# Patient Record
Sex: FEMALE | Race: WHITE | Employment: OTHER | URBAN - METROPOLITAN AREA
[De-identification: names, ages, dates, MRNs, and addresses within clinical notes are randomized per-mention and may not be internally consistent; named-entity substitution may affect disease eponyms.]

---

## 2017-10-17 ENCOUNTER — HOSPITAL ENCOUNTER (OUTPATIENT)
Dept: WOMENS IMAGING | Age: 52
Discharge: HOME | End: 2017-10-17
Attending: OBSTETRICS & GYNECOLOGY | Admitting: OBSTETRICS & GYNECOLOGY

## 2017-10-17 DIAGNOSIS — Z12.31 ENCOUNTER FOR SCREENING MAMMOGRAM FOR BREAST CANCER: ICD-10-CM

## 2017-11-16 ENCOUNTER — HOSPITAL ENCOUNTER (OUTPATIENT)
Dept: WOMENS IMAGING | Age: 52
Discharge: HOME | End: 2017-11-16
Admitting: INTERNAL MEDICINE

## 2017-11-16 DIAGNOSIS — R92.8 ABNORMAL MAMMOGRAM: ICD-10-CM

## 2017-11-16 DIAGNOSIS — R92.8 OTHER ABNORMAL AND INCONCLUSIVE FINDINGS ON DIAGNOSTIC IMAGING OF BREAST: ICD-10-CM

## 2017-11-28 ENCOUNTER — PAT TELEPHONE (OUTPATIENT)
Dept: PREADMISSION TESTING | Age: 52
End: 2017-11-28

## 2017-11-28 VITALS — HEIGHT: 60 IN | WEIGHT: 186 LBS | BODY MASS INDEX: 36.52 KG/M2

## 2017-11-28 ASSESSMENT — PAIN - FUNCTIONAL ASSESSMENT: PAIN_FUNCTIONAL_ASSESSMENT: 0-10

## 2017-11-28 NOTE — PRE-PROCEDURE INSTRUCTIONS
[facility] ([place] time_____1100_______        Surgery time___1245_________    Take the following medications with a sip of water:  Cynbalta, losartan, famotidine    Do not eat or drink anything after 12:00 midnight prior to your surgery. This includes water chewing gum, mints and ice chips. You may brush your teeth and gargle the morning of your surgery, but do not swallow the water     Please see your family doctor/pediatrician for a history and physical and/or concerning medications. Bring any test results/reports from your physicians office. If you are under the care of a heart doctor or specialist doctor, please be aware that you may be asked to them for clearance    You may be asked to stop blood thinners such as Coumadin, Plavix, Fragmin, Lovenox, etc., or any anti-inflammatories such as:  Aspirin, Ibuprofen, Advil, Naproxen prior to your surgery. We also ask that you stop any OTC medications such as fish oil, vitamin E, glucosamine, garlic, Multivitamins, COQ 10, etc.    We ask that you do not smoke 24 hours prior to surgery  We ask that you do not  drink any alcoholic beverages 24 hours prior to surgery     You must make arrangements for a responsible adult to take you home after your surgery. For your safety you will not be allowed to leave alone or drive yourself home. Your surgery will be cancelled if you do not have a ride home. Also for your safety, it is strongly suggested that someone stay with you the first 24 hours after your surgery. A parent or legal guardian must accompany a child scheduled for surgery and plan to stay at the hospital until the child is discharged. Please do not bring other children with you. For your comfort, please wear simple loose fitting clothing to the hospital.  Please do not bring valuables.     Do not wear any make-up or nail polish on your fingers or toes      For your safety, please do not wear any

## 2017-11-28 NOTE — PROGRESS NOTES
C-Difficile admission screening and protocol:     * Admitted with diarrhea? YES____    NO__X___     *Prior history of C-Diff. In last 3 months? YES____   NO__X___     *Antibiotic use in the past 6-8 weeks? NO___X___YES______                 If yes which  ANTIBIOTIC AND REASON______     *Prior hospitalization or nursing home in the last month?  YES____   NO___X_

## 2017-11-30 ENCOUNTER — HOSPITAL ENCOUNTER (OUTPATIENT)
Dept: ENDOSCOPY | Age: 52
Discharge: HOME | End: 2017-11-30
Attending: INTERNAL MEDICINE | Admitting: INTERNAL MEDICINE

## 2017-11-30 VITALS
TEMPERATURE: 97.8 F | HEART RATE: 75 BPM | SYSTOLIC BLOOD PRESSURE: 153 MMHG | DIASTOLIC BLOOD PRESSURE: 95 MMHG | OXYGEN SATURATION: 95 % | RESPIRATION RATE: 16 BRPM

## 2017-11-30 LAB
GLUCOSE BLD-MCNC: 164 MG/DL (ref 70–99)
GLUCOSE BLD-MCNC: 176 MG/DL (ref 70–99)
PERFORMED ON: ABNORMAL
PERFORMED ON: ABNORMAL

## 2017-11-30 RX ORDER — FENTANYL CITRATE 50 UG/ML
25 INJECTION, SOLUTION INTRAMUSCULAR; INTRAVENOUS EVERY 5 MIN PRN
Status: DISCONTINUED | OUTPATIENT
Start: 2017-11-30 | End: 2017-12-01 | Stop reason: HOSPADM

## 2017-11-30 RX ORDER — SODIUM CHLORIDE 0.9 % (FLUSH) 0.9 %
10 SYRINGE (ML) INJECTION EVERY 12 HOURS SCHEDULED
Status: DISCONTINUED | OUTPATIENT
Start: 2017-11-30 | End: 2017-12-01 | Stop reason: HOSPADM

## 2017-11-30 RX ORDER — SODIUM CHLORIDE 0.9 % (FLUSH) 0.9 %
10 SYRINGE (ML) INJECTION PRN
Status: DISCONTINUED | OUTPATIENT
Start: 2017-11-30 | End: 2017-12-01 | Stop reason: HOSPADM

## 2017-11-30 RX ORDER — ONDANSETRON 2 MG/ML
4 INJECTION INTRAMUSCULAR; INTRAVENOUS
Status: ACTIVE | OUTPATIENT
Start: 2017-11-30 | End: 2017-11-30

## 2017-11-30 RX ORDER — PROMETHAZINE HYDROCHLORIDE 25 MG/ML
6.25 INJECTION, SOLUTION INTRAMUSCULAR; INTRAVENOUS
Status: ACTIVE | OUTPATIENT
Start: 2017-11-30 | End: 2017-11-30

## 2017-11-30 RX ORDER — SODIUM CHLORIDE 9 MG/ML
INJECTION, SOLUTION INTRAVENOUS CONTINUOUS
Status: DISCONTINUED | OUTPATIENT
Start: 2017-11-30 | End: 2017-12-01 | Stop reason: HOSPADM

## 2017-11-30 ASSESSMENT — PAIN SCALES - GENERAL: PAINLEVEL_OUTOF10: 0

## 2017-11-30 ASSESSMENT — ENCOUNTER SYMPTOMS: SHORTNESS OF BREATH: 1

## 2017-11-30 ASSESSMENT — LIFESTYLE VARIABLES: SMOKING_STATUS: 0

## 2017-11-30 NOTE — OP NOTE
The prep was {Desc; excellent/good/fair:78688}. The ileocecal valve was identified and intubated. The scope was then withdrawn back through the cecum, ascending, transverse, descending and sigmoid colons. Careful circumferential examination of the mucosa in these areas was performed. The scope was then withdrawn into the rectum and retroflexed. The scope was straightened, the colon was decompressed and the scope was withdrawn from the patient. Findings:  Ileum:  ***  Colon:  ***  Retroflexed view of the rectum: ***    The patient tolerated the procedure well and was taken to Recovery in good condition. No complications. EBL: ***  Specimens taken: ***    Impression:    1. ***      Recommendations:   1. Clear liquid diet, advance as tolerated.   2.  ***    Daysi Olson MD  [address] [facility]

## 2017-11-30 NOTE — ANESTHESIA PRE-OP
[facility] Department of Anesthesiology  Pre-Anesthesia Evaluation/Consultation       Name:  Dejan Qiu  : 1965  Age:  47 y.o.                                            MRN:  [id number]  Date: 2017           Procedure (Scheduled):  colonoscopy  Surgeon:  Dr. Mary Ann Mane     Allergies   Allergen Reactions    Latex Itching and Rash    Tramadol Hives and Shortness Of Breath    Codeine Itching     Patient Active Problem List   Diagnosis    Labral tear of shoulder    Acromioclavicular joint arthritis    Rotator cuff tendonitis    S/P shoulder surgery     Past Medical History:   Diagnosis Date    Arthritis     Depression     Diabetes mellitus ([address]     REHMAN (dyspnea on exertion)     Fatty liver     GERD (gastroesophageal reflux disease)     Hyperlipidemia     Hypertension     Stress incontinence     Thyroid disease     CYST ON THYROID--FOUND 20 YEARS AGO     Past Surgical History:   Procedure Laterality Date     SECTION      TIMES 3    CHOLECYSTECTOMY      EYE SURGERY      muscle correction and eyelid     HERNIA REPAIR      UMBILICAL AREA x 2    HYSTERECTOMY      SHOULDER ARTHROSCOPY Right 5-19-15    Right shoulder arthroscopy labral debridement open [place] subacromial decompression and chrondroplasty     Social History   Substance Use Topics    Smoking status: Former Smoker     Packs/day: 1.00     Years: 30.00     Types: Cigarettes     Quit date:     Smokeless tobacco: Never Used    Alcohol use No     Medications  Current Outpatient Prescriptions on File Prior to [employer]   Medication Sig Dispense Refill    gabapentin (NEURONTIN) 400 MG capsule TAKE 1 CAPSULE BY MOUTH 3 TIMES DAILY 90 capsule 3    losartan (COZAAR) 100 MG tablet TAKE 1 TABLET BY MOUTH DAILY 30 tablet 3    DULoxetine (CYMBALTA) 60 MG extended release capsule TAKE 1 CAPSULE BY MOUTH DAILY 30 capsule 2    MIRALAX powder TAKE 17 G BY MOUTH DAILY (Patient taking differently: TAKE 17 G BY MOUTH NIGHTLY) 510 g 2    Dulaglutide 0.75 MG/0.5ML SOPN Inject 0.75 mg into the skin once a week 5 Pen 3    ondansetron (ZOFRAN) 4 MG tablet Take 1 tablet by mouth daily as needed for Nausea or Vomiting 30 tablet 0    HYDROcodone-acetaminophen (NORCO) 5-325 MG per tablet Take 1 tablet by mouth 2 times daily . 60 tablet 0    diazepam (VALIUM) 5 MG tablet Take 1 tablet by mouth 2 times daily 60 tablet 2    atorvastatin (LIPITOR) 20 MG tablet TAKE 1 TABLET BY MOUTH DAILY 30 tablet 2    HUMALOG 100 UNIT/ML injection vial INJECT 10 UNITS 3 TIMES A DAY 10 mL 2    famotidine (PEPCID) 20 MG tablet TAKE 1 TAB BY MOUTH 2X DAILY 60 tablet 3    cyclobenzaprine (FLEXERIL) 10 MG tablet TAKE 1 TABLET 3 TIMES A DAY (Patient taking differently: TAKE 1 TABLET 3 TIMES A DAY AS NEEDED) 90 tablet 1    linaclotide (LINZESS) 145 MCG capsule Take 1 capsule by mouth every morning (before breakfast) (Patient taking differently: Take 145 mcg by mouth daily as needed ) 30 capsule 1    cetirizine (ZYRTEC ALLERGY) 10 MG tablet Take 1 tablet by mouth daily (Patient taking differently: Take 10 mg by mouth daily as needed ) 10 tablet 0    insulin glargine (LANTUS) 100 UNIT/ML injection Inject 74 Units into the skin nightly        No current facility-administered medications on file prior to encounter.       Current Outpatient Prescriptions   Medication Sig Dispense Refill    gabapentin (NEURONTIN) 400 MG capsule TAKE 1 CAPSULE BY MOUTH 3 TIMES DAILY 90 capsule 3    losartan (COZAAR) 100 MG tablet TAKE 1 TABLET BY MOUTH DAILY 30 tablet 3    DULoxetine (CYMBALTA) 60 MG extended release capsule TAKE 1 CAPSULE BY MOUTH DAILY 30 capsule 2    MIRALAX powder TAKE 17 G BY MOUTH DAILY (Patient taking differently: TAKE 17 G BY MOUTH NIGHTLY) 510 g 2    Dulaglutide 0.75 MG/0.5ML SOPN Inject 0.75 mg into the skin once a week 5 Pen 3    ondansetron (ZOFRAN) 4 MG tablet Take 1 tablet by mouth daily as needed for Nausea or Vomiting 30 tablet 0    HYDROcodone-acetaminophen (NORCO) 5-325 MG per tablet Take 1 tablet by mouth 2 times daily . 60 tablet 0    diazepam (VALIUM) 5 MG tablet Take 1 tablet by mouth 2 times daily 60 tablet 2    atorvastatin (LIPITOR) 20 MG tablet TAKE 1 TABLET BY MOUTH DAILY 30 tablet 2    HUMALOG 100 UNIT/ML injection vial INJECT 10 UNITS 3 TIMES A DAY 10 mL 2    famotidine (PEPCID) 20 MG tablet TAKE 1 TAB BY MOUTH 2X DAILY 60 tablet 3    cyclobenzaprine (FLEXERIL) 10 MG tablet TAKE 1 TABLET 3 TIMES A DAY (Patient taking differently: TAKE 1 TABLET 3 TIMES A DAY AS NEEDED) 90 tablet 1    linaclotide (LINZESS) 145 MCG capsule Take 1 capsule by mouth every morning (before breakfast) (Patient taking differently: Take 145 mcg by mouth daily as needed ) 30 capsule 1    cetirizine (ZYRTEC ALLERGY) 10 MG tablet Take 1 tablet by mouth daily (Patient taking differently: Take 10 mg by mouth daily as needed ) 10 tablet 0    insulin glargine (LANTUS) 100 UNIT/ML injection Inject 74 Units into the skin nightly        Current Facility-Administered Medications   Medication Dose Route Frequency Provider Last Rate Last Dose    sodium chloride flush 0.9 % injection 10 mL  10 mL Intravenous 2 times per day Yann Elias MD        sodium chloride flush 0.9 % injection 10 mL  10 mL Intravenous PRN Yann Elias MD        0.9 % sodium chloride infusion   Intravenous Continuous Yann Elias MD         Vital Signs (Current) There were no vitals filed for this visit. Vital Signs Statistics (for past 48 hrs)     No Data Recorded    BP Readings from Last 3 Encounters:   11/09/17 (!) 160/90   09/27/17 (!) 165/100   07/07/17 (!) 222/90     BMI  There is no height or weight on file to calculate BMI. Estimated body mass index is 36.33 kg/m² as calculated from the following:    Height as of 11/28/17: 5' (1.524 m). Weight as of 11/28/17: 186 lb (84.4 kg).     CBC   Lab Results   Component Value Date    WBC 6.6 01/18/2017    RBC 4.40

## 2017-11-30 NOTE — OP NOTE
the patient's rectum under digital direction and advanced to the cecum. The cecum was identified by characteristic anatomy and ballottment. The prep was good. The ileocecal valve was identified and intubated. The scope was then withdrawn back through the cecum, ascending, transverse, descending and sigmoid colons. Careful circumferential examination of the mucosa in these areas was performed. The scope was then withdrawn into the rectum and retroflexed. The scope was straightened, the colon was decompressed and the scope was withdrawn from the patient. Findings:  Ileum:  Normal.  Colon:  There was a 5mm and a 7mm lipoma in the ascending colon. A 2mm polyp was identified in the ascending colon and removed completely with cold biopsy polypectomy down to a clean base confirmed by post-polypectomy photograph. All polyp tissue sent off for pathologic evaluation. A 3mm polyp was identified in the ascending colon and removed completely with cold biopsy polypectomy down to a clean base confirmed by post-polypectomy photograph. All polyp tissue sent off for pathologic evaluation. A 3mm polyp was identified in the ascending colon and removed completely with cold biopsy polypectomy down to a clean base confirmed by post-polypectomy photograph. All polyp tissue sent off for pathologic evaluation. A 8mm polyp was identified in the distal transverse colon and removed completely with hot snare polypectomy down to a clean base confirmed by post-polypectomy photograph. All polyp tissue sent off for pathologic evaluation. A 7mm polyp was identified in the distal transverse colon and removed completely with hot snare polypectomy down to a clean base confirmed by post-polypectomy photograph. All polyp tissue sent off for pathologic evaluation. A 6mm polyp was identified in the sigmoid colon and removed completely with hot snare polypectomy down to a clean base confirmed by post-polypectomy photograph.   All polyp

## 2017-11-30 NOTE — H&P
[address] [place] [address]   Pre-operative History and Physical    Patient: Lizzy Tran  : 1965  Acct#:     HISTORY OF PRESENT ILLNESS:    The patient is a 47 y.o. female who presents with 2 cm tubulovillous adenoma removed in  here for surveillance colonoscopy. Also gets epigastric abdominal pain, chronic nausea, heartburn without dysphagia or bleeding. Will plan EGD at same time. Past Medical History:        Diagnosis Date    Arthritis     Depression     Diabetes mellitus (HCC)     REHMAN (dyspnea on exertion)     Fatty liver     GERD (gastroesophageal reflux disease)     Hyperlipidemia     Hypertension     Stress incontinence     Thyroid disease     CYST ON THYROID--FOUND 20 YEARS AGO      Past Surgical History:        Procedure Laterality Date     SECTION      TIMES 3    CHOLECYSTECTOMY      EYE SURGERY      muscle correction and eyelid     HERNIA REPAIR      UMBILICAL AREA x 2    HYSTERECTOMY      SHOULDER ARTHROSCOPY Right 5-19-15    Right shoulder arthroscopy labral debridement open [place] subacromial decompression and chrondroplasty      Medications Prior to Admission:   Current Outpatient Prescriptions on File Prior to Encounter   Medication Sig Dispense Refill    gabapentin (NEURONTIN) 400 MG capsule TAKE 1 CAPSULE BY MOUTH 3 TIMES DAILY 90 capsule 3    losartan (COZAAR) 100 MG tablet TAKE 1 TABLET BY MOUTH DAILY 30 tablet 3    DULoxetine (CYMBALTA) 60 MG extended release capsule TAKE 1 CAPSULE BY MOUTH DAILY 30 capsule 2    MIRALAX powder TAKE 17 G BY MOUTH DAILY (Patient taking differently: TAKE 17 G BY MOUTH NIGHTLY) 510 g 2    Dulaglutide 0.75 MG/0.5ML SOPN Inject 0.75 mg into the skin once a week 5 Pen 3    ondansetron (ZOFRAN) 4 MG tablet Take 1 tablet by mouth daily as needed for Nausea or Vomiting 30 tablet 0    HYDROcodone-acetaminophen (NORCO) 5-325 MG per tablet Take 1 tablet by mouth 2 times daily .  60 tablet 0    diazepam (VALIUM) 5 MG tablet Take 1 tablet by mouth 2 times daily 60 tablet 2    atorvastatin (LIPITOR) 20 MG tablet TAKE 1 TABLET BY MOUTH DAILY 30 tablet 2    HUMALOG 100 UNIT/ML injection vial INJECT 10 UNITS 3 TIMES A DAY 10 mL 2    famotidine (PEPCID) 20 MG tablet TAKE 1 TAB BY MOUTH 2X DAILY 60 tablet 3    cyclobenzaprine (FLEXERIL) 10 MG tablet TAKE 1 TABLET 3 TIMES A DAY (Patient taking differently: TAKE 1 TABLET 3 TIMES A DAY AS NEEDED) 90 tablet 1    linaclotide (LINZESS) 145 MCG capsule Take 1 capsule by mouth every morning (before breakfast) (Patient taking differently: Take 145 mcg by mouth daily as needed ) 30 capsule 1    cetirizine (ZYRTEC ALLERGY) 10 MG tablet Take 1 tablet by mouth daily (Patient taking differently: Take 10 mg by mouth daily as needed ) 10 tablet 0    insulin glargine (LANTUS) 100 UNIT/ML injection Inject 74 Units into the skin nightly        No current facility-administered medications on file prior to encounter. Allergies:  Latex; Tramadol; and Codeine    Social History:   Social History     Social History    Marital status:      Spouse name: N/A    Number of children: N/A    Years of education: N/A     Occupational History    Not on file. Social History Main Topics    Smoking status: Former Smoker     Packs/day: 1.00     Years: 30.00     Types: Cigarettes     Quit date: 2015    Smokeless tobacco: Never Used    Alcohol use No    Drug use: No    Sexual activity: No     Other Topics Concern    Not on file     Social History Narrative    No narrative on file      Family History:       Problem Relation Age of Onset    Cancer Mother     Heart Disease Father     Stroke Brother         PHYSICAL EXAM:      LMP 11/30/2005  I        Heart:  RRR    Lungs:  CTA b    Abdomen:  S/NT/ND/+BS      ASSESSMENT AND PLAN:  ASA: per anesthesia  Mallampati: per anesthesia  1. Patient is a 47 y.o. female here for EGD and colonoscopy   2.   Procedure options, risks and benefits

## 2017-11-30 NOTE — ANESTHESIA POST-OP
[facility] Department of Anesthesiology  Post-Anesthesia Note       Name:  Leta Blackmon                                  Age:  47 y.o. MRN:  [id number]     Last Vitals & Oxygen Saturation: BP (!) 153/95   Pulse 75   Temp 97.8 °F (36.6 °C) (Temporal)   Resp 16   LMP 11/30/2005   SpO2 95%   Patient Vitals for the past 4 hrs:   BP Temp Temp src Pulse Resp SpO2   11/30/17 1314 (!) 153/95 - - 75 16 -   11/30/17 1304 (!) 159/78 97.8 °F (36.6 °C) Temporal 77 16 95 %   11/30/17 1257 - - - 77 14 91 %   11/30/17 1255 - - - 77 18 92 %   11/30/17 1250 - - - 77 16 93 %   11/30/17 1240 127/76 97.6 °F (36.4 °C) Temporal 74 14 100 %   11/30/17 1235 129/75 - - 75 15 99 %   11/30/17 1232 - - - 76 17 100 %   11/30/17 1231 122/73 - - 75 19 99 %   11/30/17 1230 - - - 75 17 98 %   11/30/17 1229 - - - 76 18 98 %   11/30/17 1228 - - - 75 17 99 %   11/30/17 1227 - - - 77 18 99 %   11/30/17 1226 114/63 - - 77 17 98 %   11/30/17 1225 - - - 77 19 96 %   11/30/17 1223 114/63 97.8 °F (36.6 °C) Temporal 75 18 97 %       Level of consciousness:  Awake, alert    Respiratory: Respirations easy, no distress. Stable. Cardiovascular: Hemodynamically stable. Hydration: Adequate. PONV: Adequately managed. Post-op pain: Adequately controlled. Post-op assessment: Tolerated anesthetic well without complication. Complications:  None.     Endy Arango MD  November 30, 2017   2:25 PM

## 2018-09-26 DIAGNOSIS — E11.65 TYPE 2 DIABETES MELLITUS WITH HYPERGLYCEMIA, WITH LONG-TERM CURRENT USE OF INSULIN (HCC): ICD-10-CM

## 2018-09-26 DIAGNOSIS — Z79.4 TYPE 2 DIABETES MELLITUS WITH HYPERGLYCEMIA, WITH LONG-TERM CURRENT USE OF INSULIN (HCC): ICD-10-CM

## 2018-09-26 DIAGNOSIS — E13.9 DIABETES 1.5, MANAGED AS TYPE 2 (HCC): ICD-10-CM

## 2018-09-26 LAB
A/G RATIO: 1.7 (ref 1.1–2.2)
ALBUMIN SERPL-MCNC: 4.1 G/DL (ref 3.4–5)
ALP BLD-CCNC: 128 U/L (ref 40–129)
ALT SERPL-CCNC: 41 U/L (ref 10–40)
ANION GAP SERPL CALCULATED.3IONS-SCNC: 12 MMOL/L (ref 3–16)
AST SERPL-CCNC: 20 U/L (ref 15–37)
BILIRUB SERPL-MCNC: 0.5 MG/DL (ref 0–1)
BUN BLDV-MCNC: 13 MG/DL (ref 7–20)
CALCIUM SERPL-MCNC: 9.3 MG/DL (ref 8.3–10.6)
CHLORIDE BLD-SCNC: 98 MMOL/L (ref 99–110)
CHOLESTEROL, TOTAL: 151 MG/DL (ref 0–199)
CO2: 27 MMOL/L (ref 21–32)
CREAT SERPL-MCNC: 0.6 MG/DL (ref 0.6–1.1)
ESTIMATED AVERAGE GLUCOSE: 277.6 MG/DL
GFR AFRICAN AMERICAN: >60
GFR NON-AFRICAN AMERICAN: >60
GLOBULIN: 2.4 G/DL
GLUCOSE BLD-MCNC: 332 MG/DL (ref 70–99)
HBA1C MFR BLD: 11.3 %
HDLC SERPL-MCNC: 25 MG/DL (ref 40–60)
LDL CHOLESTEROL CALCULATED: 86 MG/DL
POTASSIUM SERPL-SCNC: 4.1 MMOL/L (ref 3.5–5.1)
SODIUM BLD-SCNC: 137 MMOL/L (ref 136–145)
TOTAL PROTEIN: 6.5 G/DL (ref 6.4–8.2)
TRIGL SERPL-MCNC: 202 MG/DL (ref 0–150)
TSH SERPL DL<=0.05 MIU/L-ACNC: 2.29 UIU/ML (ref 0.27–4.2)
URIC ACID, SERUM: 4.7 MG/DL (ref 2.6–6)
VLDLC SERPL CALC-MCNC: 40 MG/DL

## 2019-03-19 ENCOUNTER — HOSPITAL ENCOUNTER (OUTPATIENT)
Dept: WOMENS IMAGING | Age: 54
Discharge: HOME | End: 2019-03-19
Payer: COMMERCIAL

## 2019-03-19 DIAGNOSIS — Z12.39 SCREENING BREAST EXAMINATION: ICD-10-CM

## 2019-03-19 PROCEDURE — 77067 SCR MAMMO BI INCL CAD: CPT

## 2019-04-02 DIAGNOSIS — E13.9 DIABETES 1.5, MANAGED AS TYPE 2 (HCC): ICD-10-CM

## 2019-04-02 LAB
A/G RATIO: 1.3 (ref 1.1–2.2)
ALBUMIN SERPL-MCNC: 4.1 G/DL (ref 3.4–5)
ALP BLD-CCNC: 139 U/L (ref 40–129)
ALT SERPL-CCNC: 38 U/L (ref 10–40)
ANION GAP SERPL CALCULATED.3IONS-SCNC: 13 MMOL/L (ref 3–16)
AST SERPL-CCNC: 20 U/L (ref 15–37)
BILIRUB SERPL-MCNC: 0.5 MG/DL (ref 0–1)
BUN BLDV-MCNC: 16 MG/DL (ref 7–20)
CALCIUM SERPL-MCNC: 9.5 MG/DL (ref 8.3–10.6)
CHLORIDE BLD-SCNC: 94 MMOL/L (ref 99–110)
CHOLESTEROL, TOTAL: 184 MG/DL (ref 0–199)
CO2: 27 MMOL/L (ref 21–32)
CREAT SERPL-MCNC: 0.6 MG/DL (ref 0.6–1.1)
ESTIMATED AVERAGE GLUCOSE: 271.9 MG/DL
GFR AFRICAN AMERICAN: >60
GFR NON-AFRICAN AMERICAN: >60
GLOBULIN: 3.2 G/DL
GLUCOSE BLD-MCNC: 387 MG/DL (ref 70–99)
HBA1C MFR BLD: 11.1 %
HDLC SERPL-MCNC: 27 MG/DL (ref 40–60)
LDL CHOLESTEROL CALCULATED: ABNORMAL MG/DL
LDL CHOLESTEROL DIRECT: 116 MG/DL
POTASSIUM SERPL-SCNC: 4.1 MMOL/L (ref 3.5–5.1)
SODIUM BLD-SCNC: 134 MMOL/L (ref 136–145)
TOTAL PROTEIN: 7.3 G/DL (ref 6.4–8.2)
TRIGL SERPL-MCNC: 306 MG/DL (ref 0–150)
TSH SERPL DL<=0.05 MIU/L-ACNC: 4.25 UIU/ML (ref 0.27–4.2)
VLDLC SERPL CALC-MCNC: ABNORMAL MG/DL

## 2019-08-28 ENCOUNTER — HOSPITAL ENCOUNTER (OUTPATIENT)
Dept: ULTRASOUND IMAGING | Age: 54
Discharge: HOME | End: 2019-08-28
Payer: COMMERCIAL

## 2019-08-28 DIAGNOSIS — R10.84 GENERALIZED ABDOMINAL PAIN: ICD-10-CM

## 2019-08-28 PROCEDURE — 76700 US EXAM ABDOM COMPLETE: CPT

## 2019-08-28 PROCEDURE — 76830 TRANSVAGINAL US NON-OB: CPT

## 2019-08-28 PROCEDURE — 76856 US EXAM PELVIC COMPLETE: CPT

## 2019-10-02 DIAGNOSIS — E13.9 DIABETES 1.5, MANAGED AS TYPE 2 (HCC): ICD-10-CM

## 2019-10-02 LAB
A/G RATIO: 1.4 (ref 1.1–2.2)
ALBUMIN SERPL-MCNC: 3.8 G/DL (ref 3.4–5)
ALP BLD-CCNC: 120 U/L (ref 40–129)
ALT SERPL-CCNC: 28 U/L (ref 10–40)
ANION GAP SERPL CALCULATED.3IONS-SCNC: 11 MMOL/L (ref 3–16)
AST SERPL-CCNC: 15 U/L (ref 15–37)
BILIRUB SERPL-MCNC: 0.4 MG/DL (ref 0–1)
BUN BLDV-MCNC: 15 MG/DL (ref 7–20)
CALCIUM SERPL-MCNC: 9.5 MG/DL (ref 8.3–10.6)
CHLORIDE BLD-SCNC: 98 MMOL/L (ref 99–110)
CHOLESTEROL, TOTAL: 146 MG/DL (ref 0–199)
CO2: 28 MMOL/L (ref 21–32)
CREAT SERPL-MCNC: 0.7 MG/DL (ref 0.6–1.1)
GFR AFRICAN AMERICAN: >60
GFR NON-AFRICAN AMERICAN: >60
GLOBULIN: 2.8 G/DL
GLUCOSE BLD-MCNC: 358 MG/DL (ref 70–99)
HDLC SERPL-MCNC: 24 MG/DL (ref 40–60)
LDL CHOLESTEROL CALCULATED: 72 MG/DL
POTASSIUM SERPL-SCNC: 3.9 MMOL/L (ref 3.5–5.1)
SODIUM BLD-SCNC: 137 MMOL/L (ref 136–145)
TOTAL PROTEIN: 6.6 G/DL (ref 6.4–8.2)
TRIGL SERPL-MCNC: 252 MG/DL (ref 0–150)
TSH SERPL DL<=0.05 MIU/L-ACNC: 3.54 UIU/ML (ref 0.27–4.2)
VLDLC SERPL CALC-MCNC: 50 MG/DL

## 2019-10-03 LAB
ESTIMATED AVERAGE GLUCOSE: 266.1 MG/DL
HBA1C MFR BLD: 10.9 %

## 2020-07-05 ENCOUNTER — HOSPITAL ENCOUNTER (EMERGENCY)
Age: 55
Discharge: HOME | End: 2020-07-05
Attending: EMERGENCY MEDICINE
Payer: COMMERCIAL

## 2020-07-05 VITALS
WEIGHT: 182.76 LBS | SYSTOLIC BLOOD PRESSURE: 158 MMHG | HEART RATE: 94 BPM | RESPIRATION RATE: 18 BRPM | OXYGEN SATURATION: 96 % | DIASTOLIC BLOOD PRESSURE: 77 MMHG | BODY MASS INDEX: 35.88 KG/M2 | TEMPERATURE: 99 F | HEIGHT: 60 IN

## 2020-07-05 PROCEDURE — 99282 EMERGENCY DEPT VISIT SF MDM: CPT

## 2020-07-05 PROCEDURE — 6370000000 HC RX 637 (ALT 250 FOR IP): Performed by: EMERGENCY MEDICINE

## 2020-07-05 RX ORDER — AMOXICILLIN 500 MG/1
500 CAPSULE ORAL 3 TIMES DAILY
Qty: 20 CAPSULE | Refills: 0 | Status: SHIPPED | OUTPATIENT
Start: 2020-07-05 | End: 2020-07-09

## 2020-07-05 RX ORDER — AMOXICILLIN 250 MG/1
500 CAPSULE ORAL ONCE
Status: COMPLETED | OUTPATIENT
Start: 2020-07-05 | End: 2020-07-05

## 2020-07-05 RX ADMIN — AMOXICILLIN 500 MG: 250 CAPSULE ORAL at 16:13

## 2020-07-05 ASSESSMENT — PAIN DESCRIPTION - ORIENTATION: ORIENTATION: RIGHT;OUTER

## 2020-07-05 ASSESSMENT — PAIN SCALES - GENERAL
PAINLEVEL_OUTOF10: 6
PAINLEVEL_OUTOF10: 6

## 2020-07-05 ASSESSMENT — PAIN DESCRIPTION - DESCRIPTORS: DESCRIPTORS: BURNING;TINGLING

## 2020-07-05 ASSESSMENT — PAIN DESCRIPTION - FREQUENCY: FREQUENCY: CONTINUOUS

## 2020-07-05 ASSESSMENT — PAIN DESCRIPTION - LOCATION: LOCATION: LEG

## 2020-07-05 ASSESSMENT — PAIN DESCRIPTION - PAIN TYPE: TYPE: ACUTE PAIN

## 2020-07-05 ASSESSMENT — PAIN - FUNCTIONAL ASSESSMENT: PAIN_FUNCTIONAL_ASSESSMENT: 0-10

## 2020-07-05 NOTE — ED NOTES
Pt presents with rash on right outer thigh, three blisters, and tingling that goes around to her inner thigh. Pt noticed it approx 4 days ago. Pt has had shingles in the past and reports that it feels the same. Pt has appt with her PCP coming up soon but states that she was too uncomfortable to wait. No other symptoms.       [address], [facility]  07/05/20 2389

## 2020-07-05 NOTE — ED PROVIDER NOTES
Stroke Brother        Social History     Tobacco Use    Smoking status: Former Smoker     Packs/day: 1.00     Years: 30.00     Pack years: 30.00     Types: Cigarettes     Last attempt to quit: 2015     Years since quittin.5    Smokeless tobacco: Never Used   Substance Use Topics    Alcohol use: No    Drug use: No       No current facility-administered medications for this encounter. Current Outpatient Medications   Medication Sig Dispense Refill    amoxicillin (AMOXIL) 500 MG capsule Take 1 capsule by mouth 3 times daily for 20 doses 20 capsule 0    HYDROcodone-acetaminophen (NORCO) 5-325 MG per tablet Take 1 tablet by mouth 2 times daily for 30 days. 60 tablet 0    diazePAM (VALIUM) 5 MG tablet Take 1 tablet by mouth every 12 hours as needed for Anxiety or Sleep for up to 30 days. 60 tablet 1    insulin glargine (BASAGLAR KWIKPEN) 100 UNIT/ML injection pen INJECT 70 UNITS INTO THE SKIN NIGHTLY 5 pen 5    ezetimibe (ZETIA) 10 MG tablet TAKE 1 TABLET BY MOUTH DAILY.  30 tablet 3    albuterol sulfate  (90 Base) MCG/ACT inhaler 2 PUFFS INHALED INTO LUNGS EVERY 4-6HRS WHILE AWAKE FOR 7-10 DYAS THEN AS NEEDED WHEEZING/COUGH/EMELI 18 Inhaler 1    famotidine (PEPCID) 20 MG tablet TAKE 1 TAB BY MOUTH 2X DAILY 60 tablet 3    DULoxetine (CYMBALTA) 60 MG extended release capsule TAKE 1 CAPSULE BY MOUTH DAILY 30 capsule 2    losartan (COZAAR) 100 MG tablet TAKE 1 TABLET BY MOUTH DAILY 30 tablet 3    MIRALAX powder TAKE 17 G BY MOUTH DAILY 510 g 2    ADMELOG 100 UNIT/ML injection vial INJECT 10 UNITS 3 TIMES A DAY 10 mL 2    FREESTYLE LITE strip TEST THREE TIMES DAILY 100 strip 5    BD INSULIN SYRINGE U/F 31G X 5/16\" 1 ML MISC USE AT BEDTIME WITH LANTUS 100 each 2    BD INSULIN SYRINGE ULTRAFINE 31G X 5/16\" 0.3 ML MISC USE 4 TIMES A DAY 1 each 3       Allergies   Allergen Reactions    Latex Itching and Rash    Tramadol Hives and Shortness Of Breath    Codeine Itching    Penicillins OCCUPATIONAL HX:  She is retired. REVIEW OF SYSTEMS  10 systems reviewed, pertinent positives per HPI otherwise noted to be negative    PHYSICAL EXAM  BP (!) 158/77   Pulse 94   Temp 99 °F (37.2 °C) (Oral)   Resp 18   Ht 5' (1.524 m)   Wt 182 lb 12.2 oz (82.9 kg)   LMP 11/30/2005   SpO2 96%   BMI 35.69 kg/m²   GENERAL APPEARANCE: Awake and alert. Cooperative. No acute distress. HEAD: Normocephalic. Atraumatic. EYES: EOM's grossly intact. ENT: Mucous membranes are moist.   NECK: Supple. Normal ROM. CHEST: Equal symmetric chest rise. LUNGS: Breathing is unlabored. Speaking comfortably in full sentences. HEART:  Regular rhythm. ABDOMEN: Nondistended. No masses. EXTREMITIES: Moves actively. No acute deformities. SKIN: Warm and dry. She has 3 spots raised red inflamed with some cellulitis on her lateral thigh below her greater trochanter. She complains of pain localized in that area but I do not feel any exudates and there is no signs of trauma. There is no pustules. She then complains of numbness that wraps down from the lateral aspect of her thigh across the lower thigh to the medial thigh with some tingling sensation. This would correspond to a dermatome. I do not see any other rash at the present time. NEUROLOGICAL: Alert and oriented. Strength is 5/5 in all extremities and sensation is intact. LABS  No results found for this visit on 07/05/20. RADIOLOGY  No orders to display         ED COURSE/MDM  Patient seen and evaluated. Patient was evaluated and found to have a rash which could either be insect bites or shingles. At this point the patient wants some antibiotics. She is able to take care of the pain with over-the-counter pain medicines. She has a primary physician to follow-up with. I considered most likely the diagnosis of shingles because of her dermatomal distribution of pain and then numbness and tingling.       Patient was given:   Medications amoxicillin (AMOXIL) capsule 500 mg (500 mg Oral Given 7/5/20 1613)        Patient was given scripts for the following medications. I counseled patient how to take these medications. New Prescriptions    AMOXICILLIN (AMOXIL) 500 MG CAPSULE    Take 1 capsule by mouth 3 times daily for 20 doses       PATIENT REFERRED TO:  Rissa Rizvi MD  [place] [address]  [phone]    In 2 days  If symptoms worsen       CLINICAL IMPRESSION  1. Rash and other nonspecific skin eruption    2. Herpes zoster without complication        Blood pressure (!) 158/77, pulse 94, temperature 99 °F (37.2 °C), temperature source Oral, resp. rate 18, height 5' (1.524 m), weight 182 lb 12.2 oz (82.9 kg), last menstrual period 11/30/2005, SpO2 96 %. DISPOSITION Decision To Discharge 07/05/2020 04:13:53 PM      Comment: Please note this report has been produced using speech recognition software and may contain errors related to that system including errors in grammar, punctuation, and spelling, as well as words and phrases that may be inappropriate. If there are any questions or concerns please feel free to contact the dictating provider for clarification.         Elizabeth Delgado MD  07/05/20 Ollie Harris MD  07/05/20 0418

## 2020-07-06 DIAGNOSIS — E13.9 DIABETES 1.5, MANAGED AS TYPE 2 (HCC): ICD-10-CM

## 2020-07-06 LAB
A/G RATIO: 1.3 (ref 1.1–2.2)
ALBUMIN SERPL-MCNC: 3.9 G/DL (ref 3.4–5)
ALP BLD-CCNC: 105 U/L (ref 40–129)
ALT SERPL-CCNC: 20 U/L (ref 10–40)
ANION GAP SERPL CALCULATED.3IONS-SCNC: 9 MMOL/L (ref 3–16)
AST SERPL-CCNC: 14 U/L (ref 15–37)
BASOPHILS ABSOLUTE: 0.1 K/UL (ref 0–0.2)
BASOPHILS RELATIVE PERCENT: 1 %
BILIRUB SERPL-MCNC: 0.7 MG/DL (ref 0–1)
BUN BLDV-MCNC: 11 MG/DL (ref 7–20)
CALCIUM SERPL-MCNC: 8.8 MG/DL (ref 8.3–10.6)
CHLORIDE BLD-SCNC: 102 MMOL/L (ref 99–110)
CHOLESTEROL, TOTAL: 163 MG/DL (ref 0–199)
CO2: 29 MMOL/L (ref 21–32)
CREAT SERPL-MCNC: 0.7 MG/DL (ref 0.6–1.1)
EOSINOPHILS ABSOLUTE: 0.1 K/UL (ref 0–0.6)
EOSINOPHILS RELATIVE PERCENT: 1.4 %
GFR AFRICAN AMERICAN: >60
GFR NON-AFRICAN AMERICAN: >60
GLOBULIN: 3 G/DL
GLUCOSE BLD-MCNC: 367 MG/DL (ref 70–99)
HCT VFR BLD CALC: 39.6 % (ref 36–48)
HDLC SERPL-MCNC: 26 MG/DL (ref 40–60)
HEMOGLOBIN: 13.2 G/DL (ref 12–16)
LDL CHOLESTEROL CALCULATED: 98 MG/DL
LYMPHOCYTES ABSOLUTE: 1.4 K/UL (ref 1–5.1)
LYMPHOCYTES RELATIVE PERCENT: 25.5 %
MCH RBC QN AUTO: 28.9 PG (ref 26–34)
MCHC RBC AUTO-ENTMCNC: 33.3 G/DL (ref 31–36)
MCV RBC AUTO: 86.6 FL (ref 80–100)
MONOCYTES ABSOLUTE: 0.4 K/UL (ref 0–1.3)
MONOCYTES RELATIVE PERCENT: 7.5 %
NEUTROPHILS ABSOLUTE: 3.5 K/UL (ref 1.7–7.7)
NEUTROPHILS RELATIVE PERCENT: 64.6 %
PDW BLD-RTO: 15.1 % (ref 12.4–15.4)
PLATELET # BLD: 147 K/UL (ref 135–450)
PMV BLD AUTO: 9.1 FL (ref 5–10.5)
POTASSIUM SERPL-SCNC: 4.2 MMOL/L (ref 3.5–5.1)
RBC # BLD: 4.57 M/UL (ref 4–5.2)
SODIUM BLD-SCNC: 140 MMOL/L (ref 136–145)
TOTAL PROTEIN: 6.9 G/DL (ref 6.4–8.2)
TRIGL SERPL-MCNC: 195 MG/DL (ref 0–150)
VLDLC SERPL CALC-MCNC: 39 MG/DL
WBC # BLD: 5.4 K/UL (ref 4–11)

## 2020-07-07 LAB
ESTIMATED AVERAGE GLUCOSE: 205.9 MG/DL
HBA1C MFR BLD: 8.8 %

## 2020-10-05 DIAGNOSIS — E13.9 DIABETES 1.5, MANAGED AS TYPE 2 (HCC): ICD-10-CM

## 2020-10-05 LAB
A/G RATIO: 1.3 (ref 1.1–2.2)
ALBUMIN SERPL-MCNC: 3.9 G/DL (ref 3.4–5)
ALP BLD-CCNC: 124 U/L (ref 40–129)
ALT SERPL-CCNC: 21 U/L (ref 10–40)
ANION GAP SERPL CALCULATED.3IONS-SCNC: 11 MMOL/L (ref 3–16)
AST SERPL-CCNC: 23 U/L (ref 15–37)
BILIRUB SERPL-MCNC: 0.5 MG/DL (ref 0–1)
BUN BLDV-MCNC: 13 MG/DL (ref 7–20)
CALCIUM SERPL-MCNC: 9.2 MG/DL (ref 8.3–10.6)
CHLORIDE BLD-SCNC: 103 MMOL/L (ref 99–110)
CHOLESTEROL, TOTAL: 194 MG/DL (ref 0–199)
CO2: 26 MMOL/L (ref 21–32)
CREAT SERPL-MCNC: 0.7 MG/DL (ref 0.6–1.1)
GFR AFRICAN AMERICAN: >60
GFR NON-AFRICAN AMERICAN: >60
GLOBULIN: 2.9 G/DL
GLUCOSE BLD-MCNC: 203 MG/DL (ref 70–99)
HDLC SERPL-MCNC: 26 MG/DL (ref 40–60)
LDL CHOLESTEROL CALCULATED: 129 MG/DL
POTASSIUM SERPL-SCNC: 4 MMOL/L (ref 3.5–5.1)
SODIUM BLD-SCNC: 140 MMOL/L (ref 136–145)
TOTAL PROTEIN: 6.8 G/DL (ref 6.4–8.2)
TRIGL SERPL-MCNC: 196 MG/DL (ref 0–150)
TSH SERPL DL<=0.05 MIU/L-ACNC: 3.8 UIU/ML (ref 0.27–4.2)
VLDLC SERPL CALC-MCNC: 39 MG/DL

## 2020-10-06 ENCOUNTER — HOSPITAL ENCOUNTER (INPATIENT)
Age: 55
LOS: 2 days | Discharge: HOME | DRG: 174 | End: 2020-10-08
Attending: EMERGENCY MEDICINE | Admitting: INTERNAL MEDICINE
Payer: COMMERCIAL

## 2020-10-06 ENCOUNTER — APPOINTMENT (OUTPATIENT)
Dept: GENERAL RADIOLOGY | Age: 55
DRG: 174 | End: 2020-10-06
Payer: COMMERCIAL

## 2020-10-06 PROBLEM — I21.3 STEMI (ST ELEVATION MYOCARDIAL INFARCTION) (HCC): Status: ACTIVE | Noted: 2020-10-06

## 2020-10-06 LAB
ANION GAP SERPL CALCULATED.3IONS-SCNC: 11 MMOL/L (ref 3–16)
APTT: 38.7 SEC (ref 24.2–36.2)
BASOPHILS ABSOLUTE: 0 K/UL (ref 0–0.2)
BASOPHILS RELATIVE PERCENT: 0.4 %
BUN BLDV-MCNC: 14 MG/DL (ref 7–20)
CALCIUM SERPL-MCNC: 9.4 MG/DL (ref 8.3–10.6)
CHLORIDE BLD-SCNC: 100 MMOL/L (ref 99–110)
CO2: 26 MMOL/L (ref 21–32)
CREAT SERPL-MCNC: 0.7 MG/DL (ref 0.6–1.1)
EKG ATRIAL RATE: 97 BPM
EKG DIAGNOSIS: NORMAL
EKG P AXIS: 60 DEGREES
EKG P-R INTERVAL: 146 MS
EKG Q-T INTERVAL: 402 MS
EKG QRS DURATION: 146 MS
EKG QTC CALCULATION (BAZETT): 510 MS
EKG R AXIS: 69 DEGREES
EKG T AXIS: 47 DEGREES
EKG VENTRICULAR RATE: 97 BPM
EOSINOPHILS ABSOLUTE: 0.1 K/UL (ref 0–0.6)
EOSINOPHILS RELATIVE PERCENT: 0.9 %
ESTIMATED AVERAGE GLUCOSE: 197.3 MG/DL
GFR AFRICAN AMERICAN: >60
GFR NON-AFRICAN AMERICAN: >60
GLUCOSE BLD-MCNC: 329 MG/DL (ref 70–99)
GLUCOSE BLD-MCNC: 340 MG/DL (ref 70–99)
HBA1C MFR BLD: 8.5 %
HCT VFR BLD CALC: 36.3 % (ref 36–48)
HCT VFR BLD CALC: 43.6 % (ref 36–48)
HEMOGLOBIN: 12.3 G/DL (ref 12–16)
HEMOGLOBIN: 14.7 G/DL (ref 12–16)
INR BLD: 1.02 (ref 0.86–1.14)
LYMPHOCYTES ABSOLUTE: 2.3 K/UL (ref 1–5.1)
LYMPHOCYTES RELATIVE PERCENT: 19.3 %
MCH RBC QN AUTO: 28.6 PG (ref 26–34)
MCH RBC QN AUTO: 29.1 PG (ref 26–34)
MCHC RBC AUTO-ENTMCNC: 33.7 G/DL (ref 31–36)
MCHC RBC AUTO-ENTMCNC: 33.9 G/DL (ref 31–36)
MCV RBC AUTO: 84.8 FL (ref 80–100)
MCV RBC AUTO: 86 FL (ref 80–100)
MONOCYTES ABSOLUTE: 0.7 K/UL (ref 0–1.3)
MONOCYTES RELATIVE PERCENT: 5.6 %
NEUTROPHILS ABSOLUTE: 8.6 K/UL (ref 1.7–7.7)
NEUTROPHILS RELATIVE PERCENT: 73.8 %
PDW BLD-RTO: 14.1 % (ref 12.4–15.4)
PDW BLD-RTO: 15.1 % (ref 12.4–15.4)
PERFORMED ON: ABNORMAL
PLATELET # BLD: 199 K/UL (ref 135–450)
PLATELET # BLD: 209 K/UL (ref 135–450)
PMV BLD AUTO: 8.6 FL (ref 5–10.5)
PMV BLD AUTO: 8.6 FL (ref 5–10.5)
POC ACT LR: 352 SEC
POTASSIUM REFLEX MAGNESIUM: 4.4 MMOL/L (ref 3.5–5.1)
PROTHROMBIN TIME: 11.8 SEC (ref 10–13.2)
RBC # BLD: 4.23 M/UL (ref 4–5.2)
RBC # BLD: 5.14 M/UL (ref 4–5.2)
SODIUM BLD-SCNC: 137 MMOL/L (ref 136–145)
TROPONIN: 0.1 NG/ML
WBC # BLD: 10.1 K/UL (ref 4–11)
WBC # BLD: 11.7 K/UL (ref 4–11)

## 2020-10-06 PROCEDURE — 6370000000 HC RX 637 (ALT 250 FOR IP): Performed by: EMERGENCY MEDICINE

## 2020-10-06 PROCEDURE — B2151ZZ FLUOROSCOPY OF LEFT HEART USING LOW OSMOLAR CONTRAST: ICD-10-PCS | Performed by: INTERNAL MEDICINE

## 2020-10-06 PROCEDURE — 99152 MOD SED SAME PHYS/QHP 5/>YRS: CPT | Performed by: INTERNAL MEDICINE

## 2020-10-06 PROCEDURE — 6360000002 HC RX W HCPCS

## 2020-10-06 PROCEDURE — 2580000003 HC RX 258: Performed by: INTERNAL MEDICINE

## 2020-10-06 PROCEDURE — 99153 MOD SED SAME PHYS/QHP EA: CPT

## 2020-10-06 PROCEDURE — 93005 ELECTROCARDIOGRAM TRACING: CPT | Performed by: EMERGENCY MEDICINE

## 2020-10-06 PROCEDURE — 99152 MOD SED SAME PHYS/QHP 5/>YRS: CPT

## 2020-10-06 PROCEDURE — 85730 THROMBOPLASTIN TIME PARTIAL: CPT

## 2020-10-06 PROCEDURE — 6360000004 HC RX CONTRAST MEDICATION: Performed by: INTERNAL MEDICINE

## 2020-10-06 PROCEDURE — 92941 PRQ TRLML REVSC TOT OCCL AMI: CPT

## 2020-10-06 PROCEDURE — 6370000000 HC RX 637 (ALT 250 FOR IP): Performed by: INTERNAL MEDICINE

## 2020-10-06 PROCEDURE — 93005 ELECTROCARDIOGRAM TRACING: CPT | Performed by: INTERNAL MEDICINE

## 2020-10-06 PROCEDURE — 93458 L HRT ARTERY/VENTRICLE ANGIO: CPT | Performed by: INTERNAL MEDICINE

## 2020-10-06 PROCEDURE — 71045 X-RAY EXAM CHEST 1 VIEW: CPT

## 2020-10-06 PROCEDURE — C1874 STENT, COATED/COV W/DEL SYS: HCPCS

## 2020-10-06 PROCEDURE — 6360000002 HC RX W HCPCS: Performed by: INTERNAL MEDICINE

## 2020-10-06 PROCEDURE — 2500000003 HC RX 250 WO HCPCS

## 2020-10-06 PROCEDURE — 83036 HEMOGLOBIN GLYCOSYLATED A1C: CPT

## 2020-10-06 PROCEDURE — 96374 THER/PROPH/DIAG INJ IV PUSH: CPT

## 2020-10-06 PROCEDURE — 92941 PRQ TRLML REVSC TOT OCCL AMI: CPT | Performed by: INTERNAL MEDICINE

## 2020-10-06 PROCEDURE — 6360000002 HC RX W HCPCS: Performed by: EMERGENCY MEDICINE

## 2020-10-06 PROCEDURE — 85027 COMPLETE CBC AUTOMATED: CPT

## 2020-10-06 PROCEDURE — C1769 GUIDE WIRE: HCPCS

## 2020-10-06 PROCEDURE — 84484 ASSAY OF TROPONIN QUANT: CPT

## 2020-10-06 PROCEDURE — B2111ZZ FLUOROSCOPY OF MULTIPLE CORONARY ARTERIES USING LOW OSMOLAR CONTRAST: ICD-10-PCS | Performed by: INTERNAL MEDICINE

## 2020-10-06 PROCEDURE — 2709999900 HC NON-CHARGEABLE SUPPLY

## 2020-10-06 PROCEDURE — 92928 PRQ TCAT PLMT NTRAC ST 1 LES: CPT

## 2020-10-06 PROCEDURE — 36415 COLL VENOUS BLD VENIPUNCTURE: CPT

## 2020-10-06 PROCEDURE — 93458 L HRT ARTERY/VENTRICLE ANGIO: CPT

## 2020-10-06 PROCEDURE — C1894 INTRO/SHEATH, NON-LASER: HCPCS

## 2020-10-06 PROCEDURE — 92928 PRQ TCAT PLMT NTRAC ST 1 LES: CPT | Performed by: INTERNAL MEDICINE

## 2020-10-06 PROCEDURE — 93010 ELECTROCARDIOGRAM REPORT: CPT | Performed by: INTERNAL MEDICINE

## 2020-10-06 PROCEDURE — C1887 CATHETER, GUIDING: HCPCS

## 2020-10-06 PROCEDURE — C1725 CATH, TRANSLUMIN NON-LASER: HCPCS

## 2020-10-06 PROCEDURE — 80048 BASIC METABOLIC PNL TOTAL CA: CPT

## 2020-10-06 PROCEDURE — 99285 EMERGENCY DEPT VISIT HI MDM: CPT

## 2020-10-06 PROCEDURE — 85610 PROTHROMBIN TIME: CPT

## 2020-10-06 PROCEDURE — 85025 COMPLETE CBC W/AUTO DIFF WBC: CPT

## 2020-10-06 PROCEDURE — 2100000000 HC CCU R&B

## 2020-10-06 PROCEDURE — 85347 COAGULATION TIME ACTIVATED: CPT

## 2020-10-06 PROCEDURE — 4A023N7 MEASUREMENT OF CARDIAC SAMPLING AND PRESSURE, LEFT HEART, PERCUTANEOUS APPROACH: ICD-10-PCS | Performed by: INTERNAL MEDICINE

## 2020-10-06 PROCEDURE — 027136Z DILATION OF CORONARY ARTERY, TWO ARTERIES WITH THREE DRUG-ELUTING INTRALUMINAL DEVICES, PERCUTANEOUS APPROACH: ICD-10-PCS | Performed by: INTERNAL MEDICINE

## 2020-10-06 RX ORDER — ASPIRIN 81 MG/1
81 TABLET, CHEWABLE ORAL DAILY
Status: DISCONTINUED | OUTPATIENT
Start: 2020-10-07 | End: 2020-10-08 | Stop reason: HOSPADM

## 2020-10-06 RX ORDER — ONDANSETRON 2 MG/ML
4 INJECTION INTRAMUSCULAR; INTRAVENOUS EVERY 6 HOURS PRN
Status: DISCONTINUED | OUTPATIENT
Start: 2020-10-06 | End: 2020-10-08 | Stop reason: HOSPADM

## 2020-10-06 RX ORDER — HEPARIN SODIUM AND DEXTROSE 10000; 5 [USP'U]/100ML; G/100ML
12 INJECTION INTRAVENOUS CONTINUOUS
Status: DISCONTINUED | OUTPATIENT
Start: 2020-10-06 | End: 2020-10-06

## 2020-10-06 RX ORDER — DIAZEPAM 5 MG/1
5 TABLET ORAL EVERY 6 HOURS PRN
Status: DISCONTINUED | OUTPATIENT
Start: 2020-10-06 | End: 2020-10-08 | Stop reason: HOSPADM

## 2020-10-06 RX ORDER — DEXTROSE MONOHYDRATE 50 MG/ML
100 INJECTION, SOLUTION INTRAVENOUS PRN
Status: DISCONTINUED | OUTPATIENT
Start: 2020-10-06 | End: 2020-10-07

## 2020-10-06 RX ORDER — HYDRALAZINE HYDROCHLORIDE 10 MG/1
10 TABLET, FILM COATED ORAL EVERY 6 HOURS PRN
Status: DISCONTINUED | OUTPATIENT
Start: 2020-10-06 | End: 2020-10-08 | Stop reason: HOSPADM

## 2020-10-06 RX ORDER — HYDROCODONE BITARTRATE AND ACETAMINOPHEN 5; 325 MG/1; MG/1
1 TABLET ORAL 2 TIMES DAILY
Status: DISCONTINUED | OUTPATIENT
Start: 2020-10-06 | End: 2020-10-08 | Stop reason: HOSPADM

## 2020-10-06 RX ORDER — ATROPINE SULFATE 0.4 MG/ML
0.5 AMPUL (ML) INJECTION
Status: ACTIVE | OUTPATIENT
Start: 2020-10-06 | End: 2020-10-06

## 2020-10-06 RX ORDER — DULOXETIN HYDROCHLORIDE 60 MG/1
60 CAPSULE, DELAYED RELEASE ORAL DAILY
Status: DISCONTINUED | OUTPATIENT
Start: 2020-10-06 | End: 2020-10-08 | Stop reason: HOSPADM

## 2020-10-06 RX ORDER — DIAZEPAM 5 MG/1
5 TABLET ORAL EVERY 6 HOURS PRN
COMMUNITY
End: 2020-10-27

## 2020-10-06 RX ORDER — DEXTROSE MONOHYDRATE 25 G/50ML
12.5 INJECTION, SOLUTION INTRAVENOUS PRN
Status: DISCONTINUED | OUTPATIENT
Start: 2020-10-06 | End: 2020-10-07

## 2020-10-06 RX ORDER — HEPARIN SODIUM 1000 [USP'U]/ML
60 INJECTION, SOLUTION INTRAVENOUS; SUBCUTANEOUS ONCE
Status: COMPLETED | OUTPATIENT
Start: 2020-10-06 | End: 2020-10-06

## 2020-10-06 RX ORDER — CARVEDILOL 3.12 MG/1
3.12 TABLET ORAL 2 TIMES DAILY WITH MEALS
Status: DISCONTINUED | OUTPATIENT
Start: 2020-10-06 | End: 2020-10-08 | Stop reason: HOSPADM

## 2020-10-06 RX ORDER — SODIUM CHLORIDE 0.9 % (FLUSH) 0.9 %
10 SYRINGE (ML) INJECTION EVERY 12 HOURS SCHEDULED
Status: DISCONTINUED | OUTPATIENT
Start: 2020-10-06 | End: 2020-10-08 | Stop reason: HOSPADM

## 2020-10-06 RX ORDER — NICOTINE POLACRILEX 4 MG
15 LOZENGE BUCCAL PRN
Status: DISCONTINUED | OUTPATIENT
Start: 2020-10-06 | End: 2020-10-07

## 2020-10-06 RX ORDER — ACETAMINOPHEN 325 MG/1
650 TABLET ORAL EVERY 4 HOURS PRN
Status: DISCONTINUED | OUTPATIENT
Start: 2020-10-06 | End: 2020-10-08 | Stop reason: HOSPADM

## 2020-10-06 RX ORDER — INSULIN GLARGINE 100 [IU]/ML
70 INJECTION, SOLUTION SUBCUTANEOUS NIGHTLY
Status: DISCONTINUED | OUTPATIENT
Start: 2020-10-06 | End: 2020-10-07

## 2020-10-06 RX ORDER — FAMOTIDINE 20 MG/1
20 TABLET, FILM COATED ORAL 2 TIMES DAILY
Status: DISCONTINUED | OUTPATIENT
Start: 2020-10-06 | End: 2020-10-08 | Stop reason: HOSPADM

## 2020-10-06 RX ORDER — SODIUM CHLORIDE 0.9 % (FLUSH) 0.9 %
10 SYRINGE (ML) INJECTION PRN
Status: DISCONTINUED | OUTPATIENT
Start: 2020-10-06 | End: 2020-10-08 | Stop reason: HOSPADM

## 2020-10-06 RX ORDER — MORPHINE SULFATE 2 MG/ML
2 INJECTION, SOLUTION INTRAMUSCULAR; INTRAVENOUS
Status: COMPLETED | OUTPATIENT
Start: 2020-10-06 | End: 2020-10-06

## 2020-10-06 RX ORDER — ATORVASTATIN CALCIUM 40 MG/1
40 TABLET, FILM COATED ORAL NIGHTLY
Status: DISCONTINUED | OUTPATIENT
Start: 2020-10-06 | End: 2020-10-08 | Stop reason: HOSPADM

## 2020-10-06 RX ORDER — HEPARIN SODIUM 1000 [USP'U]/ML
60 INJECTION, SOLUTION INTRAVENOUS; SUBCUTANEOUS PRN
Status: DISCONTINUED | OUTPATIENT
Start: 2020-10-06 | End: 2020-10-06

## 2020-10-06 RX ORDER — HYDRALAZINE HYDROCHLORIDE 20 MG/ML
10 INJECTION INTRAMUSCULAR; INTRAVENOUS
Status: DISCONTINUED | OUTPATIENT
Start: 2020-10-06 | End: 2020-10-06

## 2020-10-06 RX ORDER — ASPIRIN 81 MG/1
324 TABLET, CHEWABLE ORAL ONCE
Status: COMPLETED | OUTPATIENT
Start: 2020-10-06 | End: 2020-10-06

## 2020-10-06 RX ORDER — ALBUTEROL SULFATE 2.5 MG/3ML
2.5 SOLUTION RESPIRATORY (INHALATION) EVERY 4 HOURS PRN
Status: DISCONTINUED | OUTPATIENT
Start: 2020-10-06 | End: 2020-10-08 | Stop reason: HOSPADM

## 2020-10-06 RX ORDER — POLYETHYLENE GLYCOL 3350 17 G/17G
17 POWDER, FOR SOLUTION ORAL DAILY PRN
Status: DISCONTINUED | OUTPATIENT
Start: 2020-10-07 | End: 2020-10-08 | Stop reason: HOSPADM

## 2020-10-06 RX ORDER — LOSARTAN POTASSIUM 100 MG/1
100 TABLET ORAL DAILY
Status: DISCONTINUED | OUTPATIENT
Start: 2020-10-06 | End: 2020-10-08 | Stop reason: HOSPADM

## 2020-10-06 RX ORDER — 0.9 % SODIUM CHLORIDE 0.9 %
250 INTRAVENOUS SOLUTION INTRAVENOUS PRN
Status: DISCONTINUED | OUTPATIENT
Start: 2020-10-06 | End: 2020-10-08 | Stop reason: HOSPADM

## 2020-10-06 RX ADMIN — FAMOTIDINE 20 MG: 20 TABLET, FILM COATED ORAL at 21:02

## 2020-10-06 RX ADMIN — CARVEDILOL 3.12 MG: 3.12 TABLET, FILM COATED ORAL at 21:02

## 2020-10-06 RX ADMIN — DULOXETINE HYDROCHLORIDE 60 MG: 60 CAPSULE, DELAYED RELEASE ORAL at 21:02

## 2020-10-06 RX ADMIN — HYDROCODONE BITARTRATE AND ACETAMINOPHEN 1 TABLET: 5; 325 TABLET ORAL at 21:02

## 2020-10-06 RX ADMIN — ATORVASTATIN CALCIUM 40 MG: 40 TABLET, FILM COATED ORAL at 21:02

## 2020-10-06 RX ADMIN — MORPHINE SULFATE 2 MG: 2 INJECTION, SOLUTION INTRAMUSCULAR; INTRAVENOUS at 19:01

## 2020-10-06 RX ADMIN — TICAGRELOR 90 MG: 90 TABLET ORAL at 21:02

## 2020-10-06 RX ADMIN — ASPIRIN 81 MG 324 MG: 81 TABLET ORAL at 14:14

## 2020-10-06 RX ADMIN — IOPAMIDOL 146 ML: 755 INJECTION, SOLUTION INTRAVENOUS at 16:03

## 2020-10-06 RX ADMIN — HEPARIN SODIUM 4990 UNITS: 1000 INJECTION INTRAVENOUS; SUBCUTANEOUS at 14:23

## 2020-10-06 RX ADMIN — Medication 10 ML: at 21:03

## 2020-10-06 RX ADMIN — INSULIN LISPRO 2 UNITS: 100 INJECTION, SOLUTION INTRAVENOUS; SUBCUTANEOUS at 21:15

## 2020-10-06 RX ADMIN — INSULIN GLARGINE 70 UNITS: 100 INJECTION, SOLUTION SUBCUTANEOUS at 21:15

## 2020-10-06 ASSESSMENT — PAIN DESCRIPTION - ORIENTATION
ORIENTATION: LEFT
ORIENTATION: LEFT
ORIENTATION: MID

## 2020-10-06 ASSESSMENT — PAIN SCALES - GENERAL
PAINLEVEL_OUTOF10: 2
PAINLEVEL_OUTOF10: 7
PAINLEVEL_OUTOF10: 1
PAINLEVEL_OUTOF10: 7
PAINLEVEL_OUTOF10: 4
PAINLEVEL_OUTOF10: 1

## 2020-10-06 ASSESSMENT — PAIN DESCRIPTION - DESCRIPTORS
DESCRIPTORS: SHARP;PRESSURE
DESCRIPTORS: SORE
DESCRIPTORS: SORE

## 2020-10-06 ASSESSMENT — PAIN DESCRIPTION - FREQUENCY: FREQUENCY: CONTINUOUS

## 2020-10-06 ASSESSMENT — PAIN DESCRIPTION - LOCATION
LOCATION: OTHER (COMMENT)
LOCATION: CHEST

## 2020-10-06 ASSESSMENT — PAIN DESCRIPTION - PAIN TYPE
TYPE: ACUTE PAIN

## 2020-10-06 ASSESSMENT — PAIN - FUNCTIONAL ASSESSMENT
PAIN_FUNCTIONAL_ASSESSMENT: ACTIVITIES ARE NOT PREVENTED
PAIN_FUNCTIONAL_ASSESSMENT: ACTIVITIES ARE NOT PREVENTED

## 2020-10-06 NOTE — ED NOTES
Report called to Lorenza hennessy, RN, [facility]. Awaiting arrival of [facility]. Last food intake was 2300 last night.      Alena Gitelman, RN  10/06/20 4531

## 2020-10-06 NOTE — H&P
[address] [facility]  1965        CC: Chest Pain    HPI:  The patient is 63 y.o. female with a past medical history significant for type 2 DM and essential hypertension who presented to the [facility] ED with chest pain. She stated that it began this morning and was dull in nature. It lasted several hours before presenting to the ED     On presentation, she is anxious and complains of 7 out of 10 chest pain. She denies any shortness of breath or nausea. Review of Systems:  Constitutional: No fatigue, weakness, night sweats or fever. HEENT: No new vision difficulties or ringing in the ears. Respiratory: No new SOB, PND, orthopnea or cough. Cardiovascular: See HPI   GI: No n/v, diarrhea, constipation, abdominal pain or changes in bowel habits. No melena, no hematochezia  : No urinary frequency, urgency, incontinence, hematuria or dysuria. Skin: No cyanosis or skin lesions. Musculoskeletal: No new muscle or joint pain. Neurological: No syncope or TIA-like symptoms.   Psychiatric: No anxiety, insomnia or depression     Past Medical History:   Diagnosis Date    Arthritis     Back pain     Depression     Diabetes mellitus ([address]     REHMAN (dyspnea on exertion)     Fatty liver     GERD (gastroesophageal reflux disease)     Hyperlipidemia     Hypertension     MRSA (methicillin resistant staph aureus) culture positive 08/15/2018    arm    Stress incontinence     Thyroid disease     CYST ON THYROID--FOUND 20 YEARS AGO     Past Surgical History:   Procedure Laterality Date     SECTION      TIMES 3    CHOLECYSTECTOMY      COLONOSCOPY  2017      Colonoscopy with snare and biopsy    EYE SURGERY      muscle correction and eyelid     HERNIA REPAIR      UMBILICAL AREA x 2    HYSTERECTOMY      SHOULDER ARTHROSCOPY Right 5-19-15    Right shoulder arthroscopy labral debridement open [place] subacromial decompression and chrondroplasty    UPPER GASTROINTESTINAL ENDOSCOPY  2017    Esophagogastroduodenoscopy with biopsy     Family History   Problem Relation Age of Onset    Cancer Mother     Heart Disease Father     Stroke Brother      Social History     Tobacco Use    Smoking status: Former Smoker     Packs/day: 1.00     Years: 30.00     Pack years: 30.00     Types: Cigarettes     Last attempt to quit:      Years since quittin.7    Smokeless tobacco: Never Used   Substance Use Topics    Alcohol use: No    Drug use: No       Allergies   Allergen Reactions    Latex Itching and Rash    Tramadol Hives and Shortness Of Breath    Codeine Itching    Penicillins      No current facility-administered medications for this encounter. Current Outpatient Medications   Medication Sig Dispense Refill    diazePAM (VALIUM) 5 MG tablet Take 5 mg by mouth every 6 hours as needed for Anxiety.  HYDROcodone-acetaminophen (NORCO) 5-325 MG per tablet Take 1 tablet by mouth 2 times daily for 30 days.  60 tablet 0    BASAGLAR KWIKPEN 100 UNIT/ML injection pen INJECT 70 UNITS INTO THE SKIN NIGHTLY 5 pen 5    famotidine (PEPCID) 20 MG tablet TAKE 1 TABLET BY MOUTH TWICE A DAY 60 tablet 3    DULoxetine (CYMBALTA) 60 MG extended release capsule TAKE 1 CAPSULE BY MOUTH EVERY DAY 30 capsule 2    insulin glargine (LANTUS SOLOSTAR) 100 UNIT/ML injection pen Inject 70 Units into the skin nightly 10 pen 3    losartan (COZAAR) 100 MG tablet TAKE 1 TABLET BY MOUTH DAILY 30 tablet 3    MIRALAX powder TAKE 17 G BY MOUTH DAILY 510 g 2    albuterol sulfate  (90 Base) MCG/ACT inhaler INHALE 2 PUFFS BY MOUTH EVERY 4-6 HOURS FOR 7-10 DAYS THEN AS NEEDED 18 Inhaler 1    FREESTYLE LITE strip TEST THREE TIMES DAILY 100 strip 5    BD INSULIN SYRINGE U/F 31G X 5/16\" 1 ML MISC USE AT BEDTIME WITH LANTUS 100 each 2    BD INSULIN SYRINGE ULTRAFINE 31G X 5/16\" 0.3 ML MISC USE 4 TIMES A DAY 1 each 3       Physical Exam:   BP (!) 173/98 Pulse 99   Temp 98.5 °F (36.9 °C) (Oral)   Resp 16   Ht 5' (1.524 m)   Wt 183 lb 3.2 oz (83.1 kg)   LMP 11/30/2005   SpO2 98%   BMI 35.78 kg/m²   No intake or output data in the 24 hours ending 10/06/20 1502  Wt Readings from Last 2 Encounters:   10/06/20 183 lb 3.2 oz (83.1 kg)   07/09/20 183 lb (83 kg)     Constitutional: She is oriented to person, place, and time. She appears well-developed and well-nourished. In no acute distress. Head: Normocephalic and atraumatic. Neck: Neck supple. No JVD present. Carotid bruit is not present. No mass and no thyromegaly present. No lymphadenopathy present. Cardiovascular: Normal rate, regular rhythm, normal heart sounds and intact distal pulses. Exam reveals no gallop and no friction rub. No murmur heard. Pulmonary/Chest: Effort normal and breath sounds normal. No respiratory distress. She has no wheezes, rhonchi or rales. Abdominal: Soft, non-tender. Bowel sounds and aorta are normal. She exhibits no organomegaly, mass or bruit. Extremities: No edema, cyanosis, or clubbing. Pulses are 2+ radial/carotid/dorsalis pedis and posterior tibial bilaterally. Neurological: She is alert and oriented to person, place, and time. She has normal reflexes. No cranial nerve deficit. Coordination normal.   Skin: Skin is warm and dry. There is no rash or diaphoresis. Psychiatric: She has a normal mood and affect. Her speech is normal and behavior is normal.     EKG Interpretation: Sinus rhythm with acute inferior ischemia    Lab Review:   Lab Results   Component Value Date    TRIG 196 10/05/2020    HDL 26 10/05/2020    LDLCALC 129 10/05/2020    LDLDIRECT 116 04/02/2019    LABVLDL 39 10/05/2020     Lab Results   Component Value Date     10/06/2020    K 4.4 10/06/2020    BUN 14 10/06/2020    CREATININE 0.7 10/06/2020     Recent Labs     10/06/20  1410   WBC 11.7*   HGB 14.7   HCT 43.6          Assessment:  1.  Acute Inferior ST elevation Myocardial Infarction  2. Essential Hypertension  3. Type 2 DM      Plan:  Abraham Oglesby is stable at present. SHe has normal LV systolic function. She underwetn PCI to her mid RCA with overlapping 2.75mm X 15mm SHEFALI dilated 2.80mm. There was 0% residual stneosis and MARILIN 3 flow. The mid LAD underwent PCI with a 2.75mm X 15mm SHEFALI diulated to 2.80mm.  Continue atorvastatin and carvedilo for her MI.

## 2020-10-06 NOTE — PROGRESS NOTES
1740: Patient admitted from cath lab to room 1312. Admission assessment completed. VSS on 2L NC. Patient c/o soreness of chest, 2/10. States not nearly as painful as previously. EKG complete. R groin arterial sheath in place. Aggrastat gtt running at this time.

## 2020-10-06 NOTE — PROGRESS NOTES
1815 ACT drawn and resulted 173. Patient currently on aggrastat gtt at 15  ML/hr. Patient instructed on removal procedure. Arterial sheath site without hematoma or oozing. Arterial sheath removed per policy without difficulty. Integrity of sheath inspected upon removal and no abnormalities noted. Manual pressure held 10 minutes. Dry, sterile tegaderm applied. Pressure dressing applied. Patient tolerated well. Vital signs, groin checks, and pedal pulses will be completed per protocol (every 15 minutes X 4, every 30 minutes X 2, and every hour X 2 per protocol).     Sheath removed by Jone Multani RN   Electronically signed by Jone Multani RN on 10/6/2020 at 6:40 PM

## 2020-10-06 NOTE — ED NOTES
Air care here to transport pt to [facility], [address] [facility]  10/06/20 7887

## 2020-10-06 NOTE — PROGRESS NOTES
Late entry due to patient care. 10/06/2020    @ 1938 - Bedside shift hand-off done w/ off-going RN Adán Guajardo and CHENTE Hauser. She's alert and oriented, SR on the monitor, on O2 @ 2L/min per nasal cannula, not in any distress, & on Aggrastat @ 0.15 mcg/kg/min. R groin doesn't have active oozing, is soft, but looks swollen. This RN held manual pressure over her R groin & the groin is less swollen afterwards, although bruised. She was set-up for her dinner tray. Pt. is aware that she can't bend on her waist and to keep her R leg straight. @ 2055 - Shift assessment completed. Plan of care for this shift was explained to her and she verbalized understanding. According to her, she ate \"4 to 5 bites\" of her dinner tray. @ 2102 - She tolerated taking her PO meds. She asked if she can take Miralax tonight. This RN told her that what the hospital has is Glycolax. She refused to take what's available here in the hospital due to her past experience of getting nauseous w/ it.    @ 2125 - Asked for a snack. After this RN told her what's available, she opted to eat a turkey sandwich.    @ 2230 - Ate about 60% of the turkey sandwich.    @ 2300 - R groin is still soft, without active oozing, nor any hematoma. She was repositioned in bed and was instructed that she can now turn to her sides or raise her head of the bed up. 10/07/2020    @ 0105 - O2 sat = 97% on O2 @ 2L/min. This RN titrated-down her O2 support to 1L/min.    @ 0220 - Called-out to go to the bathroom. @ 0234 - Nauseous and started vomiting. PRN Zofran 4 mg IV given (see MAR). @ 0240 - Vomited 180 ml of undigested food/ yellowish vomitus. She said that even at home, she gets nauseous and vomits, especially when she doesn't have a BM. She verbalized, Michelle Rivera (Jamie) can never figure-out where this (n/v) is coming from. \"    @ 77 196 003 - Had a moderate amount of loose and brown BM. She urinated, but missed the measuring hat in the toilet.  After this, she walked back to the bed. Her O2 sat on room air is 89%. This RN placed her back on O2 @ 1L/min.    @ 0309 - Pt.'s daughter, Angeli West, called [facility] because she was calling her mom, but no one was answering. This RN told the pt.'s daughter that the pt. is currently sleeping. Also, the daughter was updated about the recent n/v episode. @ 0450 - Reassessment unchanged. Vitals had been stable. @ 0630 - Warm blanket applied, per her request. No other needs. @ 0720 - Shift hand-off done w/ oncoming MARY Castro, to assume pt. care. This RN handed-off the pt. in a stable condition. @ 0730 - Had another moderate amount of loose and brown BM. She's nauseous and dry heaving. This RN told her that PRN Zofran IV is not yet due. Pt. acknowledged. She was assisted to sit in the bedside recliner.     Electronically signed by Oz Jones RN on 10/7/2020 at 8:23 AM

## 2020-10-06 NOTE — ED NOTES
Left side chest pain, left arm pain woke pt at 0930 this morning. Pt also c/o slight nausea, denies sweating or shortness of breath. Pt has no heart hx herself but sister and father both had heart issues.       [address] Nancy, RN  10/06/20 1372

## 2020-10-06 NOTE — PROCEDURES
[address]                            CARDIAC CATHETERIZATION    PATIENT NAME: Fernando Bautista                :        1965  MED REC NO:   [id number]                          ROOM:       [facility]  ACCOUNT NO:   [de-identified]                           ADMIT DATE: 10/06/2020  PROVIDER:     Pradeep Allen MD    DATE OF PROCEDURE:  10/06/2020    [address] Cardiology Catheterization    INDICATION FOR PROCEDURE:  Acute inferior myocardial infarction. The risks and benefits of the procedure were explained to the patient. Informed consent was obtained. She was flown emergently from [facility]  Emergency Department to [facility] to the cardiac catheterization lab. She had presented to [facility] with chest pain and was found to have an  acute inferior myocardial infarction. The patient was placed on the cardiac catheterization table and prepped  and draped in the sterile fashion. Anesthesia was provided in the right  groin with 1% lidocaine injected subcutaneously and deep. The right  femoral artery was accessed and cannulated and a 6-[place] sheath  inserted using Seldinger technique. Over the 0.035 J-wire, the JL-4 diagnostic catheter was advanced to the  ostium of the left main coronary artery and coronary angiography was  performed to this system. Catheter was removed over the wire. Next,  the JR-4, 6-[place] guide catheter was advanced to the ostium of the  right coronary artery and coronary angiography was performed to this  system. The RCA was then wired with a Runthrough wire. The 99% lesion  in the mid right coronary artery was ballooned with a 2.5 x 15 mm  balloon. This was removed and repeat angiography was performed. The  lesion was then stented with a 2.75 x 15 mm balloon.   There were some  thrombi more proximal to the stent so this was covered with an  additional 2.75 x 15 performed for the procedure. The patient received  a total of 3 mg of IV Versed and 100 mcg of fentanyl. Vital signs were  monitored throughout the procedure and remained stable. The patient had  an ASA grade of II and a Mallampati score of II. Total duration of  sedation was 70 minutes. Vital signs were monitored throughout the  procedure and remained stable. There were no complications from the  sedation. FINDINGS:  1. Right-dominant coronary arterial system with a 99% mid RCA lesion  successfully intervened upon with two 2.75 x 15 mm overlapping [place]  drug-eluting stents dilated to 2.80 mm in diameter. 2.  In the left system, there was a 90% mid LAD lesion that was  intervened upon with a 2.75 x 15 mm [place] drug-eluting stent dilated to  2.80 mm in diameter. There was a 60% proximal LAD disease and a 60%  focal lesion distal to this. In the circumflex, there were small OM  branches and one small OM branch has a 99% lesion. This is too small to  intervene upon. 3.  Normal left ventricular systolic function with an LV ejection  fraction of 60%. 4.  Left ventricular end-diastolic pressure of 10 mmHg. 5.  No gradient across the aortic valve on pullback to suggest aortic  stenosis.         Edwina Carter MD    D: 10/06/2020 16:14:36       T: 10/06/2020 16:19:32     [email or web address]  Job#: [id number]     Doc#: [id number]    CC:

## 2020-10-06 NOTE — ED PROVIDER NOTES
eMERGENCY dEPARTMENT eNCOUnter      Pt Name: Gregg Cervantes  MRN: [id number]  [place] 1965  Date of evaluation: 10/6/2020  Provider: Juliet Wahl MD     [address] [place] [address]       Chief Complaint   Patient presents with    Chest Pain     left side chest pain woke patient up at 0930 this morning         HISTORY OF PRESENT ILLNESS   (Location/Symptom, Timing/Onset,Context/Setting, Quality, Duration, Modifying Factors, Severity) Note limiting factors. HPI    Gregg Cervantes is a 63 y.o. female who presents to the emergency department with acute onset of chest pain that woke her up this morning around 930. Patient has no prior history of heart issues. Patient is a insulin-dependent diabetic with hypertension and presents with 3 to 4 hours of chest pain. The pain radiates to the left shoulder and arm. Has family history significant for heart attacks in the father and a brother and  at age 46s. She has no fever. There has been mild shortness of breath. Mostly in the left precordial area with pressure sensation. Status post right rotator cuff and hysterectomy. Patient denies any injury. There has been nausea but no vomiting. Patient did drive herself to the ED today. Nursing Notes were reviewed. REVIEW OFSYSTEMS    (2+ for level 4; 10+ for level 5)   Review of Systems    General: No fevers, chills or night sweats, No weight loss    Head:  No Sore throat,  No Ear Pain    Chest:  Nontender. No Cough, there is some SOB, positive chest Pain    GI: No abdominal pain or vomiting    : No dysuria or hematuria    Musculoskeletal: No unrelenting pain or night pain    Neurologic: No bowel or bladder incontinence, No saddle anesthesia, No leg weakness    All other systems reviewed and are negative.         PAST MEDICAL HISTORY     Past Medical History:   Diagnosis Date    Arthritis     Back pain     Depression     Diabetes mellitus ([facility] [address] [facility]     REHMAN (dyspnea on exertion)     Fatty liver     GERD Dynamically stable at this time. HEENT: Normocephalic atraumatic. Neck is supple. Airway intact. No adenopathy  Cardiac: Regular rate and rhythm with no murmurs rubs or gallops  Pulmonary: Lungs are clear in all lung fields. No wheezing. No Rales. Abdomen: Soft and nontender. Negative hepatosplenomegaly. Bowel sounds are active  Extremities: Moving all extremities. No calf tenderness. Peripheral pulses all intact  Skin: No skin lesions. No rashes  Neurologic: Cranial nerves II through XII was grossly intact. Nonfocal neurological exam  Psychiatric: Patient is pleasant. Mood is appropriate. DIAGNOSTIC RESULTS     EKG (Per Emergency Physician):     Normal sinus rhythm with ST elevation in the inferior leads as well as a right bundle branch block. This is consistent for acute STEMI     RADIOLOGY (Per Emergency Physician): Interpretation per the Radiologist below, if available at the time of this note:  Xr Chest Portable    Result Date: 10/6/2020  EXAMINATION: ONE XRAY VIEW OF THE CHEST 10/6/2020 2:15 pm COMPARISON: 02/14/2018 HISTORY: ORDERING SYSTEM PROVIDED HISTORY: CP TECHNOLOGIST PROVIDED HISTORY: Reason for exam:->CP Reason for Exam: left side chest pain woke patient up at 0930 this morning-STEMI Acuity: Acute Type of Exam: Initial FINDINGS: Single portable frontal view of the chest is submitted for review. The cardiac silhouette is normal in size. Lung parenchyma is clear without focal airspace consolidation, sizeable pleural effusion, or pneumothorax. Trachea is midline. Visualized osseous structures and soft tissues are grossly intact. No acute cardiopulmonary pathology.        ED BEDSIDE ULTRASOUND:   Performed by ED Physician - none    LABS:  Labs Reviewed   CBC WITH AUTO DIFFERENTIAL - Abnormal; Notable for the following components:       Result Value    WBC 11.7 (*)     Neutrophils Absolute 8.6 (*)     All other components within normal limits    Narrative:     Performed at:  [facility]  [address] [facility]   Phone [phone]   BASIC METABOLIC PANEL W/ REFLEX TO MG FOR LOW K - Abnormal; Notable for the following components:    Glucose 340 (*)     All other components within normal limits    Narrative:     Performed at:  [facility]) - [facility]  [address] [facility]   Phone [phone]   TROPONIN - Abnormal; Notable for the following components:    Troponin 0.10 (*)     All other components within normal limits    Narrative:     Performed at:  [facility]) - [facility]  [address] [facility]   Phone [phone]   APTT   PROTIME-INR        All other labs were within normal range or not returned as of this dictation. Procedures      EMERGENCY DEPARTMENT COURSE and DIFFERENTIAL DIAGNOSIS/MDM:   Vitals:    Vitals:    10/06/20 1415 10/06/20 1420 10/06/20 1426 10/06/20 1430   BP:  (!) 189/93  (!) 173/98   Pulse: 97 97 106 99   Resp: 19 20 22 16   Temp:    98.5 °F (36.9 °C)   TempSrc:    Oral   SpO2: 93% 96% 95% 98%   Weight:       Height:           Medications   aspirin chewable tablet 324 mg (324 mg Oral Given 10/6/20 1414)   heparin (porcine) injection 4,990 Units (4,990 Units Intravenous Given 10/6/20 1423)       MDM. Patient is a 79-year-old female woke up with acute onset of chest pain with radiation to the left arm. On arrival patient has chest pain. EKG confirms acute STEMI. It is mostly in the inferior wall. Patient was given aspirin and heparin bolus. I did activate the STEMI protocol. I spoke with Dr. Damion Gan cardiology. He accepted patient to go directly to the Cath Lab. I did call the air care. ETA in about 16 to 20 minutes. Remained stable. Spoke and gave report to the air care team.  Do not carry Brilinta at the [facility]. I spoke with pharmacy.   Advise the air care team to let cardiology know that she did not receive Brilinta  prior to arrival to the Cath Lab. She did receive heparin and aspirin though. REVAL:         CRITICAL CARE TIME   Total CriticalCare time was 30 minutes, excluding separately reportable procedures. There was a high probability of clinically significant/life threatening deterioration in the patient's condition which required my urgent intervention. CONSULTS:  None    PROCEDURES:  Unless otherwise noted below, none     [unfilled]    FINAL IMPRESSION      1. Acute ST elevation myocardial infarction (STEMI) involving right coronary artery (HCC)          DISPOSITION/PLAN   DISPOSITION        PATIENT REFERRED TO:  No follow-up provider specified. DISCHARGE MEDICATIONS:  New Prescriptions    No medications on file          (Please note:  Portions of this note were completed with a voice recognition program.Efforts were made to edit the dictations but occasionally words and phrases are mis-transcribed.)  Form v2016. J.5-cn    Edvin BRAGG MD (electronically signed)  Emergency Medicine Provider        Felipe Garcia MD  10/06/20 9473

## 2020-10-07 LAB
ANION GAP SERPL CALCULATED.3IONS-SCNC: 9 MMOL/L (ref 3–16)
BUN BLDV-MCNC: 14 MG/DL (ref 7–20)
CALCIUM SERPL-MCNC: 8.7 MG/DL (ref 8.3–10.6)
CHLORIDE BLD-SCNC: 100 MMOL/L (ref 99–110)
CHOLESTEROL, TOTAL: 157 MG/DL (ref 0–199)
CO2: 25 MMOL/L (ref 21–32)
CREAT SERPL-MCNC: 0.6 MG/DL (ref 0.6–1.1)
EKG ATRIAL RATE: 87 BPM
EKG DIAGNOSIS: NORMAL
EKG P AXIS: 71 DEGREES
EKG P-R INTERVAL: 154 MS
EKG Q-T INTERVAL: 428 MS
EKG QRS DURATION: 148 MS
EKG QTC CALCULATION (BAZETT): 515 MS
EKG R AXIS: 167 DEGREES
EKG T AXIS: 32 DEGREES
EKG VENTRICULAR RATE: 87 BPM
ESTIMATED AVERAGE GLUCOSE: 194.4 MG/DL
ESTIMATED AVERAGE GLUCOSE: 197.3 MG/DL
GFR AFRICAN AMERICAN: >60
GFR NON-AFRICAN AMERICAN: >60
GLUCOSE BLD-MCNC: 203 MG/DL (ref 70–99)
GLUCOSE BLD-MCNC: 210 MG/DL (ref 70–99)
GLUCOSE BLD-MCNC: 321 MG/DL (ref 70–99)
GLUCOSE BLD-MCNC: 341 MG/DL (ref 70–99)
GLUCOSE BLD-MCNC: 346 MG/DL (ref 70–99)
HBA1C MFR BLD: 8.4 %
HBA1C MFR BLD: 8.5 %
HCT VFR BLD CALC: 36.6 % (ref 36–48)
HDLC SERPL-MCNC: 29 MG/DL (ref 40–60)
HEMOGLOBIN: 12.2 G/DL (ref 12–16)
LDL CHOLESTEROL CALCULATED: 104 MG/DL
MCH RBC QN AUTO: 28.8 PG (ref 26–34)
MCHC RBC AUTO-ENTMCNC: 33.3 G/DL (ref 31–36)
MCV RBC AUTO: 86.3 FL (ref 80–100)
PDW BLD-RTO: 15.2 % (ref 12.4–15.4)
PERFORMED ON: ABNORMAL
PLATELET # BLD: 193 K/UL (ref 135–450)
PMV BLD AUTO: 8.4 FL (ref 5–10.5)
POC ACT LR: 211 SEC
POC ACT LR: 247 SEC
POTASSIUM SERPL-SCNC: 4.6 MMOL/L (ref 3.5–5.1)
RBC # BLD: 4.24 M/UL (ref 4–5.2)
SODIUM BLD-SCNC: 134 MMOL/L (ref 136–145)
TRIGL SERPL-MCNC: 120 MG/DL (ref 0–150)
TROPONIN: 0.27 NG/ML
VLDLC SERPL CALC-MCNC: 24 MG/DL
WBC # BLD: 11.7 K/UL (ref 4–11)

## 2020-10-07 PROCEDURE — 2580000003 HC RX 258: Performed by: INTERNAL MEDICINE

## 2020-10-07 PROCEDURE — 6360000002 HC RX W HCPCS: Performed by: INTERNAL MEDICINE

## 2020-10-07 PROCEDURE — 94760 N-INVAS EAR/PLS OXIMETRY 1: CPT

## 2020-10-07 PROCEDURE — 2060000000 HC ICU INTERMEDIATE R&B

## 2020-10-07 PROCEDURE — 85027 COMPLETE CBC AUTOMATED: CPT

## 2020-10-07 PROCEDURE — 6370000000 HC RX 637 (ALT 250 FOR IP): Performed by: INTERNAL MEDICINE

## 2020-10-07 PROCEDURE — 83036 HEMOGLOBIN GLYCOSYLATED A1C: CPT

## 2020-10-07 PROCEDURE — 93010 ELECTROCARDIOGRAM REPORT: CPT | Performed by: INTERNAL MEDICINE

## 2020-10-07 PROCEDURE — 99233 SBSQ HOSP IP/OBS HIGH 50: CPT | Performed by: NURSE PRACTITIONER

## 2020-10-07 PROCEDURE — 84484 ASSAY OF TROPONIN QUANT: CPT

## 2020-10-07 PROCEDURE — 36415 COLL VENOUS BLD VENIPUNCTURE: CPT

## 2020-10-07 PROCEDURE — 80048 BASIC METABOLIC PNL TOTAL CA: CPT

## 2020-10-07 PROCEDURE — 80061 LIPID PANEL: CPT

## 2020-10-07 RX ORDER — INSULIN GLARGINE 100 [IU]/ML
73 INJECTION, SOLUTION SUBCUTANEOUS NIGHTLY
Status: DISCONTINUED | OUTPATIENT
Start: 2020-10-07 | End: 2020-10-07

## 2020-10-07 RX ORDER — PROPOFOL 10 MG/ML
12 INJECTION, EMULSION INTRAVENOUS CONTINUOUS PRN
Status: DISCONTINUED | OUTPATIENT
Start: 2020-10-07 | End: 2020-10-07

## 2020-10-07 RX ORDER — INSULIN GLARGINE 100 [IU]/ML
75 INJECTION, SOLUTION SUBCUTANEOUS NIGHTLY
Status: DISCONTINUED | OUTPATIENT
Start: 2020-10-08 | End: 2020-10-07

## 2020-10-07 RX ORDER — INSULIN GLARGINE 100 [IU]/ML
75 INJECTION, SOLUTION SUBCUTANEOUS NIGHTLY
Status: DISCONTINUED | OUTPATIENT
Start: 2020-10-07 | End: 2020-10-07

## 2020-10-07 RX ORDER — PROPOFOL 10 MG/ML
10 INJECTION, EMULSION INTRAVENOUS
Status: DISCONTINUED | OUTPATIENT
Start: 2020-10-07 | End: 2020-10-07 | Stop reason: CLARIF

## 2020-10-07 RX ORDER — DEXTROSE MONOHYDRATE 25 G/50ML
12.5 INJECTION, SOLUTION INTRAVENOUS PRN
Status: DISCONTINUED | OUTPATIENT
Start: 2020-10-07 | End: 2020-10-08 | Stop reason: HOSPADM

## 2020-10-07 RX ORDER — INSULIN GLARGINE 100 [IU]/ML
3 INJECTION, SOLUTION SUBCUTANEOUS ONCE
Status: DISCONTINUED | OUTPATIENT
Start: 2020-10-07 | End: 2020-10-07

## 2020-10-07 RX ORDER — INSULIN GLARGINE 100 [IU]/ML
5 INJECTION, SOLUTION SUBCUTANEOUS ONCE
Status: COMPLETED | OUTPATIENT
Start: 2020-10-07 | End: 2020-10-07

## 2020-10-07 RX ORDER — INSULIN GLARGINE 100 [IU]/ML
70 INJECTION, SOLUTION SUBCUTANEOUS NIGHTLY
Status: DISCONTINUED | OUTPATIENT
Start: 2020-10-07 | End: 2020-10-07

## 2020-10-07 RX ORDER — INSULIN GLARGINE 100 [IU]/ML
75 INJECTION, SOLUTION SUBCUTANEOUS NIGHTLY
Status: DISCONTINUED | OUTPATIENT
Start: 2020-10-07 | End: 2020-10-08 | Stop reason: HOSPADM

## 2020-10-07 RX ORDER — DEXTROSE MONOHYDRATE 50 MG/ML
100 INJECTION, SOLUTION INTRAVENOUS PRN
Status: DISCONTINUED | OUTPATIENT
Start: 2020-10-07 | End: 2020-10-08 | Stop reason: HOSPADM

## 2020-10-07 RX ORDER — NICOTINE POLACRILEX 4 MG
15 LOZENGE BUCCAL PRN
Status: DISCONTINUED | OUTPATIENT
Start: 2020-10-07 | End: 2020-10-08 | Stop reason: HOSPADM

## 2020-10-07 RX ORDER — INSULIN GLARGINE 100 [IU]/ML
73 INJECTION, SOLUTION SUBCUTANEOUS NIGHTLY
Status: DISCONTINUED | OUTPATIENT
Start: 2020-10-08 | End: 2020-10-07

## 2020-10-07 RX ADMIN — INSULIN GLARGINE 5 UNITS: 100 INJECTION, SOLUTION SUBCUTANEOUS at 13:15

## 2020-10-07 RX ADMIN — INSULIN LISPRO 2 UNITS: 100 INJECTION, SOLUTION INTRAVENOUS; SUBCUTANEOUS at 17:45

## 2020-10-07 RX ADMIN — INSULIN LISPRO 1 UNITS: 100 INJECTION, SOLUTION INTRAVENOUS; SUBCUTANEOUS at 22:02

## 2020-10-07 RX ADMIN — ONDANSETRON 4 MG: 2 INJECTION INTRAMUSCULAR; INTRAVENOUS at 02:34

## 2020-10-07 RX ADMIN — LOSARTAN POTASSIUM 100 MG: 100 TABLET, FILM COATED ORAL at 08:52

## 2020-10-07 RX ADMIN — TICAGRELOR 90 MG: 90 TABLET ORAL at 08:52

## 2020-10-07 RX ADMIN — ONDANSETRON 4 MG: 2 INJECTION INTRAMUSCULAR; INTRAVENOUS at 22:00

## 2020-10-07 RX ADMIN — DIAZEPAM 5 MG: 5 TABLET ORAL at 08:52

## 2020-10-07 RX ADMIN — INSULIN LISPRO 7 UNITS: 100 INJECTION, SOLUTION INTRAVENOUS; SUBCUTANEOUS at 13:16

## 2020-10-07 RX ADMIN — INSULIN LISPRO 4 UNITS: 100 INJECTION, SOLUTION INTRAVENOUS; SUBCUTANEOUS at 13:16

## 2020-10-07 RX ADMIN — INSULIN GLARGINE 75 UNITS: 100 INJECTION, SOLUTION SUBCUTANEOUS at 22:02

## 2020-10-07 RX ADMIN — Medication 10 ML: at 08:53

## 2020-10-07 RX ADMIN — INSULIN LISPRO 7 UNITS: 100 INJECTION, SOLUTION INTRAVENOUS; SUBCUTANEOUS at 17:44

## 2020-10-07 RX ADMIN — INSULIN LISPRO 4 UNITS: 100 INJECTION, SOLUTION INTRAVENOUS; SUBCUTANEOUS at 08:53

## 2020-10-07 RX ADMIN — Medication 10 ML: at 02:34

## 2020-10-07 RX ADMIN — Medication 10 ML: at 22:04

## 2020-10-07 RX ADMIN — CARVEDILOL 3.12 MG: 3.12 TABLET, FILM COATED ORAL at 17:43

## 2020-10-07 RX ADMIN — ASPIRIN 81 MG: 81 TABLET, CHEWABLE ORAL at 08:52

## 2020-10-07 RX ADMIN — FAMOTIDINE 20 MG: 20 TABLET, FILM COATED ORAL at 08:52

## 2020-10-07 RX ADMIN — ONDANSETRON 4 MG: 2 INJECTION INTRAMUSCULAR; INTRAVENOUS at 08:53

## 2020-10-07 RX ADMIN — TICAGRELOR 90 MG: 90 TABLET ORAL at 22:00

## 2020-10-07 RX ADMIN — ATORVASTATIN CALCIUM 40 MG: 40 TABLET, FILM COATED ORAL at 22:00

## 2020-10-07 RX ADMIN — DULOXETINE HYDROCHLORIDE 60 MG: 60 CAPSULE, DELAYED RELEASE ORAL at 08:53

## 2020-10-07 RX ADMIN — FAMOTIDINE 20 MG: 20 TABLET, FILM COATED ORAL at 22:00

## 2020-10-07 RX ADMIN — CARVEDILOL 3.12 MG: 3.12 TABLET, FILM COATED ORAL at 08:53

## 2020-10-07 ASSESSMENT — PAIN SCALES - GENERAL
PAINLEVEL_OUTOF10: 0
PAINLEVEL_OUTOF10: 0

## 2020-10-07 NOTE — PROGRESS NOTES
tablet Take 1 tablet by mouth 2 times daily 10/7/20  Yes Morro Bautista MD   diazePAM (VALIUM) 5 MG tablet Take 5 mg by mouth every 6 hours as needed for Anxiety. Yes Dylan Caicedo MD   HYDROcodone-acetaminophen (NORCO) 5-325 MG per tablet Take 1 tablet by mouth 2 times daily for 30 days. 9/21/20 10/21/20 Yes La Harry MD   BASAGLAR KWIKPEN 100 UNIT/ML injection pen INJECT 70 UNITS INTO THE SKIN NIGHTLY 8/24/20  Yes La Harry MD   famotidine (PEPCID) 20 MG tablet TAKE 1 TABLET BY MOUTH TWICE A DAY 8/10/20  Yes La Harry MD   DULoxetine (CYMBALTA) 60 MG extended release capsule TAKE 1 CAPSULE BY MOUTH EVERY DAY 7/22/20  Yes La Harry MD   insulin glargine (LANTUS SOLOSTAR) 100 UNIT/ML injection pen Inject 70 Units into the skin nightly 7/21/20  Yes La Harry MD   losartan (COZAAR) 100 MG tablet TAKE 1 TABLET BY MOUTH DAILY 7/6/20  Yes La Harry MD   MIRALAX powder TAKE 17 G BY MOUTH DAILY 2/4/19  Yes La Harry MD   albuterol sulfate  (90 Base) MCG/ACT inhaler INHALE 2 PUFFS BY MOUTH EVERY 4-6 HOURS FOR 7-10 DAYS THEN AS NEEDED 8/24/20   La Harry MD   FREESTYLE LITE strip TEST THREE TIMES DAILY 5/21/20   La Harry MD   BD INSULIN SYRINGE U/F 31G X 5/16\" 1 ML MISC USE AT BEDTIME WITH LANTUS 8/19/19   La Harry MD   BD INSULIN SYRINGE ULTRAFINE 31G X 5/16\" 0.3 ML MISC USE 4 TIMES A DAY 11/16/18   La Harry MD       Allergies:  Latex; Tramadol; Codeine; and Penicillins    Social History:      TOBACCO:   reports that she quit smoking about 5 years ago. Her smoking use included cigarettes. She has a 30.00 pack-year smoking history. She has never used smokeless tobacco.  ETOH:   reports no history of alcohol use. Family History:      Reviewed in detail and negative for DM, CAD, Cancer, CVA.  Positive as follows:        Problem Relation Age of Onset    Cancer Mother     Heart Disease Father    Mauricio Galvan Stroke Brother        REVIEW OF SYSTEMS:   Pertinent positives as noted in the HPI. All other systems reviewed and negative. PHYSICAL EXAM:  BP (!) 150/74   Pulse 89   Temp 97.8 °F (36.6 °C) (Oral)   Resp 20   Ht 5' (1.524 m)   Wt 181 lb (82.1 kg)   LMP 11/30/2005   SpO2 91%   BMI 35.35 kg/m²   General appearance: No apparent distress, appears stated age and cooperative. Obese female  HEENT: Normal cephalic, atraumatic without obvious deformity. Pupils equal, round, and reactive to light. Extra ocular muscles intact. Conjunctivae/corneas clear. Neck: Supple, with full range of motion. No jugular venous distention. Trachea midline. Respiratory:  Normal respiratory effort. Clear to auscultation, bilaterally without crackles or rhonchi  Cardiovascular: Regular rate, regular rhythm with normal S1/S2 , with no murmurs  Abdomen: Soft, non-tender, non-distended with normal bowel sounds. Musculoskeletal: No clubbing, cyanosis or edema bilaterally. Skin: Skin color, texture, turgor normal.  No rashes or lesions. Neurologic:  Neurovascularly intact without any focal sensory/motor deficits.  Cranial nerves: II-XII intact, grossly non-focal.  Psychiatric: Alert and oriented, thought content appropriate, normal insight    Labs:     CBC:   Recent Labs     10/06/20  1410 10/06/20  2153 10/07/20  0432   WBC 11.7* 10.1 11.7*   RBC 5.14 4.23 4.24   HGB 14.7 12.3 12.2   HCT 43.6 36.3 36.6   MCV 84.8 86.0 86.3   RDW 14.1 15.1 15.2    199 193     BMP:   Recent Labs     10/05/20  1457 10/06/20  1410 10/07/20  0432    137 134*   K 4.0 4.4 4.6    100 100   CO2 26 26 25   BUN 13 14 14   CREATININE 0.7 0.7 0.6     LFT:  Recent Labs     10/05/20  1457   PROT 6.8   ALKPHOS 124   ALT 21   AST 23   BILITOT 0.5     CE:  Recent Labs     10/06/20  1410 10/07/20  0432   TROPONINI 0.10* 0.27*       PT/INR:   Recent Labs     10/06/20  1410   INR 1.02   APTT 38.7*     BNP: No results for input(s): BNP in the last 72 hours. Hgb A1C:   Lab Results   Component Value Date    LABA1C 8.5 10/05/2020     Lab Results   Component Value Date    .3 10/05/2020     ESR:   Lab Results   Component Value Date    SEDRATE 21 07/31/2014     CRP: No results found for: CRP  D Dimer: No results found for: DDIMER  Folate and B12: No results found for: [id number], No results found for: FOLATE  Lactic Acid: No results found for: LACTA  Thyroid Studies:   Lab Results   Component Value Date    TSH 3.80 10/05/2020         ASSESSMENT:    #Inferior wall STEMI  Status post PCI to RCA x2 and mid LAD  -Continue aspirin, Brilinta and statin  -Management as per Cardiology    #Hypertension  -BP fluctuating, adjustments as per Cardiology    #Diabetes type 2-A1c 8.5,which is improving compared to last year. Probable insulin resistance. She states she has been working on diet.  -Increase Lantus to 75 units nightly,home dose  -Start standing Humalog and continue coverage insulin  -Recommend Endocrinologist referal outpatient  -Diabetic education    #History of herpes zoster    #Morbid obesity BMI 35  -Complicating assessment and treatment. Placing patient at risk for multiple co-morbidities as well as early death and contributing to the patient's presentation.   - Counseled on weight loss. Diet: DIET CARDIAC; No Added Salt (3-4 GM)  Thank you for allowing us to participate in this patient's care.    +++++++++++++++++++++++++++++++++++++++++++++++++  [address] Physician - Hospitalist    +++++++++++++++++++++++++++++++++++++++++++++++++  NOTE: This report was transcribed using voice recognition software. Every effort was made to ensure accuracy; however, inadvertent computerized transcription errors may be present.

## 2020-10-07 NOTE — CARE COORDINATION
[address] [facility]  Diabetes Education   Progress Note       NAME:  Natalia Loaiza  MEDICAL RECORD NUMBER:  [id number]  AGE: 63 y.o. GENDER: female  : 1965  TODAY'S DATE:  10/7/2020    Subjective   Reason for Diabetic Education Evaluation and Assessment: hyperglycemia    [employer] agrees to meet with me for diabetes education. She reports that she omitted her Lantus dose because of fear of hypoglycemia when fasting. She has not been able to take her prandial insulin because formulary preference issues could not get resolved. [employer] reports that she focused on improving her glycemic control with consistent Lantus taking, eating less fast food and carry out, smaller carb portions and reducing her regular soda intake.       Visit Type: evaluation      Natalia Loaiza is a 63 y.o. female referred by:     [x] Physician  [] Nursing  [] Chart Review   [] Other:     PAST MEDICAL HISTORY        Diagnosis Date    Arthritis     Back pain     Depression     Diabetes mellitus ([address]     REHMAN (dyspnea on exertion)     Fatty liver     GERD (gastroesophageal reflux disease)     Hyperlipidemia     Hypertension     MRSA (methicillin resistant staph aureus) culture positive 08/15/2018    arm    Stress incontinence     Thyroid disease     CYST ON THYROID--FOUND 20 YEARS AGO       PAST SURGICAL HISTORY    Past Surgical History:   Procedure Laterality Date     SECTION      TIMES 3    CHOLECYSTECTOMY      COLONOSCOPY  2017      Colonoscopy with snare and biopsy    EYE SURGERY      muscle correction and eyelid     HERNIA REPAIR      UMBILICAL AREA x 2    HYSTERECTOMY      SHOULDER ARTHROSCOPY Right 5-19-15    Right shoulder arthroscopy labral debridement open [place] subacromial decompression and chrondroplasty    UPPER GASTROINTESTINAL ENDOSCOPY  2017    Esophagogastroduodenoscopy with biopsy       FAMILY HISTORY    Family History   Problem Relation Age of Onset  Cancer Mother     Heart Disease Father     Stroke Brother        SOCIAL HISTORY    Social History     Tobacco Use    Smoking status: Former Smoker     Packs/day: 1.00     Years: 30.00     Pack years: 30.00     Types: Cigarettes     Last attempt to quit:      Years since quittin.7    Smokeless tobacco: Never Used   Substance Use Topics    Alcohol use: No    Drug use: No       ALLERGIES    Allergies   Allergen Reactions    Latex Itching and Rash    Tramadol Hives and Shortness Of Breath    Codeine Itching    Penicillins        MEDICATIONS     insulin lispro  0.08 Units/kg Subcutaneous TID WC    insulin glargine  75 Units Subcutaneous Nightly    insulin lispro  0-6 Units Subcutaneous TID WC    insulin lispro  0-3 Units Subcutaneous Nightly    losartan  100 mg Oral Daily    DULoxetine  60 mg Oral Daily    famotidine  20 mg Oral BID    HYDROcodone-acetaminophen  1 tablet Oral BID    sodium chloride flush  10 mL Intravenous 2 times per day    carvedilol  3.125 mg Oral BID WC    atorvastatin  40 mg Oral Nightly    aspirin  81 mg Oral Daily    ticagrelor  90 mg Oral BID       Objective        Patient Active Problem List   Diagnosis Code    Labral tear of shoulder S43.439A    Acromioclavicular joint arthritis M19.019    Rotator cuff tendonitis M75.80    S/P shoulder surgery Z98.890    STEMI (ST elevation myocardial infarction) ([facility]) I21.3    Acute ST elevation myocardial infarction (STEMI) involving right coronary artery ([facility]) I21.11    Hyperlipidemia LDL goal <70 E78.5    Uncontrolled type 2 diabetes mellitus with hyperglycemia ([facility]) E11.65        BP (!) 99/58   Pulse 80   Temp 97.8 °F (36.6 °C) (Oral)   Resp 20   Ht 5' (1.524 m)   Wt 181 lb (82.1 kg)   LMP 2005   SpO2 91%   BMI 35.35 kg/m²     HgBA1c:    Lab Results   Component Value Date    LABA1C 8.5 10/07/2020       Recent Labs     10/06/20  2108 10/07/20  0810 10/07/20  1157   POCGLU 329* 346* 321* BUN/Creatinine:    Lab Results   Component Value Date    BUN 14 10/07/2020    CREATININE 0.6 10/07/2020       Assessment        Diabetes Management and Education    Does the patient have a Primary Care Physician? Yes, Bisi Ruano MD       Does the patient require new medication instruction? Yes  Reviewed basal and prandial insulin concepts. Person responsible for administration of Insulin/Medication:        [x] Self     [] Caregiver       [] Spouse       [] Other Family Member   []  Other    Level of patient/caregiver understanding able to:       [] Verbalized Understanding   [] Demonstrated Understanding       [] Teach Back       [x] Needs Reinforcement     []  Other:        Does the patient/caregiver monitor Blood Glucoses? Yes - Expressed concerns that her glucometer was \"old\" and not accurate. Reviewed glycemic control targets, testing frequency and when to call PCP. Level of patient/caregiver understanding able to:        [x] Verbalized Understanding   [] Demonstrated Understanding       [] Teach Back       [] Needs Reinforcement     []  Other:        Does the patient/caregiver understand S/S of Hypoglycemia? No: Denies any recent low blood sugars. Reports having symptoms of low blood sugar when less than 100. Reviewed symptoms, prevention and treatment. Encouraged to carry rescue carbs at all times. Level of patient/caregiver understanding able to:       [] Verbalized Understanding   [] Demonstrated Understanding       [] Teach Back       [x] Needs Reinforcement     []  Other:                    Does the patient/caregiver understand S/S of Hyperglycemia? Yes    Reviewed symptoms, prevention and treatment. Level of patient/caregiver understanding able to:        [x] Verbalized Understanding   [] Demonstrated Understanding       [] Teach Back       [] Needs Reinforcement     []  Other:           Plan        Ongoing diabetes education and blood glucose monitoring. Recommend meds to beds. Will need new prescriptions for prandial insulin pens. Consider increasing correction scale if next BG is > 250.                                              Discharge Plan:  Discharge needs: insulin pens and 4mm pen needles and glucometer/strips/lancets       Teaching Time Diabetes Education:  20 minutes     Electronically signed by Vernell Mendoza on 10/7/2020 at 3:52 PM

## 2020-10-08 VITALS
HEIGHT: 60 IN | TEMPERATURE: 99.1 F | BODY MASS INDEX: 36.66 KG/M2 | HEART RATE: 101 BPM | WEIGHT: 186.73 LBS | DIASTOLIC BLOOD PRESSURE: 50 MMHG | RESPIRATION RATE: 18 BRPM | OXYGEN SATURATION: 90 % | SYSTOLIC BLOOD PRESSURE: 128 MMHG

## 2020-10-08 LAB
GLUCOSE BLD-MCNC: 136 MG/DL (ref 70–99)
GLUCOSE BLD-MCNC: 142 MG/DL (ref 70–99)
PERFORMED ON: ABNORMAL
PERFORMED ON: ABNORMAL

## 2020-10-08 PROCEDURE — 99222 1ST HOSP IP/OBS MODERATE 55: CPT | Performed by: INTERNAL MEDICINE

## 2020-10-08 PROCEDURE — 6370000000 HC RX 637 (ALT 250 FOR IP): Performed by: INTERNAL MEDICINE

## 2020-10-08 PROCEDURE — 6360000002 HC RX W HCPCS: Performed by: INTERNAL MEDICINE

## 2020-10-08 PROCEDURE — 99239 HOSP IP/OBS DSCHRG MGMT >30: CPT | Performed by: NURSE PRACTITIONER

## 2020-10-08 PROCEDURE — 94760 N-INVAS EAR/PLS OXIMETRY 1: CPT

## 2020-10-08 RX ORDER — ONDANSETRON 4 MG/1
4 TABLET, FILM COATED ORAL 3 TIMES DAILY PRN
Qty: 15 TABLET | Refills: 0 | Status: SHIPPED | OUTPATIENT
Start: 2020-10-08 | End: 2020-10-15 | Stop reason: SDUPTHER

## 2020-10-08 RX ORDER — LANCETS 28 GAUGE
1 EACH MISCELLANEOUS DAILY
Qty: 200 EACH | Refills: 0 | Status: SHIPPED | OUTPATIENT
Start: 2020-10-08 | End: 2020-12-09

## 2020-10-08 RX ORDER — CARVEDILOL 3.12 MG/1
3.12 TABLET ORAL 2 TIMES DAILY WITH MEALS
Qty: 60 TABLET | Refills: 3 | Status: SHIPPED | OUTPATIENT
Start: 2020-10-08 | End: 2020-11-10 | Stop reason: SDUPTHER

## 2020-10-08 RX ORDER — BLOOD-GLUCOSE METER
1 KIT MISCELLANEOUS DAILY
Qty: 100 EACH | Refills: 3 | Status: SHIPPED | OUTPATIENT
Start: 2020-10-08

## 2020-10-08 RX ORDER — BLOOD-GLUCOSE METER
1 KIT MISCELLANEOUS DAILY PRN
Qty: 1 DEVICE | Refills: 0 | Status: SHIPPED | OUTPATIENT
Start: 2020-10-08 | End: 2020-10-09 | Stop reason: SDUPTHER

## 2020-10-08 RX ORDER — ONDANSETRON 4 MG/1
4 TABLET, FILM COATED ORAL 3 TIMES DAILY PRN
Qty: 15 TABLET | Refills: 0 | Status: SHIPPED | OUTPATIENT
Start: 2020-10-08 | End: 2020-10-15 | Stop reason: ALTCHOICE

## 2020-10-08 RX ORDER — INSULIN LISPRO 100 [IU]/ML
7 INJECTION, SOLUTION INTRAVENOUS; SUBCUTANEOUS
Qty: 6.3 ML | Refills: 0 | Status: SHIPPED | OUTPATIENT
Start: 2020-10-08 | End: 2020-10-20 | Stop reason: SDUPTHER

## 2020-10-08 RX ORDER — ASPIRIN 81 MG/1
81 TABLET, CHEWABLE ORAL DAILY
Qty: 30 TABLET | Refills: 3 | Status: SHIPPED | OUTPATIENT
Start: 2020-10-09 | End: 2020-12-30

## 2020-10-08 RX ORDER — LOSARTAN POTASSIUM 100 MG/1
100 TABLET ORAL DAILY
Qty: 30 TABLET | Refills: 3 | Status: SHIPPED | OUTPATIENT
Start: 2020-10-09 | End: 2020-12-30

## 2020-10-08 RX ORDER — INSULIN GLARGINE 100 [IU]/ML
75 INJECTION, SOLUTION SUBCUTANEOUS NIGHTLY
Qty: 1 VIAL | Refills: 3 | Status: SHIPPED | OUTPATIENT
Start: 2020-10-08 | End: 2020-10-15 | Stop reason: SDUPTHER

## 2020-10-08 RX ORDER — ATORVASTATIN CALCIUM 40 MG/1
40 TABLET, FILM COATED ORAL NIGHTLY
Qty: 30 TABLET | Refills: 3 | Status: SHIPPED | OUTPATIENT
Start: 2020-10-08 | End: 2020-10-15 | Stop reason: SINTOL

## 2020-10-08 RX ADMIN — ONDANSETRON 4 MG: 2 INJECTION INTRAMUSCULAR; INTRAVENOUS at 08:00

## 2020-10-08 RX ADMIN — LOSARTAN POTASSIUM 100 MG: 100 TABLET, FILM COATED ORAL at 07:58

## 2020-10-08 RX ADMIN — DULOXETINE HYDROCHLORIDE 60 MG: 60 CAPSULE, DELAYED RELEASE ORAL at 07:58

## 2020-10-08 RX ADMIN — INSULIN LISPRO 1 UNITS: 100 INJECTION, SOLUTION INTRAVENOUS; SUBCUTANEOUS at 08:44

## 2020-10-08 RX ADMIN — INSULIN LISPRO 7 UNITS: 100 INJECTION, SOLUTION INTRAVENOUS; SUBCUTANEOUS at 12:35

## 2020-10-08 RX ADMIN — INSULIN LISPRO 7 UNITS: 100 INJECTION, SOLUTION INTRAVENOUS; SUBCUTANEOUS at 08:47

## 2020-10-08 RX ADMIN — FAMOTIDINE 20 MG: 20 TABLET, FILM COATED ORAL at 07:58

## 2020-10-08 RX ADMIN — CARVEDILOL 3.12 MG: 3.12 TABLET, FILM COATED ORAL at 07:58

## 2020-10-08 RX ADMIN — TICAGRELOR 90 MG: 90 TABLET ORAL at 07:58

## 2020-10-08 RX ADMIN — ASPIRIN 81 MG: 81 TABLET, CHEWABLE ORAL at 07:58

## 2020-10-08 ASSESSMENT — PAIN SCALES - GENERAL: PAINLEVEL_OUTOF10: 0

## 2020-10-08 NOTE — H&P
Hospital Medicine History & Physical    Patient:  Sade Rodney  YOB: 1965  Date of Service: 10/8/20  MRN: [id number]    PCP: Jeovanny Ramos MD    Date of Admission: 10/6/2020      Chief Complaint:    Chief Complaint   Patient presents with    Chest Pain     left side chest pain woke patient up at 0930 this morning         History Of Present Illness: The patient is a 63 y.o. female who presents to [facility] with c/o as above  S/p stent placement  Now feels better  Still is nauseous    Past Medical History:        Diagnosis Date    Arthritis     Back pain     Depression     Diabetes mellitus ([address]     REHMAN (dyspnea on exertion)     Fatty liver     GERD (gastroesophageal reflux disease)     Hyperlipidemia     Hypertension     MRSA (methicillin resistant staph aureus) culture positive 08/15/2018    arm    Stress incontinence     Thyroid disease     CYST ON THYROID--FOUND 20 YEARS AGO       Past Surgical History:        Procedure Laterality Date     SECTION      TIMES 3    CHOLECYSTECTOMY      COLONOSCOPY  2017      Colonoscopy with snare and biopsy    EYE SURGERY      muscle correction and eyelid     HERNIA REPAIR      UMBILICAL AREA x 2    HYSTERECTOMY      SHOULDER ARTHROSCOPY Right 5-19-15    Right shoulder arthroscopy labral debridement open [place] subacromial decompression and chrondroplasty    UPPER GASTROINTESTINAL ENDOSCOPY  2017    Esophagogastroduodenoscopy with biopsy       Family History:  Family History   Problem Relation Age of Onset    Cancer Mother     Heart Disease Father     Stroke Brother        Medications Prior to Admission:    Prior to Admission medications    Medication Sig Start Date End Date Taking?  Authorizing Provider   aspirin 81 MG chewable tablet Take 1 tablet by mouth daily 10/9/20  Yes MOY Wiley CNP   insulin glargine (LANTUS) 100 UNIT/ML injection vial Inject 75 Units into the skin nightly 10/8/20  Yes MOY Wiley CNP   atorvastatin (LIPITOR) 40 MG tablet Take 1 tablet by mouth nightly 10/8/20  Yes MOY Wiley CNP   losartan (COZAAR) 100 MG tablet Take 1 tablet by mouth daily 10/9/20  Yes MOY Wiley CNP   carvedilol (COREG) 3.125 MG tablet Take 1 tablet by mouth 2 times daily (with meals) 10/8/20  Yes MOY Wiley CNP   insulin lispro, 1 Unit Dial, (HUMALOG KWIKPEN) 100 UNIT/ML SOPN Inject 7 Units into the skin 3 times daily (before meals) 10/8/20 11/7/20 Yes Nolan Womack MD   Insulin Pen Needle 32G X 4 MM MISC 1 each by Does not apply route daily 10/8/20  Yes Nolan Womack MD   Blood Glucose Monitoring Suppl (FREESTYLE LITE) ASHWIN 1 Device by Does not apply route daily as needed (glucose monitoring) 10/8/20 11/7/20 Yes Nolan Womack MD   FreeStyle Lancets [address] [facility] 1 each by Does not apply route daily 10/8/20 11/7/20 Yes Nolan Womack MD   blood glucose test strips (FREESTYLE LITE) strip 1 each by In Vitro route daily As needed. 10/8/20  Yes Nolan Womack MD   ticagrelor (BRILINTA) 90 MG TABS tablet Take 1 tablet by mouth 2 times daily 10/7/20  Yes Morro Bautista MD   diazePAM (VALIUM) 5 MG tablet Take 5 mg by mouth every 6 hours as needed for Anxiety.    Yes Historical Provider, MD   famotidine (PEPCID) 20 MG tablet TAKE 1 TABLET BY MOUTH TWICE A DAY 8/10/20  Yes La Harry MD   DULoxetine (CYMBALTA) 60 MG extended release capsule TAKE 1 CAPSULE BY MOUTH EVERY DAY 7/22/20  Yes La Harry MD   MIRALAX powder TAKE 17 G BY MOUTH DAILY 2/4/19  Yes La Harry MD   albuterol sulfate  (90 Base) MCG/ACT inhaler INHALE 2 PUFFS BY MOUTH EVERY 4-6 HOURS FOR 7-10 DAYS THEN AS NEEDED 8/24/20   La Harry MD   FREESTYLE LITE strip TEST THREE TIMES DAILY 5/21/20 Mir Costa MD   BD INSULIN SYRINGE U/F 31G X \" 1 ML MISC USE AT BEDTIME WITH LANTUS 19   Mir Costa MD       Allergies:  Latex; Tramadol; Codeine; and Penicillins    Social History:    Social History     Socioeconomic History    Marital status:      Spouse name: Not on file    Number of children: Not on file    Years of education: Not on file    Highest education level: Not on file   Occupational History    Not on file   Social Needs    Financial resource strain: Not on file    Food insecurity     Worry: Not on file     Inability: Not on file    Transportation needs     Medical: Not on file     Non-medical: Not on file   Tobacco Use    Smoking status: Former Smoker     Packs/day: 1.00     Years: 30.00     Pack years: 30.00     Types: Cigarettes     Last attempt to quit:      Years since quittin.7    Smokeless tobacco: Never Used   Substance and Sexual Activity    Alcohol use: No    Drug use: No    Sexual activity: Never   Lifestyle    Physical activity     Days per week: Not on file     Minutes per session: Not on file    Stress: Not on file   Relationships    Social connections     Talks on phone: Not on file     Gets together: Not on file     Attends Ca[employer] service: Not on file     Active member of club or organization: Not on file     Attends meetings of clubs or organizations: Not on file     Relationship status: Not on file    Intimate partner violence     Fear of current or ex partner: Not on file     Emotionally abused: Not on file     Physically abused: Not on file     Forced sexual activity: Not on file   Other Topics Concern    Not on file   Social History Narrative    Not on file       TOBACCO:   reports that she quit smoking about 5 years ago. Her smoking use included cigarettes. She has a 30.00 pack-year smoking history. She has never used smokeless tobacco.  ETOH:   reports no history of alcohol use.     REVIEW OF SYSTEMS:    Vital: BP 10.1 11.7*   HGB 14.7 12.3 12.2   HCT 43.6 36.3 36.6    199 193      RENAL  Recent Labs     10/05/20  1457 10/06/20  1410 10/07/20  0432    137 134*   K 4.0 4.4 4.6    100 100   CO2 26 26 25   BUN 13 14 14   CREATININE 0.7 0.7 0.6     LFT'S  Recent Labs     10/05/20  1457   AST 23   ALT 21   BILITOT 0.5   ALKPHOS 124     COAG  Recent Labs     10/06/20  1410   INR 1.02     CARDIAC ENZYMES  Recent Labs     10/06/20  1410 10/07/20  0432   TROPONINI 0.10* 0.27*       U/A:    Lab Results   Component Value Date    COLORU Yellow 02/21/2018    WBCUA 0-2 02/21/2018    RBCUA None seen 02/21/2018    BACTERIA Rare 02/21/2018    CLARITYU Clear 02/21/2018    SPECGRAV 1.015 02/21/2018    LEUKOCYTESUR Negative 02/21/2018    BLOODU Negative 02/21/2018    GLUCOSEU Negative 02/21/2018       ABG  No results found for: LKU1YUR, BEART, F2NYQUCM, PHART, THGBART, OVS0FOG, PO2ART, [address] Problems    Diagnosis Date Noted    Hyperlipidemia LDL goal <70 [E78.5]     Uncontrolled type 2 diabetes mellitus with hyperglycemia ([facility] [E11.65]     [facility] [I21.3] 10/06/2020    Acute ST elevation myocardial infarction (STEMI) involving right coronary artery ([facility]) [I21.11]            ASSESSMENT/PLAN:  STEMI  CAD  Uncontrolled dm- she is trying to do better  htn  High chol  Non compliance  gastroparesis    Pt is stable. Discussed with staff and pt about the plan. See the orders for further plan. Will proceed further acc to pt's progress.     Electronically signed by Marcela Godoy MD on 10/8/2020 at 11:04 AM

## 2020-10-08 NOTE — PROGRESS NOTES
Hospitalist Progress Note      PCP: Florian Sanders MD    Date of Admission: 10/6/2020    Hospital Course: Ms. Anabela Ybarra, a 63y.o. year old female  who  has a past medical history of Arthritis, Back pain, Depression, Diabetes mellitus ([address] REHMAN (dyspnea on exertion), Fatty liver, GERD (gastroesophageal reflux disease), Hyperlipidemia, Hypertension, MRSA (methicillin resistant staph aureus) culture positive, Stress incontinence, and Thyroid disease. Who presented with chest pain. She was noted to have acute inferior ST elevation MI. She had emergent cardiac catheterization with placement of PCI to mid RCA x2 and mid LAD. Postprocedure she has had hyperglycemia with glucose levels in 300s. A1c is 8.5. Subjective:   No overnight events. Blood pressure better. Patient was weaned off oxygen.     Medications:  Reviewed    Infusion Medications    dextrose       Scheduled Medications    insulin lispro  0.08 Units/kg Subcutaneous TID WC    insulin glargine  75 Units Subcutaneous Nightly    insulin lispro  0-6 Units Subcutaneous TID WC    insulin lispro  0-3 Units Subcutaneous Nightly    losartan  100 mg Oral Daily    DULoxetine  60 mg Oral Daily    famotidine  20 mg Oral BID    HYDROcodone-acetaminophen  1 tablet Oral BID    sodium chloride flush  10 mL Intravenous 2 times per day    carvedilol  3.125 mg Oral BID WC    atorvastatin  40 mg Oral Nightly    aspirin  81 mg Oral Daily    ticagrelor  90 mg Oral BID     PRN Meds: glucose, dextrose, glucagon (rDNA), dextrose, polyethylene glycol, albuterol, diazePAM, sodium chloride flush, acetaminophen, sodium chloride, ondansetron, hydrALAZINE    No intake or output data in the 24 hours ending 10/08/20 1522    Physical Exam Performed:    /71   Pulse 92   Temp 97.4 °F (36.3 °C) (Oral)   Resp 16   Ht 5' (1.524 m)   Wt 186 lb 11.7 oz (84.7 kg)   LMP 11/30/2005   SpO2 92%   BMI 36.47 kg/m²     General appearance: No apparent distress, appears stated age and cooperative. HEENT: Pupils equal, round, and reactive to light. Conjunctivae/corneas clear. Neck: Supple, with full range of motion. No jugular venous distention. Trachea midline. Respiratory:  Normal respiratory effort. Clear to auscultation, bilaterally without Rales/Wheezes/Rhonchi. Cardiovascular: Regular rate and rhythm with normal S1/S2 without murmurs, rubs or gallops. Abdomen: Soft, non-tender, non-distended with normal bowel sounds. Musculoskeletal: No clubbing, cyanosis or edema bilaterally. Full range of motion without deformity. Skin: Skin color, texture, turgor normal.  No rashes or lesions. Neurologic:  Neurovascularly intact without any focal sensory/motor deficits. Cranial nerves: II-XII intact, grossly non-focal.  Psychiatric: Alert and oriented, thought content appropriate, normal insight      Labs:   Recent Labs     10/06/20  1410 10/06/20  2153 10/07/20  0432   WBC 11.7* 10.1 11.7*   HGB 14.7 12.3 12.2   HCT 43.6 36.3 36.6    199 193     Recent Labs     10/06/20  1410 10/07/20  0432    134*   K 4.4 4.6    100   CO2 26 25   BUN 14 14   CREATININE 0.7 0.6   CALCIUM 9.4 8.7     No results for input(s): AST, ALT, BILIDIR, BILITOT, ALKPHOS in the last 72 hours. Recent Labs     10/06/20  1410   INR 1.02     Recent Labs     10/06/20  1410 10/07/20  0432   TROPONINI 0.10* 0.27*       Urinalysis:      Lab Results   Component Value Date    NITRU Negative 02/21/2018    WBCUA 0-2 02/21/2018    BACTERIA Rare 02/21/2018    RBCUA None seen 02/21/2018    BLOODU Negative 02/21/2018    SPECGRAV 1.015 02/21/2018    GLUCOSEU Negative 02/21/2018       Radiology:  XR CHEST PORTABLE   Final Result   No acute cardiopulmonary pathology.                  Assessment/Plan:    Active Hospital Problems    Diagnosis    Hyperlipidemia LDL goal <70 [E78.5]    Uncontrolled type 2 diabetes mellitus with hyperglycemia ([facility] [E11.65]    STEMI (ST elevation myocardial

## 2020-10-08 NOTE — PROGRESS NOTES
Krystle 80   Daily Progress Note      Admit Date:  10/6/2020    Reason for follow up visit: STEMI; DM    CC: \"I feel so much better today. I still have a little nausea, but I always have that. \"    64 y/o female with PMH significant for type II DM and HTN admitted to [facility] after presenting to [facility] ED with chest pain. It lasted several hours before presenting to the ED. ECG demonstrated SR with inferior ST elevation. Transported to [facility] and underwent cardiac cath with resultant SHEFALI x 2 to RCA and SHEFALI to LAD. Developed nausea/vomiting post procedure which eventually improved with use of Zofran. Seen by hospitalist for her diabetes mellitus and Lantus adjusted. She has progressively ambulated and without complaints. Interval History:  Pt. seen and examined; records reviewed  BP stable. Ambulating without complaints  Nausea improved  No further vomiting  Denies chest pain or SOB  Hospitalist consult appreciated    Subjective:  Pt with no acute overnight cardiac events. Denies chest pain, SOB, cough, PND, palpitations or dizziness  + nausea improving    Review of Systems:   · Constitutional: no unanticipated weight loss. There's been no change in energy level, sleep pattern, or activity level. No fevers, chills. · Eyes: No visual changes or diplopia. No scleral icterus. · ENT: No Headaches, hearing loss or vertigo. No mouth sores or sore throat. · Cardiovascular: as reviewed in HPI  · Respiratory: No cough or wheezing, no sputum production. No hematemesis. · Gastrointestinal: No abdominal pain, appetite loss, blood in stools. No change in bowel or bladder habits. + chronic nausea  · Genitourinary: No dysuria, trouble voiding, or hematuria. · Musculoskeletal:  No gait disturbance, no joint complaints. · Integumentary: No rash or pruritis. · Neurological: No headache, diplopia, change in muscle strength, numbness or tingling. · Psychiatric: No anxiety or depression.   · Endocrine: No temperature intolerance. No excessive thirst, fluid intake, or urination. No tremor. · Hematologic/Lymphatic: No abnormal bruising or bleeding, blood clots or swollen lymph nodes. · Allergic/Immunologic: No nasal congestion or hives. Objective:   /71   Pulse 92   Temp 97.4 °F (36.3 °C) (Oral)   Resp 16   Ht 5' (1.524 m)   Wt 186 lb 11.7 oz (84.7 kg)   LMP 11/30/2005   SpO2 92%   BMI 36.47 kg/m²       Intake/Output Summary (Last 24 hours) at 10/8/2020 0843  Last data filed at 10/7/2020 0852  Gross per 24 hour   Intake 10 ml   Output --   Net 10 ml     Wt Readings from Last 3 Encounters:   10/08/20 186 lb 11.7 oz (84.7 kg)   07/09/20 183 lb (83 kg)   07/05/20 182 lb 12.2 oz (82.9 kg)       Physical Exam:  General: In no acute distress. Awake, alert, and oriented X4. Ambulated without complaints  Skin:  Warm and dry. No new appearing rashes or lesions. Neck:  Supple. No JVD or carotid bruit appreciated. Chest: Lungs clear to auscultation. No wheezes/rhonchi/rales  Cardiovascular:  RRR. Normal S1 and S2. No murmur/gallop or rub  Abdomen:  soft, nontender, nondistended, +bowel sounds. No hepatomegaly  Extremities:  No LE edema. No clubbing or cyanosis. R groin site unremarkable. 2+ bilateral radial/DP/PT pulses.  Cap refill brisk     Medications:    insulin lispro  0.08 Units/kg Subcutaneous TID WC    insulin glargine  75 Units Subcutaneous Nightly    insulin lispro  0-6 Units Subcutaneous TID WC    insulin lispro  0-3 Units Subcutaneous Nightly    losartan  100 mg Oral Daily    DULoxetine  60 mg Oral Daily    famotidine  20 mg Oral BID    HYDROcodone-acetaminophen  1 tablet Oral BID    sodium chloride flush  10 mL Intravenous 2 times per day    carvedilol  3.125 mg Oral BID WC    atorvastatin  40 mg Oral Nightly    aspirin  81 mg Oral Daily    ticagrelor  90 mg Oral BID      dextrose       glucose, dextrose, glucagon (rDNA), dextrose, polyethylene glycol, albuterol, diazePAM, sodium chloride flush, acetaminophen, sodium chloride, ondansetron, hydrALAZINE    Lab Data:  CBC:   Recent Labs     10/06/20  1410 10/06/20  2153 10/07/20  0432   WBC 11.7* 10.1 11.7*   HGB 14.7 12.3 12.2    199 193     BMP:    Recent Labs     10/05/20  1457 10/06/20  1410 10/07/20  0432    137 134*   K 4.0 4.4 4.6   CO2 26 26 25   BUN 13 14 14   CREATININE 0.7 0.7 0.6     LIVR:   Recent Labs     10/05/20  1457   AST 23   ALT 21     INR:    Recent Labs     10/06/20  1410   INR 1.02     APTT:   Recent Labs     10/06/20  1410   APTT 38.7*     Results for Shannon Lovett (MRN [id number]) as of 10/8/2020 08:48   Ref. Range 10/6/2020 14:10 10/7/2020 04:32   Troponin Latest Ref Range: <0.01 ng/mL 0.10 (H) 0.27 (H)     Results for Shannon Lovett (MRN [id number]) as of 10/8/2020 08:48   Ref. Range 10/7/2020 04:32   Cholesterol, Total Latest Ref Range: 0 - 199 mg/dL 157   HDL Cholesterol Latest Ref Range: 40 - 60 mg/dL 29 (L)   LDL Calculated Latest Ref Range: <100 mg/dL 104 (H)   Triglycerides Latest Ref Range: 0 - 150 mg/dL 120   VLDL Cholesterol Calculated Latest Ref Range: Not Established mg/dL 24       Hgb A1c: 8.5    10/6/2020 Cardiac cath/PCI:  1.  Right-dominant coronary arterial system with a 99% mid RCA lesion  successfully intervened upon with two 2.75 x 15 mm overlapping [place]  drug-eluting stents dilated to 2.80 mm in diameter. 2.  In the left system, there was a 90% mid LAD lesion that was  intervened upon with a 2.75 x 15 mm [place] drug-eluting stent dilated to  2.80 mm in diameter.  There was a 60% proximal LAD disease and a 60%  focal lesion distal to this.  In the circumflex, there were small OM  branches and one small OM branch has a 99% lesion.  This is too small to  intervene upon. 3.  Normal left ventricular systolic function with an LV ejection  fraction of 60%. 4.  Left ventricular end-diastolic pressure of 10 mmHg.   5.  No gradient across the aortic valve on pullback to suggest aortic  stenosis.     ECG: SR with RBBB: inferior infarct    Telemetry: Sinus rhythm      Assessment/Plan:    1. Inferior STEMI/multivessel CAD  -S/P SHEFALI x 2 to RCA and SHEFALI to LAD  -residual 60% prox LAD, 60% focal lesion distal to prox lesion, small OM 99% (to small for intervention)  -LVEF 60%  -continue DAPT, BB and statin  -risk factor modification  -referral to cardiac rehab as oupt     2. Hyperlipidemia with goal LDL < 70  -continue high intensity statin    3. Nausea/vomiting  -nausea improving (relates she has some chronic issues with nausea)  -no further vomiting  -advised to F/U with her PCP: ? Gastroparesis from her DM     4. Type II diabetes mellitus  -A1c 8.5  -Lantus increased  -uncontrolled     5. Obesity  -weight loss discussed      Stable from cardiac standpoint and okay to discharge today    Follow up with cardiology: scheduled on 10/15/2020 @@ 1:20 PM with D. Enzweiler, CNP    Post PCI/MI instructions and limitations discussed. Discussed diet, medications, follow up, activity/exercise, disease process.     Advised to follow up with PCP (Dr. Rocky Ricci) within 1-2 weeks    Discharge Meds:  ASA 81 mg daily  Atorvastatin 40 mg nightly  Carvedilol 3.125 mg BID  Cymbalta 60 mg daily  Famotidine 20 mg BID  Lantus 75 units nightly  Losartan 100 mg daily  Brilinta 90 mg BID      Electronically signed by MOY Najera CNP on 10/8/2020 at 8:43 AM

## 2020-10-08 NOTE — PLAN OF CARE
Problem: Pain:  Goal: Pain level will decrease  Description: Pain level will decrease  Outcome: Met This Shift  Goal: Control of acute pain  Description: Control of acute pain  Outcome: Met This Shift  Goal: Control of chronic pain  Description: Control of chronic pain  Outcome: Met This Shift   Alert and oriented x4 Resp. Easy and even Sats. WNL on RA Medicated x1 for c/o nausea with good effect. Cont.  Per bathroom with SBA Free from fall/injury No c/o pain Frequently turns and repositions self Moved from CVU to PCU this evening

## 2020-10-08 NOTE — PROGRESS NOTES
Reviewed med list and dc instructions. Verbalized understanding. Follow up information given. Patient dc via wc to home.

## 2020-10-08 NOTE — PROGRESS NOTES
4 Eyes Skin Assessment     NAME:  Gregg Cervantes  YOB: 1965  MEDICAL RECORD NUMBER:  [id number]    The patient is being assess for  Transfer to New Unit    I agree that 2 RN's have performed a thorough Head to Toe Skin Assessment on the patient. ALL assessment sites listed below have been assessed. Areas assessed by both nurses:    Head, Face, Ears        Does the Patient have a Wound?  No noted wound(s)       Glynn Prevention initiated:  NA   Wound Care Orders initiated:  NA    Pressure Injury (Stage 3,4, Unstageable, DTI, NWPT, and Complex wounds) if present place consult order under [de-identified] NA    New and Established Ostomies if present place consult order under : NA      Nurse 1 eSignature: Electronically signed by Theresa Andino RN on 10/7/20 at 9:36 PM EDT    **SHARE this note so that the co-signing nurse is able to place an eSignature**    Nurse 2 eSignature: Electronically signed by Vidhi Sanchez RN on 10/7/20 at 11:46 PM EDT

## 2020-10-08 NOTE — PROGRESS NOTES
CLINICAL PHARMACY NOTE: MEDS TO [address] Select Patient?: No  Total # of Prescriptions Filled: 3   The following medications were delivered to the patient:  Current Discharge Medication List      START taking these medications    Details   insulin glargine (LANTUS) 100 UNIT/ML injection vial Inject 75 Units into the skin nightly  Qty: 1 vial, Refills: 3      Insulin Pen Needle 31G X 5 MM MISC 1 each by Does not apply route daily  Qty: 100 each, Refills: 3      FreeStyle Lancets MISC 1 each by Does not apply route daily  Qty: 200 each, Refills: 0        Total # of Interventions Completed: 0  Time Spent (min): 30    Additional Documentation: Test strips and meter were refill too soon

## 2020-10-08 NOTE — DISCHARGE SUMMARY
small OM  branches and one small OM branch has a 99% lesion.  This is too small to  intervene upon. 3.  Normal left ventricular systolic function with an LV ejection  fraction of 60%. 4.  Left ventricular end-diastolic pressure of 10 mmHg. 5.  No gradient across the aortic valve on pullback to suggest aortic  stenosis. Labs:   Lab Results   Component Value Date    CREATININE 0.6 10/07/2020    BUN 14 10/07/2020     (L) 10/07/2020    K 4.6 10/07/2020     10/07/2020    CO2 25 10/07/2020      Lab Results   Component Value Date    WBC 11.7 (H) 10/07/2020    HGB 12.2 10/07/2020    HCT 36.6 10/07/2020    MCV 86.3 10/07/2020     10/07/2020      Lab Results   Component Value Date    INR 1.02 10/06/2020    PROTIME 11.8 10/06/2020    No results found for: BNP    Disposition: home    Patient Instructions:    Nereyda Cobian   Home Medication Instructions ANT:[id number]    Printed on:10/08/20 9166   Medication Information                      albuterol sulfate  (90 Base) MCG/ACT inhaler  INHALE 2 PUFFS BY MOUTH EVERY 4-6 HOURS FOR 7-10 DAYS THEN AS NEEDED             aspirin 81 MG chewable tablet  Take 1 tablet by mouth daily             atorvastatin (LIPITOR) 40 MG tablet  Take 1 tablet by mouth nightly             BD INSULIN SYRINGE U/F 31G X 5/16\" 1 ML MISC  USE AT BEDTIME WITH LANTUS             carvedilol (COREG) 3.125 MG tablet  Take 1 tablet by mouth 2 times daily (with meals)             diazePAM (VALIUM) 5 MG tablet  Take 5 mg by mouth every 6 hours as needed for Anxiety.              DULoxetine (CYMBALTA) 60 MG extended release capsule  TAKE 1 CAPSULE BY MOUTH EVERY DAY             famotidine (PEPCID) 20 MG tablet  TAKE 1 TABLET BY MOUTH TWICE A DAY             FREESTYLE LITE strip  TEST THREE TIMES DAILY             insulin glargine (LANTUS) 100 UNIT/ML injection vial  Inject 75 Units into the skin nightly             losartan (COZAAR) 100 MG tablet  Take 1 tablet by mouth daily MIRALAX powder  TAKE 17 G BY MOUTH DAILY             ticagrelor (BRILINTA) 90 MG TABS tablet  Take 1 tablet by mouth 2 times daily                 Follow-up with D. Enzweiler, CNP on 10/15/2020 @ 1:20 PM    Follow up with Dr. Saw Regalado in 1-2 weeks    Please see today's progress note for discharge physical exam, meds, instructions, etc.    35 minutes spent on pt's discharge this AM    Signed:  MOY Perez CNP on 10/8/2020 at 9:04 AM    [facility], [address] [place] [address], [id number] [place] [address]  Phone: [phone]  Fax: [phone]

## 2020-10-08 NOTE — PROGRESS NOTES
Pt with orders to transfer, up in chair,m VSS, c/o Nausea, Zofran IV given, effective, no vomiting, denies any pain. Report called PCU Nurse Mai Gaines.   Electronically signed by Quincy Singletary RN on 10/7/2020 at 8:27 PM

## 2020-10-08 NOTE — CARE COORDINATION
[facility]  Diabetes Education   Progress Note       NAME:  Sade Rodney  MEDICAL RECORD NUMBER:  [id number]  AGE: 63 y.o. GENDER: female  : 1965  TODAY'S DATE:  10/8/2020    Subjective   Reason for Diabetic Education Evaluation and Assessment: hyperglycemia    Mohini Hopkins is please with her blood sugar levels today. Visit Type: follow-up      Sade Rodney is a 63 y.o. female referred by:     [x] Physician  [] Nursing  [] Chart Review   [] Other:     PAST MEDICAL HISTORY        Diagnosis Date    Arthritis     Back pain     Depression     Diabetes mellitus ([address]     REHMAN (dyspnea on exertion)     Fatty liver     GERD (gastroesophageal reflux disease)     Hyperlipidemia     Hypertension     MRSA (methicillin resistant staph aureus) culture positive 08/15/2018    arm    Stress incontinence     Thyroid disease     CYST ON THYROID--FOUND 20 YEARS AGO       PAST SURGICAL HISTORY    Past Surgical History:   Procedure Laterality Date     SECTION      TIMES 3    CHOLECYSTECTOMY      COLONOSCOPY  2017      Colonoscopy with snare and biopsy    EYE SURGERY      muscle correction and eyelid     HERNIA REPAIR      UMBILICAL AREA x 2    HYSTERECTOMY      SHOULDER ARTHROSCOPY Right 5-19-15    Right shoulder arthroscopy labral debridement open nedra subacromial decompression and chrondroplasty    UPPER GASTROINTESTINAL ENDOSCOPY  2017    Esophagogastroduodenoscopy with biopsy       FAMILY HISTORY    Family History   Problem Relation Age of Onset    Cancer Mother     Heart Disease Father     Stroke Brother        SOCIAL HISTORY    Social History     Tobacco Use    Smoking status: Former Smoker     Packs/day: 1.00     Years: 30.00     Pack years: 30.00     Types: Cigarettes     Last attempt to quit:      Years since quittin.7    Smokeless tobacco: Never Used   Substance Use Topics    Alcohol use: No    Drug use:  No ALLERGIES    Allergies   Allergen Reactions    Latex Itching and Rash    Tramadol Hives and Shortness Of Breath    Codeine Itching    Penicillins        MEDICATIONS     insulin lispro  0.08 Units/kg Subcutaneous TID WC    insulin glargine  75 Units Subcutaneous Nightly    insulin lispro  0-6 Units Subcutaneous TID WC    insulin lispro  0-3 Units Subcutaneous Nightly    losartan  100 mg Oral Daily    DULoxetine  60 mg Oral Daily    famotidine  20 mg Oral BID    HYDROcodone-acetaminophen  1 tablet Oral BID    sodium chloride flush  10 mL Intravenous 2 times per day    carvedilol  3.125 mg Oral BID WC    atorvastatin  40 mg Oral Nightly    aspirin  81 mg Oral Daily    ticagrelor  90 mg Oral BID       Objective        Patient Active Problem List   Diagnosis Code    Labral tear of shoulder S43.439A    Acromioclavicular joint arthritis M19.019    Rotator cuff tendonitis M75.80    S/P shoulder surgery Z98.890    STEMI (ST elevation myocardial infarction) ([facility]) I21.3    Acute ST elevation myocardial infarction (STEMI) involving right coronary artery ([facility]) I21.11    Hyperlipidemia LDL goal <70 E78.5    Uncontrolled type 2 diabetes mellitus with hyperglycemia ([facility]) E11.65        /71   Pulse 92   Temp 97.4 °F (36.3 °C) (Oral)   Resp 16   Ht 5' (1.524 m)   Wt 186 lb 11.7 oz (84.7 kg)   LMP 11/30/2005   SpO2 92%   BMI 36.47 kg/m²     HgBA1c:    Lab Results   Component Value Date    LABA1C 8.5 10/07/2020       Recent Labs     10/07/20  1740 10/07/20  2151 10/08/20  0807 10/08/20  1159   POCGLU 203* 210* 142* 136*       BUN/Creatinine:    Lab Results   Component Value Date    BUN 14 10/07/2020    CREATININE 0.6 10/07/2020       Assessment        Diabetes Management and Education    Does the patient have a Primary Care Physician? Yes, Aj Man MD       Does the patient require new medication instruction? Yes  Reviewed basal and bolus insulin concepts.       Person responsible for administration of Insulin/Medication:        [x] Self     [] Caregiver       [] Spouse       [] Other Family Member   []  Other      Level of patient/caregiver understanding able to:      [x] Verbalized Understanding   [] Demonstrated Understanding       [] Teach Back       [] Needs Reinforcement     []  Other:        Does the patient/caregiver monitor Blood Glucoses? Yes  Reviewed glycemic control targets, testing frequency and when to call PCP. Level of patient/caregiver understanding able to:        [x] Verbalized Understanding   [] Demonstrated Understanding       [] Teach Back       [] Needs Reinforcement     []  Other:      Does the patient/caregiver understand S/S of Hypoglycemia? Yes  Reviewed symptoms, prevention and treatment. Level of patient/caregiver understanding able to:       [x] Verbalized Understanding   [] Demonstrated Understanding       [] Teach Back       [] Needs Reinforcement     []  Other:                     Plan        Ongoing diabetes education and blood glucose monitoring.                                              Discharge Plan:  Discharge needs: insulin pens and 4mm pen needles and glucometer/strips/lancets  Placement for patient upon discharge: independent living        Teaching Time Diabetes Education:  20 minutes     Electronically signed by Olga Matson on 10/8/2020 at 1:09 PM

## 2020-10-09 LAB — POC ACT LR: 173 SEC

## 2020-10-09 RX ORDER — INSULIN GLARGINE 100 [IU]/ML
75 INJECTION, SOLUTION SUBCUTANEOUS NIGHTLY
Qty: 5 PEN | Refills: 0 | Status: SHIPPED | OUTPATIENT
Start: 2020-10-09 | End: 2020-10-20 | Stop reason: SDUPTHER

## 2020-10-09 NOTE — TELEPHONE ENCOUNTER
Pt is taking Lantus. Lilian sent over for the vial but pt normally does the pens. Pharmacy wants to know if we can resend prescription for the pen.     [facility]/pharmacy #[id number] Krystle [address] - F [phone]

## 2020-10-09 NOTE — TELEPHONE ENCOUNTER
I called in prescription this AM for refill on pens. Her dose was increased to 75 u nightly while an inpatient. She needs to follow up with Dr. Daniela Guerrier to get this straightened out.

## 2020-10-12 NOTE — PROGRESS NOTES
[address]  Office Visit    Anirudh Courtney  1965    October 15, 2020    CC:   Chief Complaint   Patient presents with    Follow Up After Procedure     s/p LHC/PCI- edema, sob     HPI:  The patient is 63 y.o. female with a past medical history significant for type II DM and HTN  Her for hospital follow up visit. She was hospitalized at [facility] from 10/6/2020-10/8/2020 after presenting with chest pain to [facility] ED. ECG there demonstrated SR with inferior ST elevation. Transported to [facility] and underwent cardiac cath with resultant SHEFALI x 2 to RCA and SHEFALI to LAD. Developed nausea/vomiting post procedure which eventually improved with use of Zofran. Seen by hospitalist for her diabetes mellitus and Lantus adjusted. Statin stopped after 3 days d/t muscle pains (has tried in the past and same issues). Muscle aches improved off statin. Has been having edema in her feet and legs  And still has SOB with exertion. Hard to get and  Walk around d/t swelling in her feet. Has tingling in her feet. Has been occurring everyday since discharge and good in the AM and worsens as the day progresses. SOBOE. Has limited her sodium intake significantly . Denies chest pain/discomfort,  orthopnea/PND, cough, palpitations, dizziness, syncope,  weight change or claudication. No regular exercise. Having difficulty regulating her BS and with insulin (advised to follow with Dr. Nitza Fang). Asking about sliding scale insulin with meals-referred to Dr. Nitza Fang    Review of Systems:  Constitutional: Denies  fatigue, weakness, night sweats or fever. HEENT: Denies new visual changes, ringing in ears, nosebleeds,nasal congestion  Respiratory: Denies new or change in SOB, PND, orthopnea or cough. Cardiovascular: see HPI  GI: Denies N/V, diarrhea, constipation, abdominal pain, change in bowel habits, melena or hematochezia  : Denies urinary frequency, urgency, incontinence, hematuria or dysuria.   Skin: Denies rash, hives, or cyanosis  Musculoskeletal: + muscle aches on statin  Neurological: Denies syncope or TIA-like symptoms.   Psychiatric: Denies anxiety, insomnia or depression     Past Medical History:   Diagnosis Date    Arthritis     Back pain     Depression     Diabetes mellitus ([address]     REHMAN (dyspnea on exertion)     Fatty liver     GERD (gastroesophageal reflux disease)     Hyperlipidemia     Hypertension     MRSA (methicillin resistant staph aureus) culture positive 08/15/2018    arm    Stress incontinence     Thyroid disease     CYST ON THYROID--FOUND 20 YEARS AGO     Past Surgical History:   Procedure Laterality Date     SECTION      TIMES 3    CHOLECYSTECTOMY      COLONOSCOPY  2017      Colonoscopy with snare and biopsy    EYE SURGERY      muscle correction and eyelid     HERNIA REPAIR      UMBILICAL AREA x 2    HYSTERECTOMY      PTCA  10/2020    SHEFALI to RCA x 2; SHEFALI to LAD    SHOULDER ARTHROSCOPY Right 5-19-15    Right shoulder arthroscopy labral debridement open nedra subacromial decompression and chrondroplasty    UPPER GASTROINTESTINAL ENDOSCOPY  2017    Esophagogastroduodenoscopy with biopsy     Family History   Problem Relation Age of Onset    Cancer Mother     Heart Disease Father     Stroke Brother      Social History     Tobacco Use    Smoking status: Former Smoker     Packs/day: 1.00     Years: 30.00     Pack years: 30.00     Types: Cigarettes     Last attempt to quit:      Years since quittin.7    Smokeless tobacco: Never Used   Substance Use Topics    Alcohol use: No    Drug use: No       Allergies   Allergen Reactions    Latex Itching and Rash    Tramadol Hives and Shortness Of Breath    Codeine Itching    Penicillins      Current Outpatient Medications   Medication Sig Dispense Refill    rosuvastatin (CRESTOR) 20 MG tablet Take 1 tablet by mouth daily 30 tablet 5    ticagrelor (BRILINTA) 90 MG TABS tablet Take 1 tablet by mouth 2 times daily 60 tablet 11    insulin glargine (LANTUS SOLOSTAR) 100 UNIT/ML injection pen Inject 75 Units into the skin nightly 5 pen 0    Insulin Pen Needle 32G X 4 MM MISC 1 each by Does not apply route daily 100 each 3    Blood Glucose Monitoring Suppl (FREESTYLE LITE) ASHWIN 1 Device by Does not apply route daily as needed (glucose monitoring) 1 Device 0    aspirin 81 MG chewable tablet Take 1 tablet by mouth daily 30 tablet 3    losartan (COZAAR) 100 MG tablet Take 1 tablet by mouth daily 30 tablet 3    carvedilol (COREG) 3.125 MG tablet Take 1 tablet by mouth 2 times daily (with meals) 60 tablet 3    insulin lispro, 1 Unit Dial, (HUMALOG KWIKPEN) 100 UNIT/ML SOPN Inject 7 Units into the skin 3 times daily (before meals) 6.3 mL 0    FreeStyle Lancets MISC 1 each by Does not apply route daily 200 each 0    blood glucose test strips (FREESTYLE LITE) strip 1 each by In Vitro route daily As needed. 100 each 3    diazePAM (VALIUM) 5 MG tablet Take 5 mg by mouth every 6 hours as needed for Anxiety.  albuterol sulfate  (90 Base) MCG/ACT inhaler INHALE 2 PUFFS BY MOUTH EVERY 4-6 HOURS FOR 7-10 DAYS THEN AS NEEDED 18 Inhaler 1    famotidine (PEPCID) 20 MG tablet TAKE 1 TABLET BY MOUTH TWICE A DAY 60 tablet 3    DULoxetine (CYMBALTA) 60 MG extended release capsule TAKE 1 CAPSULE BY MOUTH EVERY DAY 30 capsule 2    FREESTYLE LITE strip TEST THREE TIMES DAILY 100 strip 5    BD INSULIN SYRINGE U/F 31G X 5/16\" 1 ML MISC USE AT BEDTIME WITH LANTUS 100 each 2    MIRALAX powder TAKE 17 G BY MOUTH DAILY 510 g 2     No current facility-administered medications for this visit. Physical Exam:   /70   Pulse 77   Temp 96 °F (35.6 °C)   Ht 5' (1.524 m)   Wt 180 lb (81.6 kg)   LMP 11/30/2005   SpO2 96%   BMI 35.15 kg/m²   Wt Readings from Last 2 Encounters:   10/15/20 180 lb (81.6 kg)   10/08/20 186 lb 11.7 oz (84.7 kg)     Constitutional: She is oriented to person, place, and time.  She appears well-developed and well-nourished. In no acute distress. HEENT: Normocephalic and atraumatic. Sclerae anicteric. No xanthelasmas. EOM's intact. Neck: Supple. No JVD present. Carotids without bruits. No thyromegaly present. No lymphadenopathy present. Cardiovascular: RRR, normal S1 and S2; no murmur/gallop or rub  Pulmonary/Chest: Effort normal.  Lungs clear to auscultation. Chest wall nontender  Abdominal: soft, nontender, nondistended. + bowel sounds; no hepatomegaly   Extremities: No edema, cyanosis, or clubbing. Pulses are 2+ radial/DP/PT bilaterally. Cap refill brisk. Neurological: No focal deficit. Skin: Skin is warm and dry. Psychiatric: She has a normal mood and affect. Her speech is normal and behavior is normal.     Lab Review:   Lab Results   Component Value Date    TRIG 120 10/07/2020    HDL 29 10/07/2020    LDLCALC 104 10/07/2020    LDLDIRECT 116 04/02/2019    LABVLDL 24 10/07/2020     Lab Results   Component Value Date     10/07/2020    K 4.6 10/07/2020    K 4.4 10/06/2020     10/07/2020    CO2 25 10/07/2020    BUN 14 10/07/2020    CREATININE 0.6 10/07/2020    GLUCOSE 341 10/07/2020    CALCIUM 8.7 10/07/2020      Lab Results   Component Value Date    WBC 11.7 (H) 10/07/2020    HGB 12.2 10/07/2020    HCT 36.6 10/07/2020    MCV 86.3 10/07/2020     10/07/2020     Results for Saurabh Clemens (MRN [id number]) as of 10/8/2020 08:48    Ref. Range 10/6/2020 14:10 10/7/2020 04:32   Troponin Latest Ref Range: <0.01 ng/mL 0.10 (H) 0.27 (H)       Hgb A1c: 8.5    ECG 10/15/2020  SR with RBBB    10/6/2020 Cardiac cath/PCI: Dr. Chirag Zavala  1.  Right-dominant coronary arterial system with a 99% mid RCA lesion successfully intervened upon with two 2.75 x 15 mm overlapping [facility] drug-eluting stents dilated to 2.80 mm in diameter. 2.  In the left system, there was a 90% mid LAD lesion that was intervened upon with a 2.75 x 15 mm [place] drug-eluting stent dilated to 2.80 mm in diameter. regarding her diabetes mellitus and adjustment of her insulin. She has been running much lower since change in her diet. Check echo for valvular function  Refer to cardiac rehab  Follow up with Dr. Rayo Mak in 3 months or sooner if needed    Return in about 3 months (around 1/15/2021) for with Dr. Rayo Mak. Thanks for allowing me to participate in the care of this patient.       PATRICIA Bowden  [facility], [address] [place] [address] [address] [place] [address], [address] [place] [address]  Office: [phone]  Fax: [phone]      Electronically signed by MOY Ross CNP on 10/15/2020 at 2:08 PM

## 2020-10-15 ENCOUNTER — OFFICE VISIT (OUTPATIENT)
Dept: CARDIOLOGY CLINIC | Age: 55
End: 2020-10-15
Payer: COMMERCIAL

## 2020-10-15 VITALS
WEIGHT: 180 LBS | OXYGEN SATURATION: 96 % | HEART RATE: 77 BPM | BODY MASS INDEX: 35.34 KG/M2 | DIASTOLIC BLOOD PRESSURE: 70 MMHG | SYSTOLIC BLOOD PRESSURE: 130 MMHG | HEIGHT: 60 IN | TEMPERATURE: 96 F

## 2020-10-15 PROCEDURE — G8427 DOCREV CUR MEDS BY ELIG CLIN: HCPCS | Performed by: NURSE PRACTITIONER

## 2020-10-15 PROCEDURE — 1036F TOBACCO NON-USER: CPT | Performed by: NURSE PRACTITIONER

## 2020-10-15 PROCEDURE — 3017F COLORECTAL CA SCREEN DOC REV: CPT | Performed by: NURSE PRACTITIONER

## 2020-10-15 PROCEDURE — 99214 OFFICE O/P EST MOD 30 MIN: CPT | Performed by: NURSE PRACTITIONER

## 2020-10-15 PROCEDURE — G8417 CALC BMI ABV UP PARAM F/U: HCPCS | Performed by: NURSE PRACTITIONER

## 2020-10-15 PROCEDURE — 1111F DSCHRG MED/CURRENT MED MERGE: CPT | Performed by: NURSE PRACTITIONER

## 2020-10-15 PROCEDURE — 2022F DILAT RTA XM EVC RTNOPTHY: CPT | Performed by: NURSE PRACTITIONER

## 2020-10-15 PROCEDURE — 93000 ELECTROCARDIOGRAM COMPLETE: CPT | Performed by: NURSE PRACTITIONER

## 2020-10-15 PROCEDURE — 3052F HG A1C>EQUAL 8.0%<EQUAL 9.0%: CPT | Performed by: NURSE PRACTITIONER

## 2020-10-15 PROCEDURE — G8484 FLU IMMUNIZE NO ADMIN: HCPCS | Performed by: NURSE PRACTITIONER

## 2020-10-15 RX ORDER — ROSUVASTATIN CALCIUM 20 MG/1
20 TABLET, COATED ORAL DAILY
Qty: 30 TABLET | Refills: 5 | Status: SHIPPED | OUTPATIENT
Start: 2020-10-15 | End: 2021-02-17

## 2020-10-27 ENCOUNTER — HOSPITAL ENCOUNTER (OUTPATIENT)
Dept: CARDIAC REHAB | Age: 55
Setting detail: THERAPIES SERIES
Discharge: HOME | End: 2020-10-27
Payer: COMMERCIAL

## 2020-10-27 PROCEDURE — 93798 PHYS/QHP OP CAR RHAB W/ECG: CPT

## 2020-10-30 ENCOUNTER — HOSPITAL ENCOUNTER (OUTPATIENT)
Dept: CARDIAC REHAB | Age: 55
Setting detail: THERAPIES SERIES
Discharge: HOME | End: 2020-10-30
Payer: COMMERCIAL

## 2020-10-30 PROCEDURE — 93798 PHYS/QHP OP CAR RHAB W/ECG: CPT

## 2020-11-02 ENCOUNTER — HOSPITAL ENCOUNTER (OUTPATIENT)
Dept: CARDIAC REHAB | Age: 55
Setting detail: THERAPIES SERIES
Discharge: HOME | End: 2020-11-02
Payer: COMMERCIAL

## 2020-11-02 PROCEDURE — 93798 PHYS/QHP OP CAR RHAB W/ECG: CPT

## 2020-11-04 ENCOUNTER — HOSPITAL ENCOUNTER (OUTPATIENT)
Dept: CARDIAC REHAB | Age: 55
Setting detail: THERAPIES SERIES
Discharge: HOME | End: 2020-11-04
Payer: COMMERCIAL

## 2020-11-04 PROCEDURE — 93798 PHYS/QHP OP CAR RHAB W/ECG: CPT

## 2020-11-06 ENCOUNTER — APPOINTMENT (OUTPATIENT)
Dept: CARDIAC REHAB | Age: 55
End: 2020-11-06
Payer: COMMERCIAL

## 2020-11-09 ENCOUNTER — APPOINTMENT (OUTPATIENT)
Dept: CARDIAC REHAB | Age: 55
End: 2020-11-09
Payer: COMMERCIAL

## 2020-11-11 ENCOUNTER — TELEPHONE (OUTPATIENT)
Dept: CARDIOLOGY CLINIC | Age: 55
End: 2020-11-11

## 2020-11-11 ENCOUNTER — HOSPITAL ENCOUNTER (OUTPATIENT)
Dept: CARDIAC REHAB | Age: 55
Setting detail: THERAPIES SERIES
Discharge: HOME | End: 2020-11-11
Payer: COMMERCIAL

## 2020-11-11 PROCEDURE — 93798 PHYS/QHP OP CAR RHAB W/ECG: CPT

## 2020-11-11 NOTE — TELEPHONE ENCOUNTER
----- Message from Freddi Skiff, MD sent at 11/10/2020  4:57 PM EST -----  Hi  This pt is still having problems with shortness of breath with exertion and bp has been high. I am adjusting the meds. Do you want to see her sooner?   Thank you
Message from Dr. Burgess Raymond parra. Carol was to have an echo and it looks like it is scheduled on 11/30/2020. She needs to have this completed and then arrange follow up with me after echo completed. Keep her appointment with Dr. Rayo Mak as scheduled in January.
Spontaneous, unlabored and symmetrical

## 2020-11-13 ENCOUNTER — HOSPITAL ENCOUNTER (OUTPATIENT)
Dept: CARDIAC REHAB | Age: 55
Setting detail: THERAPIES SERIES
Discharge: HOME | End: 2020-11-13
Payer: COMMERCIAL

## 2020-11-13 PROCEDURE — 93798 PHYS/QHP OP CAR RHAB W/ECG: CPT

## 2020-11-16 ENCOUNTER — HOSPITAL ENCOUNTER (OUTPATIENT)
Dept: CARDIAC REHAB | Age: 55
Setting detail: THERAPIES SERIES
Discharge: HOME | End: 2020-11-16
Payer: COMMERCIAL

## 2020-11-16 PROCEDURE — 93798 PHYS/QHP OP CAR RHAB W/ECG: CPT

## 2020-11-18 ENCOUNTER — TELEPHONE (OUTPATIENT)
Dept: CARDIOLOGY CLINIC | Age: 55
End: 2020-11-18

## 2020-11-18 ENCOUNTER — APPOINTMENT (OUTPATIENT)
Dept: CARDIAC REHAB | Age: 55
End: 2020-11-18
Payer: COMMERCIAL

## 2020-11-18 NOTE — TELEPHONE ENCOUNTER
Spoke to Perico Monae at Dr. Haley Flaherty office and gave her info below. She will give message to Dr. Diony Wiseman and they will call pt to ask if she wants to proceed.

## 2020-11-18 NOTE — TELEPHONE ENCOUNTER
[facility] sent Cardiac Clearance for upper endoscopy and colonoscopy scheduled for 2/5/21 with Dr. Isaiah Vieira    Requesting to hold anticoag/antiplatelet therapy 5 days prior and bridge with Lovenox :  Pt currently on Brilinta - which looks like he just began in Oct.  - please verify     They do not require pt to stop Aspirin    Med Hx:  STEMI, HLD, HTN, DM     LHC/PCI:  10/6/2020  ECG:  10/15/2020  SR with RBBB

## 2020-11-18 NOTE — TELEPHONE ENCOUNTER
Brilinta cannot be held for at least 6 months post stent placement. This will need to be rescheduled unless they are willing to complete while she is still taking Brilinta.

## 2020-11-20 ENCOUNTER — APPOINTMENT (OUTPATIENT)
Dept: CARDIAC REHAB | Age: 55
End: 2020-11-20
Payer: COMMERCIAL

## 2020-11-25 ENCOUNTER — APPOINTMENT (OUTPATIENT)
Dept: CARDIAC REHAB | Age: 55
End: 2020-11-25
Payer: COMMERCIAL

## 2020-11-27 ENCOUNTER — OFFICE VISIT (OUTPATIENT)
Dept: PRIMARY CARE CLINIC | Age: 55
End: 2020-11-27
Payer: COMMERCIAL

## 2020-11-27 PROCEDURE — G8428 CUR MEDS NOT DOCUMENT: HCPCS | Performed by: NURSE PRACTITIONER

## 2020-11-27 PROCEDURE — 99211 OFF/OP EST MAY X REQ PHY/QHP: CPT | Performed by: NURSE PRACTITIONER

## 2020-11-27 PROCEDURE — G8417 CALC BMI ABV UP PARAM F/U: HCPCS | Performed by: NURSE PRACTITIONER

## 2020-11-28 LAB — SARS-COV-2: NOT DETECTED

## 2020-11-30 ENCOUNTER — HOSPITAL ENCOUNTER (OUTPATIENT)
Dept: NON INVASIVE DIAGNOSTICS | Age: 55
Discharge: HOME | End: 2020-11-30
Payer: COMMERCIAL

## 2020-11-30 ENCOUNTER — APPOINTMENT (OUTPATIENT)
Dept: CARDIAC REHAB | Age: 55
End: 2020-11-30
Payer: COMMERCIAL

## 2020-11-30 LAB
LV EF: 58 %
LVEF MODALITY: NORMAL

## 2020-11-30 PROCEDURE — 93306 TTE W/DOPPLER COMPLETE: CPT

## 2020-12-01 NOTE — TELEPHONE ENCOUNTER
Received refill request for ASA,Losartan, and Carvedilol from [facility]. Last OV: 10/15/2020    Last Refill: 10/09/2020 #30 w/ 3 ; 11/10/2020 #60 w/ 2     Next Appointment: 12/10/2020    Pharmacy states they don't have any current scripts for this pt at this time .

## 2020-12-02 ENCOUNTER — HOSPITAL ENCOUNTER (OUTPATIENT)
Dept: CARDIAC REHAB | Age: 55
Setting detail: THERAPIES SERIES
Discharge: HOME | End: 2020-12-02
Payer: COMMERCIAL

## 2020-12-02 PROCEDURE — 93798 PHYS/QHP OP CAR RHAB W/ECG: CPT

## 2020-12-02 RX ORDER — CARVEDILOL 3.12 MG/1
TABLET ORAL
Qty: 60 TABLET | Refills: 3 | OUTPATIENT
Start: 2020-12-02

## 2020-12-02 RX ORDER — ASPIRIN 81 MG/1
TABLET, CHEWABLE ORAL
Qty: 30 TABLET | Refills: 3 | OUTPATIENT
Start: 2020-12-02

## 2020-12-02 RX ORDER — LOSARTAN POTASSIUM 100 MG/1
TABLET ORAL
Qty: 30 TABLET | Refills: 3 | OUTPATIENT
Start: 2020-12-02

## 2020-12-02 NOTE — TELEPHONE ENCOUNTER
It appears that these prescriptions do not need refilled at this time. Please call patient and verify. She is scheduled to F/U with Rick Anna on 12/10 - can be filled at that time if needed.

## 2020-12-03 ENCOUNTER — HOSPITAL ENCOUNTER (OUTPATIENT)
Dept: PULMONOLOGY | Age: 55
Discharge: HOME | End: 2020-12-03
Payer: COMMERCIAL

## 2020-12-03 LAB
DLCO %PRED: 61 %
DLCO PRED: NORMAL
DLCO/VA %PRED: NORMAL
DLCO/VA PRED: NORMAL
DLCO/VA: NORMAL
DLCO: NORMAL
EXPIRATORY TIME-POST: NORMAL
EXPIRATORY TIME: NORMAL
FEF 25-75% %CHNG: NORMAL
FEF 25-75% %PRED-POST: NORMAL
FEF 25-75% %PRED-PRE: NORMAL
FEF 25-75% PRED: NORMAL
FEF 25-75%-POST: NORMAL
FEF 25-75%-PRE: NORMAL
FEV1 %PRED-POST: 54 %
FEV1 %PRED-PRE: 45 %
FEV1 PRED: NORMAL
FEV1-POST: NORMAL
FEV1-PRE: NORMAL
FEV1/FVC %PRED-POST: NORMAL
FEV1/FVC %PRED-PRE: NORMAL
FEV1/FVC PRED: NORMAL
FEV1/FVC-POST: 78 %
FEV1/FVC-PRE: 76 %
FVC %PRED-POST: NORMAL
FVC %PRED-PRE: NORMAL
FVC PRED: NORMAL
FVC-POST: NORMAL
FVC-PRE: NORMAL
GAW %PRED: NORMAL
GAW PRED: NORMAL
GAW: NORMAL
IC %PRED: NORMAL
IC PRED: NORMAL
IC: NORMAL
MEP: NORMAL
MIP: NORMAL
MVV %PRED-PRE: NORMAL
MVV PRED: NORMAL
MVV-PRE: NORMAL
PEF %PRED-POST: NORMAL
PEF %PRED-PRE: NORMAL
PEF PRED: NORMAL
PEF%CHNG: NORMAL
PEF-POST: NORMAL
PEF-PRE: NORMAL
RAW %PRED: NORMAL
RAW PRED: NORMAL
RAW: NORMAL
RV %PRED: NORMAL
RV PRED: NORMAL
RV: NORMAL
SVC %PRED: NORMAL
SVC PRED: NORMAL
SVC: NORMAL
TLC %PRED: 98 %
TLC PRED: NORMAL
TLC: NORMAL
VA %PRED: NORMAL
VA PRED: NORMAL
VA: NORMAL
VTG %PRED: NORMAL
VTG PRED: NORMAL
VTG: NORMAL

## 2020-12-03 PROCEDURE — 94729 DIFFUSING CAPACITY: CPT | Performed by: INTERNAL MEDICINE

## 2020-12-03 PROCEDURE — 94726 PLETHYSMOGRAPHY LUNG VOLUMES: CPT

## 2020-12-03 PROCEDURE — 6370000000 HC RX 637 (ALT 250 FOR IP): Performed by: INTERNAL MEDICINE

## 2020-12-03 PROCEDURE — 94729 DIFFUSING CAPACITY: CPT

## 2020-12-03 PROCEDURE — 94640 AIRWAY INHALATION TREATMENT: CPT

## 2020-12-03 PROCEDURE — 94060 EVALUATION OF WHEEZING: CPT

## 2020-12-03 PROCEDURE — 94060 EVALUATION OF WHEEZING: CPT | Performed by: INTERNAL MEDICINE

## 2020-12-03 PROCEDURE — 94726 PLETHYSMOGRAPHY LUNG VOLUMES: CPT | Performed by: INTERNAL MEDICINE

## 2020-12-03 RX ORDER — ALBUTEROL SULFATE 90 UG/1
4 AEROSOL, METERED RESPIRATORY (INHALATION) ONCE
Status: COMPLETED | OUTPATIENT
Start: 2020-12-03 | End: 2020-12-03

## 2020-12-03 RX ADMIN — ALBUTEROL SULFATE 4 PUFF: 108 AEROSOL, METERED RESPIRATORY (INHALATION) at 09:43

## 2020-12-03 ASSESSMENT — PULMONARY FUNCTION TESTS
FEV1/FVC_PRE: 76
FEV1/FVC_POST: 78
FEV1_PERCENT_PREDICTED_PRE: 45
FEV1_PERCENT_PREDICTED_POST: 54

## 2020-12-04 ENCOUNTER — HOSPITAL ENCOUNTER (OUTPATIENT)
Dept: CARDIAC REHAB | Age: 55
Setting detail: THERAPIES SERIES
Discharge: HOME | End: 2020-12-04
Payer: COMMERCIAL

## 2020-12-04 PROCEDURE — 93798 PHYS/QHP OP CAR RHAB W/ECG: CPT

## 2020-12-04 NOTE — PROCEDURES
Reason for PFT:  SOB    Spirometry  1. Spirometry shows severe obstructive defect. 2. The FVC is diminished suggesting a possible restrictive defect; suggest lung volumes if clinically indicated. Lung Volumes  3. Lung volumes are normal.    Bronchodilator  1. There is a response to bronchodilator. Flow-Volume Loop  4. The flow-volume loop is compatible with an obstructive process. Diffusing Capacity  5. Diffusing capacity is decreased. [>60% and <LLN]      Overall Interpretation  Severe obstruction is present    No restriction is present    There is a bronchodilator response present    Diffusion capacity is mildly reduced    FEV1 is 1.07 L at 45% predicted. FVC is 1.74 L at 59% predicted    FEV1/FVC ratio is 61    Severe obstruction with bronchodilator response. Flows do not normalize post-bronchodilation. Normal volumes. Mildly reduced diffusing capacity. Findings would suggest COPD with asthma-overlap         OBSTRUCTION % Predicted FEV1   MILD >70%   MODERATE 60-69%   MODERATELY-SEVERE 50-59%   SEVERE 35-49%   VERY SEVERE <35%         RESTRICTION % Predicted TLC   MILD Less than LLN but > than 70%   MODERATE 60-69%   MODERATELY-SEVERE <60%                 DIFFUSION CAPACITY DL,CO % Pred   MILD >60% AND < LLN   MODERATE 40-60%   SEVERE <40%       PFT data will be scanned into the media tab in epic.     [facility] [facility] Pulmonology

## 2020-12-07 NOTE — TELEPHONE ENCOUNTER
Spoke with the patient and she states that she does not need scripts at this time to her knowledge . Call complete.

## 2020-12-09 ENCOUNTER — OFFICE VISIT (OUTPATIENT)
Dept: PULMONOLOGY | Age: 55
End: 2020-12-09
Payer: COMMERCIAL

## 2020-12-09 ENCOUNTER — APPOINTMENT (OUTPATIENT)
Dept: CARDIAC REHAB | Age: 55
End: 2020-12-09
Payer: COMMERCIAL

## 2020-12-09 VITALS
TEMPERATURE: 97.5 F | SYSTOLIC BLOOD PRESSURE: 159 MMHG | DIASTOLIC BLOOD PRESSURE: 80 MMHG | HEIGHT: 60 IN | RESPIRATION RATE: 16 BRPM | BODY MASS INDEX: 33.38 KG/M2 | WEIGHT: 170 LBS | HEART RATE: 80 BPM | OXYGEN SATURATION: 96 %

## 2020-12-09 PROBLEM — Z87.891 PERSONAL HISTORY OF TOBACCO USE: Status: ACTIVE | Noted: 2020-12-09

## 2020-12-09 PROBLEM — J44.9 STAGE 2 MODERATE COPD BY GOLD CLASSIFICATION (HCC): Status: ACTIVE | Noted: 2020-12-09

## 2020-12-09 PROCEDURE — G8926 SPIRO NO PERF OR DOC: HCPCS | Performed by: INTERNAL MEDICINE

## 2020-12-09 PROCEDURE — G8484 FLU IMMUNIZE NO ADMIN: HCPCS | Performed by: INTERNAL MEDICINE

## 2020-12-09 PROCEDURE — G8417 CALC BMI ABV UP PARAM F/U: HCPCS | Performed by: INTERNAL MEDICINE

## 2020-12-09 PROCEDURE — 3017F COLORECTAL CA SCREEN DOC REV: CPT | Performed by: INTERNAL MEDICINE

## 2020-12-09 PROCEDURE — 3023F SPIROM DOC REV: CPT | Performed by: INTERNAL MEDICINE

## 2020-12-09 PROCEDURE — 99204 OFFICE O/P NEW MOD 45 MIN: CPT | Performed by: INTERNAL MEDICINE

## 2020-12-09 PROCEDURE — 1036F TOBACCO NON-USER: CPT | Performed by: INTERNAL MEDICINE

## 2020-12-09 PROCEDURE — G8427 DOCREV CUR MEDS BY ELIG CLIN: HCPCS | Performed by: INTERNAL MEDICINE

## 2020-12-09 PROCEDURE — G0296 VISIT TO DETERM LDCT ELIG: HCPCS | Performed by: INTERNAL MEDICINE

## 2020-12-09 NOTE — ASSESSMENT & PLAN NOTE
-FEV1 54% with a bronchodilator response  -Start Advair 250 twice daily, albuterol as needed.  -She is somewhat reluctant to use albuterol, hopefully she will take Advair.  -She is already quit smoking  -May benefit from pulmonary rehab but currently is enrolled in cardiac rehab

## 2020-12-09 NOTE — PROGRESS NOTES
REASON FOR CONSULTATION:  Chief Complaint   Patient presents with    New Patient     shortness of breath PFT ordered         Consult at request of Mona Grove MD     PCP: Mona Grove MD    HISTORY OF PRESENT ILLNESS: Ananya Rodas is a 63y.o. year old female with a history of former 80-pack-year smoker quit  who presents for evaluation of chronic, but progressive shortness of breath. Patient describes shortness of breath has been worse with exertion. Relieved by rest.  She is unsure if there is an associated cough. She did recently have a STEMI and she feels like her symptoms are more noticeable since then but admits that symptoms have been going on at least for a year. She has been given albuterol rescue inhaler which she said alleviates symptoms but does not last very long. She is currently in cardiac rehab.       Past Medical History:   Diagnosis Date    Arthritis     Back pain     Depression     Diabetes mellitus ([address]     REHMAN (dyspnea on exertion)     Fatty liver     GERD (gastroesophageal reflux disease)     Hyperlipidemia     Hypertension     MRSA (methicillin resistant staph aureus) culture positive 08/15/2018    arm    Stress incontinence     Thyroid disease     CYST ON THYROID--FOUND 20 YEARS AGO       Past Surgical History:   Procedure Laterality Date     SECTION      TIMES 3    CHOLECYSTECTOMY      COLONOSCOPY  2017      Colonoscopy with snare and biopsy    EYE SURGERY      muscle correction and eyelid     HERNIA REPAIR      UMBILICAL AREA x 2    HYSTERECTOMY      PTCA  10/2020    SHEFALI to RCA x 2; SHEFALI to LAD    SHOULDER ARTHROSCOPY Right 5-19-15    Right shoulder arthroscopy labral debridement open [place] subacromial decompression and chrondroplasty    UPPER GASTROINTESTINAL ENDOSCOPY  2017    Esophagogastroduodenoscopy with biopsy       family history includes Cancer in her mother; Heart Disease in her father; Stroke in her brother. SOCIAL HISTORY:   reports that she quit smoking about 5 years ago. Her smoking use included cigarettes. She has a 30.00 pack-year smoking history. She has never used smokeless tobacco.      ALLERGIES:  Patient is allergic to latex; tramadol; codeine; and penicillins. REVIEW OF SYSTEMS:  Constitutional: Negative for fever   HENT: Negative for sore throat  Eyes: Negative for redness   Respiratory: +dyspnea, cough  Cardiovascular: Negative for chest pain  Gastrointestinal: Negative for vomiting, diarrhea   Genitourinary: Negative for hematuria   Musculoskeletal: Negative for arthralgias   Skin: Negative for rash  Neurological: Negative for syncope  Hematological: Negative for adenopathy  Psychiatric/Behavorial: Negative for anxiety    Objective:   PHYSICAL EXAM:  Blood pressure (!) 159/80, pulse 80, temperature 97.5 °F (36.4 °C), temperature source Temporal, resp. rate 16, height 5' (1.524 m), weight 170 lb (77.1 kg), last menstrual period 11/30/2005, SpO2 96 %.'  Gen: No distress. Eyes: PERRL. No sclera icterus. No conjunctival injection. ENT: No discharge. Pharynx clear. External appearance of ears and nose normal.  Neck: Trachea midline. No obvious mass. Resp: No accessory muscle use. No crackles. No wheezes. No rhonchi. CV: Regular rate. Regular rhythm. No murmur or rub. No edema. GI: Non-tender. Truncal obesity no hernia. Skin: Warm, dry, normal texture and turgor. No nodule on exposed extremities. Lymph: No cervical LAD. No supraclavicular LAD. M/S: No cyanosis. No clubbing. No joint deformity. Neuro: Moves all four extremities. Psych: Oriented x 3. No anxiety. Awake. Alert. Intact judgement and insight.     Current Outpatient Medications   Medication Sig Dispense Refill    fluticasone-salmeterol (ADVAIR DISKUS) 250-50 MCG/DOSE AEPB Inhale 1 puff into the lungs 2 times daily 1 Inhaler 5    DULoxetine (CYMBALTA) 60 MG extended release capsule TAKE 1 CAPSULE BY MOUTH EVERY DAY 30 capsule 2    famotidine (PEPCID) 20 MG tablet TAKE 1 TABLET BY MOUTH TWICE A DAY 60 tablet 3    FREESTYLE TEST STRIPS strip TEST 3 TIMES A  strip 5    HYDROcodone-acetaminophen (NORCO) 5-325 MG per tablet Take 1 tablet by mouth 2 times daily for 30 days. 60 tablet 0    carvedilol (COREG) 6.25 MG tablet Take 1 tablet by mouth 2 times daily (with meals) 60 tablet 2    albuterol sulfate  (90 Base) MCG/ACT inhaler INHALE 2 PUFFS BY MOUTH EVERY 4-6 HOURS FOR 7-10 DAYS THEN AS NEEDED 18 Inhaler 1    insulin glargine (LANTUS SOLOSTAR) 100 UNIT/ML injection pen Inject 75 Units into the skin nightly (Patient taking differently: Inject 60 Units into the skin nightly ) 5 pen 2    insulin lispro, 1 Unit Dial, (HUMALOG KWIKPEN) 100 UNIT/ML SOPN Inject 7 Units into the skin 3 times daily (before meals) 6.3 mL 2    rosuvastatin (CRESTOR) 20 MG tablet Take 1 tablet by mouth daily 30 tablet 5    ticagrelor (BRILINTA) 90 MG TABS tablet Take 1 tablet by mouth 2 times daily 60 tablet 11    Insulin Pen Needle 32G X 4 MM MISC 1 each by Does not apply route daily 100 each 3    Blood Glucose Monitoring Suppl (FREESTYLE LITE) ASHWIN 1 Device by Does not apply route daily as needed (glucose monitoring) 1 Device 0    aspirin 81 MG chewable tablet Take 1 tablet by mouth daily 30 tablet 3    losartan (COZAAR) 100 MG tablet Take 1 tablet by mouth daily 30 tablet 3    FreeStyle Lancets MISC 1 each by Does not apply route daily 200 each 0    blood glucose test strips (FREESTYLE LITE) strip 1 each by In Vitro route daily As needed. 100 each 3    BD INSULIN SYRINGE U/F 31G X 5/16\" 1 ML MISC USE AT BEDTIME WITH LANTUS 100 each 2    MIRALAX powder TAKE 17 G BY MOUTH DAILY 510 g 2     No current facility-administered medications for this visit.         Data Reviewed:   CBC and Renal reviewed  Last CBC  Lab Results   Component Value Date    WBC 11.7 10/07/2020    RBC 4.24 10/07/2020    HGB 12.2 10/07/2020    MCV 86.3 10/07/2020     10/07/2020     Last Renal  Lab Results   Component Value Date     10/07/2020    K 4.6 10/07/2020    K 4.4 10/06/2020     10/07/2020    CO2 25 10/07/2020    CO2 26 10/06/2020    CO2 26 10/05/2020    BUN 14 10/07/2020    CREATININE 0.6 10/07/2020    GLUCOSE 341 10/07/2020    CALCIUM 8.7 10/07/2020       Last ABG  POC Blood Gas: No results found for: POCPH, POCPCO2, POCPO2, POCHCO3, NBEA, ACWC2XZV  No results for input(s): PH, PCO2, PO2, HCO3, BE, O2SAT in the last 72 hours. Radiology Review:  Pertinent images / reports were reviewed as a part of this visit. CT Chest w/ contrast: No results found for this or any previous visit. CT Chest w/o contrast: No results found for this or any previous visit. CTPA: No results found for this or any previous visit. CXR PA/LAT:   Results for orders placed during the hospital encounter of 02/14/18   XR CHEST STANDARD (2 VW)    Narrative EXAMINATION:  TWO VIEWS OF THE CHEST    2/14/2018 7:46 am    COMPARISON:  05/19/2015    HISTORY:  ORDERING PHYSICIAN PROVIDED HISTORY: cough  TECHNOLOGIST PROVIDED HISTORY:  Technologist Provided Reason for Exam: cough, congestion, and sob for the  last 5 days  Acuity: Acute  Type of Encounter: Initial  Relevant Medical/Surgical History: quit smoking 3 yrs ago    FINDINGS:  Cardiac silhouette, mediastinal hilar contours are normal.  There is mild  linear opacity in the right middle lobe. Lungs are otherwise clear. No  pleural effusions are seen. No acute osseous abnormality is seen. Cholecystectomy clips. Impression Minimal atelectasis or scarring in the right middle lobe. Otherwise no acute cardiopulmonary disease.          CXR portable:   Results for orders placed during the hospital encounter of 10/06/20   XR CHEST PORTABLE    Narrative EXAMINATION:  ONE XRAY VIEW OF THE CHEST    10/6/2020 2:15 pm    COMPARISON:  02/14/2018    HISTORY:  ORDERING SYSTEM PROVIDED HISTORY: CP  TECHNOLOGIST PROVIDED HISTORY:  Reason for exam:->CP  Reason for Exam: left side chest pain woke patient up at 0930 this  morning-STEMI  Acuity: Acute  Type of Exam: Initial    FINDINGS:  Single portable frontal view of the chest is submitted for review. The  cardiac silhouette is normal in size. Lung parenchyma is clear without focal  airspace consolidation, sizeable pleural effusion, or pneumothorax. Trachea  is midline. Visualized osseous structures and soft tissues are grossly  intact. Impression No acute cardiopulmonary pathology. Pulmonary function testing  Moderately severe airflow obstruction, postbronchodilator FEV1 of 54%, there is a bronchodilator response. Reduced DLCO. Assessment/Plan:       Diagnosis Orders   1. Stage 2 moderate COPD by GOLD classification ([facility])  fluticasone-salmeterol (ADVAIR DISKUS) 250-50 MCG/DOSE AEPB   2. Shortness of breath     3. Personal history of tobacco use  [place] VISIT TO DISCUSS LUNG CA SCREEN W LDCT         Problem List Items Addressed This Visit        Pulmonary Problems    Shortness of breath     -Unclear cardiac contributions, though she has significant obstructive lung disease likely attributing for majority of symptoms.          Stage 2 moderate COPD by GOLD classification ([address] - Primary     -FEV1 54% with a bronchodilator response  -Start Advair 250 twice daily, albuterol as needed.  -She is somewhat reluctant to use albuterol, hopefully she will take Advair.  -She is already quit smoking  -May benefit from pulmonary rehab but currently is enrolled in cardiac rehab         Relevant Medications    fluticasone-salmeterol (ADVAIR DISKUS) 250-50 MCG/DOSE AEPB       Other    Personal history of tobacco use     -Quit smoking in 2014  -Eligible for LD CT based on age and smoking history  -Given her family history of lung cancer and her high smoking history she is at relatively high risk of lung cancer I recommended LDCT as per discussion below, she says she will think about this and get back to us. Relevant Orders    [place] VISIT TO DISCUSS LUNG CA SCREEN W LDCT (Completed)               This note was transcribed using [id number] [employer]. Please disregard any translational errors. Swethamicah Kinney [address] [facility]      Low Dose CT (LDCT) Lung Screening criteria met   Age 50-69   Pack year smoking >30   Still smoking or less than 15 year since quit   No sign or symptoms of lung cancer   > 11 months since last LDCT     Risks and benefits of lung cancer screening with LDCT scans discussed:    Significance of positive screen - False-positive LDCT results often occur. 95% of all positive results do not lead to a diagnosis of cancer. Usually further imaging can resolve most false-positive results; however, some patients may require invasive procedures. Over diagnosis risk - 10% to 12% of screen-detected lung cancer cases are over diagnosed--that is, the cancer would not have been detected in the patient's lifetime without the screening. Need for follow up screens annually to continue lung cancer screening effectiveness     Risks associated with radiation from annual LDCT- Radiation exposure is about the same as for a mammogram, which is about 1/3 of the annual background radiation exposure from everyday life. Starting screening at age 54 is not likely to increase cancer risk from radiation exposure. Patients with comorbidities resulting in life expectancy of < 10 years, or that would preclude treatment of an abnormality identified on CT, should not be screened due to lack of benefit.     To obtain maximal benefit from this screening, smoking cessation and long-term abstinence from smoking is critical

## 2020-12-09 NOTE — ASSESSMENT & PLAN NOTE
-Quit smoking in 2014  -Eligible for LD CT based on age and smoking history  -Given her family history of lung cancer and her high smoking history she is at relatively high risk of lung cancer I recommended LDCT as per discussion below, she says she will think about this and get back to us.

## 2020-12-09 NOTE — ASSESSMENT & PLAN NOTE
-Unclear cardiac contributions, though she has significant obstructive lung disease likely attributing for majority of symptoms.

## 2020-12-10 ENCOUNTER — TELEPHONE (OUTPATIENT)
Dept: PULMONOLOGY | Age: 55
End: 2020-12-10

## 2020-12-11 ENCOUNTER — APPOINTMENT (OUTPATIENT)
Dept: CARDIAC REHAB | Age: 55
End: 2020-12-11
Payer: COMMERCIAL

## 2020-12-11 RX ORDER — BUDESONIDE AND FORMOTEROL FUMARATE DIHYDRATE 160; 4.5 UG/1; UG/1
2 AEROSOL RESPIRATORY (INHALATION) 2 TIMES DAILY
Qty: 1 INHALER | Refills: 6 | Status: SHIPPED | OUTPATIENT
Start: 2020-12-11 | End: 2021-05-20

## 2020-12-16 ENCOUNTER — APPOINTMENT (OUTPATIENT)
Dept: CARDIAC REHAB | Age: 55
End: 2020-12-16
Payer: COMMERCIAL

## 2020-12-28 ENCOUNTER — APPOINTMENT (OUTPATIENT)
Dept: CARDIAC REHAB | Age: 55
End: 2020-12-28
Payer: COMMERCIAL

## 2020-12-30 RX ORDER — LOSARTAN POTASSIUM 100 MG/1
TABLET ORAL
Qty: 30 TABLET | Refills: 3 | Status: SHIPPED | OUTPATIENT
Start: 2020-12-30 | End: 2021-03-30

## 2020-12-30 RX ORDER — ASPIRIN 81 MG/1
TABLET, CHEWABLE ORAL
Qty: 30 TABLET | Refills: 3 | Status: SHIPPED | OUTPATIENT
Start: 2020-12-30

## 2021-01-05 LAB
CATARACTS: POSITIVE
DIABETIC RETINOPATHY: NORMAL
GLAUCOMA: NEGATIVE
INTRAOCULAR PRESSURE EYE: NORMAL
VISUAL ACUITY DISTANCE LEFT EYE: NORMAL
VISUAL ACUITY DISTANCE RIGHT EYE: NORMAL

## 2021-01-20 RX ORDER — CARVEDILOL 3.12 MG/1
TABLET ORAL
Qty: 180 TABLET | Refills: 3 | Status: SHIPPED | OUTPATIENT
Start: 2021-01-20 | End: 2021-02-18

## 2021-02-08 DIAGNOSIS — E13.9 DIABETES 1.5, MANAGED AS TYPE 2 (HCC): ICD-10-CM

## 2021-02-08 LAB
A/G RATIO: 1.2 (ref 1.1–2.2)
ALBUMIN SERPL-MCNC: 4.1 G/DL (ref 3.4–5)
ALP BLD-CCNC: 119 U/L (ref 40–129)
ALT SERPL-CCNC: 16 U/L (ref 10–40)
ANION GAP SERPL CALCULATED.3IONS-SCNC: 10 MMOL/L (ref 3–16)
AST SERPL-CCNC: 17 U/L (ref 15–37)
BASOPHILS ABSOLUTE: 0 K/UL (ref 0–0.2)
BASOPHILS RELATIVE PERCENT: 0.3 %
BILIRUB SERPL-MCNC: 0.6 MG/DL (ref 0–1)
BUN BLDV-MCNC: 14 MG/DL (ref 7–20)
CALCIUM SERPL-MCNC: 9.4 MG/DL (ref 8.3–10.6)
CHLORIDE BLD-SCNC: 104 MMOL/L (ref 99–110)
CHOLESTEROL, TOTAL: 184 MG/DL (ref 0–199)
CO2: 26 MMOL/L (ref 21–32)
CREAT SERPL-MCNC: 0.8 MG/DL (ref 0.6–1.1)
EOSINOPHILS ABSOLUTE: 0.1 K/UL (ref 0–0.6)
EOSINOPHILS RELATIVE PERCENT: 1.8 %
GFR AFRICAN AMERICAN: >60
GFR NON-AFRICAN AMERICAN: >60
GLOBULIN: 3.3 G/DL
GLUCOSE BLD-MCNC: 173 MG/DL (ref 70–99)
HCT VFR BLD CALC: 38.7 % (ref 36–48)
HDLC SERPL-MCNC: 29 MG/DL (ref 40–60)
HEMOGLOBIN: 12.8 G/DL (ref 12–16)
LDL CHOLESTEROL CALCULATED: 134 MG/DL
LYMPHOCYTES ABSOLUTE: 1.5 K/UL (ref 1–5.1)
LYMPHOCYTES RELATIVE PERCENT: 18.7 %
MCH RBC QN AUTO: 28 PG (ref 26–34)
MCHC RBC AUTO-ENTMCNC: 33.2 G/DL (ref 31–36)
MCV RBC AUTO: 84.6 FL (ref 80–100)
MONOCYTES ABSOLUTE: 0.4 K/UL (ref 0–1.3)
MONOCYTES RELATIVE PERCENT: 4.8 %
NEUTROPHILS ABSOLUTE: 5.8 K/UL (ref 1.7–7.7)
NEUTROPHILS RELATIVE PERCENT: 74.4 %
PDW BLD-RTO: 15.9 % (ref 12.4–15.4)
PLATELET # BLD: 178 K/UL (ref 135–450)
PMV BLD AUTO: 9.3 FL (ref 5–10.5)
POTASSIUM SERPL-SCNC: 4.8 MMOL/L (ref 3.5–5.1)
RBC # BLD: 4.57 M/UL (ref 4–5.2)
SODIUM BLD-SCNC: 140 MMOL/L (ref 136–145)
TOTAL PROTEIN: 7.4 G/DL (ref 6.4–8.2)
TRIGL SERPL-MCNC: 103 MG/DL (ref 0–150)
TSH SERPL DL<=0.05 MIU/L-ACNC: 3.25 UIU/ML (ref 0.27–4.2)
VLDLC SERPL CALC-MCNC: 21 MG/DL
WBC # BLD: 7.8 K/UL (ref 4–11)

## 2021-02-09 LAB
ESTIMATED AVERAGE GLUCOSE: 142.7 MG/DL
HBA1C MFR BLD: 6.6 %

## 2021-02-17 NOTE — PROGRESS NOTES
[address] [facility]  1965 February 18, 2021      CC: \"I get chest pain\"     HPI:  The patient is 61 y.o. female with a past medical history significant for CAD, hyperlipidemia and hypertension. She presented to [facility] ED with complaints of chest pain and was transferred to [facility]. She underwent LHC on 10/6/20 resulting in SHEFALI x 2 to RCA and SHEFALI to LAD. She presents today for follow up. She states she was unable to complete cardiac rehab due to her COPD. She is now being treated with two inhalers but does continue with shortness of breath. She has been following with Dr. Jennifer Elam for pulmonary. She does exercise at home. She does admit to chest pain that will come with mostly rest. She states this will present as a stabbing pain that will radiate to her back. This will resolve after 30-40 seconds. She will also experience a \"pinching\" pain. This has been occurring daily for her over the past month. She does not take her Pepcid on a consistent basis. She was experiencing myalgias while taking Crestor and Dr. Mann Gonzalez discontinued this and prescribed Zetia for her. Her previous angina presented as chest pain with associated left arm and jaw pain. Review of Systems:  Constitutional: No fatigue, weakness, night sweats or fever. HEENT: No new vision difficulties or ringing in the ears. Respiratory: No new SOB, PND, orthopnea or cough. Cardiovascular: See HPI   GI: No n/v, diarrhea, constipation, abdominal pain or changes in bowel habits. No melena, no hematochezia  : No urinary frequency, urgency, incontinence, hematuria or dysuria. Skin: No cyanosis or skin lesions. Musculoskeletal: No new muscle or joint pain. Neurological: No syncope or TIA-like symptoms.   Psychiatric: No anxiety, insomnia or depression     Past Medical History:   Diagnosis Date    Arthritis     Back pain     Depression     Diabetes mellitus ([address]     REMHAN (dyspnea on exertion)     Fatty liver     GERD (gastroesophageal reflux disease)     Hyperlipidemia     Hypertension     MRSA (methicillin resistant staph aureus) culture positive 08/15/2018    arm    Stress incontinence     Thyroid disease     CYST ON THYROID--FOUND 20 YEARS AGO     Past Surgical History:   Procedure Laterality Date     SECTION      TIMES 3    CHOLECYSTECTOMY      COLONOSCOPY  2017      Colonoscopy with snare and biopsy    EYE SURGERY      muscle correction and eyelid     HERNIA REPAIR      UMBILICAL AREA x 2    HYSTERECTOMY      PTCA  10/2020    SHEFALI to RCA x 2; SHEFALI to LAD    SHOULDER ARTHROSCOPY Right 5-19-15    Right shoulder arthroscopy labral debridement open [place] subacromial decompression and chrondroplasty    UPPER GASTROINTESTINAL ENDOSCOPY  2017    Esophagogastroduodenoscopy with biopsy     Family History   Problem Relation Age of Onset    Cancer Mother         lung     Heart Disease Father     Stroke Brother      Social History     Tobacco Use    Smoking status: Former Smoker     Packs/day: 1.00     Years: 30.00     Pack years: 30.00     Types: Cigarettes     Quit date:      Years since quittin.1    Smokeless tobacco: Never Used   Substance Use Topics    Alcohol use: No    Drug use: No       Allergies   Allergen Reactions    Latex Itching and Rash    Tramadol Hives and Shortness Of Breath    Codeine Itching    Penicillins      Current Outpatient Medications   Medication Sig Dispense Refill    promethazine (PHENERGAN) 25 MG tablet Take 1 tablet by mouth 3 times daily as needed for Nausea 12 tablet 0    HYDROcodone-acetaminophen (NORCO) 5-325 MG per tablet Take 1 tablet by mouth 2 times daily for 30 days.  60 tablet 0    furosemide (LASIX) 20 MG tablet Take 1 tablet by mouth daily 30 tablet 3    ezetimibe (ZETIA) 10 MG tablet Take 1 tablet by mouth daily 30 tablet 3    insulin lispro, 1 Unit Dial, 100 UNIT/ML SOPN INJECT 7 UNITS INTO THE SKIN 3 TIMES DAILY (BEFORE MEALS) 5 pen 1    LANTUS SOLOSTAR 100 UNIT/ML injection pen INJECT 75 UNITS INTO THE SKIN NIGHTLY 5 pen 2    albuterol sulfate  (90 Base) MCG/ACT inhaler INHALE 2 PUFFS BY MOUTH EVERY 4-6 HOURS FOR 7-10 DAYS THEN AS NEEDED 18 Inhaler 1    losartan (COZAAR) 100 MG tablet TAKE 1 TABLET BY MOUTH EVERY DAY 30 tablet 3    aspirin 81 MG chewable tablet TAKE 1 TABLET BY MOUTH EVERY DAY 30 tablet 3    carvedilol (COREG) 6.25 MG tablet TAKE 1 TABLET BY MOUTH TWICE A DAY WITH MEALS 60 tablet 2    DULoxetine (CYMBALTA) 60 MG extended release capsule TAKE 1 CAPSULE BY MOUTH EVERY DAY 30 capsule 2    famotidine (PEPCID) 20 MG tablet TAKE 1 TABLET BY MOUTH TWICE A DAY 60 tablet 3    FREESTYLE TEST STRIPS strip TEST 3 TIMES A  strip 5    ticagrelor (BRILINTA) 90 MG TABS tablet Take 1 tablet by mouth 2 times daily 60 tablet 11    Insulin Pen Needle 32G X 4 MM MISC 1 each by Does not apply route daily 100 each 3    blood glucose test strips (FREESTYLE LITE) strip 1 each by In Vitro route daily As needed. 100 each 3    BD INSULIN SYRINGE U/F 31G X 5/16\" 1 ML MISC USE AT BEDTIME WITH LANTUS 100 each 2    MIRALAX powder TAKE 17 G BY MOUTH DAILY 510 g 2    budesonide-formoterol (SYMBICORT) 160-4.5 MCG/ACT AERO Inhale 2 puffs into the lungs 2 times daily 1 Inhaler 6    fluticasone-salmeterol (ADVAIR DISKUS) 250-50 MCG/DOSE AEPB Inhale 1 puff into the lungs 2 times daily 1 Inhaler 5    Blood Glucose Monitoring Suppl (FREESTYLE LITE) ASHWIN 1 Device by Does not apply route daily as needed (glucose monitoring) 1 Device 0    FreeStyle Lancets MISC 1 each by Does not apply route daily 200 each 0     No current facility-administered medications for this visit.         Physical Exam:   /86   Pulse 72   Temp 97.5 °F (36.4 °C)   Ht 5' (1.524 m)   Wt 186 lb 3.2 oz (84.5 kg)   LMP 11/30/2005   SpO2 99%   BMI 36.36 kg/m²   No intake or output data in the 24 hours ending 02/18/21 1559  Wt Readings from Last 2 Encounters:   02/18/21 186 lb 3.2 oz (84.5 kg)   02/17/21 186 lb (84.4 kg)     Constitutional: She is oriented to person, place, and time. She appears well-developed and well-nourished. In no acute distress. Head: Normocephalic and atraumatic. Neck: Neck supple. No JVD present. Carotid bruit is not present. No mass and no thyromegaly present. No lymphadenopathy present. Cardiovascular: Normal rate, regular rhythm, normal heart sounds and intact distal pulses. Exam reveals no gallop and no friction rub. No murmur heard. Pulmonary/Chest: Effort normal and breath sounds normal. No respiratory distress. She has no wheezes, rhonchi or rales. Abdominal: Soft, non-tender. Bowel sounds and aorta are normal. She exhibits no organomegaly, mass or bruit. Extremities: No edema, cyanosis, or clubbing. Pulses are 2+ radial/carotid/dorsalis pedis and posterior tibial bilaterally. Neurological: She is alert and oriented to person, place, and time. She has normal reflexes. No cranial nerve deficit. Coordination normal.   Skin: Skin is warm and dry. There is no rash or diaphoresis. Psychiatric: She has a normal mood and affect. Her speech is normal and behavior is normal.       Procedures:     [facility] 10/6/20  1. Right-dominant coronary arterial system with a 99% mid RCA lesion  successfully intervened upon with two 2.75 x 15 mm overlapping Tamar Carmela  drug-eluting stents dilated to 2.80 mm in diameter. 2.  In the left system, there was a 90% mid LAD lesion that was  intervened upon with a 2.75 x 15 mm Tamar Carmela drug-eluting stent dilated to  2.80 mm in diameter. There was a 60% proximal LAD disease and a 60%  focal lesion distal to this. In the circumflex, there were small OM  branches and one small OM branch has a 99% lesion. This is too small to  intervene upon. 3.  Normal left ventricular systolic function with an LV ejection  fraction of 60%.   4.  Left ventricular end-diastolic pressure of 10 mmHg.  5.  No gradient across the aortic valve on pullback to suggest aortic  stenosis. Imaging:     Echo 11/30/20  There is concentric left ventricular hypertrophy. Ejection fraction is visually estimated to be 55-60%. RV is mildly dilated  Right ventricular systolic function is normal .     Lab Review:   Lab Results   Component Value Date    TRIG 103 02/08/2021    HDL 29 02/08/2021    LDLCALC 134 02/08/2021    LDLDIRECT 116 04/02/2019    LABVLDL 21 02/08/2021     Lab Results   Component Value Date     02/08/2021    K 4.8 02/08/2021    K 4.4 10/06/2020    BUN 14 02/08/2021    CREATININE 0.8 02/08/2021         Assessment:  1. CAD of native coronary arteries without angina   2. Hyperlipidemia with LDL goal <70 mg/dL   3. Essential hypertension     Plan:  Given her episodes of chest pain, I will have her complete a stress perfusion study. I will order a Lexiscan perfusion due to the patient's inability to exercise. Myocardial perfusion imaging is needed given the patient's prior stents and need to localize any are of ischemia. This will aid in performing a left heart catheterization and possible intervention. Her chest pain does have some atypical features and could be related to acid reflux. I have discussed started Protonix instead of famotidine but she prefers to wait until after completing her stress test. Her cholesterol is not well controlled and she was unable to tolerate rosuvastatin. I will have her begin taking atorvastatin 20 mg daily in addition to Zetia 10 mg. She will complete a fasting lipid profile in 3 months. If this is still uncontrolled, she would require PCSK9 therapy to treat her cholesterol. I will see her in the office for follow up in 3 months. This note was scribed in the presence of Cristhian Alexander MD by General Mendoza, RN. Physician Attestation:  The scribes documentation has been prepared under my direction and personally reviewed by me in its entirety.      I, Dr. Yuliet Rizzo personally performed the services described in this documentation as scribed by my RN in my presence, and I confirm that the note above accurately reflects all work, treatment, procedures, and medical decision making performed by me.

## 2021-02-18 ENCOUNTER — OFFICE VISIT (OUTPATIENT)
Dept: CARDIOLOGY CLINIC | Age: 56
End: 2021-02-18
Payer: COMMERCIAL

## 2021-02-18 VITALS
TEMPERATURE: 97.5 F | HEIGHT: 60 IN | HEART RATE: 72 BPM | BODY MASS INDEX: 36.56 KG/M2 | WEIGHT: 186.2 LBS | OXYGEN SATURATION: 99 % | SYSTOLIC BLOOD PRESSURE: 134 MMHG | DIASTOLIC BLOOD PRESSURE: 86 MMHG

## 2021-02-18 DIAGNOSIS — I10 ESSENTIAL HYPERTENSION: ICD-10-CM

## 2021-02-18 DIAGNOSIS — E78.5 HYPERLIPIDEMIA LDL GOAL <70: ICD-10-CM

## 2021-02-18 DIAGNOSIS — I25.10 CORONARY ARTERY DISEASE INVOLVING NATIVE CORONARY ARTERY OF NATIVE HEART WITHOUT ANGINA PECTORIS: Primary | ICD-10-CM

## 2021-02-18 PROCEDURE — G8484 FLU IMMUNIZE NO ADMIN: HCPCS | Performed by: INTERNAL MEDICINE

## 2021-02-18 PROCEDURE — 99214 OFFICE O/P EST MOD 30 MIN: CPT | Performed by: INTERNAL MEDICINE

## 2021-02-18 PROCEDURE — 1036F TOBACCO NON-USER: CPT | Performed by: INTERNAL MEDICINE

## 2021-02-18 PROCEDURE — G8417 CALC BMI ABV UP PARAM F/U: HCPCS | Performed by: INTERNAL MEDICINE

## 2021-02-18 PROCEDURE — G8427 DOCREV CUR MEDS BY ELIG CLIN: HCPCS | Performed by: INTERNAL MEDICINE

## 2021-02-18 PROCEDURE — 3017F COLORECTAL CA SCREEN DOC REV: CPT | Performed by: INTERNAL MEDICINE

## 2021-02-18 RX ORDER — ATORVASTATIN CALCIUM 20 MG/1
20 TABLET, FILM COATED ORAL DAILY
Qty: 30 TABLET | Refills: 5 | Status: SHIPPED | OUTPATIENT
Start: 2021-02-18 | End: 2021-03-18

## 2021-02-18 RX ORDER — NITROGLYCERIN 0.4 MG/1
0.4 TABLET SUBLINGUAL EVERY 5 MIN PRN
Qty: 25 TABLET | Refills: 3 | Status: SHIPPED | OUTPATIENT
Start: 2021-02-18

## 2021-02-25 ENCOUNTER — HOSPITAL ENCOUNTER (OUTPATIENT)
Dept: NON INVASIVE DIAGNOSTICS | Age: 56
Discharge: HOME | End: 2021-02-25
Payer: COMMERCIAL

## 2021-02-25 DIAGNOSIS — I25.10 CORONARY ARTERY DISEASE INVOLVING NATIVE CORONARY ARTERY OF NATIVE HEART WITHOUT ANGINA PECTORIS: ICD-10-CM

## 2021-02-25 LAB
LV EF: 70 %
LVEF MODALITY: NORMAL

## 2021-02-25 PROCEDURE — A9502 TC99M TETROFOSMIN: HCPCS | Performed by: INTERNAL MEDICINE

## 2021-02-25 PROCEDURE — 6360000002 HC RX W HCPCS: Performed by: INTERNAL MEDICINE

## 2021-02-25 PROCEDURE — 93017 CV STRESS TEST TRACING ONLY: CPT

## 2021-02-25 PROCEDURE — 3430000000 HC RX DIAGNOSTIC RADIOPHARMACEUTICAL: Performed by: INTERNAL MEDICINE

## 2021-02-25 PROCEDURE — 78452 HT MUSCLE IMAGE SPECT MULT: CPT

## 2021-02-25 RX ADMIN — TETROFOSMIN 30 MILLICURIE: 1.38 INJECTION, POWDER, LYOPHILIZED, FOR SOLUTION INTRAVENOUS at 10:43

## 2021-02-25 RX ADMIN — TETROFOSMIN 10 MILLICURIE: 1.38 INJECTION, POWDER, LYOPHILIZED, FOR SOLUTION INTRAVENOUS at 09:00

## 2021-02-25 RX ADMIN — REGADENOSON 0.4 MG: 0.08 INJECTION, SOLUTION INTRAVENOUS at 10:49

## 2021-03-01 ENCOUNTER — TELEPHONE (OUTPATIENT)
Dept: CARDIOLOGY CLINIC | Age: 56
End: 2021-03-01

## 2021-03-01 DIAGNOSIS — I25.10 CORONARY ARTERY DISEASE INVOLVING NATIVE CORONARY ARTERY OF NATIVE HEART WITHOUT ANGINA PECTORIS: Primary | ICD-10-CM

## 2021-03-01 NOTE — TELEPHONE ENCOUNTER
Patient returned call and offered [facility] for 3/2 or 3/8. She agreed to come in on 3/8/21 for 10 AM procedure with 830 AM check in. All instructions discussed and questions answered. Procedure placed on Qgenda and procedure form emailed.

## 2021-03-01 NOTE — TELEPHONE ENCOUNTER
Left message for patient to return the call to the office. The morning of your procedure you will park in the hospital parking lot and report directly to the cath lab to check in.     Pre-Procedure Instructions   1. You will need to fast for at least 8 hours prior to procedure. No caffeine the morning of.   2. Hold your diuretic, Lasix the morning of procedure. 3. Hold all diabetic medications including, Metfomin. If you take Lantus/Levemir only take ½ your normal dose the evening before. All other medications can be taken in the morning with sips of water. 4. You will need to take 325 mg aspirin the morning of. If you are currently taking 81 mg please take 4 tablets that morning. 5. Do not use any lotions, creams or perfume the morning of procedure. 6. Pre-procedure lab work will need to be completed 5-7 days prior to procedure. 7. Please have a responsible adult to drive you home after procedure. We advise you have someone stay with you for the first 24 hours for precautionary measures. Depending on your procedure you may require an overnight stay. 8. Cath lab will provide you with all post procedure instructions. If you have any questions regarding the procedure itself or medications, please call [phone] and ask to speak with a nurse.

## 2021-03-02 ENCOUNTER — TELEPHONE (OUTPATIENT)
Dept: ADMINISTRATIVE | Age: 56
End: 2021-03-02

## 2021-03-02 NOTE — TELEPHONE ENCOUNTER
Patient wanting to make sure it is okay to proceed with injections in her eye.  She is scheduled for a [facility] on 3/8/2021 due to abnormal stress test.

## 2021-03-03 DIAGNOSIS — I25.10 CORONARY ARTERY DISEASE INVOLVING NATIVE CORONARY ARTERY OF NATIVE HEART WITHOUT ANGINA PECTORIS: ICD-10-CM

## 2021-03-03 LAB
ANION GAP SERPL CALCULATED.3IONS-SCNC: 9 MMOL/L (ref 3–16)
BUN BLDV-MCNC: 14 MG/DL (ref 7–20)
CALCIUM SERPL-MCNC: 8.9 MG/DL (ref 8.3–10.6)
CHLORIDE BLD-SCNC: 105 MMOL/L (ref 99–110)
CO2: 30 MMOL/L (ref 21–32)
CREAT SERPL-MCNC: 0.7 MG/DL (ref 0.6–1.1)
GFR AFRICAN AMERICAN: >60
GFR NON-AFRICAN AMERICAN: >60
GLUCOSE BLD-MCNC: 70 MG/DL (ref 70–99)
HCT VFR BLD CALC: 35.6 % (ref 36–48)
HEMOGLOBIN: 12 G/DL (ref 12–16)
MCH RBC QN AUTO: 28.6 PG (ref 26–34)
MCHC RBC AUTO-ENTMCNC: 33.5 G/DL (ref 31–36)
MCV RBC AUTO: 85.2 FL (ref 80–100)
PDW BLD-RTO: 16.2 % (ref 12.4–15.4)
PLATELET # BLD: 204 K/UL (ref 135–450)
PMV BLD AUTO: 9.1 FL (ref 5–10.5)
POTASSIUM SERPL-SCNC: 4.1 MMOL/L (ref 3.5–5.1)
RBC # BLD: 4.18 M/UL (ref 4–5.2)
SODIUM BLD-SCNC: 144 MMOL/L (ref 136–145)
WBC # BLD: 9.1 K/UL (ref 4–11)

## 2021-03-05 DIAGNOSIS — E78.5 HYPERLIPIDEMIA LDL GOAL <70: ICD-10-CM

## 2021-03-05 DIAGNOSIS — I21.19 ST-SEGMENT ELEVATION MYOCARDIAL INFARCTION (STEMI) OF INFERIOR WALL (HCC): ICD-10-CM

## 2021-03-05 RX ORDER — ROSUVASTATIN CALCIUM 20 MG/1
TABLET, COATED ORAL
Qty: 90 TABLET | Refills: 1 | Status: SHIPPED | OUTPATIENT
Start: 2021-03-05 | End: 2021-03-08

## 2021-03-08 ENCOUNTER — HOSPITAL ENCOUNTER (OUTPATIENT)
Dept: CARDIAC CATH/INVASIVE PROCEDURES | Age: 56
Discharge: HOME | End: 2021-03-08
Payer: COMMERCIAL

## 2021-03-08 VITALS
DIASTOLIC BLOOD PRESSURE: 91 MMHG | SYSTOLIC BLOOD PRESSURE: 173 MMHG | HEIGHT: 60 IN | TEMPERATURE: 98.8 F | BODY MASS INDEX: 36.52 KG/M2 | OXYGEN SATURATION: 98 % | WEIGHT: 186 LBS | HEART RATE: 70 BPM

## 2021-03-08 DIAGNOSIS — I20.0 UNSTABLE ANGINA (HCC): ICD-10-CM

## 2021-03-08 LAB
EKG ATRIAL RATE: 74 BPM
EKG DIAGNOSIS: NORMAL
EKG P AXIS: 65 DEGREES
EKG P-R INTERVAL: 134 MS
EKG Q-T INTERVAL: 442 MS
EKG QRS DURATION: 130 MS
EKG QTC CALCULATION (BAZETT): 490 MS
EKG R AXIS: 50 DEGREES
EKG T AXIS: 10 DEGREES
EKG VENTRICULAR RATE: 74 BPM

## 2021-03-08 PROCEDURE — C1894 INTRO/SHEATH, NON-LASER: HCPCS

## 2021-03-08 PROCEDURE — 99152 MOD SED SAME PHYS/QHP 5/>YRS: CPT | Performed by: INTERNAL MEDICINE

## 2021-03-08 PROCEDURE — 93010 ELECTROCARDIOGRAM REPORT: CPT | Performed by: INTERNAL MEDICINE

## 2021-03-08 PROCEDURE — 2500000003 HC RX 250 WO HCPCS

## 2021-03-08 PROCEDURE — 2580000003 HC RX 258

## 2021-03-08 PROCEDURE — 6370000000 HC RX 637 (ALT 250 FOR IP)

## 2021-03-08 PROCEDURE — 93458 L HRT ARTERY/VENTRICLE ANGIO: CPT

## 2021-03-08 PROCEDURE — C1769 GUIDE WIRE: HCPCS

## 2021-03-08 PROCEDURE — 2709999900 HC NON-CHARGEABLE SUPPLY

## 2021-03-08 PROCEDURE — 6370000000 HC RX 637 (ALT 250 FOR IP): Performed by: INTERNAL MEDICINE

## 2021-03-08 PROCEDURE — 6360000004 HC RX CONTRAST MEDICATION: Performed by: INTERNAL MEDICINE

## 2021-03-08 PROCEDURE — 99153 MOD SED SAME PHYS/QHP EA: CPT

## 2021-03-08 PROCEDURE — 93458 L HRT ARTERY/VENTRICLE ANGIO: CPT | Performed by: INTERNAL MEDICINE

## 2021-03-08 PROCEDURE — 93005 ELECTROCARDIOGRAM TRACING: CPT | Performed by: INTERNAL MEDICINE

## 2021-03-08 PROCEDURE — 6360000002 HC RX W HCPCS

## 2021-03-08 PROCEDURE — 99152 MOD SED SAME PHYS/QHP 5/>YRS: CPT

## 2021-03-08 RX ORDER — SODIUM CHLORIDE 0.9 % (FLUSH) 0.9 %
10 SYRINGE (ML) INJECTION PRN
Status: DISCONTINUED | OUTPATIENT
Start: 2021-03-08 | End: 2021-03-09 | Stop reason: HOSPADM

## 2021-03-08 RX ORDER — RANOLAZINE 500 MG/1
500 TABLET, EXTENDED RELEASE ORAL 2 TIMES DAILY
Qty: 60 TABLET | Refills: 5 | Status: SHIPPED | OUTPATIENT
Start: 2021-03-08 | End: 2021-06-24

## 2021-03-08 RX ORDER — SODIUM CHLORIDE 9 MG/ML
INJECTION, SOLUTION INTRAVENOUS CONTINUOUS
Status: DISCONTINUED | OUTPATIENT
Start: 2021-03-08 | End: 2021-03-09 | Stop reason: HOSPADM

## 2021-03-08 RX ORDER — SODIUM CHLORIDE 0.9 % (FLUSH) 0.9 %
10 SYRINGE (ML) INJECTION EVERY 12 HOURS SCHEDULED
Status: DISCONTINUED | OUTPATIENT
Start: 2021-03-08 | End: 2021-03-09 | Stop reason: HOSPADM

## 2021-03-08 RX ORDER — ASPIRIN 325 MG
325 TABLET ORAL ONCE
Status: COMPLETED | OUTPATIENT
Start: 2021-03-08 | End: 2021-03-08

## 2021-03-08 RX ORDER — ACETAMINOPHEN 325 MG/1
650 TABLET ORAL EVERY 4 HOURS PRN
Status: DISCONTINUED | OUTPATIENT
Start: 2021-03-08 | End: 2021-03-09 | Stop reason: HOSPADM

## 2021-03-08 RX ADMIN — ASPIRIN 325 MG ORAL TABLET 325 MG: 325 PILL ORAL at 09:25

## 2021-03-08 RX ADMIN — IOPAMIDOL 65 ML: 755 INJECTION, SOLUTION INTRAVENOUS at 11:55

## 2021-03-08 NOTE — ANESTHESIA PRE-OP
Date     SECTION      TIMES 3    CHOLECYSTECTOMY      COLONOSCOPY  2017      Colonoscopy with snare and biopsy    EYE SURGERY      muscle correction and eyelid     HERNIA REPAIR      UMBILICAL AREA x 2    HYSTERECTOMY      PTCA  10/2020    SHEFALI to RCA x 2; SHEFALI to LAD    SHOULDER ARTHROSCOPY Right 5-19-15    Right shoulder arthroscopy labral debridement open [place] subacromial decompression and chrondroplasty    UPPER GASTROINTESTINAL ENDOSCOPY  2017    Esophagogastroduodenoscopy with biopsy         Medications:  Current Outpatient Medications   Medication Sig Dispense Refill    carvedilol (COREG) 6.25 MG tablet TAKE 1 TABLET BY MOUTH TWICE A DAY WITH MEALS 180 tablet 0    famotidine (PEPCID) 20 MG tablet TAKE 1 TABLET BY MOUTH TWICE A  tablet 1    DULoxetine (CYMBALTA) 60 MG extended release capsule TAKE 1 CAPSULE BY MOUTH EVERY DAY 90 capsule 2    atorvastatin (LIPITOR) 20 MG tablet Take 1 tablet by mouth daily 30 tablet 5    HYDROcodone-acetaminophen (NORCO) 5-325 MG per tablet Take 1 tablet by mouth 2 times daily for 30 days.  60 tablet 0    ezetimibe (ZETIA) 10 MG tablet Take 1 tablet by mouth daily 30 tablet 3    insulin lispro, 1 Unit Dial, 100 UNIT/ML SOPN INJECT 7 UNITS INTO THE SKIN 3 TIMES DAILY (BEFORE MEALS) 5 pen 1    LANTUS SOLOSTAR 100 UNIT/ML injection pen INJECT 75 UNITS INTO THE SKIN NIGHTLY 5 pen 2    albuterol sulfate  (90 Base) MCG/ACT inhaler INHALE 2 PUFFS BY MOUTH EVERY 4-6 HOURS FOR 7-10 DAYS THEN AS NEEDED 18 Inhaler 1    losartan (COZAAR) 100 MG tablet TAKE 1 TABLET BY MOUTH EVERY DAY 30 tablet 3    aspirin 81 MG chewable tablet TAKE 1 TABLET BY MOUTH EVERY DAY 30 tablet 3    ticagrelor (BRILINTA) 90 MG TABS tablet Take 1 tablet by mouth 2 times daily 60 tablet 11    MIRALAX powder TAKE 17 G BY MOUTH DAILY 510 g 2    nitroGLYCERIN (NITROSTAT) 0.4 MG SL tablet Place 1 tablet under the tongue every 5 minutes as needed for Chest pain 25 tablet 3    furosemide (LASIX) 20 MG tablet Take 1 tablet by mouth daily 30 tablet 3    budesonide-formoterol (SYMBICORT) 160-4.5 MCG/ACT AERO Inhale 2 puffs into the lungs 2 times daily 1 Inhaler 6    FREESTYLE TEST STRIPS strip TEST 3 TIMES A  strip 5    Insulin Pen Needle 32G X 4 MM MISC 1 each by Does not apply route daily 100 each 3    Blood Glucose Monitoring Suppl (FREESTYLE LITE) ASHWIN 1 Device by Does not apply route daily as needed (glucose monitoring) 1 Device 0    FreeStyle Lancets MISC 1 each by Does not apply route daily 200 each 0    blood glucose test strips (FREESTYLE LITE) strip 1 each by In Vitro route daily As needed. 100 each 3    BD INSULIN SYRINGE U/F 31G X 5/16\" 1 ML MISC USE AT BEDTIME WITH LANTUS 100 each 2     Current Facility-Administered Medications   Medication Dose Route Frequency Provider Last Rate Last Admin    0.9 % sodium chloride infusion   Intravenous Continuous Di Yee MD        sodium chloride flush 0.9 % injection 10 mL  10 mL Intravenous 2 times per day Di Yee MD        sodium chloride flush 0.9 % injection 10 mL  10 mL Intravenous PRN Di Yee MD               Pre-Sedation:    Pre-Sedation Documentation and Exam:  I have personally completed a history, physical exam & review of systems for this patient (see notes). Prior History of Anesthesia Complications:   none    Modified Mallampati:  III (soft palate, base of uvula visible)    ASA Classification:  Class 2 - A normal healthy patient with mild systemic disease      Kiko Scale: Activity:  2 - Able to move 4 extremities voluntarily on command  Respiration:  2 - Able to breathe deeply and cough freely  Circulation:  2 - BP+/- 20mmHg of normal  Consciousness:  2 - Fully awake  Oxygen Saturation (color):  2 - Able to maintain oxygen saturation >92% on room air    Sedation/Anesthesia Plan:  Guard the patient's safety and welfare.   Minimize physical discomfort and pain. Minimize negative psychological responses to treatment by providing sedation and analgesia and maximize the potential amnesia. Patient to meet pre-procedure discharge plan.     Medication Planned:  midazolam intravenously and fentanyl intravenously    Patient is an appropriate candidate for plan of sedation: yes      Electronically signed by April Loaiza MD on 3/8/2021 at 11:06 AM

## 2021-03-08 NOTE — PROCEDURES
a Terumo pressure band applied for nonocclusive  hemostasis. There were no complications from the procedure and  estimated blood loss was less than 20 mL. Moderate sedation was performed for the procedure. The patient received  a total of 2 mg of IV Versed and 100 mcg of fentanyl. Vital signs were  monitored throughout the sedation period and remained stable. There  were no complications from sedation. Total duration of sedation was 40  minutes. FINDINGS:  1. Codominant coronary arterial circulation. The right coronary artery  has patent stents in the midportion. They are overlapping with no  significant in-stent restenosis. The distal PDA has diffuse 70%  lesions. In the left system, there is no left main disease. The left  anterior descending artery has 50% proximal disease and patent stents in  the midportion. There is a 60% lesion in the distal LAD. In the  circumflex artery, there is 25% disease diffusely. The OM1 branch has  diffuse 70% stenosis in the small branching artery. 2.  Elevated left ventricular end-diastolic pressure at 18 mmHg. 3.  Normal left ventricular systolic function. LV ejection fraction  60%. 4.  No gradient across the aortic valve on pullback to suggest aortic  stenosis.         Pardeep Patterson MD    D: 03/08/2021 12:04:40       T: 03/08/2021 12:08:19     [email or web address]  Job#: [id number]     Doc#: [id number]    CC:  Radha Moyer MD

## 2021-03-08 NOTE — H&P
CC: \"I get chest pain\"      HPI:  The patient is 61 y.o. female with a past medical history significant for CAD, hyperlipidemia and hypertension. She presented to [facility] ED with complaints of chest pain and was transferred to [facility]. She underwent LHC on 10/6/20 resulting in SHEFALI x 2 to RCA and SHEFALI to LAD. She presents today for follow up.       She states she was unable to complete cardiac rehab due to her COPD. She is now being treated with two inhalers but does continue with shortness of breath. She has been following with Dr. Marco A Bryant for pulmonary. She does exercise at home. She does admit to chest pain that will come with mostly rest. She states this will present as a stabbing pain that will radiate to her back. This will resolve after 30-40 seconds. She will also experience a \"pinching\" pain. This has been occurring daily for her over the past month. She does not take her Pepcid on a consistent basis. She was experiencing myalgias while taking Crestor and Dr. Nestor Camacho discontinued this and prescribed Zetia for her.      Her previous angina presented as chest pain with associated left arm and jaw pain.      Review of Systems:  Constitutional: No fatigue, weakness, night sweats or fever. HEENT: No new vision difficulties or ringing in the ears. Respiratory: No new SOB, PND, orthopnea or cough. Cardiovascular: See HPI   GI: No n/v, diarrhea, constipation, abdominal pain or changes in bowel habits. No melena, no hematochezia  : No urinary frequency, urgency, incontinence, hematuria or dysuria. Skin: No cyanosis or skin lesions. Musculoskeletal: No new muscle or joint pain. Neurological: No syncope or TIA-like symptoms.   Psychiatric: No anxiety, insomnia or depression     Past Medical History        Past Medical History:   Diagnosis Date    Arthritis      Back pain      Depression      Diabetes mellitus ([address]      REHMAN (dyspnea on exertion)      Fatty liver      GERD (gastroesophageal reflux disease)      Hyperlipidemia      Hypertension      MRSA (methicillin resistant staph aureus) culture positive 08/15/2018     arm    Stress incontinence      Thyroid disease       CYST ON THYROID--FOUND 20 YEARS AGO         Past Surgical History         Past Surgical History:   Procedure Laterality Date     SECTION         TIMES 3    CHOLECYSTECTOMY        COLONOSCOPY   2017       Colonoscopy with snare and biopsy    EYE SURGERY         muscle correction and eyelid     HERNIA REPAIR         UMBILICAL AREA x 2    HYSTERECTOMY        PTCA   10/2020     SHEFALI to RCA x 2; SHEFALI to LAD    SHOULDER ARTHROSCOPY Right 15     Right shoulder arthroscopy labral debridement open [place] subacromial decompression and chrondroplasty    UPPER GASTROINTESTINAL ENDOSCOPY   2017     Esophagogastroduodenoscopy with biopsy         Family History         Family History   Problem Relation Age of Onset    Cancer Mother           lung     Heart Disease Father      Stroke Brother           Social History            Tobacco Use    Smoking status: Former Smoker       Packs/day: 1.00       Years: 30.00       Pack years: 30.00       Types: Cigarettes       Quit date: 1       Years since quittin.1    Smokeless tobacco: Never Used   Substance Use Topics    Alcohol use: No    Drug use: No              Allergies   Allergen Reactions    Latex Itching and Rash    Tramadol Hives and Shortness Of Breath    Codeine Itching    Penicillins        Current Facility-Administered Medications          Current Outpatient Medications   Medication Sig Dispense Refill    promethazine (PHENERGAN) 25 MG tablet Take 1 tablet by mouth 3 times daily as needed for Nausea 12 tablet 0    HYDROcodone-acetaminophen (NORCO) 5-325 MG per tablet Take 1 tablet by mouth 2 times daily for 30 days.  60 tablet 0    furosemide (LASIX) 20 MG tablet Take 1 tablet by mouth daily 30 tablet 3    ezetimibe (ZETIA) 10 MG tablet Take 1 tablet by mouth daily 30 tablet 3    insulin lispro, 1 Unit Dial, 100 UNIT/ML SOPN INJECT 7 UNITS INTO THE SKIN 3 TIMES DAILY (BEFORE MEALS) 5 pen 1    LANTUS SOLOSTAR 100 UNIT/ML injection pen INJECT 75 UNITS INTO THE SKIN NIGHTLY 5 pen 2    albuterol sulfate  (90 Base) MCG/ACT inhaler INHALE 2 PUFFS BY MOUTH EVERY 4-6 HOURS FOR 7-10 DAYS THEN AS NEEDED 18 Inhaler 1    losartan (COZAAR) 100 MG tablet TAKE 1 TABLET BY MOUTH EVERY DAY 30 tablet 3    aspirin 81 MG chewable tablet TAKE 1 TABLET BY MOUTH EVERY DAY 30 tablet 3    carvedilol (COREG) 6.25 MG tablet TAKE 1 TABLET BY MOUTH TWICE A DAY WITH MEALS 60 tablet 2    DULoxetine (CYMBALTA) 60 MG extended release capsule TAKE 1 CAPSULE BY MOUTH EVERY DAY 30 capsule 2    famotidine (PEPCID) 20 MG tablet TAKE 1 TABLET BY MOUTH TWICE A DAY 60 tablet 3    FREESTYLE TEST STRIPS strip TEST 3 TIMES A  strip 5    ticagrelor (BRILINTA) 90 MG TABS tablet Take 1 tablet by mouth 2 times daily 60 tablet 11    Insulin Pen Needle 32G X 4 MM MISC 1 each by Does not apply route daily 100 each 3    blood glucose test strips (FREESTYLE LITE) strip 1 each by In Vitro route daily As needed.  100 each 3    BD INSULIN SYRINGE U/F 31G X 5/16\" 1 ML MISC USE AT BEDTIME WITH LANTUS 100 each 2    MIRALAX powder TAKE 17 G BY MOUTH DAILY 510 g 2    budesonide-formoterol (SYMBICORT) 160-4.5 MCG/ACT AERO Inhale 2 puffs into the lungs 2 times daily 1 Inhaler 6    fluticasone-salmeterol (ADVAIR DISKUS) 250-50 MCG/DOSE AEPB Inhale 1 puff into the lungs 2 times daily 1 Inhaler 5    Blood Glucose Monitoring Suppl (FREESTYLE LITE) ASHWIN 1 Device by Does not apply route daily as needed (glucose monitoring) 1 Device 0    FreeStyle Lancets MISC 1 each by Does not apply route daily 200 each 0      No current facility-administered medications for this visit.             Physical Exam:   /86   Pulse 72   Temp 97.5 °F (36.4 °C)   Ht 5' (1.524 m)   Wt 186 lb 3.2 oz (84.5 kg)   [facility] 11/30/2005   SpO2 99%   BMI 36.36 kg/m²   No intake or output data in the 24 hours ending 02/18/21 1559      Wt Readings from Last 2 Encounters:   02/18/21 186 lb 3.2 oz (84.5 kg)   02/17/21 186 lb (84.4 kg)      Constitutional: She is oriented to person, place, and time. She appears well-developed and well-nourished. In no acute distress. Head: Normocephalic and atraumatic. Neck: Neck supple. No JVD present. Carotid bruit is not present. No mass and no thyromegaly present. No lymphadenopathy present. Cardiovascular: Normal rate, regular rhythm, normal heart sounds and intact distal pulses. Exam reveals no gallop and no friction rub. No murmur heard. Pulmonary/Chest: Effort normal and breath sounds normal. No respiratory distress. She has no wheezes, rhonchi or rales. Abdominal: Soft, non-tender. Bowel sounds and aorta are normal. She exhibits no organomegaly, mass or bruit. Extremities: No edema, cyanosis, or clubbing. Pulses are 2+ radial/carotid/dorsalis pedis and posterior tibial bilaterally. Neurological: She is alert and oriented to person, place, and time. She has normal reflexes. No cranial nerve deficit. Coordination normal.   Skin: Skin is warm and dry. There is no rash or diaphoresis. Psychiatric: She has a normal mood and affect. Her speech is normal and behavior is normal.         Procedures:     [facility] 10/6/20  1.  Right-dominant coronary arterial system with a 99% mid RCA lesion  successfully intervened upon with two 2.75 x 15 mm overlapping [place]  drug-eluting stents dilated to 2.80 mm in diameter. 2.  In the left system, there was a 90% mid LAD lesion that was  intervened upon with a 2.75 x 15 mm [place] drug-eluting stent dilated to  2.80 mm in diameter.  There was a 60% proximal LAD disease and a 60%  focal lesion distal to this.  In the circumflex, there were small OM  branches and one small OM branch has a 99% lesion.  This is too small to  intervene upon.   3.  Normal left ventricular systolic function with an LV ejection  fraction of 60%. 4.  Left ventricular end-diastolic pressure of 10 mmHg. 5.  No gradient across the aortic valve on pullback to suggest aortic  stenosis.       Imaging:     Echo 11/30/20  There is concentric left ventricular hypertrophy. Ejection fraction is visually estimated to be 55-60%. RV is mildly dilated  Right ventricular systolic function is normal .     Lab Review:         Lab Results   Component Value Date     TRIG 103 02/08/2021     HDL 29 02/08/2021     LDLCALC 134 02/08/2021     LDLDIRECT 116 04/02/2019     LABVLDL 21 02/08/2021            Lab Results   Component Value Date      02/08/2021     K 4.8 02/08/2021     K 4.4 10/06/2020     BUN 14 02/08/2021     CREATININE 0.8 02/08/2021      No results for input(s): WBC, HGB, HCT, PLT in the last 72 hours.     Assessment:  1. CAD of native coronary arteries without angina   2. Hyperlipidemia with LDL goal <70 mg/dL   3. Essential hypertension      Plan:  Given her episodes of chest pain, I will have her complete a stress perfusion study. Her chest pain does have some atypical features and could be related to acid reflux. I have discussed started Protonix instead of famotidine but she prefers to wait until after completing her stress test. Her cholesterol is not well controlled and she was unable to tolerate rosuvastatin. I will have her begin taking atorvastatin 20 mg daily in addition to Zetia 10 mg. She will complete a fasting lipid profile in 3 months. If this is still uncontrolled, she would require PCSK9 therapy to treat her cholesterol.      I will see her in the office for follow up in 3 months. Vitals:    03/08/21 0902   BP: (!) 173/91   Pulse: 70   Temp: 98.8 °F (37.1 °C)   SpO2: 98%     I have reviewed the most recent H&P for this patient and independently examined the patient. There have been no changes. We will proceed with the planned procedure.     Vi Akers MD

## 2021-03-17 NOTE — TELEPHONE ENCOUNTER
Spoke with representative from [employer] and a new claim was initiated.      Case number [id number]     Addended office note faxed to [facility] [place] at [phone]

## 2021-03-17 NOTE — TELEPHONE ENCOUNTER
I called for Dr. Concha Hall to complete peer to peer. He explained that the patient was not able to complete a stress echo, which is why Delorise Avery was ordered. Also, patient had known CAD with previous stent placement. MARY Dale finished phone call and was informed that a new claim would be submitted. Reference # for the call is [id number].

## 2021-03-18 RX ORDER — ATORVASTATIN CALCIUM 20 MG/1
TABLET, FILM COATED ORAL
Qty: 90 TABLET | Refills: 1 | Status: SHIPPED | OUTPATIENT
Start: 2021-03-18 | End: 2021-12-16

## 2021-03-24 ENCOUNTER — TELEPHONE (OUTPATIENT)
Dept: CARDIOLOGY CLINIC | Age: 56
End: 2021-03-24

## 2021-03-24 NOTE — TELEPHONE ENCOUNTER
[employer](company name provided) is calling because they received a request for PA for a stress test but they need clinical documentation showing why the pt needed the stress test done on 2/25/21. When I asked for a good call back number, she stated that she cannot provide one but only a fax number to where the information can be sent.     Please fax the clinical documentation to: [phone]

## 2021-03-30 RX ORDER — LOSARTAN POTASSIUM 100 MG/1
TABLET ORAL
Qty: 90 TABLET | Refills: 1 | Status: SHIPPED | OUTPATIENT
Start: 2021-03-30 | End: 2021-09-23 | Stop reason: SDUPTHER

## 2021-04-19 ENCOUNTER — TELEPHONE (OUTPATIENT)
Dept: CARDIOLOGY CLINIC | Age: 56
End: 2021-04-19

## 2021-04-19 NOTE — TELEPHONE ENCOUNTER
Patient planning to have EGD/Colonoscopy on 5/14/2021 and is requesting to hold Asa and Brilinta x 7 days.      Last intervention 10/6/2020  Last office visit 2/18/2021

## 2021-04-19 NOTE — LETTER
Micheal Urena  Phone: [phone]  Fax: [phone]    Ean Cohen MD        April 20, 2021     Patient: Archana Sanches   YOB: 1965   Date of Visit: 4/19/2021       To Whom It May Concern: It is my medical opinion that Raul Guthrie should not stop DAPT. She had an MI in Oct 2020. If this is urgent we could consider holding it in a month or so but less than ideal.      If you have any questions or concerns, please don't hesitate to call.     Sincerely,        Ean Cohen MD/Gunjan Corbett RN Patient notified; patient is scheduled for stress test this Friday, 12/18/20.

## 2021-04-20 NOTE — TELEPHONE ENCOUNTER
She should not stop DAPT. She had an MI in Oct 2020.  If this is urgent we could consider holding it in a month or so but less than ideal.

## 2021-05-13 DIAGNOSIS — M54.9 BACK PAIN, UNSPECIFIED BACK LOCATION, UNSPECIFIED BACK PAIN LATERALITY, UNSPECIFIED CHRONICITY: ICD-10-CM

## 2021-05-13 DIAGNOSIS — E11.59 TYPE 2 DIABETES MELLITUS WITH OTHER CIRCULATORY COMPLICATION, UNSPECIFIED WHETHER LONG TERM INSULIN USE (HCC): ICD-10-CM

## 2021-05-13 DIAGNOSIS — I10 HYPERTENSION, UNSPECIFIED TYPE: ICD-10-CM

## 2021-05-13 DIAGNOSIS — F34.1 DYSTHYMIC DISORDER: ICD-10-CM

## 2021-05-13 DIAGNOSIS — I25.10 ATHEROSCLEROSIS OF NATIVE CORONARY ARTERY WITHOUT ANGINA PECTORIS, UNSPECIFIED WHETHER NATIVE OR TRANSPLANTED HEART: ICD-10-CM

## 2021-05-13 LAB
A/G RATIO: 1.8 (ref 1.1–2.2)
ALBUMIN SERPL-MCNC: 4.3 G/DL (ref 3.4–5)
ALP BLD-CCNC: 122 U/L (ref 40–129)
ALT SERPL-CCNC: 18 U/L (ref 10–40)
ANION GAP SERPL CALCULATED.3IONS-SCNC: 11 MMOL/L (ref 3–16)
AST SERPL-CCNC: 13 U/L (ref 15–37)
BASOPHILS ABSOLUTE: 0 K/UL (ref 0–0.2)
BASOPHILS RELATIVE PERCENT: 0.3 %
BILIRUB SERPL-MCNC: 0.7 MG/DL (ref 0–1)
BUN BLDV-MCNC: 16 MG/DL (ref 7–20)
CALCIUM SERPL-MCNC: 9.1 MG/DL (ref 8.3–10.6)
CHLORIDE BLD-SCNC: 101 MMOL/L (ref 99–110)
CHOLESTEROL, TOTAL: 112 MG/DL (ref 0–199)
CO2: 29 MMOL/L (ref 21–32)
CREAT SERPL-MCNC: 0.9 MG/DL (ref 0.6–1.1)
EOSINOPHILS ABSOLUTE: 0.1 K/UL (ref 0–0.6)
EOSINOPHILS RELATIVE PERCENT: 1.6 %
GFR AFRICAN AMERICAN: >60
GFR NON-AFRICAN AMERICAN: >60
GLOBULIN: 2.4 G/DL
GLUCOSE BLD-MCNC: 116 MG/DL (ref 70–99)
HCT VFR BLD CALC: 36.7 % (ref 36–48)
HDLC SERPL-MCNC: 31 MG/DL (ref 40–60)
HEMOGLOBIN: 12.5 G/DL (ref 12–16)
LDL CHOLESTEROL CALCULATED: 51 MG/DL
LYMPHOCYTES ABSOLUTE: 1.7 K/UL (ref 1–5.1)
LYMPHOCYTES RELATIVE PERCENT: 19.4 %
MCH RBC QN AUTO: 29.4 PG (ref 26–34)
MCHC RBC AUTO-ENTMCNC: 34.1 G/DL (ref 31–36)
MCV RBC AUTO: 86.1 FL (ref 80–100)
MONOCYTES ABSOLUTE: 0.6 K/UL (ref 0–1.3)
MONOCYTES RELATIVE PERCENT: 6.7 %
NEUTROPHILS ABSOLUTE: 6.2 K/UL (ref 1.7–7.7)
NEUTROPHILS RELATIVE PERCENT: 72 %
PDW BLD-RTO: 16.1 % (ref 12.4–15.4)
PLATELET # BLD: 159 K/UL (ref 135–450)
PMV BLD AUTO: 9.2 FL (ref 5–10.5)
POTASSIUM SERPL-SCNC: 3.9 MMOL/L (ref 3.5–5.1)
RBC # BLD: 4.27 M/UL (ref 4–5.2)
SODIUM BLD-SCNC: 141 MMOL/L (ref 136–145)
TOTAL PROTEIN: 6.7 G/DL (ref 6.4–8.2)
TRIGL SERPL-MCNC: 149 MG/DL (ref 0–150)
VLDLC SERPL CALC-MCNC: 30 MG/DL
WBC # BLD: 8.6 K/UL (ref 4–11)

## 2021-05-14 LAB
ESTIMATED AVERAGE GLUCOSE: 154.2 MG/DL
HBA1C MFR BLD: 7 %

## 2021-05-20 ENCOUNTER — OFFICE VISIT (OUTPATIENT)
Dept: CARDIOLOGY CLINIC | Age: 56
End: 2021-05-20
Payer: COMMERCIAL

## 2021-05-20 VITALS
WEIGHT: 189.4 LBS | HEART RATE: 83 BPM | DIASTOLIC BLOOD PRESSURE: 78 MMHG | OXYGEN SATURATION: 96 % | BODY MASS INDEX: 37.18 KG/M2 | HEIGHT: 60 IN | SYSTOLIC BLOOD PRESSURE: 132 MMHG

## 2021-05-20 DIAGNOSIS — R20.0 NUMBNESS OF FINGERS: ICD-10-CM

## 2021-05-20 DIAGNOSIS — I10 ESSENTIAL HYPERTENSION: ICD-10-CM

## 2021-05-20 DIAGNOSIS — I25.10 ATHEROSCLEROSIS OF NATIVE CORONARY ARTERY OF NATIVE HEART WITHOUT ANGINA PECTORIS: Primary | ICD-10-CM

## 2021-05-20 DIAGNOSIS — E78.5 HYPERLIPIDEMIA LDL GOAL <70: ICD-10-CM

## 2021-05-20 PROCEDURE — G8427 DOCREV CUR MEDS BY ELIG CLIN: HCPCS | Performed by: INTERNAL MEDICINE

## 2021-05-20 PROCEDURE — 1036F TOBACCO NON-USER: CPT | Performed by: INTERNAL MEDICINE

## 2021-05-20 PROCEDURE — 99213 OFFICE O/P EST LOW 20 MIN: CPT | Performed by: INTERNAL MEDICINE

## 2021-05-20 PROCEDURE — G8417 CALC BMI ABV UP PARAM F/U: HCPCS | Performed by: INTERNAL MEDICINE

## 2021-05-20 PROCEDURE — 3017F COLORECTAL CA SCREEN DOC REV: CPT | Performed by: INTERNAL MEDICINE

## 2021-05-20 NOTE — PROGRESS NOTES
[address] [facility]  1965    May 20, 2021      CC:  \"my hand gets numb\"     HPI:  The patient is 61 y.o. female with a past medical history significant for CAD, hyperlipidemia and hypertension. She presented to [facility] ED with complaints of chest pain and was transferred to [facility]. She underwent LHC on 10/6/20 resulting in SHEFALI x 2 to RCA and SHEFALI to LAD. She completed a stress test due to complaints of chest pain revealing a reversible defect. She underwent a heart catheterization on 3/8/21 that did not require any intervention. Today, she reports feeling well overall. She has been experiencing right hand numbness that is improving for her. She denies any recurrence of chest pain since her heart catheterization. Her breathing has been comfortable without shortness of breath. Patient denies exertional chest pain/pressure, dyspnea at rest, REHMAN, PND, orthopnea, palpitations, lightheadedness, weight changes, changes in LE edema, and syncope. She reports medication compliance and is tolerating. Her previous angina presented as chest pain with associated left arm and jaw pain. Review of Systems:  Constitutional: No fatigue, weakness, night sweats or fever. HEENT: No new vision difficulties or ringing in the ears. Respiratory: No new SOB, PND, orthopnea or cough. Cardiovascular: See HPI   GI: No n/v, diarrhea, constipation, abdominal pain or changes in bowel habits. No melena, no hematochezia  : No urinary frequency, urgency, incontinence, hematuria or dysuria. Skin: No cyanosis or skin lesions. Musculoskeletal: No new muscle or joint pain. Neurological: No syncope or TIA-like symptoms.   Psychiatric: No anxiety, insomnia or depression     Past Medical History:   Diagnosis Date    Arthritis     Back pain     Depression     Diabetes mellitus (HCC)     REHMAN (dyspnea on exertion)     Fatty liver     GERD (gastroesophageal reflux disease)     Hyperlipidemia     Hypertension     MRSA (methicillin resistant staph aureus) culture positive 08/15/2018    arm    Stress incontinence     Thyroid disease     CYST ON THYROID--FOUND 20 YEARS AGO     Past Surgical History:   Procedure Laterality Date     SECTION      TIMES 3    CHOLECYSTECTOMY      COLONOSCOPY  2017      Colonoscopy with snare and biopsy    EYE SURGERY      muscle correction and eyelid     HERNIA REPAIR      UMBILICAL AREA x 2    HYSTERECTOMY      PTCA  10/2020    SHEFALI to RCA x 2; SHEFALI to LAD    SHOULDER ARTHROSCOPY Right 5-19-15    Right shoulder arthroscopy labral debridement open [place] subacromial decompression and chrondroplasty    UPPER GASTROINTESTINAL ENDOSCOPY  2017    Esophagogastroduodenoscopy with biopsy     Family History   Problem Relation Age of Onset    Cancer Mother         lung     Heart Disease Father     Stroke Brother      Social History     Tobacco Use    Smoking status: Former Smoker     Packs/day: 1.00     Years: 30.00     Pack years: 30.00     Types: Cigarettes     Quit date:      Years since quittin.3    Smokeless tobacco: Never Used   Vaping Use    Vaping Use: Never used   Substance Use Topics    Alcohol use: No    Drug use: No       Allergies   Allergen Reactions    Latex Itching and Rash    Tramadol Hives and Shortness Of Breath    Codeine Itching    Penicillins      Current Outpatient Medications   Medication Sig Dispense Refill    ezetimibe (ZETIA) 10 MG tablet TAKE 1 TABLET BY MOUTH EVERY DAY 90 tablet 1    Blood Glucose Monitoring Suppl (TRUE METRIX METER) ASHWIN As directed 1 Device 0    blood glucose test strips (RELION TRUE METRIX TEST STRIPS) strip 1 each by In Vitro route 3 times daily As needed. 100 each 3    HYDROcodone-acetaminophen (NORCO) 5-325 MG per tablet Take 1 tablet by mouth 2 times daily for 30 days. 60 tablet 0    hydrocortisone (ALA-ANA) 1 % cream Apply topically 2 times daily.  1 Tube 1    Device 0    FreeStyle Lancets MISC 1 each by Does not apply route daily 200 each 0     No current facility-administered medications for this visit. Physical Exam:   /78   Pulse 83   Ht 5' (1.524 m)   Wt 189 lb 6.4 oz (85.9 kg)   LMP 11/30/2005   SpO2 96%   BMI 36.99 kg/m²   No intake or output data in the 24 hours ending 05/20/21 1153  Wt Readings from Last 2 Encounters:   05/20/21 189 lb 6.4 oz (85.9 kg)   05/17/21 192 lb (87.1 kg)     Constitutional: She is oriented to person, place, and time. She appears well-developed and well-nourished. In no acute distress. Head: Normocephalic and atraumatic. Neck: Neck supple. No JVD present. Carotid bruit is not present. No mass and no thyromegaly present. No lymphadenopathy present. Cardiovascular: Normal rate, regular rhythm, normal heart sounds and intact distal pulses. Exam reveals no gallop and no friction rub. No murmur heard. Pulmonary/Chest: Effort normal and breath sounds normal. No respiratory distress. She has no wheezes, rhonchi or rales. Abdominal: Soft, non-tender. Bowel sounds and aorta are normal. She exhibits no organomegaly, mass or bruit. Extremities: No edema, cyanosis, or clubbing. Pulses are 2+ radial/carotid/dorsalis pedis and posterior tibial bilaterally. Neurological: She is alert and oriented to person, place, and time. She has normal reflexes. No cranial nerve deficit. Coordination normal.   Skin: Skin is warm and dry. There is no rash or diaphoresis. Psychiatric: She has a normal mood and affect. Her speech is normal and behavior is normal.       Procedures:     [facility] 10/6/20  1. Right-dominant coronary arterial system with a 99% mid RCA lesion  successfully intervened upon with two 2.75 x 15 mm overlapping [place]  drug-eluting stents dilated to 2.80 mm in diameter.   2.  In the left system, there was a 90% mid LAD lesion that was  intervened upon with a 2.75 x 15 mm [place] drug-eluting stent dilated to  2.80 mm in diameter. There was a 60% proximal LAD disease and a 60%  focal lesion distal to this. In the circumflex, there were small OM  branches and one small OM branch has a 99% lesion. This is too small to  intervene upon. 3.  Normal left ventricular systolic function with an LV ejection  fraction of 60%. 4.  Left ventricular end-diastolic pressure of 10 mmHg. 5.  No gradient across the aortic valve on pullback to suggest aortic  stenosis. [facility] 3/8/21  1. Codominant coronary arterial circulation. The right coronary artery  has patent stents in the midportion. They are overlapping with no  significant in-stent restenosis. The distal PDA has diffuse 70%  lesions. In the left system, there is no left main disease. The left  anterior descending artery has 50% proximal disease and patent stents in  the midportion. There is a 60% lesion in the distal LAD. In the  circumflex artery, there is 25% disease diffusely. The OM1 branch has  diffuse 70% stenosis in the small branching artery. 2.  Elevated left ventricular end-diastolic pressure at 18 mmHg. 3.  Normal left ventricular systolic function. LV ejection fraction  60%. 4.  No gradient across the aortic valve on pullback to suggest aortic  stenosis. Imaging:     Echo 11/30/20  There is concentric left ventricular hypertrophy. Ejection fraction is visually estimated to be 55-60%. RV is mildly dilated  Right ventricular systolic function is normal .    Stress perfusion 2/25/21  Pharmacological Stress/MPI Results:        1. Technically a satisfactory study.    2. Moderate size reversible perfusion defect suggestive of ischemia    involving the inferior wall    3. Gated Study shows normal LV size and Systolic function; EF is 70 %.               Lab Review:   Lab Results   Component Value Date    TRIG 149 05/13/2021    HDL 31 05/13/2021    LDLCALC 51 05/13/2021    LDLDIRECT 116 04/02/2019    LABVLDL 30 05/13/2021     Lab Results   Component Value Date  05/13/2021    K 3.9 05/13/2021    K 4.4 10/06/2020    BUN 16 05/13/2021    CREATININE 0.9 05/13/2021         Assessment:  1. CAD of native coronary arteries without angina   2. Hyperlipidemia with LDL goal <70 mg/dL   3. Essential hypertension   4. Right hand finger numbness    Plan:  I think that Ms. Lucio  is stable from a cardiovascular standpoint. I see no need to make any changes currently in her medical regimen or pursue further testing. Her mild symptoms of right finger numbness could be from irritation with blood during the radial approach for her heart catheterization. This is getting better and will continue to improve over the next few weeks. She is not endorsing any symptoms representing angina and her blood pressure is well controlled. Her most recent lipid profile was much better controlled with her current medical therapy including atorvastatin 20 mg and Zetia 10 mg daily. I have encouraged her to increase her aerobic activity as tolerated and adhere to a heart healthy diet. I will see her in 6 months at her previously scheduled appointment. This note was scribed in the presence of Gonzalez Marshall MD by [employer], RN. Physician Attestation:  The scribes documentation has been prepared under my direction and personally reviewed by me in its entirety. I, Dr. Marta Machuca personally performed the services described in this documentation as scribed by my RN in my presence, and I confirm that the note above accurately reflects all work, treatment, procedures, and medical decision making performed by me.

## 2021-07-08 DIAGNOSIS — J44.9 STAGE 2 MODERATE COPD BY GOLD CLASSIFICATION (HCC): ICD-10-CM

## 2021-07-08 RX ORDER — BUDESONIDE AND FORMOTEROL FUMARATE DIHYDRATE 160; 4.5 UG/1; UG/1
AEROSOL RESPIRATORY (INHALATION)
Qty: 1 INHALER | Refills: 0 | Status: SHIPPED | OUTPATIENT
Start: 2021-07-08

## 2021-09-01 NOTE — PROGRESS NOTES
[address] [facility]  1965 September 2, 2021      CC:  \"I have bruising on my feet\"     HPI:  The patient is 61 y.o. female with a past medical history significant for CAD, hyperlipidemia and hypertension. She presented to [facility] ED with complaints of chest pain and was transferred to [facility]. She underwent LHC on 10/6/20 resulting in SHEFALI x 2 to RCA and SHEFALI to LAD. She completed a stress test due to complaints of chest pain revealing a reversible defect. She underwent a heart catheterization on 3/8/21 that did not require any intervention. She presents today for follow up. She reports bruising on her feet that began on her left foot and is now present on her right foot. She has also been experiencing swelling in both feet. She denies any injury to her foot. She admits to lower extremity pain that will present with walking. Her breathing has been comfortable without shortness of breath. She admits to occasional chest pain that will present with exertion such as housework. Her previous angina presented as chest pain with associated left arm and jaw pain. Review of Systems:  Constitutional: No fatigue, weakness, night sweats or fever. HEENT: No new vision difficulties or ringing in the ears. Respiratory: No new SOB, PND, orthopnea or cough. Cardiovascular: See HPI   GI: No n/v, diarrhea, constipation, abdominal pain or changes in bowel habits. No melena, no hematochezia  : No urinary frequency, urgency, incontinence, hematuria or dysuria. Skin: No cyanosis or skin lesions. Musculoskeletal: No new muscle or joint pain. Neurological: No syncope or TIA-like symptoms.   Psychiatric: No anxiety, insomnia or depression     Past Medical History:   Diagnosis Date    Arthritis     Back pain     Depression     Diabetes mellitus (HCC)     REHMAN (dyspnea on exertion)     Fatty liver     GERD (gastroesophageal reflux disease)     Hyperlipidemia  Hypertension     MRSA (methicillin resistant staph aureus) culture positive 08/15/2018    arm    Stress incontinence     Thyroid disease     CYST ON THYROID--FOUND 20 YEARS AGO     Past Surgical History:   Procedure Laterality Date     SECTION      TIMES 3    CHOLECYSTECTOMY      COLONOSCOPY  2017      Colonoscopy with snare and biopsy    EYE SURGERY      muscle correction and eyelid     HERNIA REPAIR      UMBILICAL AREA x 2    HYSTERECTOMY      PTCA  10/2020    SHEFALI to RCA x 2; SHEFALI to LAD    SHOULDER ARTHROSCOPY Right 519-15    Right shoulder arthroscopy labral debridement open nedra subacromial decompression and chrondroplasty    UPPER GASTROINTESTINAL ENDOSCOPY  2017    Esophagogastroduodenoscopy with biopsy     Family History   Problem Relation Age of Onset    Cancer Mother         lung     Heart Disease Father     Stroke Brother      Social History     Tobacco Use    Smoking status: Former Smoker     Packs/day: 1.00     Years: 30.00     Pack years: 30.00     Types: Cigarettes     Quit date:      Years since quittin.6    Smokeless tobacco: Never Used   Vaping Use    Vaping Use: Never used   Substance Use Topics    Alcohol use: No    Drug use: No       Allergies   Allergen Reactions    Latex Itching and Rash    Tramadol Hives and Shortness Of Breath    Codeine Itching    Penicillins      Current Outpatient Medications   Medication Sig Dispense Refill    amoxicillin (AMOXIL) 500 MG capsule Take 1 capsule by mouth 3 times daily for 7 days 21 capsule 0    LANTUS SOLOSTAR 100 UNIT/ML injection pen INJECT 75 UNITS INTO THE SKIN NIGHTLY 5 pen 2    insulin lispro, 1 Unit Dial, 100 UNIT/ML SOPN INJECT 7 UNITS INTO THE SKIN 3 TIMES DAILY (BEFORE MEALS) 5 pen 1    carvedilol (COREG) 6.25 MG tablet TAKE 1 TABLET BY MOUTH TWICE A DAY WITH MEALS 180 tablet 0    HYDROcodone-acetaminophen (NORCO) 5-325 MG per tablet Take 1 tablet by mouth 2 times daily for 30 days. 60 tablet 0    diazePAM (VALIUM) 5 MG tablet Take 1 tablet by mouth every 12 hours as needed for Anxiety or Sleep for up to 30 days. 60 tablet 1    SYMBICORT 160-4.5 MCG/ACT AERO TAKE 2 PUFFS BY MOUTH TWICE A DAY 1 Inhaler 0    famotidine (PEPCID) 20 MG tablet TAKE 1 TABLET BY MOUTH TWICE A  tablet 1    ranolazine (RANEXA) 500 MG extended release tablet TAKE 1 TABLET BY MOUTH 2 TIMES DAILY 180 tablet 3    albuterol sulfate  (90 Base) MCG/ACT inhaler INHALE 2 PUFFS BY MOUTH EVERY 4-6 HOURS FOR 7-10 DAYS THEN AS NEEDED 18 Inhaler 1    blood glucose test strips (RELION TRUE METRIX TEST STRIPS) strip 1 each by In Vitro route 6 times daily As needed. 180 each 3    furosemide (LASIX) 20 MG tablet TAKE 1 TABLET BY MOUTH EVERY DAY 90 tablet 1    ezetimibe (ZETIA) 10 MG tablet TAKE 1 TABLET BY MOUTH EVERY DAY 90 tablet 1    Blood Glucose Monitoring Suppl (TRUE METRIX METER) ASHWIN As directed 1 Device 0    losartan (COZAAR) 100 MG tablet TAKE 1 TABLET BY MOUTH EVERY DAY 90 tablet 1    atorvastatin (LIPITOR) 20 MG tablet TAKE 1 TABLET BY MOUTH EVERY DAY 90 tablet 1    DULoxetine (CYMBALTA) 60 MG extended release capsule TAKE 1 CAPSULE BY MOUTH EVERY DAY 90 capsule 2    nitroGLYCERIN (NITROSTAT) 0.4 MG SL tablet Place 1 tablet under the tongue every 5 minutes as needed for Chest pain 25 tablet 3    aspirin 81 MG chewable tablet TAKE 1 TABLET BY MOUTH EVERY DAY 30 tablet 3    FREESTYLE TEST STRIPS strip TEST 3 TIMES A  strip 5    ticagrelor (BRILINTA) 90 MG TABS tablet Take 1 tablet by mouth 2 times daily 60 tablet 11    Insulin Pen Needle 32G X 4 MM MISC 1 each by Does not apply route daily 100 each 3    blood glucose test strips (FREESTYLE LITE) strip 1 each by In Vitro route daily As needed.  100 each 3    BD INSULIN SYRINGE U/F 31G X 5/16\" 1 ML MISC USE AT BEDTIME WITH LANTUS 100 each 2    MIRALAX powder TAKE 17 G BY MOUTH DAILY 510 g 2    Blood Glucose Monitoring Suppl (FREESTYLE was  intervened upon with a 2.75 x 15 mm [place] drug-eluting stent dilated to  2.80 mm in diameter. There was a 60% proximal LAD disease and a 60%  focal lesion distal to this. In the circumflex, there were small OM  branches and one small OM branch has a 99% lesion. This is too small to  intervene upon. 3.  Normal left ventricular systolic function with an LV ejection  fraction of 60%. 4.  Left ventricular end-diastolic pressure of 10 mmHg. 5.  No gradient across the aortic valve on pullback to suggest aortic  stenosis. [facility] 3/8/21  1. Codominant coronary arterial circulation. The right coronary artery  has patent stents in the midportion. They are overlapping with no  significant in-stent restenosis. The distal PDA has diffuse 70%  lesions. In the left system, there is no left main disease. The left  anterior descending artery has 50% proximal disease and patent stents in  the midportion. There is a 60% lesion in the distal LAD. In the  circumflex artery, there is 25% disease diffusely. The OM1 branch has  diffuse 70% stenosis in the small branching artery. 2.  Elevated left ventricular end-diastolic pressure at 18 mmHg. 3.  Normal left ventricular systolic function. LV ejection fraction  60%. 4.  No gradient across the aortic valve on pullback to suggest aortic  stenosis. Imaging:     Echo 11/30/20  There is concentric left ventricular hypertrophy. Ejection fraction is visually estimated to be 55-60%. RV is mildly dilated  Right ventricular systolic function is normal .    Stress perfusion 2/25/21  Pharmacological Stress/MPI Results:        1. Technically a satisfactory study.    2. Moderate size reversible perfusion defect suggestive of ischemia    involving the inferior wall    3. Gated Study shows normal LV size and Systolic function; EF is 70 %.               Lab Review:   Lab Results   Component Value Date    TRIG 236 08/16/2021    HDL 26 08/16/2021    LDLCALC 39 08/16/2021 LDLDIRECT 116 04/02/2019    LABVLDL 47 08/16/2021     Lab Results   Component Value Date     08/16/2021    K 4.2 08/16/2021    K 4.4 10/06/2020    BUN 20 08/16/2021    CREATININE 0.7 08/16/2021       Assessment:  1. CAD of native coronary arteries with stable angina   2. Hyperlipidemia with LDL goal <70 mg/dL   3. Essential hypertension   4. Intermittent claudication     Plan:  She does endorse symptoms of claudication and leg pain with exertion. I will have her complete a lower extremity arterial duplex to assess for peripheral disease. I have encouraged her to wear compression stockings to help with the foot swelling she is experiencing. She does have residual small branch disease and has been experiencing occasional exertional chest pain. I will have her increase her dose of Ranexa to 100 mg BID. Her blood pressure is well controlled and her recent lipid profile was favorable. I have personally reviewed all previous testing for this visit today including imaging, lab results and EKG as detailed above. I will see her in the office for follow up in 6 months. This note was scribed in the presence of Chuck Booker MD by [employer], RN. Physician Attestation:  The scribes documentation has been prepared under my direction and personally reviewed by me in its entirety. I, Dr. Rosie Lott personally performed the services described in this documentation as scribed by my RN in my presence, and I confirm that the note above accurately reflects all work, treatment, procedures, and medical decision making performed by me.

## 2021-09-02 ENCOUNTER — OFFICE VISIT (OUTPATIENT)
Dept: CARDIOLOGY CLINIC | Age: 56
End: 2021-09-02
Payer: COMMERCIAL

## 2021-09-02 VITALS
WEIGHT: 191.6 LBS | HEART RATE: 76 BPM | BODY MASS INDEX: 37.62 KG/M2 | DIASTOLIC BLOOD PRESSURE: 78 MMHG | OXYGEN SATURATION: 95 % | SYSTOLIC BLOOD PRESSURE: 130 MMHG | HEIGHT: 60 IN

## 2021-09-02 DIAGNOSIS — I73.9 INTERMITTENT CLAUDICATION (HCC): ICD-10-CM

## 2021-09-02 DIAGNOSIS — E78.5 HYPERLIPIDEMIA LDL GOAL <70: ICD-10-CM

## 2021-09-02 DIAGNOSIS — I25.118 CORONARY ARTERY DISEASE OF NATIVE ARTERY OF NATIVE HEART WITH STABLE ANGINA PECTORIS (HCC): Primary | ICD-10-CM

## 2021-09-02 DIAGNOSIS — I10 ESSENTIAL HYPERTENSION: ICD-10-CM

## 2021-09-02 PROCEDURE — 99214 OFFICE O/P EST MOD 30 MIN: CPT | Performed by: INTERNAL MEDICINE

## 2021-09-02 PROCEDURE — 1036F TOBACCO NON-USER: CPT | Performed by: INTERNAL MEDICINE

## 2021-09-02 PROCEDURE — G8427 DOCREV CUR MEDS BY ELIG CLIN: HCPCS | Performed by: INTERNAL MEDICINE

## 2021-09-02 PROCEDURE — G8417 CALC BMI ABV UP PARAM F/U: HCPCS | Performed by: INTERNAL MEDICINE

## 2021-09-02 PROCEDURE — 3017F COLORECTAL CA SCREEN DOC REV: CPT | Performed by: INTERNAL MEDICINE

## 2021-09-02 RX ORDER — RANOLAZINE 1000 MG/1
1000 TABLET, EXTENDED RELEASE ORAL 2 TIMES DAILY
Qty: 180 TABLET | Refills: 3 | Status: SHIPPED | OUTPATIENT
Start: 2021-09-02 | End: 2021-10-12

## 2021-09-09 ENCOUNTER — HOSPITAL ENCOUNTER (OUTPATIENT)
Dept: VASCULAR LAB | Age: 56
Discharge: HOME | End: 2021-09-09
Payer: COMMERCIAL

## 2021-09-09 ENCOUNTER — HOSPITAL ENCOUNTER (EMERGENCY)
Age: 56
Discharge: HOME | End: 2021-09-09
Attending: EMERGENCY MEDICINE
Payer: COMMERCIAL

## 2021-09-09 VITALS
DIASTOLIC BLOOD PRESSURE: 86 MMHG | RESPIRATION RATE: 18 BRPM | TEMPERATURE: 98.4 F | WEIGHT: 190 LBS | HEIGHT: 60 IN | SYSTOLIC BLOOD PRESSURE: 182 MMHG | BODY MASS INDEX: 37.3 KG/M2 | OXYGEN SATURATION: 96 % | HEART RATE: 73 BPM

## 2021-09-09 DIAGNOSIS — L03.90 CELLULITIS, UNSPECIFIED CELLULITIS SITE: Primary | ICD-10-CM

## 2021-09-09 DIAGNOSIS — I73.9 INTERMITTENT CLAUDICATION (HCC): ICD-10-CM

## 2021-09-09 PROCEDURE — 93925 LOWER EXTREMITY STUDY: CPT

## 2021-09-09 PROCEDURE — 99285 EMERGENCY DEPT VISIT HI MDM: CPT

## 2021-09-09 RX ORDER — SULFAMETHOXAZOLE AND TRIMETHOPRIM 800; 160 MG/1; MG/1
1 TABLET ORAL 2 TIMES DAILY
Qty: 20 TABLET | Refills: 0 | Status: SHIPPED | OUTPATIENT
Start: 2021-09-09 | End: 2021-09-19

## 2021-09-09 ASSESSMENT — PAIN - FUNCTIONAL ASSESSMENT: PAIN_FUNCTIONAL_ASSESSMENT: ACTIVITIES ARE NOT PREVENTED

## 2021-09-09 ASSESSMENT — PAIN SCALES - GENERAL
PAINLEVEL_OUTOF10: 7

## 2021-09-09 ASSESSMENT — PAIN DESCRIPTION - ORIENTATION
ORIENTATION: LEFT
ORIENTATION: LEFT

## 2021-09-09 ASSESSMENT — PAIN DESCRIPTION - DIRECTION: RADIATING_TOWARDS: NON RADIATING

## 2021-09-09 ASSESSMENT — PAIN DESCRIPTION - FREQUENCY: FREQUENCY: CONTINUOUS

## 2021-09-09 ASSESSMENT — PAIN DESCRIPTION - PAIN TYPE
TYPE: ACUTE PAIN

## 2021-09-09 ASSESSMENT — PAIN DESCRIPTION - DESCRIPTORS: DESCRIPTORS: BURNING;ITCHING

## 2021-09-09 ASSESSMENT — PAIN DESCRIPTION - PROGRESSION: CLINICAL_PROGRESSION: GRADUALLY WORSENING

## 2021-09-09 ASSESSMENT — PAIN DESCRIPTION - ONSET: ONSET: PROGRESSIVE

## 2021-09-09 ASSESSMENT — PAIN DESCRIPTION - LOCATION
LOCATION: ARM
LOCATION: ARM

## 2021-09-10 NOTE — ED PROVIDER NOTES
Triage Chief Complaint:   Abscess (Patient states, \"I think i was bitten by a spider 4-5 days ago\". Patient has 1.25 cm round red area on left lower arm with small hard scab in the center. )    DUANE:  Laci Lawrence is a 61 y.o. female that presents with concern for abscess. She states \"I think I was bitten by a spider for 5 days ago\". The patient notes an area of redness and mild swelling with a scab in the center noted to the inner portion of her mid left forearm. Patient is diabetic. No fever no nausea no vomiting reported    ROS:  At least 9 systems reviewed and otherwise acutely negative except as in the [address]     Past Medical History:   Diagnosis Date    Arthritis     Back pain     Depression     Diabetes mellitus ([address]     REHMAN (dyspnea on exertion)     Fatty liver     GERD (gastroesophageal reflux disease)     Hyperlipidemia     Hypertension     MRSA (methicillin resistant staph aureus) culture positive 08/15/2018    arm    Stress incontinence     Thyroid disease     CYST ON THYROID--FOUND 20 YEARS AGO     Past Surgical History:   Procedure Laterality Date     SECTION      TIMES 3    CHOLECYSTECTOMY      COLONOSCOPY  2017      Colonoscopy with snare and biopsy    EYE SURGERY      muscle correction and eyelid     HERNIA REPAIR      UMBILICAL AREA x 2    HYSTERECTOMY      PTCA  10/2020    SHEFALI to RCA x 2; SHEFALI to LAD    SHOULDER ARTHROSCOPY Right 5-19-15    Right shoulder arthroscopy labral debridement open [place] subacromial decompression and chrondroplasty    UPPER GASTROINTESTINAL ENDOSCOPY  2017    Esophagogastroduodenoscopy with biopsy     Family History   Problem Relation Age of Onset    Cancer Mother         lung     Heart Disease Father     Stroke Brother      Social History     Socioeconomic History    Marital status:      Spouse name: Not on file    Number of children: Not on file    Years of education: Not on file    Highest education level: Not on file   Occupational History    Not on file   Tobacco Use    Smoking status: Former Smoker     Packs/day: 1.00     Years: 30.00     Pack years: 30.00     Types: Cigarettes     Quit date:      Years since quittin.6    Smokeless tobacco: Never Used   Vaping Use    Vaping Use: Never used   Substance and Sexual Activity    Alcohol use: No    Drug use: No    Sexual activity: Never   Other Topics Concern    Not on file   Social History Narrative    Not on file     Social Determinants of Health     Financial Resource Strain:     Difficulty of Paying Living Expenses:    Food Insecurity:     Worried About Running Out of Food in the Last Year:     [address] in the Last Year:    Transportation Needs:     Lack of Transportation (Medical):  Lack of Transportation (Non-Medical):    Physical Activity:     Days of Exercise per Week:     Minutes of Exercise per Session:    Stress:     Feeling of Stress :    Social Connections:     Frequency of Communication with Friends and Family:     Frequency of Social Gatherings with Friends and Family:     Attends [employer] Services:     Active Member of Clubs or Organizations:     Attends Club or Organization Meetings:     Marital Status:    Intimate Partner Violence:     Fear of Current or Ex-Partner:     Emotionally Abused:     Physically Abused:     Sexually Abused:      No current facility-administered medications for this encounter.      Current Outpatient Medications   Medication Sig Dispense Refill    TRUE METRIX BLOOD GLUCOSE TEST strip 1 EACH BY IN VITRO ROUTE 3 TIMES DAILY AS NEEDED. 100 strip 3    ranolazine (RANEXA) 1000 MG extended release tablet Take 1 tablet by mouth 2 times daily 180 tablet 3    LANTUS SOLOSTAR 100 UNIT/ML injection pen INJECT 75 UNITS INTO THE SKIN NIGHTLY 5 pen 2    insulin lispro, 1 Unit Dial, 100 UNIT/ML SOPN INJECT 7 UNITS INTO THE SKIN 3 TIMES DAILY (BEFORE MEALS) 5 pen 1    carvedilol (COREG) 6.25 MG tablet TAKE 1 TABLET BY MOUTH TWICE A DAY WITH MEALS 180 tablet 0    HYDROcodone-acetaminophen (NORCO) 5-325 MG per tablet Take 1 tablet by mouth 2 times daily for 30 days. 60 tablet 0    diazePAM (VALIUM) 5 MG tablet Take 1 tablet by mouth every 12 hours as needed for Anxiety or Sleep for up to 30 days. 60 tablet 1    SYMBICORT 160-4.5 MCG/ACT AERO TAKE 2 PUFFS BY MOUTH TWICE A DAY 1 Inhaler 0    famotidine (PEPCID) 20 MG tablet TAKE 1 TABLET BY MOUTH TWICE A  tablet 1    albuterol sulfate  (90 Base) MCG/ACT inhaler INHALE 2 PUFFS BY MOUTH EVERY 4-6 HOURS FOR 7-10 DAYS THEN AS NEEDED 18 Inhaler 1    blood glucose test strips (RELION TRUE METRIX TEST STRIPS) strip 1 each by In Vitro route 6 times daily As needed.  180 each 3    furosemide (LASIX) 20 MG tablet TAKE 1 TABLET BY MOUTH EVERY DAY 90 tablet 1    ezetimibe (ZETIA) 10 MG tablet TAKE 1 TABLET BY MOUTH EVERY DAY 90 tablet 1    Blood Glucose Monitoring Suppl (TRUE METRIX METER) ASHWIN As directed 1 Device 0    losartan (COZAAR) 100 MG tablet TAKE 1 TABLET BY MOUTH EVERY DAY 90 tablet 1    atorvastatin (LIPITOR) 20 MG tablet TAKE 1 TABLET BY MOUTH EVERY DAY 90 tablet 1    DULoxetine (CYMBALTA) 60 MG extended release capsule TAKE 1 CAPSULE BY MOUTH EVERY DAY 90 capsule 2    nitroGLYCERIN (NITROSTAT) 0.4 MG SL tablet Place 1 tablet under the tongue every 5 minutes as needed for Chest pain 25 tablet 3    aspirin 81 MG chewable tablet TAKE 1 TABLET BY MOUTH EVERY DAY 30 tablet 3    FREESTYLE TEST STRIPS strip TEST 3 TIMES A  strip 5    ticagrelor (BRILINTA) 90 MG TABS tablet Take 1 tablet by mouth 2 times daily 60 tablet 11    Insulin Pen Needle 32G X 4 MM MISC 1 each by Does not apply route daily 100 each 3    Blood Glucose Monitoring Suppl (FREESTYLE LITE) ASHWIN 1 Device by Does not apply route daily as needed (glucose monitoring) 1 Device 0    FreeStyle Lancets MISC 1 each by Does not apply route daily 200 each 0    blood glucose test strips (FREESTYLE LITE) strip 1 each by In Vitro route daily As needed. 100 each 3    BD INSULIN SYRINGE U/F 31G X 5/16\" 1 ML MISC USE AT BEDTIME WITH LANTUS 100 each 2    MIRALAX powder TAKE 17 G BY MOUTH DAILY 510 g 2     Allergies   Allergen Reactions    Latex Itching and Rash    Tramadol Hives and Shortness Of Breath    Codeine Itching    Penicillins        [unfilled]    Nursing Notes Reviewed    Physical Exam:  There were no vitals filed for this visit. GENERAL APPEARANCE: Awake and alert. Cooperative. No acute distress. HEAD: Normocephalic. Atraumatic. EYES: EOM's grossly intact. Sclera anicteric. ENT: Mucous membranes are moist. Tolerates saliva. No trismus. NECK: Supple. No meningismus. Trachea midline. HEART: RRR. Radial pulses 2+. LUNGS: Respirations unlabored. CTAB  ABDOMEN: Soft. Non-tender. No guarding or rebound. EXTREMITIES/SKIN: Examination of left upper extremity shows a well-perfused extremity soft compartments, appropriate capillary refill, strong distal pulses, full range of motion. There is a circular area of indurated cellulitis with a scab in the middle located to the inner left forearm. It is approximately 1.75 cm diameter. There is no circumscribed or fluctuant abscess  NEUROLOGICAL: No gross facial drooping. Moves all 4 extremities spontaneously. PSYCHIATRIC: Normal mood. I have reviewed and interpreted all of the currently available lab results from this visit (if applicable):  No results found for this visit on 09/09/21. Radiographs (if obtained):  [] The following radiograph was interpreted by myself in the absence of a radiologist:  [x] Radiologist's Report Reviewed:  n/a    EKG (if obtained): (All EKG's are interpreted by myself in the absence of a cardiologist)  Initial EKG on my interpretation shows *n/a    MDM:  Differential diagnosis: Abscess, cellulitis, spider bite    Unclear whether or not this is actually a spider bite or not. The patient clearly has an area of cellulitis with a scab in the middle however there is currently no abscess they'll be amenable to incision and drainage at this time. I believe this is probably attempting to become an early abscess however at this stage it is just an indurated cellulitis. Will discharge with Bactrim/Bactroban and outpatient follow-up along with recommendations to keep the area clean. Discussed results, diagnosis and plan with patient and/or family. Questions addressed. Disposition and follow-up agreed upon. Specific discharge instructions explained. The patient and/or family and I have discussed the diagnosis and risks, and we agree with discharging home to follow-up with their primary care, specialist or referral doctor. In the event that medications were prescribed the risk profile of these medications were detailed expressly. We also discussed returning to the Emergency Department immediately if new or worsening symptoms occur. We have discussed the symptoms which are most concerning that necessitate immediate return. Old records reviewed. Labs and imaging reviewed and results discussed with patient. .        Patient was given scripts for the following medications. I counseled patient how to take these medications. New Prescriptions    No medications on file         CRITICAL CARE TIME   Total Critical Care time was 0 minutes, excluding separately reportable procedures. There was a high probability of clinically significant/life threatening deterioration in the patient's condition which required my urgent intervention.       Clinical Impression:  Arm cellulitis  (Please note that portions of this note may have been completed with a voice recognition program. Efforts were made to edit the dictations but occasionally words are mis-transcribed.)    MD Teodora Nicholson MD  09/09/21 [id number]

## 2021-09-10 NOTE — ED TRIAGE NOTES
Patient ambulatory to Room 11 with c/o an abscess to her left lower arm. Patient states, \"I think I was bitten by a spider 4-5 days ago\". Patient has 1.25 cm round red area on left lower arm with small hard scab in the center. Patient c/o pain and itching to the abscess site. No draining noted. She denies fever, denies nausea, vomiting and diarrhea. She is awake, alert, oriented, respirations easy & regular, skin w/d, MMM & pink, cap refill brisk. Dr. Miles Simeon in room to examine patient.

## 2021-09-18 ENCOUNTER — HOSPITAL ENCOUNTER (EMERGENCY)
Age: 56
Discharge: HOME | End: 2021-09-18
Attending: EMERGENCY MEDICINE
Payer: COMMERCIAL

## 2021-09-18 ENCOUNTER — APPOINTMENT (OUTPATIENT)
Dept: GENERAL RADIOLOGY | Age: 56
End: 2021-09-18
Payer: COMMERCIAL

## 2021-09-18 VITALS
WEIGHT: 198.41 LBS | SYSTOLIC BLOOD PRESSURE: 144 MMHG | OXYGEN SATURATION: 90 % | BODY MASS INDEX: 38.75 KG/M2 | DIASTOLIC BLOOD PRESSURE: 61 MMHG | HEART RATE: 75 BPM | TEMPERATURE: 99.5 F | RESPIRATION RATE: 17 BRPM

## 2021-09-18 DIAGNOSIS — U07.1 COVID-19: Primary | ICD-10-CM

## 2021-09-18 LAB
ANION GAP SERPL CALCULATED.3IONS-SCNC: 13 MMOL/L (ref 3–16)
BASOPHILS ABSOLUTE: 0 K/UL (ref 0–0.2)
BASOPHILS RELATIVE PERCENT: 0.1 %
BUN BLDV-MCNC: 23 MG/DL (ref 7–20)
CALCIUM SERPL-MCNC: 8.5 MG/DL (ref 8.3–10.6)
CHLORIDE BLD-SCNC: 94 MMOL/L (ref 99–110)
CO2: 27 MMOL/L (ref 21–32)
CREAT SERPL-MCNC: 1.3 MG/DL (ref 0.6–1.1)
EOSINOPHILS ABSOLUTE: 0 K/UL (ref 0–0.6)
EOSINOPHILS RELATIVE PERCENT: 0.1 %
GFR AFRICAN AMERICAN: 51
GFR NON-AFRICAN AMERICAN: 42
GLUCOSE BLD-MCNC: 267 MG/DL (ref 70–99)
HCT VFR BLD CALC: 33.9 % (ref 36–48)
HEMOGLOBIN: 11.6 G/DL (ref 12–16)
LYMPHOCYTES ABSOLUTE: 1 K/UL (ref 1–5.1)
LYMPHOCYTES RELATIVE PERCENT: 24.7 %
MCH RBC QN AUTO: 29.8 PG (ref 26–34)
MCHC RBC AUTO-ENTMCNC: 34.2 G/DL (ref 31–36)
MCV RBC AUTO: 87 FL (ref 80–100)
MONOCYTES ABSOLUTE: 0.4 K/UL (ref 0–1.3)
MONOCYTES RELATIVE PERCENT: 10.9 %
NEUTROPHILS ABSOLUTE: 2.6 K/UL (ref 1.7–7.7)
NEUTROPHILS RELATIVE PERCENT: 64.2 %
PDW BLD-RTO: 15.9 % (ref 12.4–15.4)
PLATELET # BLD: 117 K/UL (ref 135–450)
PMV BLD AUTO: 8.6 FL (ref 5–10.5)
POTASSIUM REFLEX MAGNESIUM: 4.1 MMOL/L (ref 3.5–5.1)
PRO-BNP: 482 PG/ML (ref 0–124)
RBC # BLD: 3.9 M/UL (ref 4–5.2)
SODIUM BLD-SCNC: 134 MMOL/L (ref 136–145)
TROPONIN: <0.01 NG/ML
WBC # BLD: 4.1 K/UL (ref 4–11)

## 2021-09-18 PROCEDURE — 36415 COLL VENOUS BLD VENIPUNCTURE: CPT

## 2021-09-18 PROCEDURE — 99285 EMERGENCY DEPT VISIT HI MDM: CPT

## 2021-09-18 PROCEDURE — 94640 AIRWAY INHALATION TREATMENT: CPT

## 2021-09-18 PROCEDURE — 94760 N-INVAS EAR/PLS OXIMETRY 1: CPT

## 2021-09-18 PROCEDURE — 96375 TX/PRO/DX INJ NEW DRUG ADDON: CPT

## 2021-09-18 PROCEDURE — 93005 ELECTROCARDIOGRAM TRACING: CPT | Performed by: EMERGENCY MEDICINE

## 2021-09-18 PROCEDURE — 80048 BASIC METABOLIC PNL TOTAL CA: CPT

## 2021-09-18 PROCEDURE — 83880 ASSAY OF NATRIURETIC PEPTIDE: CPT

## 2021-09-18 PROCEDURE — 96365 THER/PROPH/DIAG IV INF INIT: CPT

## 2021-09-18 PROCEDURE — 71045 X-RAY EXAM CHEST 1 VIEW: CPT

## 2021-09-18 PROCEDURE — 2580000003 HC RX 258: Performed by: EMERGENCY MEDICINE

## 2021-09-18 PROCEDURE — 6370000000 HC RX 637 (ALT 250 FOR IP): Performed by: EMERGENCY MEDICINE

## 2021-09-18 PROCEDURE — 6360000002 HC RX W HCPCS: Performed by: EMERGENCY MEDICINE

## 2021-09-18 PROCEDURE — 85025 COMPLETE CBC W/AUTO DIFF WBC: CPT

## 2021-09-18 PROCEDURE — 84484 ASSAY OF TROPONIN QUANT: CPT

## 2021-09-18 PROCEDURE — 2500000003 HC RX 250 WO HCPCS: Performed by: EMERGENCY MEDICINE

## 2021-09-18 RX ORDER — IPRATROPIUM BROMIDE AND ALBUTEROL SULFATE 2.5; .5 MG/3ML; MG/3ML
1 SOLUTION RESPIRATORY (INHALATION) ONCE
Status: DISCONTINUED | OUTPATIENT
Start: 2021-09-18 | End: 2021-09-18

## 2021-09-18 RX ORDER — ALBUTEROL SULFATE 90 UG/1
2 AEROSOL, METERED RESPIRATORY (INHALATION) EVERY 6 HOURS PRN
Status: DISCONTINUED | OUTPATIENT
Start: 2021-09-18 | End: 2021-09-18 | Stop reason: HOSPADM

## 2021-09-18 RX ORDER — 0.9 % SODIUM CHLORIDE 0.9 %
1000 INTRAVENOUS SOLUTION INTRAVENOUS ONCE
Status: COMPLETED | OUTPATIENT
Start: 2021-09-18 | End: 2021-09-18

## 2021-09-18 RX ORDER — DEXAMETHASONE SODIUM PHOSPHATE 10 MG/ML
10 INJECTION, SOLUTION INTRAMUSCULAR; INTRAVENOUS ONCE
Status: COMPLETED | OUTPATIENT
Start: 2021-09-18 | End: 2021-09-18

## 2021-09-18 RX ADMIN — CASIRIVIMAB AND IMDEVIMAB: 600; 600 INJECTION, SOLUTION, CONCENTRATE INTRAVENOUS at 03:02

## 2021-09-18 RX ADMIN — ALBUTEROL SULFATE 2 PUFF: 90 AEROSOL, METERED RESPIRATORY (INHALATION) at 02:30

## 2021-09-18 RX ADMIN — DEXAMETHASONE SODIUM PHOSPHATE 10 MG: 10 INJECTION, SOLUTION INTRAMUSCULAR; INTRAVENOUS at 01:56

## 2021-09-18 RX ADMIN — SODIUM CHLORIDE 1000 ML: 9 INJECTION, SOLUTION INTRAVENOUS at 01:58

## 2021-09-18 ASSESSMENT — PAIN SCALES - GENERAL: PAINLEVEL_OUTOF10: 5

## 2021-09-18 NOTE — ED NOTES
Dr. Leon Headings at the bedside. Patient complains of chest pain and shortness of breath. She states both are intermittent and not constant. She is COVID positive.        Alexander Elias RN  09/18/21 [id number]

## 2021-09-18 NOTE — ED NOTES
Patient's oxygen fluctuates between %88-%94 on room air. Dr. Anuja Reyna notified.         Petra Christianson, RN  09/18/21 [facility], RN  09/18/21 [id number]

## 2021-09-18 NOTE — ED NOTES
Patient placed on 2 liters NC and went from %85 to %94. Dr. Chayo Hernandez notified.        Nelly Mahajan, RN  09/18/21 [id number] [facility], RN  09/18/21 [id number]

## 2021-09-18 NOTE — ED PROVIDER NOTES
[address] [facility]      Pt Name: Jesus Dockery  MRN: [id number]  [place] 1965  Date of evaluation: 9/18/2021  Provider: Bartolo Walsh MD    CHIEF COMPLAINT       Chief Complaint   Patient presents with    Chest Pain     x 2 days, tightness throughout chest     Shortness of Breath     Patient [address] [facility]    Jesus Dockery is a 61 y.o. female who presents to the emergency department with Covid x2 days. Patient endorses 2-day history of chest tightness 4 out of 10 pressure-like worse with inspiration, shortness of breath, cough, congestion. Endorses that both are intermittent and not constant. Patient endorses dry cough. Positive for tactile fevers. States this has never happened before. Denies vaccination status. States symptoms are better with rest. Has been taking over-the-counter medication with some relief. Patient has tested positive for Covid. No other associated symptoms. Nursing Notes were reviewed. Including nursing noted for FM, Surgical History, Past Medical History, Social History, vitals, and allergies; agree with all. REVIEW OF SYSTEMS       Review of Systems   Constitutional: Negative for diaphoresis and unexpected weight change. HENT: Positive for congestion. Negative for dental problem. Eyes: Negative for discharge and itching. Respiratory: Positive for cough, chest tightness and shortness of breath. Cardiovascular: Positive for chest pain. Negative for leg swelling. Gastrointestinal: Negative for abdominal pain, constipation and vomiting. Endocrine: Negative for cold intolerance and heat intolerance. Genitourinary: Negative for vaginal bleeding, vaginal discharge and vaginal pain. Musculoskeletal: Negative for neck pain and neck stiffness. Skin: Negative for color change and pallor. Neurological: Negative for tremors and weakness. Psychiatric/Behavioral: Negative for agitation and behavioral problems. Except as noted above the remainder of the review of systems was reviewed and negative. PAST MEDICAL HISTORY     Past Medical History:   Diagnosis Date    Arthritis     Back pain     Depression     Diabetes mellitus ([address]     REHMAN (dyspnea on exertion)     Fatty liver     GERD (gastroesophageal reflux disease)     Hyperlipidemia     Hypertension     MRSA (methicillin resistant staph aureus) culture positive 08/15/2018    arm    Stress incontinence     Thyroid disease     CYST ON THYROID--FOUND 20 YEARS AGO       SURGICAL HISTORY       Past Surgical History:   Procedure Laterality Date     SECTION      TIMES 3    CHOLECYSTECTOMY      COLONOSCOPY  2017      Colonoscopy with snare and biopsy    EYE SURGERY      muscle correction and eyelid     HERNIA REPAIR      UMBILICAL AREA x 2    HYSTERECTOMY      PTCA  10/2020    SHEFALI to RCA x 2; SHEFALI to LAD    SHOULDER ARTHROSCOPY Right 5-19-15    Right shoulder arthroscopy labral debridement open nedra subacromial decompression and chrondroplasty    UPPER GASTROINTESTINAL ENDOSCOPY  2017    Esophagogastroduodenoscopy with biopsy       CURRENT MEDICATIONS       Discharge Medication List as of 2021  5:33 AM      CONTINUE these medications which have NOT CHANGED    Details   HYDROcodone-acetaminophen (NORCO) 5-325 MG per tablet Take 1 tablet by mouth 2 times daily for 30 days. , Disp-60 tablet, R-0Normal      sulfamethoxazole-trimethoprim (BACTRIM DS) 800-160 MG per tablet Take 1 tablet by mouth 2 times daily for 10 days, Disp-20 tablet, R-0Print      !! TRUE METRIX BLOOD GLUCOSE TEST strip 1 EACH BY IN VITRO ROUTE 3 TIMES DAILY AS NEEDED., Disp-100 strip, R-3Normal      ranolazine (RANEXA) 1000 MG extended release tablet Take 1 tablet by mouth 2 times daily, Disp-180 tablet, R-3Normal      LANTUS SOLOSTAR 100 UNIT/ML injection pen INJECT 75 UNITS INTO THE SKIN NIGHTLY, Disp-5 pen, R-2Normal      insulin lispro, 1 Unit Dial, 100 UNIT/ML SOPN INJECT 7 UNITS INTO THE SKIN 3 TIMES DAILY (BEFORE MEALS), Disp-5 pen, R-1Normal      carvedilol (COREG) 6.25 MG tablet TAKE 1 TABLET BY MOUTH TWICE A DAY WITH MEALS, Disp-180 tablet, R-0Normal      SYMBICORT 160-4.5 MCG/ACT AERO TAKE 2 PUFFS BY MOUTH TWICE A DAY, Disp-1 Inhaler, R-0Patient needs an appointmentNormal      famotidine (PEPCID) 20 MG tablet TAKE 1 TABLET BY MOUTH TWICE A DAY, Disp-180 tablet, R-1Normal      albuterol sulfate  (90 Base) MCG/ACT inhaler INHALE 2 PUFFS BY MOUTH EVERY 4-6 HOURS FOR 7-10 DAYS THEN AS NEEDED, Disp-18 Inhaler, R-1Normal      !! blood glucose test strips (RELION TRUE METRIX TEST STRIPS) strip 1 each by In Vitro route 6 times daily As needed. , Disp-180 each, R-3Normal      furosemide (LASIX) 20 MG tablet TAKE 1 TABLET BY MOUTH EVERY DAY, Disp-90 tablet, R-1Normal      ezetimibe (ZETIA) 10 MG tablet TAKE 1 TABLET BY MOUTH EVERY DAY, Disp-90 tablet, R-1Normal      Blood Glucose Monitoring Suppl (TRUE METRIX METER) ASHWIN As directed, Disp-1 Device, R-0Normal      losartan (COZAAR) 100 MG tablet TAKE 1 TABLET BY MOUTH EVERY DAY, Disp-90 tablet, R-1Normal      atorvastatin (LIPITOR) 20 MG tablet TAKE 1 TABLET BY MOUTH EVERY DAY, Disp-90 tablet, R-1Normal      DULoxetine (CYMBALTA) 60 MG extended release capsule TAKE 1 CAPSULE BY MOUTH EVERY DAY, Disp-90 capsule, R-2Normal      nitroGLYCERIN (NITROSTAT) 0.4 MG SL tablet Place 1 tablet under the tongue every 5 minutes as needed for Chest pain, Disp-25 tablet, R-3Normal      aspirin 81 MG chewable tablet TAKE 1 TABLET BY MOUTH EVERY DAY, Disp-30 tablet, R-3Normal      !! FREESTYLE TEST STRIPS strip TEST 3 TIMES A DAY, Disp-100 strip,R-5Normal      ticagrelor (BRILINTA) 90 MG TABS tablet Take 1 tablet by mouth 2 times daily, Disp-60 tablet,R-11Patient probable discharge 10/8Normal      Insulin Pen Needle 32G X 4 MM MISC DAILY Starting Fri 10/9/2020, Disp-100 each,R-3, Normal      FreeStyle Lancets MISC DAILY Starting Thu 10/8/2020, Until Wed 2020, For 30 doses, Disp-200 each,R-0, Normal      !! blood glucose test strips (FREESTYLE LITE) strip 1 each by In Vitro route daily As needed. , Disp-100 each,R-3Normal      BD INSULIN SYRINGE U/F 31G X 5/16\" 1 ML MISC USE AT BEDTIME WITH LANTUS, Disp-100 each, R-2Normal      MIRALAX powder TAKE 17 G BY MOUTH DAILY, Disp-510 g, R-2, DAWNormal       !! - Potential duplicate medications found. Please discuss with provider. ALLERGIES     Latex, Tramadol, Codeine, and Penicillins    FAMILY HISTORY        Family History   Problem Relation Age of Onset    Cancer Mother         lung     Heart Disease Father     Stroke Brother        SOCIAL HISTORY       Social History     Socioeconomic History    Marital status:      Spouse name: None    Number of children: None    Years of education: None    Highest education level: None   Occupational History    None   Tobacco Use    Smoking status: Former Smoker     Packs/day: 1.00     Years: 30.00     Pack years: 30.00     Types: Cigarettes     Quit date:      Years since quittin.7    Smokeless tobacco: Never Used   Vaping Use    Vaping Use: Never used   Substance and Sexual Activity    Alcohol use: No    Drug use: No    Sexual activity: Never   Other Topics Concern    None   Social History Narrative    None     Social Determinants of Health     Financial Resource Strain:     Difficulty of Paying Living Expenses:    Food Insecurity:     Worried About Running Out of Food in the Last Year:     Ran Out of Food in the Last Year:    Transportation Needs:     Lack of Transportation (Medical):      Lack of Transportation (Non-Medical):    Physical Activity:     Days of Exercise per Week:     Minutes of Exercise per Session:    Stress:     Feeling of Stress :    Social Connections:     Frequency of Communication with Friends and Family:  Frequency of Social Gatherings with Friends and Family:     Attends Orthodox Services:     Active Member of Clubs or Organizations:     Attends Club or Organization Meetings:     Marital Status:    Intimate Partner Violence:     Fear of Current or Ex-Partner:     Emotionally Abused:     Physically Abused:     Sexually Abused:        PHYSICAL EXAM       ED Triage Vitals [09/18/21 0052]   BP Temp Temp Source Pulse Resp SpO2 Height Weight   (!) 154/70 97.7 °F (36.5 °C) Oral 73 18 92 % -- 198 lb 6.6 oz (90 kg)       Physical Exam  Vitals and nursing note reviewed. Constitutional:       General: She is not in acute distress. Appearance: She is well-developed. She is ill-appearing. She is not toxic-appearing or diaphoretic. HENT:      Head: Normocephalic and atraumatic. Right Ear: External ear normal.      Left Ear: External ear normal.   Eyes:      General:         Right eye: No discharge. Left eye: No discharge. Conjunctiva/sclera: Conjunctivae normal.      Pupils: Pupils are equal, round, and reactive to light. Cardiovascular:      Rate and Rhythm: Normal rate and regular rhythm. Heart sounds: No murmur heard. Pulmonary:      Effort: Pulmonary effort is normal. No respiratory distress. Breath sounds: Normal breath sounds. No wheezing or rales. Abdominal:      General: Bowel sounds are normal. There is no distension. Palpations: Abdomen is soft. There is no mass. Tenderness: There is no abdominal tenderness. There is no guarding or rebound. Genitourinary:     Comments: Deferred  Musculoskeletal:         General: No deformity. Normal range of motion. Cervical back: Normal range of motion and neck supple. Skin:     General: Skin is warm. Findings: No erythema or rash. Neurological:      Mental Status: She is alert and oriented to person, place, and time. She is not disoriented. Cranial Nerves: No cranial nerve deficit.       Motor: No atrophy or abnormal muscle tone. Coordination: Coordination normal.   Psychiatric:         Behavior: Behavior normal.         Thought Content: Thought content normal.       DIAGNOSTIC RESULTS     RADIOLOGY:   Non-plain film images such as CT, Ultrasoundand MRI are read by the radiologist. Plain radiographic images are visualized and preliminarily interpreted by the emergency physician with the below findings:    Impression   1. Borderline cardiomegaly, without evidence of CHF   2.  No evidence of pneumonia     ED BEDSIDE ULTRASOUND:   Performed by ED Physician - none    LABS:  Labs Reviewed   CBC WITH AUTO DIFFERENTIAL - Abnormal; Notable for the following components:       Result Value    RBC 3.90 (*)     Hemoglobin 11.6 (*)     Hematocrit 33.9 (*)     RDW 15.9 (*)     Platelets 995 (*)     All other components within normal limits    Narrative:     Performed at:  [address] [facility]   Phone [phone]   BASIC METABOLIC PANEL W/ REFLEX TO MG FOR LOW K - Abnormal; Notable for the following components:    Sodium 134 (*)     Chloride 94 (*)     Glucose 267 (*)     BUN 23 (*)     CREATININE 1.3 (*)     GFR Non- 42 (*)     GFR  51 (*)     All other components within normal limits    Narrative:     Performed at:  [address] [employer]   Phone [phone]   BRAIN NATRIURETIC PEPTIDE - Abnormal; Notable for the following components:    Pro- (*)     All other components within normal limits    Narrative:     Performed at:  [address] [employer]   Phone [phone]   TROPONIN    Narrative:     Performed at:  [address] [employer]   Phone [phone]       All other labs were withinnormal range or not returned as of this dictation. EMERGENCY DEPARTMENT COURSE and DIFFERENTIAL DIAGNOSIS/MDM:     PMH, Surgical Hx, FH, Social Hx reviewed by myself (ETOH usage, Tobacco usage, Drug usage reviewed by myself, no pertinent Hx)- No Pertinent Hx     Old records were reviewed by me    61 female with Covid. Reassuring findings. Her oxygen saturation did fluctuate from 88 to 95% during stay. She was offered admission for borderline oxygen. She wants to go home. She ambulates with steady gait. She was given Regeneron. She understands the risk of going home and she understands the need for strict return precautions. Discussed close PCP follow-up. I estimate there is LOW risk for Sepsis, MI, Stroke, Tamponade, PTX, Toxicity or other life threatening etiology thus I consider the discharge disposition reasonable. The patient is at low risk for mortality based on demographic, history and clinical factors. Given the best available information and clinical assessment, I estimate the risk of hospitalization to be greater than risk of treatment at home. I have explained to the patient that the risk could rapidly change, given precautions for return and instructions. Explained to patient that the risk for mortality is low based on demographic, history and clinical factors. I discussed with patient the results of evaluation in the ED, diagnosis, care, and prognosis. The plan is to discharge to home. Patient is in agreement with plan and questions have been answered. I also discussed with patient the reasons which may require a return visit and the importance of follow-up care. The patient is well-appearing, nontoxic, and improved at the time of discharge. Patient agrees to call to arrange follow-up care as directed. Patient understands to return immediately for worsening/change in symptoms. Lukas close PCP follow-up.     CRITICAL CARE TIME   Total Critical Caretime was 0 minutes, excluding separately reportable procedures. There was a high probability of clinically significant/life threatening deterioration in the patient's condition which required my urgent intervention.         PROCEDURES:  Unlessotherwise noted below, none    FINAL IMPRESSION      1. COVID-19          DISPOSITION/PLAN   DISPOSITION Decision To Discharge 09/18/2021 05:27:26 AM    PATIENT REFERRED TO:  Florian Sanders MD  [address]  [address] [facility] [address]  [phone]    Call today        DISCHARGE MEDICATIONS:  Discharge Medication List as of 9/18/2021  5:33 AM             (Please note that portions ofthis note were completed with a voice recognition program.  Efforts were made to edit the dictations but occasionally words are mis-transcribed.)    Gerry Arias MD(electronically signed)  Attending Emergency Physician         Gerry Arias MD  09/22/21 0026

## 2021-09-19 LAB
EKG ATRIAL RATE: 71 BPM
EKG DIAGNOSIS: NORMAL
EKG P AXIS: 61 DEGREES
EKG P-R INTERVAL: 156 MS
EKG Q-T INTERVAL: 470 MS
EKG QRS DURATION: 140 MS
EKG QTC CALCULATION (BAZETT): 510 MS
EKG R AXIS: 46 DEGREES
EKG T AXIS: 4 DEGREES
EKG VENTRICULAR RATE: 71 BPM

## 2021-09-19 PROCEDURE — 93010 ELECTROCARDIOGRAM REPORT: CPT | Performed by: INTERNAL MEDICINE

## 2021-09-22 ENCOUNTER — APPOINTMENT (OUTPATIENT)
Dept: GENERAL RADIOLOGY | Age: 56
End: 2021-09-22
Payer: COMMERCIAL

## 2021-09-22 ENCOUNTER — HOSPITAL ENCOUNTER (EMERGENCY)
Age: 56
Discharge: HOME | End: 2021-09-23
Attending: EMERGENCY MEDICINE
Payer: COMMERCIAL

## 2021-09-22 DIAGNOSIS — U07.1 PNEUMONIA DUE TO COVID-19 VIRUS: Primary | ICD-10-CM

## 2021-09-22 DIAGNOSIS — J12.82 PNEUMONIA DUE TO COVID-19 VIRUS: Primary | ICD-10-CM

## 2021-09-22 PROCEDURE — 71045 X-RAY EXAM CHEST 1 VIEW: CPT

## 2021-09-22 PROCEDURE — 6370000000 HC RX 637 (ALT 250 FOR IP): Performed by: EMERGENCY MEDICINE

## 2021-09-22 PROCEDURE — 99284 EMERGENCY DEPT VISIT MOD MDM: CPT

## 2021-09-22 RX ORDER — ONDANSETRON 4 MG/1
8 TABLET, ORALLY DISINTEGRATING ORAL ONCE
Status: COMPLETED | OUTPATIENT
Start: 2021-09-22 | End: 2021-09-22

## 2021-09-22 RX ADMIN — ONDANSETRON 8 MG: 4 TABLET, ORALLY DISINTEGRATING ORAL at 23:14

## 2021-09-22 ASSESSMENT — ENCOUNTER SYMPTOMS
SHORTNESS OF BREATH: 1
CONSTIPATION: 0
EYE DISCHARGE: 0
VOMITING: 0
CHEST TIGHTNESS: 1
COLOR CHANGE: 0
EYE ITCHING: 0
ABDOMINAL PAIN: 0
COUGH: 1

## 2021-09-22 ASSESSMENT — PAIN SCALES - GENERAL: PAINLEVEL_OUTOF10: 0

## 2021-09-23 VITALS
HEART RATE: 76 BPM | RESPIRATION RATE: 18 BRPM | TEMPERATURE: 98.2 F | WEIGHT: 182.54 LBS | OXYGEN SATURATION: 94 % | BODY MASS INDEX: 35.84 KG/M2 | SYSTOLIC BLOOD PRESSURE: 185 MMHG | HEIGHT: 60 IN | DIASTOLIC BLOOD PRESSURE: 91 MMHG

## 2021-09-23 RX ORDER — LOSARTAN POTASSIUM 100 MG/1
TABLET ORAL
Qty: 90 TABLET | Refills: 1 | Status: SHIPPED | OUTPATIENT
Start: 2021-09-23 | End: 2022-01-17

## 2021-09-23 RX ORDER — ONDANSETRON 4 MG/1
4 TABLET, FILM COATED ORAL EVERY 8 HOURS PRN
Qty: 20 TABLET | Refills: 0 | Status: SHIPPED | OUTPATIENT
Start: 2021-09-23 | End: 2021-11-08

## 2021-09-23 RX ORDER — METHYLPREDNISOLONE 4 MG/1
TABLET ORAL
Qty: 1 KIT | Refills: 0 | Status: SHIPPED | OUTPATIENT
Start: 2021-09-23 | End: 2021-11-08 | Stop reason: ALTCHOICE

## 2021-09-23 ASSESSMENT — PAIN SCALES - GENERAL: PAINLEVEL_OUTOF10: 0

## 2021-09-23 NOTE — ED PROVIDER NOTES
eMERGENCY dEPARTMENT eNCOUnter      Pt Name: Laci Lawrence  MRN: [id number]  [place] 1965  Date of evaluation: 9/22/2021  Provider: Olivia Polo MD     [address] [place] [address]       Chief Complaint   Patient presents with    Positive For Covid-19     positive covid on 14 th. Went to [facility] infusion 2 days ago. Was feeling better now c/o nausea, cough, and not eating well. HISTORY OF PRESENT ILLNESS   (Location/Symptom, Timing/Onset,Context/Setting, Quality, Duration, Modifying Factors, Severity) Note limiting factors. HPI    Laci Lawrence is a 61 y.o. female who presents to the emergency department with history of Covid diagnosed about a week ago. Patient was at [facility] 4 days ago and received a work-up and with general on infusion. Patient felt better but 2 days later started feeling worse again. Patient has no chest pain but has some nausea. There has been a nonproductive cough. Patient states not eating well. Has been no fever. Patient denies any abdominal pain. Nursing Notes were reviewed. REVIEW OFSYSTEMS    (2+ for level 4; 10+ for level 5)   Review of Systems    General: No fevers, chills or night sweats, No weight loss    Head:  No Sore throat,  No Ear Pain    Chest:  Nontender. Positive cough, No SOB,  Chest Pain    GI: No abdominal pain or vomiting. Positive nausea    : No dysuria or hematuria    Musculoskeletal: No unrelenting pain or night pain    Neurologic: No bowel or bladder incontinence, No saddle anesthesia, No leg weakness    All other systems reviewed and are negative.         PAST MEDICAL HISTORY     Past Medical History:   Diagnosis Date    Arthritis     Back pain     CAD (coronary artery disease)     [facility]     Depression     Diabetes mellitus ([facility]     REHMAN (dyspnea on exertion)     Fatty liver     GERD (gastroesophageal reflux disease)     Hyperlipidemia     Hypertension     MRSA (methicillin resistant staph aureus) culture positive 08/15/2018    arm    Stress incontinence     Thyroid disease     CYST ON THYROID--FOUND 20 YEARS AGO       SURGICAL HISTORY       Past Surgical History:   Procedure Laterality Date    CARDIAC SURGERY      3 stent placed OCt 20     SECTION      TIMES 3    CHOLECYSTECTOMY      COLONOSCOPY  2017      Colonoscopy with snare and biopsy    EYE SURGERY      muscle correction and eyelid     HERNIA REPAIR      UMBILICAL AREA x 2    HYSTERECTOMY      PTCA  10/2020    SHEFALI to RCA x 2; SHEFALI to LAD    SHOULDER ARTHROSCOPY Right 5-19-15    Right shoulder arthroscopy labral debridement open [place] subacromial decompression and chrondroplasty    UPPER GASTROINTESTINAL ENDOSCOPY  2017    Esophagogastroduodenoscopy with biopsy       CURRENT MEDICATIONS       Previous Medications    ALBUTEROL SULFATE  (90 BASE) MCG/ACT INHALER    INHALE 2 PUFFS BY MOUTH EVERY 4-6 HOURS FOR 7-10 DAYS THEN AS NEEDED    ASPIRIN 81 MG CHEWABLE TABLET    TAKE 1 TABLET BY MOUTH EVERY DAY    ATORVASTATIN (LIPITOR) 20 MG TABLET    TAKE 1 TABLET BY MOUTH EVERY DAY    BD INSULIN SYRINGE U/F 31G X \" 1 ML MISC    USE AT BEDTIME WITH LANTUS    BLOOD GLUCOSE MONITORING SUPPL (FREESTYLE LITE) ASHWIN    1 Device by Does not apply route daily as needed (glucose monitoring)    BLOOD GLUCOSE MONITORING SUPPL (TRUE METRIX METER) ASHWIN    As directed    BLOOD GLUCOSE TEST STRIPS (FREESTYLE LITE) STRIP    1 each by In Vitro route daily As needed. BLOOD GLUCOSE TEST STRIPS (RELION TRUE METRIX TEST STRIPS) STRIP    1 each by In Vitro route 6 times daily As needed.     CARVEDILOL (COREG) 6.25 MG TABLET    TAKE 1 TABLET BY MOUTH TWICE A DAY WITH MEALS    DULOXETINE (CYMBALTA) 60 MG EXTENDED RELEASE CAPSULE    TAKE 1 CAPSULE BY MOUTH EVERY DAY    EZETIMIBE (ZETIA) 10 MG TABLET    TAKE 1 TABLET BY MOUTH EVERY DAY    FAMOTIDINE (PEPCID) 20 MG TABLET    TAKE 1 TABLET BY MOUTH TWICE A DAY FREESTYLE LANCETS MISC    1 each by Does not apply route daily    FREESTYLE TEST STRIPS STRIP    TEST 3 TIMES A DAY    FUROSEMIDE (LASIX) 20 MG TABLET    TAKE 1 TABLET BY MOUTH EVERY DAY    HYDROCODONE-ACETAMINOPHEN (NORCO) 5-325 MG PER TABLET    Take 1 tablet by mouth 2 times daily for 30 days. INSULIN LISPRO, 1 UNIT DIAL, 100 UNIT/ML SOPN    INJECT 7 UNITS INTO THE SKIN 3 TIMES DAILY (BEFORE MEALS)    INSULIN PEN NEEDLE 32G X 4 MM MISC    1 each by Does not apply route daily    LANTUS SOLOSTAR 100 UNIT/ML INJECTION PEN    INJECT 75 UNITS INTO THE SKIN NIGHTLY    LOSARTAN (COZAAR) 100 MG TABLET    TAKE 1 TABLET BY MOUTH EVERY DAY    MIRALAX POWDER    TAKE 17 G BY MOUTH DAILY    NITROGLYCERIN (NITROSTAT) 0.4 MG SL TABLET    Place 1 tablet under the tongue every 5 minutes as needed for Chest pain    RANOLAZINE (RANEXA) 1000 MG EXTENDED RELEASE TABLET    Take 1 tablet by mouth 2 times daily    SYMBICORT 160-4.5 MCG/ACT AERO    TAKE 2 PUFFS BY MOUTH TWICE A DAY    TICAGRELOR (BRILINTA) 90 MG TABS TABLET    Take 1 tablet by mouth 2 times daily    TRUE METRIX BLOOD GLUCOSE TEST STRIP    1 EACH BY IN VITRO ROUTE 3 TIMES DAILY AS NEEDED.        ALLERGIES     Latex, Tramadol, Codeine, and Penicillins    FAMILY HISTORY       Family History   Problem Relation Age of Onset    Cancer Mother         lung     Heart Disease Father     Stroke Brother         SOCIAL HISTORY       Social History     Socioeconomic History    Marital status:      Spouse name: None    Number of children: None    Years of education: None    Highest education level: None   Occupational History    None   Tobacco Use    Smoking status: Former Smoker     Packs/day: 1.00     Years: 30.00     Pack years: 30.00     Types: Cigarettes     Quit date:      Years since quittin.7    Smokeless tobacco: Never Used   Vaping Use    Vaping Use: Never used   Substance and Sexual Activity    Alcohol use: No    Drug use: No    Sexual activity: Never   Other Topics Concern    None   Social History Narrative    None     Social Determinants of Health     Financial Resource Strain:     Difficulty of Paying Living Expenses:    Food Insecurity:     Worried About Running Out of Food in the Last Year:     [address] in the Last Year:    Transportation Needs:     Lack of Transportation (Medical):  Lack of Transportation (Non-Medical):    Physical Activity:     Days of Exercise per Week:     Minutes of Exercise per Session:    Stress:     Feeling of Stress :    Social Connections:     Frequency of Communication with Friends and Family:     Frequency of Social Gatherings with Friends and Family:     Attends Shinto Services:     Active Member of Clubs or Organizations:     Attends Club or Organization Meetings:     Marital Status:    Intimate Partner Violence:     Fear of Current or Ex-Partner:     Emotionally Abused:     Physically Abused:     Sexually Abused:        SCREENINGS    Yfn Coma Scale  Eye Opening: Spontaneous  Best Verbal Response: Oriented  Best Motor Response: Obeys commands  Yfn Coma Scale Score: 15      PHYSICAL EXAM    (up to 7 for level 4, 8 or more for level 5)     ED Triage Vitals [09/22/21 2248]   BP Temp Temp Source Pulse Resp SpO2 Height Weight   (!) 206/85 97.2 °F (36.2 °C) Oral 77 18 94 % 5' (1.524 m) 182 lb 8.7 oz (82.8 kg)       Physical Exam    General: Alert and awake ×3. Nontoxic appearance. Well-developed well-nourished elderly 58-year-old in no acute distress. HEENT: Normocephalic atraumatic. Neck is supple. Airway intact. No adenopathy  Cardiac: Regular rate and rhythm with no murmurs rubs or gallops. Chest wall is nontender  Pulmonary: Lungs are clear in all lung fields. No wheezing. No Rales. Abdomen: Soft and nontender. Negative hepatosplenomegaly. Bowel sounds are active  Extremities: Moving all extremities. No calf tenderness. Peripheral pulses all intact.   Negative Ross Hockley' sign. Skin: No skin lesions. No rashes  Neurologic: Cranial nerves II through XII was grossly intact. Nonfocal neurological exam  Psychiatric: Patient is pleasant. Mood is appropriate. DIAGNOSTIC RESULTS     EKG (Per Emergency Physician):       RADIOLOGY (Per Emergency Physician): Interpretation per the Radiologist below, if available at the time of this note:  XR CHEST PORTABLE    Result Date: 9/23/2021  EXAMINATION: ONE XRAY VIEW OF THE CHEST 9/22/2021 11:12 pm COMPARISON: 09/18/2021 HISTORY: ORDERING SYSTEM PROVIDED HISTORY: cough, [employer] TECHNOLOGIST PROVIDED HISTORY: Reason for exam:->cough, [place] Reason for Exam: cough, covid+, exposed 9/9/21 Acuity: Acute Type of Exam: Initial Relevant Medical/Surgical History: hx STEMI FINDINGS: The heart is normal.  The pulmonary vessels are normal.  The lungs are mildly hyperinflated there is some hazy ground-glass opacities along the lung bases which is more prominent on the left and has increased no effusion is seen. Mild bibasilar ground-glass opacities which have slightly increased and could represent developing early multisegmental bronchopneumonia secondary to the patient's known COVID-19 disease. ED BEDSIDE ULTRASOUND:   Performed by ED Physician - none    LABS:  Labs Reviewed - No data to display     All other labs were within normal range or not returned as of this dictation. Procedures      EMERGENCY DEPARTMENT COURSE and DIFFERENTIAL DIAGNOSIS/MDM:   Vitals:    Vitals:    09/22/21 2248   BP: (!) 206/85   Pulse: 77   Resp: 18   Temp: 97.2 °F (36.2 °C)   TempSrc: Oral   SpO2: 94%   Weight: 182 lb 8.7 oz (82.8 kg)   Height: 5' (1.524 m)       Medications   ondansetron (ZOFRAN-ODT) disintegrating tablet 8 mg (8 mg Oral Given 9/22/21 3874)       MDM. Patient is a 69-year-old female with history of COPD contracted Covid about a week ago.   Received Regeneron 4 days ago and felt better but now is feeling worse continues have some cough and nausea. There is no pain. Pulse ox is marginal goes to about 90 to 94% on room air. Patient did not want to be admitted 4 days ago. I also offered her admission tonight and she refused. Patient will tough it out. I will give her some Zofran and Medrol Dosepak at this time to follow-up with her family doctor. She is welcome to return to ED for admission which she wants if she continues have symptoms or worsening symptoms. REVAL:         CRITICAL CARE TIME   Total CriticalCare time was 0 minutes, excluding separately reportable procedures. There was a high probability of clinically significant/life threatening deterioration in the patient's condition which required my urgent intervention. CONSULTS:  None    PROCEDURES:  Unless otherwise noted below, none     [unfilled]    FINAL IMPRESSION      1. Pneumonia due to COVID-19 virus          DISPOSITION/PLAN   DISPOSITION        PATIENT REFERRED TO:  Estefania Justin [facility]  [facility] [address]  [phone]    Schedule an appointment as soon as possible for a visit in 1 week  If symptoms worsen      DISCHARGE MEDICATIONS:  New Prescriptions    METHYLPREDNISOLONE (MEDROL, LARRY,) 4 MG TABLET    Take by mouth. ONDANSETRON (ZOFRAN) 4 MG TABLET    Take 1 tablet by mouth every 8 hours as needed for Nausea          (Please note:  Portions of this note were completed with a voice recognition program.Efforts were made to edit the dictations but occasionally words and phrases are mis-transcribed.)  Form v2016. J.5-cn    Irina BRAGG MD (electronically signed)  Emergency Medicine Provider        Tavia Hammond MD  09/23/21 5664

## 2021-10-03 DIAGNOSIS — I21.19 ST-SEGMENT ELEVATION MYOCARDIAL INFARCTION (STEMI) OF INFERIOR WALL (HCC): ICD-10-CM

## 2021-10-04 RX ORDER — TICAGRELOR 90 MG/1
TABLET ORAL
Qty: 90 TABLET | Refills: 1 | Status: SHIPPED | OUTPATIENT
Start: 2021-10-04 | End: 2021-12-16

## 2021-10-12 ENCOUNTER — TELEPHONE (OUTPATIENT)
Dept: CARDIOLOGY CLINIC | Age: 56
End: 2021-10-12

## 2021-10-12 DIAGNOSIS — I73.9 PAD (PERIPHERAL ARTERY DISEASE) (HCC): Primary | ICD-10-CM

## 2021-10-12 DIAGNOSIS — I73.9 CLAUDICATION (HCC): ICD-10-CM

## 2021-10-12 DIAGNOSIS — Z01.818 PREOP TESTING: ICD-10-CM

## 2021-10-12 RX ORDER — RANOLAZINE 500 MG/1
500 TABLET, EXTENDED RELEASE ORAL 2 TIMES DAILY
Qty: 180 TABLET | Refills: 3 | Status: SHIPPED | OUTPATIENT
Start: 2021-10-12 | End: 2022-10-11

## 2021-10-12 NOTE — TELEPHONE ENCOUNTER
Per Dr. Jess Burton, please schedule peripheral angiogram (abnormal arterial doppler studies). Orders placed in Epic. Pt called stating that she has recovered from Nuria and now wishes to proceed with peripheral angiogram. She request that procedure be scheduled in November. Peripheral angiogram scheduled at [facility] with Dr. Jess Burton 11/8/21 at 10am with arrival time of 8:30am  Pt notified of date, time and instructions and V/u    The morning of your procedure you will park in the hospital parking lot and report directly to the cath lab to check in.     Pre-Procedure Instructions   1. You will need to fast for at least 8 hours prior to procedure. No caffeine the morning of.   2. Hold your diuretic, lasix the morning of procedure. 3. Hold all diabetic medications including, Metfomin. If you take Lantus/Levemir only take ½ your normal dose the evening before. All other medications can be taken in the morning with sips of water. 4. You will need to take 325 mg aspirin the morning of. If you are currently taking 81 mg please take 4 tablets that morning. May take [address] prior to procedure. 5. Do not use any lotions, creams or perfume the morning of procedure. 6. Pre-procedure lab work will need to be completed 5-7 days prior to procedure. 7. Please have a responsible adult to drive you home after procedure. We advise you have someone to stay with you for 24 hours following procedure for precautionary measures. Depending on procedure you may require an overnight stay. 8. Cath lab will provide you with all post procedure instructions. If you have any questions regarding the procedure itself or medications, please call [phone] and ask to speak with a nurse. Published on [facility] and e-mail to [place].

## 2021-10-12 NOTE — TELEPHONE ENCOUNTER
Michael Galvez called in this morning stating that at her last office visit with Dr. Devika Ayala on 9/2, Dr. Devika Ayala increased her dose of Ranexa to 100 mg BID. She is stating that she can't handle the 100 mg, it's tearing up her stomach. She is wanting to know if he can decrease it back to 500 mg?       You can reach Michael Galvez at #[phone]

## 2021-10-12 NOTE — TELEPHONE ENCOUNTER
Spoke to patient who states that she has tried taking Ranexa with and without food and she continues to have an upset stomach. Advised to reduce Ranexa to 500mg bid and call if she has worsening angina. She also states that her COVID test was now negative and she is ready to proceed with peripheral angiogram. See result encounter under arterial doppler study and telephone encounter.

## 2021-11-08 ENCOUNTER — HOSPITAL ENCOUNTER (OUTPATIENT)
Dept: CARDIAC CATH/INVASIVE PROCEDURES | Age: 56
Discharge: HOME | End: 2021-11-08
Payer: COMMERCIAL

## 2021-11-08 VITALS
OXYGEN SATURATION: 98 % | HEART RATE: 82 BPM | BODY MASS INDEX: 37.3 KG/M2 | TEMPERATURE: 99.3 F | DIASTOLIC BLOOD PRESSURE: 87 MMHG | HEIGHT: 60 IN | WEIGHT: 190 LBS | SYSTOLIC BLOOD PRESSURE: 175 MMHG | RESPIRATION RATE: 16 BRPM

## 2021-11-08 DIAGNOSIS — I73.9 INTERMITTENT CLAUDICATION (HCC): ICD-10-CM

## 2021-11-08 LAB
HCT VFR BLD CALC: 39.6 % (ref 36–48)
HEMOGLOBIN: 12.8 G/DL (ref 12–16)
MCH RBC QN AUTO: 27.8 PG (ref 26–34)
MCHC RBC AUTO-ENTMCNC: 32.3 G/DL (ref 31–36)
MCV RBC AUTO: 86.1 FL (ref 80–100)
PDW BLD-RTO: 16.1 % (ref 12.4–15.4)
PLATELET # BLD: 150 K/UL (ref 135–450)
PMV BLD AUTO: 8.5 FL (ref 5–10.5)
POC ACT LR: 332 SEC
RBC # BLD: 4.59 M/UL (ref 4–5.2)
WBC # BLD: 7.5 K/UL (ref 4–11)

## 2021-11-08 PROCEDURE — C1760 CLOSURE DEV, VASC: HCPCS

## 2021-11-08 PROCEDURE — 75716 ARTERY X-RAYS ARMS/LEGS: CPT | Performed by: INTERNAL MEDICINE

## 2021-11-08 PROCEDURE — C2623 CATH, TRANSLUMIN, DRUG-COAT: HCPCS

## 2021-11-08 PROCEDURE — 2500000003 HC RX 250 WO HCPCS

## 2021-11-08 PROCEDURE — 2709999900 HC NON-CHARGEABLE SUPPLY

## 2021-11-08 PROCEDURE — C1725 CATH, TRANSLUMIN NON-LASER: HCPCS

## 2021-11-08 PROCEDURE — 85347 COAGULATION TIME ACTIVATED: CPT

## 2021-11-08 PROCEDURE — C1894 INTRO/SHEATH, NON-LASER: HCPCS

## 2021-11-08 PROCEDURE — 99152 MOD SED SAME PHYS/QHP 5/>YRS: CPT | Performed by: INTERNAL MEDICINE

## 2021-11-08 PROCEDURE — C1769 GUIDE WIRE: HCPCS

## 2021-11-08 PROCEDURE — 6360000002 HC RX W HCPCS

## 2021-11-08 PROCEDURE — 85027 COMPLETE CBC AUTOMATED: CPT

## 2021-11-08 PROCEDURE — 2580000003 HC RX 258

## 2021-11-08 PROCEDURE — 6360000004 HC RX CONTRAST MEDICATION: Performed by: INTERNAL MEDICINE

## 2021-11-08 PROCEDURE — 75716 ARTERY X-RAYS ARMS/LEGS: CPT

## 2021-11-08 PROCEDURE — 99153 MOD SED SAME PHYS/QHP EA: CPT

## 2021-11-08 PROCEDURE — 99152 MOD SED SAME PHYS/QHP 5/>YRS: CPT

## 2021-11-08 PROCEDURE — 75625 CONTRAST EXAM ABDOMINL AORTA: CPT | Performed by: INTERNAL MEDICINE

## 2021-11-08 PROCEDURE — 75625 CONTRAST EXAM ABDOMINL AORTA: CPT

## 2021-11-08 PROCEDURE — 37224 HC FEM POP TERRITORY PLASTY: CPT

## 2021-11-08 PROCEDURE — 37224 PR REVSC OPN/PRG FEM/POP W/ANGIOPLASTY UNI: CPT | Performed by: INTERNAL MEDICINE

## 2021-11-08 RX ORDER — SODIUM CHLORIDE 0.9 % (FLUSH) 0.9 %
5-40 SYRINGE (ML) INJECTION EVERY 12 HOURS SCHEDULED
Status: DISCONTINUED | OUTPATIENT
Start: 2021-11-08 | End: 2021-11-09 | Stop reason: HOSPADM

## 2021-11-08 RX ORDER — SODIUM CHLORIDE 9 MG/ML
INJECTION, SOLUTION INTRAVENOUS CONTINUOUS
Status: DISCONTINUED | OUTPATIENT
Start: 2021-11-08 | End: 2021-11-09 | Stop reason: HOSPADM

## 2021-11-08 RX ORDER — SODIUM CHLORIDE 0.9 % (FLUSH) 0.9 %
5-40 SYRINGE (ML) INJECTION PRN
Status: DISCONTINUED | OUTPATIENT
Start: 2021-11-08 | End: 2021-11-08 | Stop reason: SDUPTHER

## 2021-11-08 RX ORDER — SODIUM CHLORIDE 9 MG/ML
25 INJECTION, SOLUTION INTRAVENOUS PRN
Status: DISCONTINUED | OUTPATIENT
Start: 2021-11-08 | End: 2021-11-08 | Stop reason: SDUPTHER

## 2021-11-08 RX ORDER — ASPIRIN 325 MG
325 TABLET ORAL ONCE
Status: DISCONTINUED | OUTPATIENT
Start: 2021-11-08 | End: 2021-11-09 | Stop reason: HOSPADM

## 2021-11-08 RX ORDER — ACETAMINOPHEN 325 MG/1
650 TABLET ORAL EVERY 4 HOURS PRN
Status: DISCONTINUED | OUTPATIENT
Start: 2021-11-08 | End: 2021-11-09 | Stop reason: HOSPADM

## 2021-11-08 RX ORDER — SODIUM CHLORIDE 0.9 % (FLUSH) 0.9 %
5-40 SYRINGE (ML) INJECTION PRN
Status: DISCONTINUED | OUTPATIENT
Start: 2021-11-08 | End: 2021-11-09 | Stop reason: HOSPADM

## 2021-11-08 RX ORDER — SODIUM CHLORIDE 9 MG/ML
25 INJECTION, SOLUTION INTRAVENOUS PRN
Status: DISCONTINUED | OUTPATIENT
Start: 2021-11-08 | End: 2021-11-09 | Stop reason: HOSPADM

## 2021-11-08 RX ORDER — SODIUM CHLORIDE 0.9 % (FLUSH) 0.9 %
5-40 SYRINGE (ML) INJECTION EVERY 12 HOURS SCHEDULED
Status: DISCONTINUED | OUTPATIENT
Start: 2021-11-08 | End: 2021-11-08 | Stop reason: SDUPTHER

## 2021-11-08 RX ADMIN — IOPAMIDOL 180 ML: 755 INJECTION, SOLUTION INTRAVENOUS at 11:27

## 2021-11-08 NOTE — H&P
08/15/2018     arm    Stress incontinence      Thyroid disease       CYST ON THYROID--FOUND 20 YEARS AGO            Past Surgical History:   Procedure Laterality Date     SECTION         TIMES 3    CHOLECYSTECTOMY        COLONOSCOPY   2017       Colonoscopy with snare and biopsy    EYE SURGERY         muscle correction and eyelid     HERNIA REPAIR         UMBILICAL AREA x 2    HYSTERECTOMY        PTCA   10/2020     SHEFALI to RCA x 2; SHEFALI to LAD    SHOULDER ARTHROSCOPY Right 5-19-15     Right shoulder arthroscopy labral debridement open [place] subacromial decompression and chrondroplasty    UPPER GASTROINTESTINAL ENDOSCOPY   2017     Esophagogastroduodenoscopy with biopsy            Family History   Problem Relation Age of Onset    Cancer Mother           lung     Heart Disease Father      Stroke Brother        Social History            Tobacco Use    Smoking status: Former Smoker       Packs/day: 1.00       Years: 30.00       Pack years: 30.00       Types: Cigarettes       Quit date: 1       Years since quittin.6    Smokeless tobacco: Never Used   Vaping Use    Vaping Use: Never used   Substance Use Topics    Alcohol use: No    Drug use: No              Allergies   Allergen Reactions    Latex Itching and Rash    Tramadol Hives and Shortness Of Breath    Codeine Itching    Penicillins               Current Outpatient Medications   Medication Sig Dispense Refill    amoxicillin (AMOXIL) 500 MG capsule Take 1 capsule by mouth 3 times daily for 7 days 21 capsule 0    LANTUS SOLOSTAR 100 UNIT/ML injection pen INJECT 75 UNITS INTO THE SKIN NIGHTLY 5 pen 2    insulin lispro, 1 Unit Dial, 100 UNIT/ML SOPN INJECT 7 UNITS INTO THE SKIN 3 TIMES DAILY (BEFORE MEALS) 5 pen 1    carvedilol (COREG) 6.25 MG tablet TAKE 1 TABLET BY MOUTH TWICE A DAY WITH MEALS 180 tablet 0    HYDROcodone-acetaminophen (NORCO) 5-325 MG per tablet Take 1 tablet by mouth 2 times daily for 30 days.  61 tablet 0    diazePAM (VALIUM) 5 MG tablet Take 1 tablet by mouth every 12 hours as needed for Anxiety or Sleep for up to 30 days. 60 tablet 1    SYMBICORT 160-4.5 MCG/ACT AERO TAKE 2 PUFFS BY MOUTH TWICE A DAY 1 Inhaler 0    famotidine (PEPCID) 20 MG tablet TAKE 1 TABLET BY MOUTH TWICE A  tablet 1    ranolazine (RANEXA) 500 MG extended release tablet TAKE 1 TABLET BY MOUTH 2 TIMES DAILY 180 tablet 3    albuterol sulfate  (90 Base) MCG/ACT inhaler INHALE 2 PUFFS BY MOUTH EVERY 4-6 HOURS FOR 7-10 DAYS THEN AS NEEDED 18 Inhaler 1    blood glucose test strips (RELION TRUE METRIX TEST STRIPS) strip 1 each by In Vitro route 6 times daily As needed. 180 each 3    furosemide (LASIX) 20 MG tablet TAKE 1 TABLET BY MOUTH EVERY DAY 90 tablet 1    ezetimibe (ZETIA) 10 MG tablet TAKE 1 TABLET BY MOUTH EVERY DAY 90 tablet 1    Blood Glucose Monitoring Suppl (TRUE METRIX METER) ASHWIN As directed 1 Device 0    losartan (COZAAR) 100 MG tablet TAKE 1 TABLET BY MOUTH EVERY DAY 90 tablet 1    atorvastatin (LIPITOR) 20 MG tablet TAKE 1 TABLET BY MOUTH EVERY DAY 90 tablet 1    DULoxetine (CYMBALTA) 60 MG extended release capsule TAKE 1 CAPSULE BY MOUTH EVERY DAY 90 capsule 2    nitroGLYCERIN (NITROSTAT) 0.4 MG SL tablet Place 1 tablet under the tongue every 5 minutes as needed for Chest pain 25 tablet 3    aspirin 81 MG chewable tablet TAKE 1 TABLET BY MOUTH EVERY DAY 30 tablet 3    FREESTYLE TEST STRIPS strip TEST 3 TIMES A  strip 5    ticagrelor (BRILINTA) 90 MG TABS tablet Take 1 tablet by mouth 2 times daily 60 tablet 11    Insulin Pen Needle 32G X 4 MM MISC 1 each by Does not apply route daily 100 each 3    blood glucose test strips (FREESTYLE LITE) strip 1 each by In Vitro route daily As needed.  100 each 3    BD INSULIN SYRINGE U/F 31G X 5/16\" 1 ML MISC USE AT BEDTIME WITH LANTUS 100 each 2    MIRALAX powder TAKE 17 G BY MOUTH DAILY 510 g 2    Blood Glucose Monitoring Suppl (FREESTYLE LITE) ASHWIN 1 Device by Does not apply route daily as needed (glucose monitoring) 1 Device 0    FreeStyle Lancets MISC 1 each by Does not apply route daily 200 each 0      No current facility-administered medications for this visit.         Physical Exam:   /78   Pulse 76   Ht 5' (1.524 m)   Wt 191 lb 9.6 oz (86.9 kg)   LMP 11/30/2005   SpO2 95%   BMI 37.42 kg/m²   No intake or output data in the 24 hours ending 09/02/21 1101      Wt Readings from Last 2 Encounters:   09/02/21 191 lb 9.6 oz (86.9 kg)   08/17/21 194 lb (88 kg)      Constitutional: She is oriented to person, place, and time. She appears well-developed and well-nourished. In no acute distress. Head: Normocephalic and atraumatic. Neck: Neck supple. No JVD present. Carotid bruit is not present. No mass and no thyromegaly present. No lymphadenopathy present. Cardiovascular: Normal rate, regular rhythm, normal heart sounds and intact distal pulses. Exam reveals no gallop and no friction rub. No murmur heard. Pulmonary/Chest: Effort normal and breath sounds normal. No respiratory distress. She has no wheezes, rhonchi or rales. Abdominal: Soft, non-tender. Bowel sounds and aorta are normal. She exhibits no organomegaly, mass or bruit. Extremities: No edema, cyanosis, or clubbing. Pulses are 2+ radial/carotid/dorsalis pedis and posterior tibial bilaterally. Neurological: She is alert and oriented to person, place, and time. She has normal reflexes. No cranial nerve deficit. Coordination normal.   Skin: Skin is warm and dry. There is no rash or diaphoresis. Psychiatric: She has a normal mood and affect. Her speech is normal and behavior is normal.         Procedures:     [facility] 10/6/20  1.  Right-dominant coronary arterial system with a 99% mid RCA lesion  successfully intervened upon with two 2.75 x 15 mm overlapping Isaac Bonine  drug-eluting stents dilated to 2.80 mm in diameter.   2.  In the left system, there was a 90% mid LAD lesion that was  intervened upon with a 2.75 x 15 mm [place] drug-eluting stent dilated to  2.80 mm in diameter.  There was a 60% proximal LAD disease and a 60%  focal lesion distal to this.  In the circumflex, there were small OM  branches and one small OM branch has a 99% lesion.  This is too small to  intervene upon. 3.  Normal left ventricular systolic function with an LV ejection  fraction of 60%. 4.  Left ventricular end-diastolic pressure of 10 mmHg. 5.  No gradient across the aortic valve on pullback to suggest aortic  stenosis.     [facility] 3/8/21  1.  Codominant coronary arterial circulation.  The right coronary artery  has patent stents in the midportion.  They are overlapping with no  significant in-stent restenosis.  The distal PDA has diffuse 70%  lesions.  In the left system, there is no left main disease.  The left  anterior descending artery has 50% proximal disease and patent stents in  the midportion.  There is a 60% lesion in the distal LAD.  In the  circumflex artery, there is 25% disease diffusely.  The OM1 branch has  diffuse 70% stenosis in the small branching artery. 2.  Elevated left ventricular end-diastolic pressure at 18 mmHg. 3.  Normal left ventricular systolic function.  LV ejection fraction  60%. 4.  No gradient across the aortic valve on pullback to suggest aortic  stenosis.       Imaging:     Echo 11/30/20  There is concentric left ventricular hypertrophy. Ejection fraction is visually estimated to be 55-60%. RV is mildly dilated  Right ventricular systolic function is normal .     Stress perfusion 2/25/21  Pharmacological Stress/MPI Results:        1. Technically a satisfactory study.    2. Moderate size reversible perfusion defect suggestive of ischemia    involving the inferior wall    3. Gated Study shows normal LV size and Systolic function; EF is 70 %.                 Lab Review:         Lab Results   Component Value Date     TRIG 236 08/16/2021     HDL 26 08/16/2021     LDLCALC 39 08/16/2021     LDLDIRECT 116 04/02/2019     LABVLDL 47 08/16/2021            Lab Results   Component Value Date      08/16/2021     K 4.2 08/16/2021     K 4.4 10/06/2020     BUN 20 08/16/2021     CREATININE 0.7 08/16/2021         Assessment:  1. CAD of native coronary arteries with stable angina   2. Hyperlipidemia with LDL goal <70 mg/dL   3. Essential hypertension   4. Intermittent claudication      Plan:  She does endorse symptoms of claudication and leg pain with exertion. I will have her complete a lower extremity arterial duplex to assess for peripheral disease. I have encouraged her to wear compression stockings to help with the foot swelling she is experiencing. She does have residual small branch disease and has been experiencing occasional exertional chest pain. I will have her increase her dose of Ranexa to 100 mg BID. Her blood pressure is well controlled and her recent lipid profile was favorable. I have personally reviewed all previous testing for this visit today including imaging, lab results and EKG as detailed above.      I will see her in the office for follow up in 6 months.        Vitals:    11/08/21 0909   BP: (!) 175/87   Pulse: 82   Resp: 16   Temp: 99.3 °F (37.4 °C)   SpO2: 98%     I have reviewed the most recent H&P for this patient and independently examined the patient. There have been no changes. We will proceed with the planned procedure.     Rabia De León MD

## 2021-11-08 NOTE — ANESTHESIA PRE-OP
H&P Update    I have reviewed the history and physical and examined the patient and find no relevant changes. I have reviewed with the patient and/or family the risks, benefits, and alternatives to the procedure. Pre-sedation Assessment    Patient:  Micah Field   :   1965  Intended Procedure: Peripheral Angiogram with possible intervention    [facility] nurse's notes reviewed and agreed. Medications reviewed  Allergies: Allergies   Allergen Reactions    Latex Itching and Rash    Tramadol Hives and Shortness Of Breath    Codeine Itching    Penicillins          Pre-Procedure Assessment/Plan:  ASA 3 - Patient with moderate systemic disease with functional limitations  Mallampati 2  Level of Sedation Plan: Moderate sedation    Anginal Classification w/in 2 weeks: 0    Heart Failure w/in 2 weeks:  If yes NYHA class: None    Post Procedure plan: Return to same level of care

## 2021-11-09 NOTE — PROCEDURES
[address]                                 PROCEDURE NOTE    PATIENT NAME: Reid Aragon                :        1965  MED REC NO:   [id number]                          ROOM:  ACCOUNT NO:   [de-identified]                           ADMIT DATE: 2021  PROVIDER:     Maci Turcios MD      DATE OF PROCEDURE:  2021    INDICATION FOR PROCEDURE:  Intermittent claudication, abnormal lower  extremity arterial studies. PROCEDURE:  The risks and benefits of the procedure were explained to  the patient. Informed consent was obtained. She was placed on the  cardiac catheterization table and prepped and draped in sterile fashion. Anesthesia was provided in the left groin with 1% lidocaine injected  subcutaneously and deep. Using ultrasound guidance, the left femoral  artery was accessed and cannulated and a 5-Romanian sheath inserted using  Seldinger technique. Over the 0.035 J-wire, the pigtail catheter was advanced to the upper  abdominal aorta and abdominal aortogram was performed. Runoff was  performed down to the bifurcation. The catheter was then pulled back  down to just above the bifurcation of the common iliac arteries and  bilateral oblique views were performed of the iliofemoral vessels. At  this point, runoff was performed bilaterally to the foot with the  catheter in this position. After review of the films, decision was made to select out the right  leg. Using a Terumo Advantage 0.035 J-wire, the pigtail catheter was used to  advance down into the right common femoral artery. The wire was left in  place and the pigtail catheter removed. We then removed the 5-Romanian  sheath and placed a 6-Romanian Destination sheath down to the common  femoral artery on the right. Angiography was performed down to the  foot.   We elected to perform balloon angioplasty in the angiographic 80%  lesions in the right mid superficial femoral artery. We gave the  patient IV unfractionated heparin. ACTs were checked throughout the  case to maintain ACT greater than 250 seconds. We used a 5 mm x 120 mm  balloon initially. We then used a 5 mm x 100 mm Lutonix drug-coated  balloon to perform balloon angioplasty cross the lesions here. This was  left up for 2 minutes. Balloon was deflated and the balloon removed. Intraarterial nitroglycerin was given. Repeat angiography was performed  down to the level of the foot. There was excellent flow and  three-vessel runoff below the knee to the foot. The Destination sheath  was removed over the 0.035 J-wire for a short 6-Citizen of the [place] sheath. We  performed an angiography from the left femoral arterial sheath and then  closed the arteriotomy site with a Mynx closure device and manual  pressure was applied for hemostasis. There were no complications from  the procedure. Estimated blood loss was less than 20 mL. Moderate sedation  4mg IV Versed  100mcg Fentanyl   ASA grade 3  Mallampati 2  Total duration of sedation: 100 minutes  No complications  Sedation administered by an independent agent at  direction and supervision  Vital signs monitored and stable     FINDINGS:  1. Patent abdominal aorta with patent bilateral renal arteries. 2.  Patent bilateral common, external and internal iliac arteries with  mild atherosclerotic plaque disease of 25%. 3.  Patent bilateral common femoral and profunda femoris arteries. 4.  The right superficial femoral artery is patent but had serial 80%  lesions present that underwent angioplasty eventually with drug-coated  balloon and a 5 mm size, resulting in 0% residual stenosis. On the  left, there are serial 75% lesions present in the mid SFA. Patent  bilateral popliteal arteries with 0% stenosis. 5.  Three-vessel runoff bilaterally to the foot.         Valentina Salazar MD    D: 11/08/2021 11:48:23       T: 11/08/2021 11:51:14     [id number]  Job#: [id number]     Doc#: [id number]    CC:  Harry Kaminski MD

## 2021-11-12 ENCOUNTER — TELEPHONE (OUTPATIENT)
Dept: CARDIOLOGY CLINIC | Age: 56
End: 2021-11-12

## 2021-11-12 NOTE — TELEPHONE ENCOUNTER
Chikis Reyna called in this afternoon stating that she had a peripheral angiogram done on 11/8 and the site area has knot on it, it's a little warm to touch, and painful. She is wanting to know if she can put a cool compress on it, or heating pad. She is wanting to know if this is normal? That it's scaring her a little.     You can reach Chikis Reyna at #[phone]

## 2021-11-12 NOTE — TELEPHONE ENCOUNTER
Spoke with Calli Chung. She states the knot was the most concerning symptoms for her. Discussed with her that this is normal and likely related to scar tissue in the area of access. Instructed to call if this becomes larger for her. She admits to some warmth at the access site but no redness. She denies any other symptoms down her leg. Encouraged use of a warm compress and Tylenol and to call if any symptoms worsen. She v/u.

## 2021-12-16 DIAGNOSIS — I21.19 ST-SEGMENT ELEVATION MYOCARDIAL INFARCTION (STEMI) OF INFERIOR WALL (HCC): ICD-10-CM

## 2021-12-16 RX ORDER — ATORVASTATIN CALCIUM 20 MG/1
TABLET, FILM COATED ORAL
Qty: 90 TABLET | Refills: 1 | Status: SHIPPED | OUTPATIENT
Start: 2021-12-16 | End: 2022-03-21

## 2021-12-16 RX ORDER — TICAGRELOR 90 MG/1
TABLET ORAL
Qty: 90 TABLET | Refills: 1 | Status: SHIPPED | OUTPATIENT
Start: 2021-12-16 | End: 2022-01-20 | Stop reason: ALTCHOICE

## 2022-01-17 RX ORDER — LOSARTAN POTASSIUM 100 MG/1
TABLET ORAL
Qty: 90 TABLET | Refills: 1 | Status: SHIPPED | OUTPATIENT
Start: 2022-01-17 | End: 2022-06-21

## 2022-01-17 NOTE — PROGRESS NOTES
[address] [facility]  1965 January 20, 2022    CC: \"my legs hurt\"     HPI:  The patient is 61 y.o. female with a past medical history significant for CAD, hyperlipidemia and hypertension. She underwent LHC on 10/6/20 after presenting with chest pain resulting in SHEFALI x 2 to RCA and SHEFALI to LAD. She completed a stress test due to complaints of chest pain revealing a reversible defect. She underwent a heart catheterization on 3/8/21 that did not require any intervention. She underwent peripheral angiogram due to abnormal ABIs and claudication symptoms on 11/8/21. This revealed serial 80% lesions in right SFA that underwent angioplasty with drug coated balloon. She presents today for follow up. She continues to have claudication symptoms with leg pain and denies any improvement since her peripheral angiogram. This presents as calf pain when walking. She does admit to chronic back pain and does feel the calf pain will improve if leaning forward or walking with an aid. She does admit to occasional discoloration on her feet. She denies chest pain with rest or exertion. Dr. Beatriz Ferreira did increase her dose of carvedilol due to hypertension. Her previous angina presented as chest pain with associated left arm and jaw pain. Review of Systems:  Constitutional: No fatigue, weakness, night sweats or fever. HEENT: No new vision difficulties or ringing in the ears. Respiratory: No new SOB, PND, orthopnea or cough. Cardiovascular: See HPI   GI: No n/v, diarrhea, constipation, abdominal pain or changes in bowel habits. No melena, no hematochezia  : No urinary frequency, urgency, incontinence, hematuria or dysuria. Skin: No cyanosis or skin lesions. Musculoskeletal: No new muscle or joint pain. Neurological: No syncope or TIA-like symptoms.   Psychiatric: No anxiety, insomnia or depression     Past Medical History:   Diagnosis Date    Arthritis     Back pain     CAD (coronary artery disease)     COPD (chronic obstructive pulmonary disease) (HCC)     Depression     Diabetes mellitus (HCC)     REHMAN (dyspnea on exertion)     Fatty liver     GERD (gastroesophageal reflux disease)     Hyperlipidemia     Hypertension     MRSA (methicillin resistant staph aureus) culture positive 08/15/2018    arm    Stress incontinence     Thyroid disease     CYST ON THYROID--FOUND 20 YEARS AGO     Past Surgical History:   Procedure Laterality Date    CARDIAC SURGERY      3 stent placed OCt 20    [address] [place] [address]    CHOLECYSTECTOMY      COLONOSCOPY  2017      Colonoscopy with snare and biopsy    EYE SURGERY      muscle correction and eyelid     HERNIA REPAIR      UMBILICAL AREA x 2    HYSTERECTOMY      PTCA  10/2020    SHEFALI to RCA x 2; SHEFALI to LAD    SHOULDER ARTHROSCOPY Right 5-19-15    Right shoulder arthroscopy labral debridement open [place] subacromial decompression and chrondroplasty    UPPER GASTROINTESTINAL ENDOSCOPY  2017    Esophagogastroduodenoscopy with biopsy     Family History   Problem Relation Age of Onset    Cancer Mother         lung     Heart Disease Father     Stroke Brother      Social History     Tobacco Use    Smoking status: Former Smoker     Packs/day: 1.00     Years: 30.00     Pack years: 30.00     Types: Cigarettes     Quit date:      Years since quittin.0    Smokeless tobacco: Never Used   Vaping Use    Vaping Use: Never used   Substance Use Topics    Alcohol use: No    Drug use: No       Allergies   Allergen Reactions    Latex Itching and Rash    Tramadol Hives and Shortness Of Breath    Codeine Itching    Penicillins      Current Outpatient Medications   Medication Sig Dispense Refill    carvedilol (COREG) 12.5 MG tablet TAKE 1 TABLET BY MOUTH TWICE A DAY WITH MEALS 180 tablet 1    losartan (COZAAR) 100 MG tablet TAKE 1 TABLET BY MOUTH EVERY DAY 90 tablet 1    carvedilol (COREG) 6.25 MG tablet TAKE 1 TABLET BY MOUTH TWICE A DAY WITH MEALS 180 tablet 0    HYDROcodone-acetaminophen (NORCO) 5-325 MG per tablet Take 1 tablet by mouth 2 times daily for 30 days. 60 tablet 0    famotidine (PEPCID) 20 MG tablet TAKE 1 TABLET BY MOUTH TWICE A  tablet 1    BRILINTA 90 MG TABS tablet TAKE 1 TABLET BY MOUTH TWICE A DAY 90 tablet 1    FREESTYLE TEST STRIPS strip TEST 3 TIMES A  strip 5    atorvastatin (LIPITOR) 20 MG tablet TAKE 1 TABLET BY MOUTH EVERY DAY 90 tablet 1    TRUE METRIX BLOOD GLUCOSE TEST strip 1 EACH BY IN VITRO ROUTE 3 TIMES DAILY AS NEEDED. 100 strip 3    furosemide (LASIX) 20 MG tablet TAKE 1 TABLET BY MOUTH EVERY DAY 90 tablet 1    ezetimibe (ZETIA) 10 MG tablet TAKE 1 TABLET BY MOUTH EVERY DAY 90 tablet 1    ranolazine (RANEXA) 500 MG extended release tablet Take 1 tablet by mouth 2 times daily 180 tablet 3    SYMBICORT 160-4.5 MCG/ACT AERO TAKE 2 PUFFS BY MOUTH TWICE A DAY 1 Inhaler 0    albuterol sulfate  (90 Base) MCG/ACT inhaler INHALE 2 PUFFS BY MOUTH EVERY 4-6 HOURS FOR 7-10 DAYS THEN AS NEEDED 18 Inhaler 1    blood glucose test strips (RELION TRUE METRIX TEST STRIPS) strip 1 each by In Vitro route 6 times daily As needed. 180 each 3    Blood Glucose Monitoring Suppl (TRUE METRIX METER) ASHWIN As directed 1 Device 0    DULoxetine (CYMBALTA) 60 MG extended release capsule TAKE 1 CAPSULE BY MOUTH EVERY DAY 90 capsule 2    nitroGLYCERIN (NITROSTAT) 0.4 MG SL tablet Place 1 tablet under the tongue every 5 minutes as needed for Chest pain 25 tablet 3    aspirin 81 MG chewable tablet TAKE 1 TABLET BY MOUTH EVERY DAY 30 tablet 3    Insulin Pen Needle 32G X 4 MM MISC 1 each by Does not apply route daily 100 each 3    blood glucose test strips (FREESTYLE LITE) strip 1 each by In Vitro route daily As needed.  100 each 3    BD INSULIN SYRINGE U/F 31G X 5/16\" 1 ML MISC USE AT BEDTIME WITH LANTUS 100 each 2    MIRALAX powder TAKE 17 G BY MOUTH DAILY 510 g 2    insulin lispro, 1 Unit Dial, 100 UNIT/ML SOPN INJECT 7 UNITS INTO THE SKIN 3 TIMES DAILY (BEFORE MEALS) 5 pen 1    LANTUS SOLOSTAR 100 UNIT/ML injection pen INJECT 75 UNITS INTO THE SKIN NIGHTLY 5 pen 1    gabapentin (NEURONTIN) 400 MG capsule TAKE 1 CAPSULE BY MOUTH THREE TIMES A DAY 90 capsule 3    Blood Glucose Monitoring Suppl (FREESTYLE LITE) ASHWIN 1 Device by Does not apply route daily as needed (glucose monitoring) 1 Device 0    FreeStyle Lancets MISC 1 each by Does not apply route daily 200 each 0     No current facility-administered medications for this visit. Physical Exam:   /84   Pulse 76   Resp 12   Ht 5' (1.524 m)   Wt 187 lb (84.8 kg)   LMP 11/30/2005   BMI 36.52 kg/m²   No intake or output data in the 24 hours ending 01/20/22 0944  Wt Readings from Last 2 Encounters:   01/20/22 187 lb (84.8 kg)   11/17/21 194 lb (88 kg)     Constitutional: She is oriented to person, place, and time. She appears well-developed and well-nourished. In no acute distress. Head: Normocephalic and atraumatic. Neck: Neck supple. No JVD present. Carotid bruit is not present. No mass and no thyromegaly present. No lymphadenopathy present. Cardiovascular: Normal rate, regular rhythm, normal heart sounds and intact distal pulses. Exam reveals no gallop and no friction rub. No murmur heard. Pulmonary/Chest: Effort normal and breath sounds normal. No respiratory distress. She has no wheezes, rhonchi or rales. Abdominal: Soft, non-tender. Bowel sounds and aorta are normal. She exhibits no organomegaly, mass or bruit. Extremities: No edema, cyanosis, or clubbing. Pulses are 2+ radial/carotid/dorsalis pedis and posterior tibial bilaterally. Neurological: She is alert and oriented to person, place, and time. She has normal reflexes. No cranial nerve deficit. Coordination normal.   Skin: Skin is warm and dry. There is no rash or diaphoresis. Psychiatric: She has a normal mood and affect.  Her speech is normal and behavior is normal.       Procedures:     [facility] 10/6/20  1. Right-dominant coronary arterial system with a 99% mid RCA lesion  successfully intervened upon with two 2.75 x 15 mm overlapping [place]  drug-eluting stents dilated to 2.80 mm in diameter. 2.  In the left system, there was a 90% mid LAD lesion that was  intervened upon with a 2.75 x 15 mm [place] drug-eluting stent dilated to  2.80 mm in diameter. There was a 60% proximal LAD disease and a 60%  focal lesion distal to this. In the circumflex, there were small OM  branches and one small OM branch has a 99% lesion. This is too small to  intervene upon. 3.  Normal left ventricular systolic function with an LV ejection  fraction of 60%. 4.  Left ventricular end-diastolic pressure of 10 mmHg. 5.  No gradient across the aortic valve on pullback to suggest aortic  stenosis. [facility] 3/8/21  1. Codominant coronary arterial circulation. The right coronary artery  has patent stents in the midportion. They are overlapping with no  significant in-stent restenosis. The distal PDA has diffuse 70%  lesions. In the left system, there is no left main disease. The left  anterior descending artery has 50% proximal disease and patent stents in  the midportion. There is a 60% lesion in the distal LAD. In the  circumflex artery, there is 25% disease diffusely. The OM1 branch has  diffuse 70% stenosis in the small branching artery. 2.  Elevated left ventricular end-diastolic pressure at 18 mmHg. 3.  Normal left ventricular systolic function. LV ejection fraction  60%. 4.  No gradient across the aortic valve on pullback to suggest aortic  stenosis. Peripheral angiogram 11/8/21  1. Patent abdominal aorta with patent bilateral renal arteries. 2.  Patent bilateral common, external and internal iliac arteries with  mild atherosclerotic plaque disease of 25%. 3.  Patent bilateral common femoral and profunda femoris arteries.   4.  The right superficial femoral artery is patent but had serial 80%  lesions present that underwent angioplasty eventually with drug-coated  balloon and a 5 mm size, resulting in 0% residual stenosis. On the  left, there are serial 75% lesions present in the mid SFA. Patent  bilateral popliteal arteries with 0% stenosis. 5.  Three-vessel runoff bilaterally to the foot. Imaging:     Echo 11/30/20  There is concentric left ventricular hypertrophy. Ejection fraction is visually estimated to be 55-60%. RV is mildly dilated  Right ventricular systolic function is normal .    Stress perfusion 2/25/21  Pharmacological Stress/MPI Results:        1. Technically a satisfactory study.    2. Moderate size reversible perfusion defect suggestive of ischemia    involving the inferior wall    3. Gated Study shows normal LV size and Systolic function; EF is 70 %.       Lower extremity arterial duplex 9/9/21  Right Impression   Right AUTUMN 0.73. This is consistent with moderate PAD at rest.   Elevated velocity of the proximal superficial femoral artery suggests a 50-70%   stenosis. The majority of the waveforms are multiphasic throughout the right lower   extremity. Left Impression   Left AUTUMN 1.0. This is consistent with no PAD at rest.   Elevated velocity of the common femoral, proximal superficial femoral and mid   superficial femoral artery suggests a less than 50% stenosis. The majority of the waveforms are triphasic throughout the left lower   extremity. Lab Review:   Lab Results   Component Value Date    TRIG 149 11/01/2021    HDL 31 11/01/2021    LDLCALC 43 11/01/2021    LDLDIRECT 116 04/02/2019    LABVLDL 30 11/01/2021     Lab Results   Component Value Date     11/01/2021    K 4.0 11/01/2021    K 4.1 09/18/2021    BUN 14 11/01/2021    CREATININE 0.9 11/01/2021       Assessment:  1. CAD of native coronary arteries without angina   2. Hyperlipidemia with LDL goal <70 mg/dL   3. Essential hypertension   4. PAD   5. Psuedoclaudication    Plan:  She is not endorsing any symptoms representing angina and her blood pressure is well controlled today in the office. She can finish her current prescription of Brilinta and then remain on aspirin 81 mg. The leg pain she describes is more likely related to chronic back pain (pseudoclaudication) and I have encouraged her to follow up with Dr. Jerry Walker regarding any necessary imaging. Her most recent lipid profile was favorable on her current statin therapy. I have encouraged her to increase her aerobic activity as tolerated and adhere to a heart healthy diet. I have personally reviewed all previous testing for this visit today including imaging, lab results and EKG as detailed above. I will see her in the office for follow up in 1 year. This note was scribed in the presence of Saumya Bourgeois MD by [employer], RN. Physician Attestation:  The scribes documentation has been prepared under my direction and personally reviewed by me in its entirety. I, Dr. Navjot Gordon personally performed the services described in this documentation as scribed by my RN in my presence, and I confirm that the note above accurately reflects all work, treatment, procedures, and medical decision making performed by me.

## 2022-01-20 ENCOUNTER — OFFICE VISIT (OUTPATIENT)
Dept: CARDIOLOGY CLINIC | Age: 57
End: 2022-01-20
Payer: COMMERCIAL

## 2022-01-20 VITALS
RESPIRATION RATE: 12 BRPM | HEIGHT: 60 IN | BODY MASS INDEX: 36.71 KG/M2 | HEART RATE: 76 BPM | DIASTOLIC BLOOD PRESSURE: 84 MMHG | WEIGHT: 187 LBS | SYSTOLIC BLOOD PRESSURE: 136 MMHG

## 2022-01-20 DIAGNOSIS — E78.5 HYPERLIPIDEMIA LDL GOAL <70: ICD-10-CM

## 2022-01-20 DIAGNOSIS — I10 ESSENTIAL HYPERTENSION: ICD-10-CM

## 2022-01-20 DIAGNOSIS — M48.062 PSEUDOCLAUDICATION: ICD-10-CM

## 2022-01-20 DIAGNOSIS — I25.10 CORONARY ARTERY DISEASE INVOLVING NATIVE CORONARY ARTERY OF NATIVE HEART WITHOUT ANGINA PECTORIS: Primary | ICD-10-CM

## 2022-01-20 DIAGNOSIS — I73.9 PAD (PERIPHERAL ARTERY DISEASE) (HCC): ICD-10-CM

## 2022-01-20 PROCEDURE — G8417 CALC BMI ABV UP PARAM F/U: HCPCS | Performed by: INTERNAL MEDICINE

## 2022-01-20 PROCEDURE — 1036F TOBACCO NON-USER: CPT | Performed by: INTERNAL MEDICINE

## 2022-01-20 PROCEDURE — 3017F COLORECTAL CA SCREEN DOC REV: CPT | Performed by: INTERNAL MEDICINE

## 2022-01-20 PROCEDURE — G8484 FLU IMMUNIZE NO ADMIN: HCPCS | Performed by: INTERNAL MEDICINE

## 2022-01-20 PROCEDURE — G8427 DOCREV CUR MEDS BY ELIG CLIN: HCPCS | Performed by: INTERNAL MEDICINE

## 2022-01-20 PROCEDURE — 99214 OFFICE O/P EST MOD 30 MIN: CPT | Performed by: INTERNAL MEDICINE

## 2022-02-15 DIAGNOSIS — E11.59 TYPE 2 DIABETES MELLITUS WITH OTHER CIRCULATORY COMPLICATION, UNSPECIFIED WHETHER LONG TERM INSULIN USE (HCC): ICD-10-CM

## 2022-02-15 LAB
A/G RATIO: 1.6 (ref 1.1–2.2)
ALBUMIN SERPL-MCNC: 4.2 G/DL (ref 3.4–5)
ALP BLD-CCNC: 118 U/L (ref 40–129)
ALT SERPL-CCNC: 21 U/L (ref 10–40)
ANION GAP SERPL CALCULATED.3IONS-SCNC: 12 MMOL/L (ref 3–16)
AST SERPL-CCNC: 15 U/L (ref 15–37)
BASOPHILS ABSOLUTE: 0 K/UL (ref 0–0.2)
BASOPHILS RELATIVE PERCENT: 0.7 %
BILIRUB SERPL-MCNC: 0.9 MG/DL (ref 0–1)
BUN BLDV-MCNC: 16 MG/DL (ref 7–20)
CALCIUM SERPL-MCNC: 9.3 MG/DL (ref 8.3–10.6)
CHLORIDE BLD-SCNC: 104 MMOL/L (ref 99–110)
CHOLESTEROL, TOTAL: 103 MG/DL (ref 0–199)
CO2: 27 MMOL/L (ref 21–32)
CREAT SERPL-MCNC: 0.9 MG/DL (ref 0.6–1.1)
EOSINOPHILS ABSOLUTE: 0.2 K/UL (ref 0–0.6)
EOSINOPHILS RELATIVE PERCENT: 2.3 %
FOLATE: 7.52 NG/ML (ref 4.78–24.2)
GFR AFRICAN AMERICAN: >60
GFR NON-AFRICAN AMERICAN: >60
GLUCOSE BLD-MCNC: 154 MG/DL (ref 70–99)
HCT VFR BLD CALC: 37.1 % (ref 36–48)
HDLC SERPL-MCNC: 36 MG/DL (ref 40–60)
HEMOGLOBIN: 12.4 G/DL (ref 12–16)
LDL CHOLESTEROL CALCULATED: 48 MG/DL
LYMPHOCYTES ABSOLUTE: 1.4 K/UL (ref 1–5.1)
LYMPHOCYTES RELATIVE PERCENT: 20.2 %
MCH RBC QN AUTO: 29.2 PG (ref 26–34)
MCHC RBC AUTO-ENTMCNC: 33.3 G/DL (ref 31–36)
MCV RBC AUTO: 87.6 FL (ref 80–100)
MONOCYTES ABSOLUTE: 0.4 K/UL (ref 0–1.3)
MONOCYTES RELATIVE PERCENT: 6.6 %
NEUTROPHILS ABSOLUTE: 4.8 K/UL (ref 1.7–7.7)
NEUTROPHILS RELATIVE PERCENT: 70.2 %
PDW BLD-RTO: 15.5 % (ref 12.4–15.4)
PLATELET # BLD: 154 K/UL (ref 135–450)
PMV BLD AUTO: 8.4 FL (ref 5–10.5)
POTASSIUM SERPL-SCNC: 4.3 MMOL/L (ref 3.5–5.1)
RBC # BLD: 4.24 M/UL (ref 4–5.2)
SODIUM BLD-SCNC: 143 MMOL/L (ref 136–145)
TOTAL PROTEIN: 6.8 G/DL (ref 6.4–8.2)
TRIGL SERPL-MCNC: 96 MG/DL (ref 0–150)
TSH SERPL DL<=0.05 MIU/L-ACNC: 4.35 UIU/ML (ref 0.27–4.2)
VITAMIN B-12: 392 PG/ML (ref 211–911)
VLDLC SERPL CALC-MCNC: 19 MG/DL
WBC # BLD: 6.8 K/UL (ref 4–11)

## 2022-02-16 LAB
ESTIMATED AVERAGE GLUCOSE: 142.7 MG/DL
HBA1C MFR BLD: 6.6 %

## 2022-03-09 DIAGNOSIS — I21.19 ST-SEGMENT ELEVATION MYOCARDIAL INFARCTION (STEMI) OF INFERIOR WALL (HCC): ICD-10-CM

## 2022-03-09 RX ORDER — TICAGRELOR 90 MG/1
TABLET ORAL
Qty: 60 TABLET | Refills: 1 | OUTPATIENT
Start: 2022-03-09

## 2022-03-18 ENCOUNTER — HOSPITAL ENCOUNTER (OUTPATIENT)
Dept: ULTRASOUND IMAGING | Age: 57
Discharge: HOME | End: 2022-03-18
Payer: COMMERCIAL

## 2022-03-18 ENCOUNTER — HOSPITAL ENCOUNTER (OUTPATIENT)
Dept: WOMENS IMAGING | Age: 57
Discharge: HOME | End: 2022-03-18
Payer: COMMERCIAL

## 2022-03-18 DIAGNOSIS — E04.1 THYROID NODULE: ICD-10-CM

## 2022-03-18 DIAGNOSIS — Z12.31 ENCOUNTER FOR SCREENING MAMMOGRAM FOR BREAST CANCER: ICD-10-CM

## 2022-03-18 PROCEDURE — 76536 US EXAM OF HEAD AND NECK: CPT

## 2022-03-18 PROCEDURE — 77067 SCR MAMMO BI INCL CAD: CPT

## 2022-03-20 NOTE — PROGRESS NOTES
[facility]   Daily Progress Note      Admit Date:  10/6/2020    Reason for follow up visit: Inferior STEMI    CC: \"I feel miserable this morning. \"    62 y/o female with PMH significant for type II DM and HTN admitted to [facility] after presenting to [facility] ED with chest pain. It lasted several hours before presenting to the ED. ECG demonstrated SR with inferior ST elevation. Transported to [facility] and underwent cardiac cath with resultant SHEFALI x 2 to RCA and SHEFALI to LAD. Interval History:  Pt. seen and examined; records reviewed  BP reviewed. Wt 181#  Nausea and vomiting during the night and this AM; dry heaves this AM  Denies chest pain or SOB  BS elevated and running in the 300's; A1c 8.5 on 10/5/2020  Lantus nightly    Subjective:  Pt with no acute overnight cardiac events. Denies chest pain, SOB, cough, PND, palpitations or dizziness    Review of Systems:   · Constitutional: no unanticipated weight loss. There's been no change in energy level, sleep pattern, or activity level. No fevers, chills. · Eyes: No visual changes or diplopia. No scleral icterus. · ENT: No Headaches, hearing loss or vertigo. No mouth sores or sore throat. · Cardiovascular: as reviewed in HPI  · Respiratory: No cough or wheezing, no sputum production. No hematemesis. · Gastrointestinal: No abdominal pain, appetite loss, blood in stools. No change in bowel or bladder habits.  + Nausea/vomiting this AM  · Genitourinary: No dysuria, trouble voiding, or hematuria. · Musculoskeletal:  No gait disturbance, no joint complaints. · Integumentary: No rash or pruritis. · Neurological: No headache, diplopia, change in muscle strength, numbness or tingling. · Psychiatric: No anxiety or depression. · Endocrine: No temperature intolerance. No excessive thirst, fluid intake, or urination. No tremor. · Hematologic/Lymphatic: No abnormal bruising or bleeding, blood clots or swollen lymph nodes.   · Allergic/Immunologic: No nasal congestion or hives. Objective:   BP (!) 150/74   Pulse 89   Temp 97.8 °F (36.6 °C) (Oral)   Resp 20   Ht 5' (1.524 m)   Wt 181 lb (82.1 kg)   LMP 11/30/2005   SpO2 97%   BMI 35.35 kg/m²       Intake/Output Summary (Last 24 hours) at 10/7/2020 0852  Last data filed at 10/7/2020 0730  Gross per 24 hour   Intake 1092 ml   Output 460 ml   Net 632 ml     Wt Readings from Last 3 Encounters:   10/06/20 181 lb (82.1 kg)   07/09/20 183 lb (83 kg)   07/05/20 182 lb 12.2 oz (82.9 kg)       Physical Exam:  General: In no acute distress. Awake, alert, and oriented X4. Up in chair this AM.   Skin:  Warm and dry. No new appearing rashes or lesions. Neck:  Supple. No JVD or carotid bruit appreciated. Chest: Lungs clear to auscultation. No wheezes/rhonchi/rales  Cardiovascular:  RRR. Normal S1 and S2. No murmur/gallop or rub   Abdomen:  soft, nontender, nondistended, +bowel sounds. No hepatomegaly  Extremities:  No LE edema. No clubbing or cyanosis. R groin site unremarkable. 2+ bilateral radial/DP/PT pulses.  Cap refill brisk    Medications:    losartan  100 mg Oral Daily    insulin glargine  70 Units Subcutaneous Nightly    DULoxetine  60 mg Oral Daily    famotidine  20 mg Oral BID    HYDROcodone-acetaminophen  1 tablet Oral BID    sodium chloride flush  10 mL Intravenous 2 times per day    carvedilol  3.125 mg Oral BID WC    atorvastatin  40 mg Oral Nightly    aspirin  81 mg Oral Daily    ticagrelor  90 mg Oral BID    insulin lispro  0-6 Units Subcutaneous TID WC    insulin lispro  0-3 Units Subcutaneous Nightly      dextrose       polyethylene glycol, albuterol, diazePAM, sodium chloride flush, acetaminophen, sodium chloride, ondansetron, glucose, dextrose, glucagon (rDNA), dextrose, hydrALAZINE    Lab Data:  CBC:   Recent Labs     10/06/20  1410 10/06/20  2153 10/07/20  0432   WBC 11.7* 10.1 11.7*   HGB 14.7 12.3 12.2    199 193     BMP:    Recent Labs     10/05/20  1457 10/06/20  1414 10/07/20  0432    137 134*   K 4.0 4.4 4.6   CO2 26 26 25   BUN 13 14 14   CREATININE 0.7 0.7 0.6     LIVR:   Recent Labs     10/05/20  1457   AST 23   ALT 21     INR:    Recent Labs     10/06/20  1410   INR 1.02     APTT:   Recent Labs     10/06/20  1410   APTT 38.7*     Results for Kathryn Pugh (MRN [id number]) as of 10/7/2020 08:53   Ref. Range 10/6/2020 14:10 10/7/2020 04:32   Troponin Latest Ref Range: <0.01 ng/mL 0.10 (H) 0.27 (H)   Cholesterol, Total Latest Ref Range: 0 - 199 mg/dL  157   HDL Cholesterol Latest Ref Range: 40 - 60 mg/dL  29 (L)   LDL Calculated Latest Ref Range: <100 mg/dL  104 (H)   Triglycerides Latest Ref Range: 0 - 150 mg/dL  120   VLDL Cholesterol Calculated Latest Ref Range: Not Established mg/dL  24     Results for Kathryn Pugh (MRN [id number]) as of 10/7/2020 08:53   Ref. Range 10/5/2020 14:57   Hemoglobin A1C Latest Ref Range: See comment % 8.5     10/6/2020 Cardiac cath/PCI:  1. Right-dominant coronary arterial system with a 99% mid RCA lesion  successfully intervened upon with two 2.75 x 15 mm overlapping [place]  drug-eluting stents dilated to 2.80 mm in diameter. 2.  In the left system, there was a 90% mid LAD lesion that was  intervened upon with a 2.75 x 15 mm [place] drug-eluting stent dilated to  2.80 mm in diameter. There was a 60% proximal LAD disease and a 60%  focal lesion distal to this. In the circumflex, there were small OM  branches and one small OM branch has a 99% lesion. This is too small to  intervene upon. 3.  Normal left ventricular systolic function with an LV ejection  fraction of 60%. 4.  Left ventricular end-diastolic pressure of 10 mmHg. 5.  No gradient across the aortic valve on pullback to suggest aortic  stenosis.     ECG: SR with RBBB: inferior infarct    Telemetry: SR without ectopy    Assessment/Plan:    1.  Inferior STEMI/multivessel CAD  -S/P SHEFALI x 2 to RCA and SHEFALI to LAD  -residual 60% prox LAD, 60% focal lesion distal to prox lesion, small OM 99% (to small for intervention)  -LVEF 60%  -continue ASA, Brilinta, statin, ARB and carvedilol  -risk factor modification  -discussed cardiac rehab    2. Hyperlipidemia with goal LDL < 70  -on high intensity statin    3. Nausea/vomiting  -on Zofran  -continue to monitor    4. Type II diabetes mellitus  -uncontrolled  -on Lantus  -will ask hospitalist to see regarding management    5. Obesity  -weight loss discussed    Discussed post MI meds, limitations, activity/exercise an diet. Questions answered. Increase activity today.   Hopefully home tomorrow if N/V improved    Electronically signed by MOY Nickerson CNP on 10/7/2020 at 8:52 AM Home

## 2022-03-21 RX ORDER — ATORVASTATIN CALCIUM 20 MG/1
TABLET, FILM COATED ORAL
Qty: 90 TABLET | Refills: 2 | Status: SHIPPED | OUTPATIENT
Start: 2022-03-21 | End: 2022-08-17 | Stop reason: SDUPTHER

## 2022-03-21 NOTE — TELEPHONE ENCOUNTER
Last OV: 1/20/22  Next OV: 1/20/2023  Last refill:12/16/21  Most recent Labs: 2/15/22  Last EKG (if needed): 9/18/21

## 2022-05-13 DIAGNOSIS — E11.59 TYPE 2 DIABETES MELLITUS WITH OTHER CIRCULATORY COMPLICATION, UNSPECIFIED WHETHER LONG TERM INSULIN USE (HCC): ICD-10-CM

## 2022-05-14 LAB
A/G RATIO: 2 (ref 1.1–2.2)
ALBUMIN SERPL-MCNC: 4.3 G/DL (ref 3.4–5)
ALP BLD-CCNC: 116 U/L (ref 40–129)
ALT SERPL-CCNC: 14 U/L (ref 10–40)
ANION GAP SERPL CALCULATED.3IONS-SCNC: 11 MMOL/L (ref 3–16)
AST SERPL-CCNC: 9 U/L (ref 15–37)
BILIRUB SERPL-MCNC: 0.6 MG/DL (ref 0–1)
BUN BLDV-MCNC: 24 MG/DL (ref 7–20)
CALCIUM SERPL-MCNC: 9.2 MG/DL (ref 8.3–10.6)
CHLORIDE BLD-SCNC: 102 MMOL/L (ref 99–110)
CHOLESTEROL, TOTAL: 103 MG/DL (ref 0–199)
CO2: 28 MMOL/L (ref 21–32)
CREAT SERPL-MCNC: 1 MG/DL (ref 0.6–1.1)
GFR AFRICAN AMERICAN: >60
GFR NON-AFRICAN AMERICAN: 57
GLUCOSE BLD-MCNC: 289 MG/DL (ref 70–99)
HDLC SERPL-MCNC: 30 MG/DL (ref 40–60)
LDL CHOLESTEROL CALCULATED: 48 MG/DL
POTASSIUM SERPL-SCNC: 4.1 MMOL/L (ref 3.5–5.1)
SODIUM BLD-SCNC: 141 MMOL/L (ref 136–145)
T4 FREE: 1.1 NG/DL (ref 0.9–1.8)
TOTAL PROTEIN: 6.4 G/DL (ref 6.4–8.2)
TRIGL SERPL-MCNC: 126 MG/DL (ref 0–150)
TSH SERPL DL<=0.05 MIU/L-ACNC: 3.74 UIU/ML (ref 0.27–4.2)
VLDLC SERPL CALC-MCNC: 25 MG/DL

## 2022-05-17 LAB
ESTIMATED AVERAGE GLUCOSE: 159.9 MG/DL
HBA1C MFR BLD: 7.2 %

## 2022-06-21 RX ORDER — LOSARTAN POTASSIUM 100 MG/1
TABLET ORAL
Qty: 90 TABLET | Refills: 3 | Status: SHIPPED | OUTPATIENT
Start: 2022-06-21 | End: 2022-08-17 | Stop reason: SDUPTHER

## 2022-06-21 NOTE — TELEPHONE ENCOUNTER
Last OV: 1/20/22  Next OV: 1 yr f/u 1/2023  Last refill:1/17/22  Most recent Labs: 5/13/22  Last EKG (if needed):9/18/21

## 2022-10-01 NOTE — ED NOTES
Dr. Jaylyn Ortiz gave patient chest X-RAY results. He offered to admit patient. She refused and wanted to go home. He gave her discharge instructions.  She states, understanding     Jayson Tucker, RN  09/23/21 646 [facility], RN  09/23/21 5772
Gave patient discharge instructions. She states, understanding. Patient discharged to home.  Took patient out in w/c      Christine President, MARY  09/23/21 6942
Patient walked to bathroom and back. O2 sat 89% room air then up to 90-91% r/a quickly.  Dr. Jany Pastrana aware      Nvai Santiago RN  09/22/21 91 Jennifer Palacio RN  09/22/21 3840
Yes

## 2022-10-10 NOTE — TELEPHONE ENCOUNTER
Last OV: 1/20/22  Next OV:  not scheduled. Requested to return in one year.    Last refill:10/12/21  Most recent Labs: CMP 8/15/22  Last EKG (if needed):

## 2022-10-11 RX ORDER — RANOLAZINE 500 MG/1
TABLET, EXTENDED RELEASE ORAL
Qty: 180 TABLET | Refills: 3 | Status: SHIPPED | OUTPATIENT
Start: 2022-10-11

## 2022-11-14 DIAGNOSIS — E11.59 TYPE 2 DIABETES MELLITUS WITH OTHER CIRCULATORY COMPLICATION, UNSPECIFIED WHETHER LONG TERM INSULIN USE (HCC): ICD-10-CM

## 2022-11-14 LAB
A/G RATIO: 1.9 (ref 1.1–2.2)
ALBUMIN SERPL-MCNC: 4 G/DL (ref 3.4–5)
ALP BLD-CCNC: 115 U/L (ref 40–129)
ALT SERPL-CCNC: 17 U/L (ref 10–40)
ANION GAP SERPL CALCULATED.3IONS-SCNC: 10 MMOL/L (ref 3–16)
AST SERPL-CCNC: 13 U/L (ref 15–37)
BASOPHILS ABSOLUTE: 0 K/UL (ref 0–0.2)
BASOPHILS RELATIVE PERCENT: 0.3 %
BILIRUB SERPL-MCNC: 0.8 MG/DL (ref 0–1)
BUN BLDV-MCNC: 19 MG/DL (ref 7–20)
CALCIUM SERPL-MCNC: 8.9 MG/DL (ref 8.3–10.6)
CHLORIDE BLD-SCNC: 103 MMOL/L (ref 99–110)
CHOLESTEROL, TOTAL: 105 MG/DL (ref 0–199)
CO2: 32 MMOL/L (ref 21–32)
CREAT SERPL-MCNC: 1 MG/DL (ref 0.6–1.1)
EOSINOPHILS ABSOLUTE: 0.2 K/UL (ref 0–0.6)
EOSINOPHILS RELATIVE PERCENT: 1.8 %
ESTIMATED AVERAGE GLUCOSE: 159.9 MG/DL
GFR SERPL CREATININE-BSD FRML MDRD: >60 ML/MIN/{1.73_M2}
GLUCOSE BLD-MCNC: 73 MG/DL (ref 70–99)
HBA1C MFR BLD: 7.2 %
HCT VFR BLD CALC: 38.9 % (ref 36–48)
HDLC SERPL-MCNC: 28 MG/DL (ref 40–60)
HEMOGLOBIN: 12.5 G/DL (ref 12–16)
LDL CHOLESTEROL CALCULATED: 58 MG/DL
LYMPHOCYTES ABSOLUTE: 1.4 K/UL (ref 1–5.1)
LYMPHOCYTES RELATIVE PERCENT: 16 %
MCH RBC QN AUTO: 27.8 PG (ref 26–34)
MCHC RBC AUTO-ENTMCNC: 32.3 G/DL (ref 31–36)
MCV RBC AUTO: 86.3 FL (ref 80–100)
MONOCYTES ABSOLUTE: 0.5 K/UL (ref 0–1.3)
MONOCYTES RELATIVE PERCENT: 6.2 %
NEUTROPHILS ABSOLUTE: 6.7 K/UL (ref 1.7–7.7)
NEUTROPHILS RELATIVE PERCENT: 75.7 %
PDW BLD-RTO: 17.2 % (ref 12.4–15.4)
PLATELET # BLD: 148 K/UL (ref 135–450)
PMV BLD AUTO: 8.2 FL (ref 5–10.5)
POTASSIUM SERPL-SCNC: 3.8 MMOL/L (ref 3.5–5.1)
RBC # BLD: 4.51 M/UL (ref 4–5.2)
SODIUM BLD-SCNC: 145 MMOL/L (ref 136–145)
T4 FREE: 1.5 NG/DL (ref 0.9–1.8)
TOTAL PROTEIN: 6.1 G/DL (ref 6.4–8.2)
TRIGL SERPL-MCNC: 96 MG/DL (ref 0–150)
TSH REFLEX FT4: 4.53 UIU/ML (ref 0.27–4.2)
VLDLC SERPL CALC-MCNC: 19 MG/DL
WBC # BLD: 8.8 K/UL (ref 4–11)

## 2022-12-23 ENCOUNTER — APPOINTMENT (OUTPATIENT)
Dept: CT IMAGING | Age: 57
DRG: 198 | End: 2022-12-23
Payer: COMMERCIAL

## 2022-12-23 ENCOUNTER — HOSPITAL ENCOUNTER (INPATIENT)
Age: 57
LOS: 2 days | Discharge: HOME HEALTH | DRG: 198 | End: 2022-12-25
Attending: EMERGENCY MEDICINE | Admitting: INTERNAL MEDICINE
Payer: COMMERCIAL

## 2022-12-23 ENCOUNTER — APPOINTMENT (OUTPATIENT)
Dept: GENERAL RADIOLOGY | Age: 57
DRG: 198 | End: 2022-12-23
Payer: COMMERCIAL

## 2022-12-23 DIAGNOSIS — I25.10 CORONARY ARTERY DISEASE INVOLVING NATIVE HEART, UNSPECIFIED VESSEL OR LESION TYPE, UNSPECIFIED WHETHER ANGINA PRESENT: ICD-10-CM

## 2022-12-23 DIAGNOSIS — I16.0 HYPERTENSIVE URGENCY: ICD-10-CM

## 2022-12-23 DIAGNOSIS — R07.9 CHEST PAIN, UNSPECIFIED TYPE: Primary | ICD-10-CM

## 2022-12-23 LAB
A/G RATIO: 1.4 (ref 1.1–2.2)
ALBUMIN SERPL-MCNC: 4.1 G/DL (ref 3.4–5)
ALP BLD-CCNC: 120 U/L (ref 40–129)
ALT SERPL-CCNC: 11 U/L (ref 10–40)
ANION GAP SERPL CALCULATED.3IONS-SCNC: 7 MMOL/L (ref 3–16)
APTT: 30.1 SEC (ref 23–34.3)
AST SERPL-CCNC: 12 U/L (ref 15–37)
BASOPHILS ABSOLUTE: 0 K/UL (ref 0–0.2)
BASOPHILS RELATIVE PERCENT: 0.3 %
BILIRUB SERPL-MCNC: 0.9 MG/DL (ref 0–1)
BUN BLDV-MCNC: 23 MG/DL (ref 7–20)
CALCIUM SERPL-MCNC: 9.8 MG/DL (ref 8.3–10.6)
CHLORIDE BLD-SCNC: 105 MMOL/L (ref 99–110)
CO2: 34 MMOL/L (ref 21–32)
CREAT SERPL-MCNC: 1 MG/DL (ref 0.6–1.1)
EOSINOPHILS ABSOLUTE: 0.1 K/UL (ref 0–0.6)
EOSINOPHILS RELATIVE PERCENT: 1.5 %
GFR SERPL CREATININE-BSD FRML MDRD: >60 ML/MIN/{1.73_M2}
GLUCOSE BLD-MCNC: 160 MG/DL (ref 70–99)
HCT VFR BLD CALC: 40.8 % (ref 36–48)
HEMOGLOBIN: 13.3 G/DL (ref 12–16)
IMMATURE GRANULOCYTES #: 0 K/UL (ref 0–0.2)
IMMATURE GRANULOCYTES %: 0.1 %
LIPASE: 9 U/L (ref 13–60)
LYMPHOCYTES ABSOLUTE: 1.6 K/UL (ref 1–5.1)
LYMPHOCYTES RELATIVE PERCENT: 20.1 %
MCH RBC QN AUTO: 28.4 PG (ref 26–34)
MCHC RBC AUTO-ENTMCNC: 32.6 G/DL (ref 32–36.4)
MCV RBC AUTO: 87 FL (ref 80–100)
MONOCYTES ABSOLUTE: 0.5 K/UL (ref 0–1.3)
MONOCYTES RELATIVE PERCENT: 6.9 %
NEUTROPHILS ABSOLUTE: 5.6 K/UL (ref 1.7–7.7)
NEUTROPHILS RELATIVE PERCENT: 71.1 %
PDW BLD-RTO: 16 % (ref 12.4–15.4)
PLATELET # BLD: 138 K/UL (ref 135–450)
PMV BLD AUTO: 10 FL (ref 5–10.5)
POTASSIUM REFLEX MAGNESIUM: 5 MMOL/L (ref 3.5–5.1)
PRO-BNP: 477 PG/ML (ref 0–124)
RAPID INFLUENZA  B AGN: NEGATIVE
RAPID INFLUENZA A AGN: NEGATIVE
RBC # BLD: 4.69 M/UL (ref 4–5.2)
SARS-COV-2, NAAT: NOT DETECTED
SODIUM BLD-SCNC: 146 MMOL/L (ref 136–145)
TOTAL PROTEIN: 7.1 G/DL (ref 6.4–8.2)
TROPONIN: <0.01 NG/ML
WBC # BLD: 7.8 K/UL (ref 4–11)

## 2022-12-23 PROCEDURE — 36415 COLL VENOUS BLD VENIPUNCTURE: CPT

## 2022-12-23 PROCEDURE — 93005 ELECTROCARDIOGRAM TRACING: CPT | Performed by: EMERGENCY MEDICINE

## 2022-12-23 PROCEDURE — 83880 ASSAY OF NATRIURETIC PEPTIDE: CPT

## 2022-12-23 PROCEDURE — 87635 SARS-COV-2 COVID-19 AMP PRB: CPT

## 2022-12-23 PROCEDURE — 85025 COMPLETE CBC W/AUTO DIFF WBC: CPT

## 2022-12-23 PROCEDURE — 84484 ASSAY OF TROPONIN QUANT: CPT

## 2022-12-23 PROCEDURE — 6370000000 HC RX 637 (ALT 250 FOR IP): Performed by: EMERGENCY MEDICINE

## 2022-12-23 PROCEDURE — 71260 CT THORAX DX C+: CPT | Performed by: EMERGENCY MEDICINE

## 2022-12-23 PROCEDURE — 71045 X-RAY EXAM CHEST 1 VIEW: CPT

## 2022-12-23 PROCEDURE — 96375 TX/PRO/DX INJ NEW DRUG ADDON: CPT

## 2022-12-23 PROCEDURE — 2500000003 HC RX 250 WO HCPCS: Performed by: EMERGENCY MEDICINE

## 2022-12-23 PROCEDURE — 96365 THER/PROPH/DIAG IV INF INIT: CPT

## 2022-12-23 PROCEDURE — 85730 THROMBOPLASTIN TIME PARTIAL: CPT

## 2022-12-23 PROCEDURE — 87804 INFLUENZA ASSAY W/OPTIC: CPT

## 2022-12-23 PROCEDURE — 6360000004 HC RX CONTRAST MEDICATION: Performed by: EMERGENCY MEDICINE

## 2022-12-23 PROCEDURE — 2060000000 HC ICU INTERMEDIATE R&B

## 2022-12-23 PROCEDURE — 80053 COMPREHEN METABOLIC PANEL: CPT

## 2022-12-23 PROCEDURE — 99285 EMERGENCY DEPT VISIT HI MDM: CPT

## 2022-12-23 PROCEDURE — 6360000002 HC RX W HCPCS: Performed by: EMERGENCY MEDICINE

## 2022-12-23 PROCEDURE — 96366 THER/PROPH/DIAG IV INF ADDON: CPT

## 2022-12-23 PROCEDURE — 83690 ASSAY OF LIPASE: CPT

## 2022-12-23 RX ORDER — CARVEDILOL 12.5 MG/1
12.5 TABLET ORAL ONCE
Status: DISCONTINUED | OUTPATIENT
Start: 2022-12-23 | End: 2022-12-23 | Stop reason: RX

## 2022-12-23 RX ORDER — RANOLAZINE 500 MG/1
500 TABLET, EXTENDED RELEASE ORAL ONCE
Status: DISCONTINUED | OUTPATIENT
Start: 2022-12-23 | End: 2022-12-23 | Stop reason: RX

## 2022-12-23 RX ORDER — ASPIRIN 81 MG/1
243 TABLET, CHEWABLE ORAL ONCE
Status: COMPLETED | OUTPATIENT
Start: 2022-12-23 | End: 2022-12-23

## 2022-12-23 RX ORDER — HEPARIN SODIUM 1000 [USP'U]/ML
2000 INJECTION, SOLUTION INTRAVENOUS; SUBCUTANEOUS PRN
Status: DISCONTINUED | OUTPATIENT
Start: 2022-12-23 | End: 2022-12-23

## 2022-12-23 RX ORDER — MORPHINE SULFATE 4 MG/ML
4 INJECTION, SOLUTION INTRAMUSCULAR; INTRAVENOUS
Status: DISCONTINUED | OUTPATIENT
Start: 2022-12-23 | End: 2022-12-24 | Stop reason: HOSPADM

## 2022-12-23 RX ORDER — HEPARIN SODIUM 1000 [USP'U]/ML
4000 INJECTION, SOLUTION INTRAVENOUS; SUBCUTANEOUS ONCE
Status: COMPLETED | OUTPATIENT
Start: 2022-12-23 | End: 2022-12-23

## 2022-12-23 RX ORDER — HEPARIN SODIUM 10000 [USP'U]/100ML
0-4000 INJECTION, SOLUTION INTRAVENOUS CONTINUOUS
Status: DISCONTINUED | OUTPATIENT
Start: 2022-12-23 | End: 2022-12-25 | Stop reason: HOSPADM

## 2022-12-23 RX ORDER — HEPARIN SODIUM 1000 [USP'U]/ML
4000 INJECTION, SOLUTION INTRAVENOUS; SUBCUTANEOUS PRN
Status: DISCONTINUED | OUTPATIENT
Start: 2022-12-23 | End: 2022-12-23

## 2022-12-23 RX ADMIN — ASPIRIN 243 MG: 81 TABLET, CHEWABLE ORAL at 19:41

## 2022-12-23 RX ADMIN — HEPARIN SODIUM 4000 UNITS: 1000 INJECTION INTRAVENOUS; SUBCUTANEOUS at 22:43

## 2022-12-23 RX ADMIN — MORPHINE SULFATE 4 MG: 4 INJECTION, SOLUTION INTRAMUSCULAR; INTRAVENOUS at 19:40

## 2022-12-23 RX ADMIN — NITROGLYCERIN 1 INCH: 20 OINTMENT TOPICAL at 19:42

## 2022-12-23 RX ADMIN — HEPARIN SODIUM 1000 UNITS/HR: 10000 INJECTION, SOLUTION INTRAVENOUS at 22:43

## 2022-12-23 RX ADMIN — IOPAMIDOL 100 ML: 755 INJECTION, SOLUTION INTRAVENOUS at 20:48

## 2022-12-23 ASSESSMENT — PAIN SCALES - GENERAL
PAINLEVEL_OUTOF10: 5
PAINLEVEL_OUTOF10: 3
PAINLEVEL_OUTOF10: 4
PAINLEVEL_OUTOF10: 4
PAINLEVEL_OUTOF10: 5
PAINLEVEL_OUTOF10: 4

## 2022-12-23 ASSESSMENT — PAIN DESCRIPTION - LOCATION
LOCATION: SHOULDER;ARM
LOCATION: SHOULDER
LOCATION: CHEST
LOCATION: SHOULDER;ARM
LOCATION: CHEST

## 2022-12-23 ASSESSMENT — PAIN - FUNCTIONAL ASSESSMENT
PAIN_FUNCTIONAL_ASSESSMENT: 0-10

## 2022-12-23 ASSESSMENT — PAIN DESCRIPTION - FREQUENCY
FREQUENCY: CONTINUOUS

## 2022-12-23 ASSESSMENT — PAIN DESCRIPTION - DESCRIPTORS
DESCRIPTORS: CRAMPING
DESCRIPTORS: CRAMPING

## 2022-12-23 ASSESSMENT — PAIN DESCRIPTION - PAIN TYPE
TYPE: ACUTE PAIN

## 2022-12-23 ASSESSMENT — PAIN DESCRIPTION - ORIENTATION
ORIENTATION: LEFT

## 2022-12-23 ASSESSMENT — LIFESTYLE VARIABLES: HOW OFTEN DO YOU HAVE A DRINK CONTAINING ALCOHOL: NEVER

## 2022-12-24 LAB
APTT: 64.3 SEC (ref 23–34.3)
APTT: 66.8 SEC (ref 23–34.3)
APTT: 69.5 SEC (ref 23–34.3)
EKG ATRIAL RATE: 78 BPM
EKG DIAGNOSIS: NORMAL
EKG P AXIS: 46 DEGREES
EKG P-R INTERVAL: 164 MS
EKG Q-T INTERVAL: 428 MS
EKG QRS DURATION: 132 MS
EKG QTC CALCULATION (BAZETT): 487 MS
EKG R AXIS: 257 DEGREES
EKG T AXIS: 195 DEGREES
EKG VENTRICULAR RATE: 78 BPM
GLUCOSE BLD-MCNC: 275 MG/DL (ref 70–99)
GLUCOSE BLD-MCNC: 293 MG/DL (ref 70–99)
PERFORMED ON: ABNORMAL
PERFORMED ON: ABNORMAL
TROPONIN: 0.01 NG/ML
TROPONIN: <0.01 NG/ML
TROPONIN: <0.01 NG/ML

## 2022-12-24 PROCEDURE — 6360000002 HC RX W HCPCS: Performed by: INTERNAL MEDICINE

## 2022-12-24 PROCEDURE — 36415 COLL VENOUS BLD VENIPUNCTURE: CPT

## 2022-12-24 PROCEDURE — 2500000003 HC RX 250 WO HCPCS: Performed by: EMERGENCY MEDICINE

## 2022-12-24 PROCEDURE — 6370000000 HC RX 637 (ALT 250 FOR IP): Performed by: INTERNAL MEDICINE

## 2022-12-24 PROCEDURE — 6360000002 HC RX W HCPCS: Performed by: EMERGENCY MEDICINE

## 2022-12-24 PROCEDURE — 96375 TX/PRO/DX INJ NEW DRUG ADDON: CPT

## 2022-12-24 PROCEDURE — 85730 THROMBOPLASTIN TIME PARTIAL: CPT

## 2022-12-24 PROCEDURE — 99222 1ST HOSP IP/OBS MODERATE 55: CPT | Performed by: INTERNAL MEDICINE

## 2022-12-24 PROCEDURE — 82947 ASSAY GLUCOSE BLOOD QUANT: CPT

## 2022-12-24 PROCEDURE — 2060000000 HC ICU INTERMEDIATE R&B

## 2022-12-24 PROCEDURE — 94761 N-INVAS EAR/PLS OXIMETRY MLT: CPT

## 2022-12-24 PROCEDURE — 93010 ELECTROCARDIOGRAM REPORT: CPT | Performed by: INTERNAL MEDICINE

## 2022-12-24 PROCEDURE — 2700000000 HC OXYGEN THERAPY PER DAY

## 2022-12-24 PROCEDURE — 84484 ASSAY OF TROPONIN QUANT: CPT

## 2022-12-24 RX ORDER — DEXTROSE MONOHYDRATE 100 MG/ML
INJECTION, SOLUTION INTRAVENOUS CONTINUOUS PRN
Status: DISCONTINUED | OUTPATIENT
Start: 2022-12-24 | End: 2022-12-25 | Stop reason: HOSPADM

## 2022-12-24 RX ORDER — EZETIMIBE 10 MG/1
10 TABLET ORAL DAILY
Status: DISCONTINUED | OUTPATIENT
Start: 2022-12-24 | End: 2022-12-25 | Stop reason: HOSPADM

## 2022-12-24 RX ORDER — CARVEDILOL 12.5 MG/1
12.5 TABLET ORAL 2 TIMES DAILY
Status: DISCONTINUED | OUTPATIENT
Start: 2022-12-24 | End: 2022-12-24

## 2022-12-24 RX ORDER — FAMOTIDINE 20 MG/1
20 TABLET, FILM COATED ORAL 2 TIMES DAILY
Status: DISCONTINUED | OUTPATIENT
Start: 2022-12-24 | End: 2022-12-25 | Stop reason: HOSPADM

## 2022-12-24 RX ORDER — CARVEDILOL 12.5 MG/1
12.5 TABLET ORAL 2 TIMES DAILY WITH MEALS
Status: DISCONTINUED | OUTPATIENT
Start: 2022-12-24 | End: 2022-12-25 | Stop reason: HOSPADM

## 2022-12-24 RX ORDER — LOSARTAN POTASSIUM 50 MG/1
50 TABLET ORAL DAILY
Status: DISCONTINUED | OUTPATIENT
Start: 2022-12-24 | End: 2022-12-25 | Stop reason: HOSPADM

## 2022-12-24 RX ORDER — HEPARIN SODIUM 1000 [USP'U]/ML
2000 INJECTION, SOLUTION INTRAVENOUS; SUBCUTANEOUS ONCE
Status: COMPLETED | OUTPATIENT
Start: 2022-12-24 | End: 2022-12-24

## 2022-12-24 RX ORDER — ONDANSETRON 2 MG/ML
4 INJECTION INTRAMUSCULAR; INTRAVENOUS
Status: DISCONTINUED | OUTPATIENT
Start: 2022-12-24 | End: 2022-12-24 | Stop reason: HOSPADM

## 2022-12-24 RX ORDER — DULOXETIN HYDROCHLORIDE 60 MG/1
60 CAPSULE, DELAYED RELEASE ORAL DAILY
Status: DISCONTINUED | OUTPATIENT
Start: 2022-12-24 | End: 2022-12-25 | Stop reason: HOSPADM

## 2022-12-24 RX ORDER — RANOLAZINE 500 MG/1
500 TABLET, EXTENDED RELEASE ORAL 2 TIMES DAILY
Status: DISCONTINUED | OUTPATIENT
Start: 2022-12-24 | End: 2022-12-25 | Stop reason: HOSPADM

## 2022-12-24 RX ORDER — INSULIN GLARGINE 100 [IU]/ML
75 INJECTION, SOLUTION SUBCUTANEOUS NIGHTLY
Status: DISCONTINUED | OUTPATIENT
Start: 2022-12-24 | End: 2022-12-25 | Stop reason: HOSPADM

## 2022-12-24 RX ORDER — ATORVASTATIN CALCIUM 20 MG/1
20 TABLET, FILM COATED ORAL DAILY
Status: DISCONTINUED | OUTPATIENT
Start: 2022-12-24 | End: 2022-12-25 | Stop reason: HOSPADM

## 2022-12-24 RX ORDER — DIAZEPAM 5 MG/1
5 TABLET ORAL EVERY 12 HOURS PRN
Status: DISCONTINUED | OUTPATIENT
Start: 2022-12-24 | End: 2022-12-25 | Stop reason: HOSPADM

## 2022-12-24 RX ORDER — INSULIN LISPRO 100 [IU]/ML
0-4 INJECTION, SOLUTION INTRAVENOUS; SUBCUTANEOUS
Status: DISCONTINUED | OUTPATIENT
Start: 2022-12-24 | End: 2022-12-25 | Stop reason: HOSPADM

## 2022-12-24 RX ORDER — ASPIRIN 81 MG/1
81 TABLET, CHEWABLE ORAL DAILY
Status: DISCONTINUED | OUTPATIENT
Start: 2022-12-24 | End: 2022-12-25 | Stop reason: HOSPADM

## 2022-12-24 RX ORDER — INSULIN LISPRO 100 [IU]/ML
0-4 INJECTION, SOLUTION INTRAVENOUS; SUBCUTANEOUS NIGHTLY
Status: DISCONTINUED | OUTPATIENT
Start: 2022-12-24 | End: 2022-12-25 | Stop reason: HOSPADM

## 2022-12-24 RX ADMIN — HEPARIN SODIUM 2000 UNITS: 1000 INJECTION INTRAVENOUS; SUBCUTANEOUS at 14:06

## 2022-12-24 RX ADMIN — ONDANSETRON 4 MG: 2 INJECTION INTRAMUSCULAR; INTRAVENOUS at 06:17

## 2022-12-24 RX ADMIN — HEPARIN SODIUM 2000 UNITS: 1000 INJECTION INTRAVENOUS; SUBCUTANEOUS at 07:00

## 2022-12-24 RX ADMIN — LOSARTAN POTASSIUM 50 MG: 50 TABLET, FILM COATED ORAL at 09:04

## 2022-12-24 RX ADMIN — RANOLAZINE 500 MG: 500 TABLET, FILM COATED, EXTENDED RELEASE ORAL at 21:53

## 2022-12-24 RX ADMIN — RANOLAZINE 500 MG: 500 TABLET, FILM COATED, EXTENDED RELEASE ORAL at 09:04

## 2022-12-24 RX ADMIN — HEPARIN SODIUM 2000 UNITS: 1000 INJECTION INTRAVENOUS; SUBCUTANEOUS at 22:32

## 2022-12-24 RX ADMIN — INSULIN GLARGINE 75 UNITS: 100 INJECTION, SOLUTION SUBCUTANEOUS at 21:53

## 2022-12-24 RX ADMIN — CARVEDILOL 12.5 MG: 12.5 TABLET, FILM COATED ORAL at 09:03

## 2022-12-24 RX ADMIN — ATORVASTATIN CALCIUM 20 MG: 20 TABLET, FILM COATED ORAL at 09:02

## 2022-12-24 RX ADMIN — HEPARIN SODIUM 1380 UNITS/HR: 10000 INJECTION, SOLUTION INTRAVENOUS at 17:19

## 2022-12-24 RX ADMIN — INSULIN LISPRO 1 UNITS: 100 INJECTION, SOLUTION INTRAVENOUS; SUBCUTANEOUS at 18:49

## 2022-12-24 RX ADMIN — CARVEDILOL 12.5 MG: 12.5 TABLET, FILM COATED ORAL at 21:54

## 2022-12-24 RX ADMIN — EZETIMIBE 10 MG: 10 TABLET ORAL at 21:54

## 2022-12-24 RX ADMIN — ASPIRIN 81 MG 81 MG: 81 TABLET ORAL at 09:01

## 2022-12-24 ASSESSMENT — PAIN - FUNCTIONAL ASSESSMENT
PAIN_FUNCTIONAL_ASSESSMENT: NONE - DENIES PAIN

## 2022-12-24 ASSESSMENT — PAIN SCALES - GENERAL: PAINLEVEL_OUTOF10: 0

## 2022-12-24 NOTE — ED NOTES
Pt resting quietly, daughter at bedside. Pt states she has no pain at present.       Tess Bhatia RN  12/24/22 3825

## 2022-12-24 NOTE — ED NOTES
[facility] for quicker ETA.  They report they have no ALS crews on for today     Anastasiya Gordon RN  12/24/22 7967 declined

## 2022-12-24 NOTE — PROGRESS NOTES
Clinical Pharmacy Note  Heparin Dosing       Lab Results   Component Value Date/Time    APTT 64.3 12/24/2022 01:00 PM     Lab Results   Component Value Date/Time    HGB 13.3 12/23/2022 07:25 PM    HCT 40.8 12/23/2022 07:25 PM     12/23/2022 07:25 PM    INR 1.02 10/06/2020 02:10 PM       Current Infusion Rate: 1190 units/hr    Plan:  Bolus: 2000 units  Rate: increase to 1380 units/hr  Next aPTT: 2000 on 12/24    Pharmacy will continue to monitor and adjust based on aPTT results.     Cheryl Matt, [facility] [facility]   12/24/2022 1:56 PM

## 2022-12-24 NOTE — CONSULTS
Clinical Pharmacy Note  Heparin Dosing Consult    Bon Lorenzo is a 61 y.o. female ordered heparin per low dose nomogram by Dr. Darlin Gonzalez. Lab Results   Component Value Date/Time    APTT 38.7 10/06/2020 02:10 PM     Lab Results   Component Value Date/Time    HGB 13.3 12/23/2022 07:25 PM    HCT 40.8 12/23/2022 07:25 PM     12/23/2022 07:25 PM    INR 1.02 10/06/2020 02:10 PM       Ht Readings from Last 1 Encounters:   11/16/22 5' (1.524 m)        Wt Readings from Last 1 Encounters:   12/23/22 205 lb 4 oz (93.1 kg)        Assessment/Plan:  Initial bolus: 4000 units  Initial infusion rate: 1000 units/hr  Next aPTT: 0500 on 12/24    Pharmacy will continue to monitor adjust heparin based on aPTT results using nomogram below:     CAD/STEMI/NSTEMI/UA/AFIB Heparin Nomogram     Initial Bolus: 60 units/kg Max Bolus: 4,000 units       Initial Rate: 12 units/kg/hr Max Initial Rate: 1,000 units/hr     aPTT < 59    Heparin 60 units/kg bolus Increase infusion by 4 units/kg/hr        (maximum 4,000 units)   aPTT 59.1-72.9 Heparin 30 units/kg bolus Increase infusion by 2 units/kg/hr        (maximum 2,000 units)   aPTT     No bolus   No change   aPTT 102.1-109 No bolus   Decrease infusion by 1 units/kg/hr   aPTT 109.1-122.9 No bolus     Decrease infusion by 2 units/kg/hr   aPTT > 123    Hold heparin for 1 hour Decrease infusion by 3 units/kg/hr     Obtain aPTT 6 hours after initial bolus and 6 hours after any dose change until two consecutive therapeutic aPTTs are achieved - then daily.     Meri Peralta, PharmD  12/23/2022 10:23 PM

## 2022-12-24 NOTE — ED NOTES
Patient states, her  is going to bring her insulin pen and lunch. Heparin drip infusing at 11 ml/hr or 1100 units/hr IV per order next PTT at 1300. Continue to wait on transfer ETA at 12 Noon    Patient ambulated to bathroom and back with standby assist of nurse. Patient voided x 1.   Patient back in bed and call light in reach     Ousmane Rocha RN  12/24/22 1100

## 2022-12-24 NOTE — ED NOTES
Access center call and states [address] will now be picking up the patient at 1430. Dr. Emily Ellington and Angi Owens nurse manager updated on delay of transportation.      Robson Bennett RN  12/24/22 [address] MARY Lewis  12/24/22 1302

## 2022-12-24 NOTE — ED TRIAGE NOTES
Pt to ED with complaint of chest pain. Pt states pain started about 2 hours ago. States she did not take any Nitroglycerin. States pain feels like the pain she had with her last heart attack. States pain started midsternal chest area and now radiates to her left shoulder, arm, and jaw. Pt awake, alert. Skin warm, dry with good turgor, Color appears normal for ethnicity.

## 2022-12-24 NOTE — ED NOTES
Pt ambulatory to bathroom. O2 sat 84-85% on room air while ambulatory without oxygen. Oxygen cannula reapplied. Pt c/o feeling nauseous. MD made aware.       Sarah Guy RN  12/24/22 4357

## 2022-12-24 NOTE — PROGRESS NOTES
4 Eyes Skin Assessment     NAME:  Negra Pinto  YOB: 1965  MEDICAL RECORD NUMBER:  [id number]    The patient is being assessed for  Admission    I agree that One RN have performed a thorough Head to Toe Skin Assessment on the patient. ALL assessment sites listed below have been assessed. Areas assessed by both nurses:    Head, Face, Ears, Shoulders, Back, Chest, Arms, Elbows, Hands, Sacrum. Buttock, Coccyx, Ischium, and Legs. Feet and Heels        Does the Patient have a Wound?  No noted wound(s)       Glynn Prevention initiated by RN: Yes   Wound Care Orders initiated by RN: NA    Pressure Injury (Stage 3,4, Unstageable, DTI, NWPT, and Complex wounds) if present place referral order by RN under : NA    New and Established Ostomies, if present place, referral order under : NA      Nurse 1 eSignature: Electronically signed by Latrice Dubose on 12/24/22 at 5:10 PM EST    **SHARE this note so that the co-signing nurse is able to place an eSignature**    Nurse 2 eSignature: Electronically signed by Juanpablo Martin RN on 12/24/22 at 5:13 PM EST

## 2022-12-24 NOTE — ED NOTES
Tried to find a quicker ALS crew. [facility] they report not having a [facility] crew on today.  Current ETA is 12 noon     [facility]  12/24/22 2782

## 2022-12-24 NOTE — ED NOTES
[address] here to take patient to [facility] \[facility] [place] [facility]\"" AT [place] room [facility]  12/24/22 3712

## 2022-12-24 NOTE — ED NOTES
Patient checked her . Her  brought her lunch. She will take her home insulin. Dr. Ofelia Mascorro states, okay for patient to take her home insulin. Patient has been in free standing ED for 17 hours d/t delay in transportation. [facility] ED free standing does not have patient's home medications.  Dr. Ofelia Mascorro states, it was okay for patient to take her home medications      Donta Shah RN  12/24/22 6244

## 2022-12-24 NOTE — ED NOTES
MD made aware that Ranexa and Coreg not available at this facility. No new orders.       Marylin Albarran RN  12/23/22 1298

## 2022-12-24 NOTE — ED NOTES
MD made aware no ALS transport available until tomorrow am. No new orders.       Viola Calhoun, MARY  12/24/22 0453

## 2022-12-24 NOTE — ED PROVIDER NOTES
Emergency Physician Note        Note Open Time: 9:31 PM EST    Chief Complaint  Chest Pain (States started with midsternal chest pain 2 hours ago, radiating to left jaw, shoulder and arm)       History of Present Illness  Sheri Villa is a 61 y.o. female who presents to the ED for chest pain. Patient reports that she had the gradual onset of some anterior chest pain that radiates through to her back during the afternoon and evening today. It is now severe and radiates to the left jaw on the left arm and down to the left hand. It seems to radiate through to the back and feels like a stabbing sensation there. She denies any fevers, chills or sweats. She does have some URI symptoms with a hoarse voice. No vomiting or diarrhea. She has history of coronary disease with stenting x2 a few years ago. She reports compliance with all of her medicines but has not taken her evening medicines yet. Typically has well-controlled hypertension. 10 systems reviewed, pertinent positives per HPI otherwise noted to be negative    I have reviewed the following from the nursing documentation:      Prior to Admission medications    Medication Sig Start Date End Date Taking? Authorizing Provider   carvedilol (COREG) 12.5 MG tablet TAKE 1 TABLET BY MOUTH TWICE A DAY WITH MEALS 12/6/22   Rosario Stevens MD   albuterol sulfate HFA (PROVENTIL;VENTOLIN;PROAIR) 108 (90 Base) MCG/ACT inhaler INHALE 2 PUFFS INTO THE LUNGS EVERY 6 HOURS AS NEEDED. 12/6/22   Eitan Chery MD   HYDROcodone-acetaminophen (NORCO) 5-325 MG per tablet Take 1 tablet by mouth 2 times daily for 30 days. 12/5/22 1/4/23  Rosario Stevens MD   diazePAM (VALIUM) 5 MG tablet Take 1 tablet by mouth every 12 hours as needed for Anxiety or Sleep for up to 30 days.  12/5/22 1/4/23  Rosario Stevens MD   ranolazine (RANEXA) 500 MG extended release tablet TAKE 1 TABLET BY MOUTH TWICE A DAY 10/11/22   MD DANIELA Schaefer UNIT/ML injection pen INJECT 75 UNITS INTO THE SKIN NIGHTLY 9/13/22   Lucie Infante MD   Continuous Blood Gluc  (FREESTYLE JACIEL 2 READER) ASHWIN tid 9/6/22   Lucie Infante MD   Continuous Blood Gluc Sensor (FREESTYLE JACIEL 2 SENSOR) MISC tid 9/6/22   Lucie Infante MD   TRUE METRIX BLOOD GLUCOSE TEST strip USE 1 EACH BY IN VITRO ROUTE 3 TIMES DAILY AS NEEDED. 9/6/22   Lucie Infante MD   Continuous Blood Gluc Sensor (FREESTYLE JACIEL 14 DAY SENSOR) MISC tid 8/30/22   Lucie Infante MD   Continuous Blood Gluc  (FREESTYLE JACIEL 14 DAY READER) ASHWIN tid 8/30/22   Lucie Infante MD   atorvastatin (LIPITOR) 20 MG tablet TAKE 1 TABLET BY MOUTH EVERY DAY 8/17/22   Lucie Infante MD   DULoxetine (CYMBALTA) 60 MG extended release capsule 1 tab po daily 8/17/22   Lucie Infante MD   furosemide (LASIX) 20 MG tablet TAKE 1 TABLET BY MOUTH EVERY DAY 8/17/22   Lucie Infante MD   losartan (COZAAR) 100 MG tablet 1 TAB PO DAILY 8/17/22   Lucie Infante MD   Insulin Syringe-Needle U-100 (BD INSULIN SYRINGE U/F) 31G X 5/16\" 1 ML MISC USE AT BEDTIME WITH LANTUS 8/17/22   Lucie Infante MD   ezetimibe (ZETIA) 10 MG tablet TAKE 1 TABLET BY MOUTH EVERY DAY 7/22/22   Lucie Infante MD   famotidine (PEPCID) 20 MG tablet TAKE 1 TABLET BY MOUTH TWICE A DAY 7/22/22   Lucie Infante MD   LANTUS SOLOSTAR 100 UNIT/ML injection pen INJECT 75 UNITS INTO THE SKIN NIGHTLY 7/22/22 8/21/22  Lucie Infante MD   insulin lispro, 1 Unit Dial, 100 UNIT/ML SOPN INJECT 7 UNITS INTO THE SKIN 3 TIMES DAILY (BEFORE MEALS) 2/10/22 3/12/22  Lucie Infante MD   FREESTYLE TEST STRIPS strip TEST 3 TIMES A DAY 12/16/21   Lucie Infante MD   SYMBICORT 160-4.5 MCG/ACT AERO TAKE 2 PUFFS BY MOUTH TWICE A DAY 7/8/21   Gloria Clark MD   blood glucose test strips (RELION TRUE METRIX TEST STRIPS) strip 1 each by In Vitro route 6 times daily As needed.  6/8/21   Lucie Infante MD   Blood Glucose Monitoring Suppl (TRUE METRIX METER) ASHWIN As directed 21   Rigo Barraza MD   nitroGLYCERIN (NITROSTAT) 0.4 MG SL tablet Place 1 tablet under the tongue every 5 minutes as needed for Chest pain 21   Nadira Kinney MD   aspirin 81 MG chewable tablet TAKE 1 TABLET BY MOUTH EVERY DAY 20   Nadira Kinney MD   Insulin Pen Needle 32G X 4 MM MISC 1 each by Does not apply route daily 10/9/20   Rigo Barraza MD   Blood Glucose Monitoring Suppl (FREESTYLE LITE) ASHWIN 1 Device by Does not apply route daily as needed (glucose monitoring) 10/9/20 12/9/20  Rigo Barraza MD   FreeStyle Lancets MISC 1 each by Does not apply route daily 10/8/20 12/9/20  Danyelle Brown MD   blood glucose test strips (FREESTYLE LITE) strip 1 each by In Vitro route daily As needed.  10/8/20   Danyelle Brown MD   MIRALAX powder TAKE 17 G BY MOUTH DAILY 19   Rigo Barraza MD       Allergies as of 2022 - Fully Reviewed 2022   Allergen Reaction Noted    Latex Itching and Rash 2016    Tramadol Hives and Shortness Of Breath 2012    Codeine Itching 2016    Penicillins  2020       Past Medical History:   Diagnosis Date    Arthritis     Back pain     CAD (coronary artery disease)     COPD (chronic obstructive pulmonary disease) (HCC)     Depression     Diabetes mellitus ([address]     REHMAN (dyspnea on exertion)     Fatty liver     GERD (gastroesophageal reflux disease)     Hyperlipidemia     Hypertension     MRSA (methicillin resistant staph aureus) culture positive 08/15/2018    arm    Stress incontinence     Thyroid disease     CYST ON THYROID--FOUND 20 YEARS AGO        Surgical History:   Past Surgical History:   Procedure Laterality Date    CARDIAC SURGERY      3 stent placed OCt 20     SECTION      TIMES 3    CHOLECYSTECTOMY      COLONOSCOPY  2017      Colonoscopy with snare and biopsy    EYE SURGERY      muscle correction and eyelid     HERNIA REPAIR      UMBILICAL AREA x 2    HYSTERECTOMY (CERVIX STATUS UNKNOWN)      PTCA  10/2020    SHEFALI to RCA x 2; SHEFALI to LAD    SHOULDER ARTHROSCOPY Right 5-19-15    Right shoulder arthroscopy labral debridement open Pace subacromial decompression and chrondroplasty    UPPER GASTROINTESTINAL ENDOSCOPY  2017    Esophagogastroduodenoscopy with biopsy        Family History:    Family History   Problem Relation Age of Onset    Cancer Mother         lung     Heart Disease Father     Stroke Brother        Social History     Socioeconomic History    Marital status:      Spouse name: Not on file    Number of children: Not on file    Years of education: Not on file    Highest education level: Not on file   Occupational History    Not on file   Tobacco Use    Smoking status: Former     Packs/day: 1.00     Years: 30.00     Pack years: 30.00     Types: Cigarettes     Quit date:      Years since quittin.9    Smokeless tobacco: Never   Vaping Use    Vaping Use: Never used   Substance and Sexual Activity    Alcohol use: No    Drug use: No    Sexual activity: Never   Other Topics Concern    Not on file   Social History Narrative    Not on file     Social Determinants of Health     Financial Resource Strain: Not on file   Food Insecurity: Not on file   Transportation Needs: Not on file   Physical Activity: Not on file   Stress: Not on file   Social Connections: Not on file   Intimate Partner Violence: Not on file   Housing Stability: Not on file       Nursing notes reviewed. ED Triage Vitals [22 1922]   Enc Vitals Group      BP (!) 192/109      Heart Rate 78      Resp 22      Temp       Temp src       SpO2 96 %      Weight 205 lb 4 oz (93.1 kg)      Height       Head Circumference       Peak Flow       Pain Score       Pain Loc       Pain Edu? Excl. in [address]? GENERAL:  Awake, alert. Well developed, well nourished with no apparent distress.    HENT:  Normocephalic, Atraumatic, moist mucous membranes. EYES:  Pupils equal round and reactive to light, Conjunctiva normal, extraocular movements normal.  NECK:  No meningeal signs, Supple. CHEST:  Regular rate and rhythm, chest wall non-tender. LUNGS:  Clear to auscultation bilaterally. ABDOMEN:  Soft, non-tender, no rebound, rigidity or guarding, non-distended, normal bowel sounds. No costovertebral angle tenderness to palpation. BACK:  No tenderness. EXTREMITIES:  Normal range of motion, no edema, no bony tenderness, no deformity, distal pulses present. SKIN: Warm, dry and intact. NEUROLOGIC: Normal mental status. Moving all extremities to command. LABS and DIAGNOSTIC RESULTS  EKG  The Ekg interpreted by me shows  normal sinus rhythm with a rate of 78  Axis is   Right axis deviation  QTc is  normal  RBBB  ST Segments: no acute change  Delta waves, Brugada Syndrome, and Short NC are not present. No significant change from prior EKG dated 9/18/21    RADIOLOGY  X-RAYS:  I have reviewed radiologic plain film image(s). ALL OTHER NON-PLAIN FILM IMAGES SUCH AS CT, ULTRASOUND AND MRI HAVE BEEN READ BY THE RADIOLOGIST. CT CHEST PULMONARY EMBOLISM W CONTRAST   Final Result   1. No evidence of pulmonary embolism or acute pulmonary abnormality. 2. Coronary artery disease. XR CHEST PORTABLE   Final Result   No radiographic evidence of acute cardiopulmonary disease. Point-of-care ultrasound:  No results found.     LABS  Labs Reviewed   CBC WITH AUTO DIFFERENTIAL - Abnormal; Notable for the following components:       Result Value    RDW 16.0 (*)     All other components within normal limits   COMPREHENSIVE METABOLIC PANEL W/ REFLEX TO MG FOR LOW K - Abnormal; Notable for the following components:    Sodium 146 (*)     CO2 34 (*)     Glucose 160 (*)     BUN 23 (*)     AST 12 (*)     All other components within normal limits   LIPASE - Abnormal; Notable for the following components:    Lipase 9.0 (*)     All other components within normal limits   BRAIN NATRIURETIC PEPTIDE - Abnormal; Notable for the following components:    Pro- (*)     All other components within normal limits   COVID-19, RAPID   RAPID INFLUENZA A/B ANTIGENS   TROPONIN         MEDICAL DECISION MAKING  Screenings:    ED course:    ED medications:   ED Medication Orders (From admission, onward)      Start Ordered     Status Ordering Provider    12/23/22 2047 12/23/22 2047  iopamidol (ISOVUE-370) 76 % injection 100 mL  IMG ONCE PRN         Last MAR action: Given - by Raul Hermleigh on 12/23/22 at [facility]    12/23/22 1945 12/23/22 1935  aspirin chewable tablet 243 mg  ONCE         Last MAR action: Given - by Pownal Meyers on 12/23/22 at [address], [address]    12/23/22 1945 12/23/22 1935  nitroglycerin (NITRO-BID) 2 % ointment 1 inch  ONCE         Last MAR action: Given - by Pownal Meyers on 12/23/22 at [address], [address]    12/23/22 1945 12/23/22 1936  carvedilol (COREG) tablet 12.5 mg  ONCE         Last MAR action: Not Given - by Pownal Meyers on 12/23/22 at [address], [address]    12/23/22 1945 12/23/22 1937  ranolazine (RANEXA) extended release tablet 500 mg  ONCE         Last MAR action: Not Given - by Pownal Meyers on 12/23/22 at [address]    12/23/22 1934 12/23/22 1935  morphine (PF) injection 4 mg  EVERY 1 HOUR PRN         Last MAR action: Given - by Pownal Meyers on 12/23/22 at [address], [address]          Patient was quite hypertensive on arrival and for this reason I treated her with pain as well as gave her nitroglycerin and ordered her home antihypertensive medications. With this treatment the patient's pain has improved but not resolved. Her blood pressure is also improved. Given her cardiac history I believe she would benefit from hospitalization. Have not found any alternate explanation for her pain. Patient to get a milligrams of aspirin earlier today.     The total Critical Care time is 35 minutes which excludes separately billable procedures. The critical care was concerning treatment of hypertension and chest pain with nitrates, aspirin and morphine. .  This time is exclusive of any time documented by any other providers. I spoke with Dr. Mary Jo Younger. We thoroughly discussed the history, physical exam, laboratory and imaging studies, as well as, emergency department course. Based upon that discussion, we've decided to admit Bhumi Garcia for further observation and evaluation of Marvin Lucio's chest pain. As I have deemed necessary from their history, physical, and studies, I have considered and evaluated Bhumi Garcia for the following diagnoses:  ACUTE CORONARY SYNDROME, PERICARDIAL TAMPONADE, PNEUMOTHORAX, PULMONARY EMBOLISM, and THORACIC DISSECTION. FINAL IMPRESSION  1. Chest pain, unspecified type    2. Coronary artery disease involving native heart, unspecified vessel or lesion type, unspecified whether angina present    3. Hypertensive urgency        Vitals:  Blood pressure (!) 169/91, pulse 67, resp. rate 22, weight 205 lb 4 oz (93.1 kg), last menstrual period 11/30/2005, SpO2 91 %. Disposition  Pt is in stable condition upon Admit to telemetry. This chart was generated using the [address] [place] [address] dictation system. I created this record but it may contain dictation errors.           Amber Lopez MD  12/23/22 [id number]

## 2022-12-24 NOTE — ED NOTES
MD made aware of worsening shoulder pain, states patient may have additional Morphine if she would like. Pt made aware, states she does not wish to have Morphine at this time.       Rylan Tobin RN  12/23/22 [id number]

## 2022-12-24 NOTE — ED NOTES
Called access center to check on ETA.  They state, they will start calling around again to find a transfer [address] [facility], [facility]  12/24/22 6945

## 2022-12-24 NOTE — ED NOTES
Free standing ED does not carry patient's home medications. Her ETA is 12 noon. Dr. Mary Marks states, okay for patient to take her home supply of medications. Patient is very knowledgeable of her medications. Patient didn't take her lasix d/t no inpatient order. Patient didn't bring her insulin. She was unable to take her insulin. Home medication patient did take ASA, atorvastatin, carvedilol, losartan, and ranolazine.       Dioni Perez RN  12/24/22 4330

## 2022-12-24 NOTE — PROGRESS NOTES
Clinical Pharmacy Note  Heparin Dosing       Lab Results   Component Value Date/Time    APTT 66.8 12/24/2022 06:40 AM     Lab Results   Component Value Date/Time    HGB 13.3 12/23/2022 07:25 PM    HCT 40.8 12/23/2022 07:25 PM     12/23/2022 07:25 PM    INR 1.02 10/06/2020 02:10 PM       Current Infusion Rate: 1000 units/hr    Plan:  Bolus: 2000 units  Rate: increase to 1190 units/hr  Next aPTT: 1300 on 12/24    Pharmacy will continue to monitor and adjust based on aPTT results.     Nathalia Espino, PharmD  12/24/2022 6:57 AM

## 2022-12-24 NOTE — ED NOTES
Rebecca from pharmacy called to change Heparin drip and bolus.  New order to given 2000 unit bolus and increase drip to 1380 units/hr      Beny De Leon RN  12/24/22 4524

## 2022-12-24 NOTE — PROGRESS NOTES
Pt arrived on unit, no c/o of CP or otherwise. Pt arrived w/ heparin drip infusing, currently at 13.8. Pt ambulated to bathroom w/ SBA. Diet order not in system when pt arrived, re-ordered same diet, pt ordered food.

## 2022-12-24 NOTE — ED NOTES
Pt ambulatory to the bathroom. Upon return pt  states her shoulder pain is worsening.       Camilo Meyers, RN  12/23/22 [id number]

## 2022-12-25 VITALS
DIASTOLIC BLOOD PRESSURE: 76 MMHG | OXYGEN SATURATION: 93 % | HEIGHT: 60 IN | BODY MASS INDEX: 39.6 KG/M2 | SYSTOLIC BLOOD PRESSURE: 160 MMHG | WEIGHT: 201.72 LBS | RESPIRATION RATE: 14 BRPM | HEART RATE: 84 BPM | TEMPERATURE: 98.7 F

## 2022-12-25 PROBLEM — R07.2 PRECORDIAL CHEST PAIN: Status: ACTIVE | Noted: 2022-12-23

## 2022-12-25 LAB
APTT: 108.2 SEC (ref 23–34.3)
GLUCOSE BLD-MCNC: 182 MG/DL (ref 70–99)
PERFORMED ON: ABNORMAL

## 2022-12-25 PROCEDURE — 99232 SBSQ HOSP IP/OBS MODERATE 35: CPT | Performed by: INTERNAL MEDICINE

## 2022-12-25 PROCEDURE — 99222 1ST HOSP IP/OBS MODERATE 55: CPT | Performed by: INTERNAL MEDICINE

## 2022-12-25 PROCEDURE — 6370000000 HC RX 637 (ALT 250 FOR IP): Performed by: INTERNAL MEDICINE

## 2022-12-25 PROCEDURE — 94680 O2 UPTK RST&XERS DIR SIMPLE: CPT

## 2022-12-25 PROCEDURE — 36415 COLL VENOUS BLD VENIPUNCTURE: CPT

## 2022-12-25 PROCEDURE — 85730 THROMBOPLASTIN TIME PARTIAL: CPT

## 2022-12-25 PROCEDURE — 2700000000 HC OXYGEN THERAPY PER DAY

## 2022-12-25 PROCEDURE — 94761 N-INVAS EAR/PLS OXIMETRY MLT: CPT

## 2022-12-25 RX ORDER — ONDANSETRON 2 MG/ML
4 INJECTION INTRAMUSCULAR; INTRAVENOUS EVERY 6 HOURS PRN
Status: DISCONTINUED | OUTPATIENT
Start: 2022-12-25 | End: 2022-12-25 | Stop reason: HOSPADM

## 2022-12-25 RX ADMIN — CARVEDILOL 12.5 MG: 12.5 TABLET, FILM COATED ORAL at 08:07

## 2022-12-25 RX ADMIN — ASPIRIN 81 MG 81 MG: 81 TABLET ORAL at 08:07

## 2022-12-25 RX ADMIN — RANOLAZINE 500 MG: 500 TABLET, FILM COATED, EXTENDED RELEASE ORAL at 08:07

## 2022-12-25 RX ADMIN — LOSARTAN POTASSIUM 50 MG: 50 TABLET, FILM COATED ORAL at 08:07

## 2022-12-25 RX ADMIN — FAMOTIDINE 20 MG: 20 TABLET, FILM COATED ORAL at 08:07

## 2022-12-25 RX ADMIN — ATORVASTATIN CALCIUM 20 MG: 20 TABLET, FILM COATED ORAL at 08:07

## 2022-12-25 NOTE — PROGRESS NOTES
Home Oxygen Evaluation    SpO2 94% on 2 L/m at rest. (Pt found on 2 L/m when I entered room.)  SpO2 88% on RA at rest  SpO2 81% on RA w/ ambulation  SpO2 89% on 2 L/m w/ ambulation  SpO2 92% on 3 L/m w/ ambulation    Pt DOES qualify for Home O2, at 3 L/m w/ ambulation.  Pt left on 3 L/m post O2 eval.

## 2022-12-25 NOTE — PROGRESS NOTES
DC order in. Pt stated \"please unhook me from this IV fluid, I dont like it\". Pt saline locked. Spoke to [facility] RT about Home O2 Eval and he said it was completed but not charted as of this note. Spoke to  and she is waiting for RT documentation.

## 2022-12-25 NOTE — CONSULTS
[address] VANDA Lucio  1965      Reason for Referral: CP    CC: CP      Subjective:     History of Present Illness:    Jas Whiting is a 61 y.o. patient with a PMH significant for HTN, CAD presented with complaints of CP. Chest pain was precordial  Currently no chest pain  Chest pain intensity moderate 5/10  Pressure-like sometimes sharp no radiation no diaphoresis shortness of breath associated with it. No aggravating or relieving factors. Chest pain was at rest.      Past Medical History:   has a past medical history of Arthritis, Back pain, CAD (coronary artery disease), COPD (chronic obstructive pulmonary disease) ([address] Depression, Diabetes mellitus ([address] REHMAN (dyspnea on exertion), Fatty liver, GERD (gastroesophageal reflux disease), Hyperlipidemia, Hypertension, MRSA (methicillin resistant staph aureus) culture positive, Stress incontinence, and Thyroid disease. Surgical History:   has a past surgical history that includes  section; Hysterectomy; Shoulder arthroscopy (Right, 5-19-15); Cholecystectomy; hernia repair; eye surgery; Upper gastrointestinal endoscopy (2017); Colonoscopy (2017); Percutaneous Transluminal Coronary Angio (10/2020); and Cardiac surgery. Social History:   reports that she quit smoking about 7 years ago. Her smoking use included cigarettes. She has a 30.00 pack-year smoking history. She has never used smokeless tobacco. She reports that she does not drink alcohol and does not use drugs. Family History:  family history includes Cancer in her mother; Heart Disease in her father; Stroke in her brother. Home Medications:  Were reviewed and are listed in nursing record and/or below  Prior to Admission medications    Medication Sig Start Date End Date Taking?  Authorizing Provider   carvedilol (COREG) 12.5 MG tablet TAKE 1 TABLET BY MOUTH TWICE A DAY WITH MEALS 22   Rosario MD Hilda   albuterol sulfate HFA (PROVENTIL;VENTOLIN;PROAIR) 108 (90 Base) MCG/ACT inhaler INHALE 2 PUFFS INTO THE LUNGS EVERY 6 HOURS AS NEEDED. 12/6/22   Vincent Padilla MD   HYDROcodone-acetaminophen (NORCO) 5-325 MG per tablet Take 1 tablet by mouth 2 times daily for 30 days. 12/5/22 1/4/23  Rosario Stevens MD   diazePAM (VALIUM) 5 MG tablet Take 1 tablet by mouth every 12 hours as needed for Anxiety or Sleep for up to 30 days.  12/5/22 1/4/23  Vincent Padilla MD   ranolazine (RANEXA) 500 MG extended release tablet TAKE 1 TABLET BY MOUTH TWICE A DAY 10/11/22   Cuate Maher MD   LANTUS SOLOSTAR 100 UNIT/ML injection pen INJECT 75 UNITS INTO THE SKIN NIGHTLY 9/13/22   Vincent Padilla MD   Continuous Blood Gluc  (FREESTYLE JACIEL 2 READER) ASHWIN tid 9/6/22   Vincent Padilla MD   Continuous Blood Gluc Sensor (FREESTYLE JACIEL 2 SENSOR) MISC tid 9/6/22   Vincent Padilla MD   TRUE METRIX BLOOD GLUCOSE TEST strip USE 1 EACH BY IN VITRO ROUTE 3 TIMES DAILY AS NEEDED. 9/6/22   Vincent Padilla MD   Continuous Blood Gluc Sensor (FREESTYLE JACIEL 14 DAY SENSOR) MISC tid 8/30/22   Vincent Padilla MD   Continuous Blood Gluc  (FREESTYLE JACIEL 14 DAY READER) ASHWIN tid 8/30/22   Vincent Padilla MD   atorvastatin (LIPITOR) 20 MG tablet TAKE 1 TABLET BY MOUTH EVERY DAY 8/17/22   Vincent Padilla MD   DULoxetine (CYMBALTA) 60 MG extended release capsule 1 tab po daily 8/17/22   Vincent Padilla MD   furosemide (LASIX) 20 MG tablet TAKE 1 TABLET BY MOUTH EVERY DAY 8/17/22   Vincent Padilla MD   losartan (COZAAR) 100 MG tablet 1 TAB PO DAILY 8/17/22   Vincent Padilla MD   Insulin Syringe-Needle U-100 (BD INSULIN SYRINGE U/F) 31G X 5/16\" 1 ML MISC USE AT BEDTIME WITH LANTUS 8/17/22   Vincent Padilla MD   ezetimibe (ZETIA) 10 MG tablet TAKE 1 TABLET BY MOUTH EVERY DAY 7/22/22   Vincent Padilla MD   famotidine (PEPCID) 20 MG tablet TAKE 1 TABLET BY MOUTH TWICE A DAY 7/22/22   Catrachita Lechuga MD   insulin lispro, 1 Unit Dial, 100 UNIT/ML SOPN INJECT 7 UNITS INTO THE SKIN 3 TIMES DAILY (BEFORE MEALS) 2/10/22 3/12/22  Rosario Stevens MD   FREESTYLE TEST STRIPS strip TEST 3 TIMES A DAY 12/16/21   Catrachita Lechuga MD   SYMBICORT 160-4.5 MCG/ACT AERO TAKE 2 PUFFS BY MOUTH TWICE A DAY 7/8/21   Arturo Salas MD   blood glucose test strips (RELION TRUE METRIX TEST STRIPS) strip 1 each by In Vitro route 6 times daily As needed. 6/8/21   Catrachita Lechuga MD   Blood Glucose Monitoring Suppl (TRUE METRIX METER) ASHWIN As directed 5/17/21   Catrachita Lechuga MD   nitroGLYCERIN (NITROSTAT) 0.4 MG SL tablet Place 1 tablet under the tongue every 5 minutes as needed for Chest pain 2/18/21   Bayron Bruno MD   aspirin 81 MG chewable tablet TAKE 1 TABLET BY MOUTH EVERY DAY 12/30/20   Bayron Bruno MD   Insulin Pen Needle 32G X 4 MM MISC 1 each by Does not apply route daily 10/9/20   Catrachita Lechuga MD   Blood Glucose Monitoring Suppl (FREESTYLE LITE) ASHWIN 1 Device by Does not apply route daily as needed (glucose monitoring) 10/9/20 12/9/20  Catrachita Lechuga MD   FreeStyle Lancets MISC 1 each by Does not apply route daily 10/8/20 12/9/20  Tatum Ruiz MD   blood glucose test strips (FREESTYLE LITE) strip 1 each by In Vitro route daily As needed.  10/8/20   Tatum Ruiz MD   MIRALAX powder TAKE 17 G BY MOUTH DAILY 2/4/19   Catrachita Lechuga MD        CURRENT Medications:  aspirin chewable tablet 81 mg, Daily  atorvastatin (LIPITOR) tablet 20 mg, Daily  diazePAM (VALIUM) tablet 5 mg, Q12H PRN  DULoxetine (CYMBALTA) extended release capsule 60 mg, Daily  famotidine (PEPCID) tablet 20 mg, BID  ezetimibe (ZETIA) tablet 10 mg, Daily  insulin glargine (LANTUS) injection vial 75 Units, Nightly  losartan (COZAAR) tablet 50 mg, Daily  ranolazine (RANEXA) extended release tablet 500 mg, BID  mometasone-formoterol (DULERA) 200-5 MCG/ACT inhaler 2 puff, BID  insulin lispro (HUMALOG) injection vial 0-4 Units, TID WC  insulin lispro (HUMALOG) injection vial 0-4 Units, Nightly  glucose chewable tablet 16 g, PRN  dextrose bolus 10% 125 mL, PRN   Or  dextrose bolus 10% 250 mL, PRN  glucagon (rDNA) injection 1 mg, PRN  dextrose 10 % infusion, Continuous PRN  carvedilol (COREG) tablet 12.5 mg, BID WC  heparin 25,000 unit in sodium chloride 0.45% 250 mL (premix) infusion, Continuous        Allergies:  Latex, Tramadol, Codeine, and Penicillins           Review of Systems: SEE HPI   Constitutional: no unanticipated weight loss. There's been no change in energy level, sleep pattern, or activity level. No fevers, chills. Eyes: No visual changes or diplopia. No scleral icterus. ENT: No Headaches, hearing loss or vertigo. No mouth sores or sore throat. Cardiovascular: No Chest pain, tightness or discomfort. No Shortness of breath. No Dyspnea on exertion, Orthopnea, Paroxysmal nocturnal dyspnea or breathlessness at rest.  No Palpitations. No Syncope ('blackouts', 'faints', 'collapse') or dizziness. Respiratory: No cough or wheezing, no sputum production. No hematemesis. Gastrointestinal: No abdominal pain, appetite loss, blood in stools. No change in bowel or bladder habits. Genitourinary: No dysuria, trouble voiding, or hematuria. Musculoskeletal:  No gait disturbance, no joint complaints. Integumentary: No rash or pruritis. Neurological: No headache, diplopia, change in muscle strength, numbness or tingling. Psychiatric: No anxiety or depression. Endocrine: No temperature intolerance. No excessive thirst, fluid intake, or urination. No tremor. Hematologic/Lymphatic: No abnormal bruising or bleeding, blood clots or swollen lymph nodes. Allergic/Immunologic: No nasal congestion or hives.       Objective:     PHYSICAL EXAM:      Vitals:    12/25/22 0346   BP: (!) 149/75   Pulse: 78   Resp: 20   Temp: 99.3 °F (37.4 °C)   SpO2: 93%    Weight: 201 lb 11.5 oz (91.5 kg) General Appearance:  Alert, cooperative, no distress, appears stated age. Head:  Normocephalic, without obvious abnormality, atraumatic. Eyes:  Pupils equal and round. No scleral icterus. Mouth: Moist mucosa, no pharyngeal erythema. Nose: Nares normal. No drainage or sinus tenderness. Neck: Supple, symmetrical, trachea midline. No adenopathy. No tenderness/mass/nodules. No carotid bruit or elevated JVD. Lungs:   Respiratory Effort: Normal   Auscultation: Clear to auscultation bilaterally, respirations unlabored. No wheeze, rales   Chest Wall:  No tenderness or deformity. Cardiovascular:    Pulses  Palpation: normal   Ascultation: Regular rate, S1/ S2 normal. No murmur, rub, or gallop. 2+ radial and pedal pulses, symmetric  Carotid  Femoral   Abdomen and Gastrointestinal:   Soft, non-tender, bowel sounds active. Liver and Spleen  Masses   Musculoskeletal: No muscle wasting  Back  Gait   Extremities: Extremities normal, atraumatic. No cyanosis or edema. No cyanosis clubbing       Skin: Inspection and palpation performed, no rashes or lesions. Pysch: Normal mood and affect.  Alert and oriented to time place person   Neurologic: Normal gross motor and sensory exam.       Labs     All labs have been reviewed    Lab Results   Component Value Date/Time    WBC 7.8 12/23/2022 07:25 PM    RBC 4.69 12/23/2022 07:25 PM    HGB 13.3 12/23/2022 07:25 PM    HCT 40.8 12/23/2022 07:25 PM    MCV 87.0 12/23/2022 07:25 PM    RDW 16.0 12/23/2022 07:25 PM     12/23/2022 07:25 PM     Lab Results   Component Value Date/Time     12/23/2022 07:25 PM    K 5.0 12/23/2022 07:25 PM     12/23/2022 07:25 PM    CO2 34 12/23/2022 07:25 PM    BUN 23 12/23/2022 07:25 PM    CREATININE 1.0 12/23/2022 07:25 PM    GFRAA >60 08/15/2022 11:05 AM    GFRAA >60 09/16/2012 03:59 AM    AGRATIO 1.4 12/23/2022 07:25 PM    LABGLOM >60 12/23/2022 07:25 PM    GLUCOSE 160 12/23/2022 07:25 PM    PROT 7.1 12/23/2022 07:25 PM    PROT 7.4 09/16/2012 03:59 AM    CALCIUM 9.8 12/23/2022 07:25 PM    BILITOT 0.9 12/23/2022 07:25 PM    ALKPHOS 120 12/23/2022 07:25 PM    AST 12 12/23/2022 07:25 PM    ALT 11 12/23/2022 07:25 PM     No results found for: PTINR  Lab Results   Component Value Date    LABA1C 7.2 11/14/2022     Lab Results   Component Value Date    TROPONINI <0.01 12/24/2022       Cardiac, Vascular and Imaging Data all Personally Reviewed in Detail by Myself      EKG: NSR RBBB    All reviewed    Assessment and Plan     -Precordial chest pain   No ACS  Normal troponin  No ECG Changes  No further cardiovascular work-up is required  Follow-up with cardiology  Can consider outpatient stress test.    -Uncontrolled HTN   BP Control       Thank you for allowing us to participate in the care of Danna Malin. Please do not hesitate to contact me if you have any questions.     Cortes Cornejo MD, MPH    [facility] [address]  Ph: [phone]  Fax: [phone]    12/25/2022 8:18 AM

## 2022-12-25 NOTE — PROGRESS NOTES
Clinical Pharmacy Note  Heparin Dosing       Lab Results   Component Value Date/Time    APTT 108.2 12/25/2022 04:53 AM     Lab Results   Component Value Date/Time    HGB 13.3 12/23/2022 07:25 PM    HCT 40.8 12/23/2022 07:25 PM     12/23/2022 07:25 PM    INR 1.02 10/06/2020 02:10 PM       Current Infusion Rate: 1380 units/hr    Plan:  Rate: decrease to 1480 units/hr  Next aPTT: 1200 on 12/25    Pharmacy will continue to monitor and adjust based on aPTT results.     Cheng Mendiola [facility]   12/25/2022 7:00 AM

## 2022-12-25 NOTE — PROGRESS NOTES
Pt given home O2 by CM. Belongings gathered. No c/o pain or otherwise. SO came to  pt and I took her down with her home O2 and belongings.

## 2022-12-25 NOTE — PROGRESS NOTES
Clinical Pharmacy Note  Heparin Dosing       Lab Results   Component Value Date/Time    APTT 69.5 12/24/2022 09:40 PM     Lab Results   Component Value Date/Time    HGB 13.3 12/23/2022 07:25 PM    HCT 40.8 12/23/2022 07:25 PM     12/23/2022 07:25 PM    INR 1.02 10/06/2020 02:10 PM       Current Infusion Rate: 1380 units/hr    Plan:  Bolus: 2000 units  Rate: increase to 1570 units/hr  Next aPTT: 0500 on 12/25    Pharmacy will continue to monitor and adjust based on aPTT results.     [facility]   12/24/2022 10:22 PM

## 2022-12-25 NOTE — PROGRESS NOTES
Pt took blood glucose at dinner. It was 234. Pt was given hospital insulin, 1 unit per sliding scale.

## 2022-12-25 NOTE — PLAN OF CARE
Problem: Discharge Planning  Goal: Discharge to home or other facility with appropriate resources  Outcome: Progressing  Flowsheets  Taken 12/24/2022 1941 by Lauren Pedroza RN  Discharge to home or other facility with appropriate resources: Identify barriers to discharge with patient and caregiver  Taken 12/24/2022 1651 by Tyrese Carpio RN  Discharge to home or other facility with appropriate resources: Identify barriers to discharge with patient and caregiver     Problem: ABCDS Injury Assessment  Goal: Absence of physical injury  Outcome: Progressing   Skin assessment completed every shift. Pt assessed for incontinence, appropriate barrier cream applied prn. Pt encouraged to turn/rotate every 2 hours. Assistance provided if pt unable to do so themselves.

## 2022-12-25 NOTE — DISCHARGE INSTR - COC
Continuity of Care Form    Patient Name: Kristel Abdi   :  1965  MRN:  [id number]    Admit date:  2022  Discharge date:  ***    Code Status Order: Full Code   Advance Directives:     Admitting Physician:  Renetta Whittaker MD  PCP: Renetta Whittaker MD    Discharging Nurse: [facility] Unit/Room#: E3U-0534/6988-87  Discharging Unit Phone Number: ***    Emergency Contact:   Extended Emergency Contact Information  Primary Emergency Contact: Trev Lucio  [place] [address] [address] Phone: [phone]  Mobile Phone: [phone]  Relation: Other  Secondary Emergency Contact: Mary Ellen Lucio  [place] Phone: [phone]  Mobile Phone: [phone]  Relation: Other   needed? No    Past Surgical History:  Past Surgical History:   Procedure Laterality Date    CARDIAC SURGERY      3 stent placed OCt [address] [address] 3    CHOLECYSTECTOMY      COLONOSCOPY  2017      Colonoscopy with snare and biopsy    EYE SURGERY      muscle correction and eyelid     HERNIA REPAIR      UMBILICAL AREA x 2    HYSTERECTOMY (CERVIX STATUS UNKNOWN)      PTCA  10/2020    SHEFALI to RCA x 2; SHEFALI to LAD    SHOULDER ARTHROSCOPY Right 5-19-15    Right shoulder arthroscopy labral debridement open nedra subacromial decompression and chrondroplasty    UPPER GASTROINTESTINAL ENDOSCOPY  2017    Esophagogastroduodenoscopy with biopsy       Immunization History: There is no immunization history on file for this patient.     Active Problems:  Patient Active Problem List   Diagnosis Code    Labral tear of shoulder S43.439A    Acromioclavicular joint arthritis M19.019    Rotator cuff tendonitis M75.80    S/P shoulder surgery Z98.890    STEMI (ST elevation myocardial infarction) ([facility]) I21.3    Acute ST elevation myocardial infarction (STEMI) involving right coronary artery ([facility] I21.11    Hyperlipidemia LDL goal <70 E78.5    Uncontrolled type 2 diabetes mellitus with hyperglycemia ([facility]) E11.65    Shortness of breath R06.02    Stage 2 moderate COPD by GOLD classification ([facility]) J44.9    Personal history of tobacco use Z87.891    Unstable angina ([facility]) I20.0    Intermittent claudication ([facility]) I73.9    Precordial chest pain R07.2       Isolation/Infection:   Isolation            No Isolation          Patient Infection Status       Infection Onset Added Last Indicated Last Indicated By Review Planned Expiration Resolved Resolved By    None active    Resolved    COVID-19 (Rule Out) 12/23/22 12/23/22 12/23/22 COVID-19, Rapid (Ordered)   12/23/22 Rule-Out Test Resulted    MRSA  08/20/18 08/20/18 Ward Barraza RN   10/07/20 aWrd Barraza RN    Wound culture arm 8/15/18            Nurse Assessment:  Last Vital Signs: BP (!) 160/76   Pulse 84   Temp 98.7 °F (37.1 °C) (Oral)   Resp 14   Ht 5' (1.524 m)   Wt 201 lb 11.5 oz (91.5 kg)   LMP 11/30/2005   SpO2 93%   BMI 39.40 kg/m²     Last documented pain score (0-10 scale): Pain Level: 0  Last Weight:   Wt Readings from Last 1 Encounters:   12/25/22 201 lb 11.5 oz (91.5 kg)     Mental Status:  oriented and alert    IV Access:  - None    Nursing Mobility/ADLs:  Walking   Independent  Transfer  Independent  Bathing  Independent  Dressing  Independent  Toileting  Independent  Feeding  Independent  Med Admin  Independent  Med Delivery   whole    Wound Care Documentation and Therapy:        Elimination:  Continence: Bowel: Yes  Bladder: Yes  Urinary Catheter: None   Colostomy/Ileostomy/Ileal Conduit: No       Date of Last BM: ***    Intake/Output Summary (Last 24 hours) at 12/25/2022 1121  Last data filed at 12/25/2022 0934  Gross per 24 hour   Intake 475 ml   Output --   Net 475 ml     No intake/output data recorded.     Safety Concerns:     [address] Safety Concerns:035907847}    Impairments/Disabilities:      [address] Impairments/Disabilities:428396467}    Nutrition Therapy:  Current Nutrition Therapy:   { JONAS Diet List:663971134}    Routes of Feeding: {[facility] DME Other Feedings:344973973}  Liquids: {Slp liquid thickness:79330}  Daily Fluid Restriction: {CHP DME Yes amt example:299071878}  Last Modified Barium Swallow with Video (Video Swallowing Test): {Done Not Done Date:}    Treatments at the Time of Hospital Discharge:   Respiratory Treatments: ***  Oxygen Therapy:  {Therapy; copd oxygen:16064}  Ventilator:    { CC Vent List:162187698}    Rehab Therapies: {THERAPEUTIC INTERVENTION:2646306568}  Weight Bearing Status/Restrictions: { CC Weight Bearin}  Other Medical Equipment (for information only, NOT a DME order):  {EQUIPMENT:283225615}  Other Treatments: ***    Patient's personal belongings (please select all that are sent with patient):  {[facility] DME Belongings:698749580}    RN SIGNATURE:  {Esignature:358441500}    CASE MANAGEMENT/SOCIAL WORK SECTION    Inpatient Status Date: ***    Readmission Risk Assessment Score:  Readmission Risk              Risk of Unplanned Readmission:  13           Discharging to Facility/ Agency   Name:   Address:  Phone:  Fax:    Dialysis Facility (if applicable)   Name:  Address:  Dialysis Schedule:  Phone:  Fax:    / signature: {Esignature:672933381}    PHYSICIAN SECTION    Prognosis: {Prognosis:9814409179}    Condition at Discharge: { Patient Condition:667990105}    Rehab Potential (if transferring to Rehab): {Prognosis:6462689793}    Recommended Labs or Other Treatments After Discharge: ***    Physician Certification: I certify the above information and transfer of Lindsay Lucio  is necessary for the continuing treatment of the diagnosis listed and that she requires {Admit to Appropriate Level of Care:44870} for {GREATER/LESS:994222306} 30 days.     Update Admission H&P: {CHP DME Changes in HandP:742888936}    PHYSICIAN SIGNATURE:   {[facility]:535459919}

## 2022-12-25 NOTE — H&P
Hospital Medicine History & Physical    Patient:  Alex Cota  YOB: 1965  Date of Service: 22  MRN: [id number]    PCP: Lenin Ramirez MD    Date of Admission: 2022      Chief Complaint:    Chief Complaint   Patient presents with    Chest Pain     States started with midsternal chest pain 2 hours ago, radiating to left jaw, shoulder and arm         History Of Present Illness:     The patient is a 61 y.o. female who presents to [facility] with c/o as above  Feels better  Seen by the cardiology  No w/up planned    Past Medical History:        Diagnosis Date    Arthritis     Back pain     CAD (coronary artery disease)     COPD (chronic obstructive pulmonary disease) (HCC)     Depression     Diabetes mellitus ([address]     REHMAN (dyspnea on exertion)     Fatty liver     GERD (gastroesophageal reflux disease)     Hyperlipidemia     Hypertension     MRSA (methicillin resistant staph aureus) culture positive 08/15/2018    arm    Stress incontinence     Thyroid disease     CYST ON THYROID--FOUND 20 YEARS AGO       Past Surgical History:        Procedure Laterality Date    CARDIAC SURGERY      3 stent placed OCt 20     SECTION      TIMES 3    CHOLECYSTECTOMY      COLONOSCOPY  2017      Colonoscopy with snare and biopsy    EYE SURGERY      muscle correction and eyelid     HERNIA REPAIR      UMBILICAL AREA x 2    HYSTERECTOMY (CERVIX STATUS UNKNOWN)      PTCA  10/2020    SHEFALI to RCA x 2; SHEFALI to LAD    SHOULDER ARTHROSCOPY Right 5-19-15    Right shoulder arthroscopy labral debridement open [place] subacromial decompression and chrondroplasty    UPPER GASTROINTESTINAL ENDOSCOPY  2017    Esophagogastroduodenoscopy with biopsy       Family History:  Family History   Problem Relation Age of Onset    Cancer Mother         lung     Heart Disease Father     Stroke Brother        Medications Prior to Admission:    Prior to Admission medications    Medication Sig Start Date End Date Taking? Authorizing Provider   carvedilol (COREG) 12.5 MG tablet TAKE 1 TABLET BY MOUTH TWICE A DAY WITH MEALS 12/6/22   Rosario Stevens MD   albuterol sulfate HFA (PROVENTIL;VENTOLIN;PROAIR) 108 (90 Base) MCG/ACT inhaler INHALE 2 PUFFS INTO THE LUNGS EVERY 6 HOURS AS NEEDED. 12/6/22   Valente Jeans, MD   HYDROcodone-acetaminophen (NORCO) 5-325 MG per tablet Take 1 tablet by mouth 2 times daily for 30 days. 12/5/22 1/4/23  Rosario Stevens MD   diazePAM (VALIUM) 5 MG tablet Take 1 tablet by mouth every 12 hours as needed for Anxiety or Sleep for up to 30 days.  12/5/22 1/4/23  Valente Jeans, MD   ranolazine (RANEXA) 500 MG extended release tablet TAKE 1 TABLET BY MOUTH TWICE A DAY 10/11/22   Isaias Luna MD   LANTUS SOLOSTAR 100 UNIT/ML injection pen INJECT 75 UNITS INTO THE SKIN NIGHTLY 9/13/22   Valente Jeans, MD   Continuous Blood Gluc  (FREESTYLE JACIEL 2 READER) ASHWIN tid 9/6/22   Valente Jeans, MD   Continuous Blood Gluc Sensor (FREESTYLE JACIEL 2 SENSOR) MISC tid 9/6/22   Valente Jeans, MD   TRUE METRIX BLOOD GLUCOSE TEST strip USE 1 EACH BY IN VITRO ROUTE 3 TIMES DAILY AS NEEDED. 9/6/22   Valente Jeans, MD   Continuous Blood Gluc Sensor (FREESTYLE JACIEL 14 DAY SENSOR) MISC tid 8/30/22   Valente Jeans, MD   Continuous Blood Gluc  (FREESTYLE JACIEL 14 DAY READER) ASHWIN tid 8/30/22   Valente Jeans, MD   atorvastatin (LIPITOR) 20 MG tablet TAKE 1 TABLET BY MOUTH EVERY DAY 8/17/22   Valente Jeans, MD   DULoxetine (CYMBALTA) 60 MG extended release capsule 1 tab po daily 8/17/22   Valente Jeans, MD   furosemide (LASIX) 20 MG tablet TAKE 1 TABLET BY MOUTH EVERY DAY 8/17/22   Valente Jeans, MD   losartan (COZAAR) 100 MG tablet 1 TAB PO DAILY 8/17/22   Valente Jeans, MD   Insulin Syringe-Needle U-100 (BD INSULIN SYRINGE U/F) 31G X 5/16\" 1 ML MISC USE AT BEDTIME WITH LANTUS 8/17/22   Lazarus Rod, MD   ezetimibe (ZETIA) 10 MG tablet TAKE 1 TABLET BY MOUTH EVERY DAY 7/22/22   Lazarus Rod, MD   famotidine (PEPCID) 20 MG tablet TAKE 1 TABLET BY MOUTH TWICE A DAY 7/22/22   Lazarus Rod, MD   insulin lispro, 1 Unit Dial, 100 UNIT/ML SOPN INJECT 7 UNITS INTO THE SKIN 3 TIMES DAILY (BEFORE MEALS) 2/10/22 3/12/22  Lazarus Rod, MD   FREESTYLE TEST STRIPS strip TEST 3 TIMES A DAY 12/16/21   Lazarus Rod, MD   SYMBICORT 160-4.5 MCG/ACT AERO TAKE 2 PUFFS BY MOUTH TWICE A DAY 7/8/21   Maricel Du MD   blood glucose test strips (RELION TRUE METRIX TEST STRIPS) strip 1 each by In Vitro route 6 times daily As needed. 6/8/21   Lazarus Rod, MD   Blood Glucose Monitoring Suppl (TRUE METRIX METER) ASHWIN As directed 5/17/21   Lazarus Rod, MD   nitroGLYCERIN (NITROSTAT) 0.4 MG SL tablet Place 1 tablet under the tongue every 5 minutes as needed for Chest pain 2/18/21   Manny Zambrano MD   aspirin 81 MG chewable tablet TAKE 1 TABLET BY MOUTH EVERY DAY 12/30/20   Manny Zambrano MD   Insulin Pen Needle 32G X 4 MM MISC 1 each by Does not apply route daily 10/9/20   Lazarus Rod, MD   Blood Glucose Monitoring Suppl (FREESTYLE LITE) ASHWIN 1 Device by Does not apply route daily as needed (glucose monitoring) 10/9/20 12/9/20  Lazarus Rod, MD   FreeStyle Lancets MISC 1 each by Does not apply route daily 10/8/20 12/9/20  Deena Stanley MD   blood glucose test strips (FREESTYLE LITE) strip 1 each by In Vitro route daily As needed.  10/8/20   Deena Stanley MD   MIRALAX powder TAKE 17 G BY MOUTH DAILY 2/4/19   Lazarus Rod, MD       Allergies:  Latex, Tramadol, Codeine, and Penicillins    Social History:    Social History     Socioeconomic History    Marital status:      Spouse name: Not on file    Number of children: Not on file    Years of education: Not on file Highest education level: Not on file   Occupational History    Not on file   Tobacco Use    Smoking status: Former     Packs/day: 1.00     Years: 30.00     Pack years: 30.00     Types: Cigarettes     Quit date:      Years since quittin.9    Smokeless tobacco: Never   Vaping Use    Vaping Use: Never used   Substance and Sexual Activity    Alcohol use: No    Drug use: No    Sexual activity: Never   Other Topics Concern    Not on file   Social History Narrative    Not on file     Social Determinants of Health     Financial Resource Strain: Not on file   Food Insecurity: Not on file   Transportation Needs: Not on file   Physical Activity: Not on file   Stress: Not on file   Social Connections: Not on file   Intimate Partner Violence: Not on file   Housing Stability: Not on file       TOBACCO:   reports that she quit smoking about 7 years ago. Her smoking use included cigarettes. She has a 30.00 pack-year smoking history. She has never used smokeless tobacco.  ETOH:   reports no history of alcohol use. REVIEW OF SYSTEMS:    Vital: BP (!) 160/76   Pulse 88   Temp 98.7 °F (37.1 °C) (Oral)   Resp 12   Ht 5' (1.524 m)   Wt 201 lb 11.5 oz (91.5 kg)   LMP 2005   SpO2 94%   BMI 39.40 kg/m²   Constitutional: no fever, no night sweats, no fatigue  Head: no headache, no head injury, no migranes. Eye: no blurring of vision, no double vision.   Ears: no hearing difficulty, no tinnitus  Mouth/throat: no ulceration, dental caries, dysphagia  Lungs: no cough, no shortness of breath, no wheeze  CVS: no palpitation, no chest pain, no shortness of breath  GI: no abdominal pain, no nausea , no vomiting, no constipation  ARTHUR: no dysuria, frequency and urgency, no hematuria, no kidney stones  Musculoskeletal:back pain  Endocrine: no polyuria, polydypsia, no cold or heat intolerence  Hematology: no anemia, no easy brusing or bleeding, no hx of clotting disorder  Dermatology: no skin rash, no eczema, no prurities,  Psychiatry: no depression, no anxiety,no panic attacks, no suicide ideation  Neurology: no syncope, no seizures, no numbness or tingling of hands, no numbness or tingling of feet, no paresis      PHYSICAL EXAM:  General:  Awake, alert, not in distress. Appears to be stated age. HEENT: Atraumatic, normocephalic. PERRLA. EOM intact. Pink conjunctiva, anicteric sclera. Pink and moist oral mucosa. No lymphadenopathy. Trachea midline. Thyroid non palpable. Neck supple. No carotid bruit. No JVD. Chest: Bilateal air entry, Clear to auscultation, no wheezing, rhonchi or rales. Cardiovascular: RRR, U9L9, no murmur, click, rub or gallop. No lower extremity edema. Pedal and posterior tibialis 2+. Abdomen: Soft, non tender to palpation. Active bowel sounds x 4 quadrants. Musculoskeletal: Limitation of the rom of the joints and LSS  SLR is positive on the affected side  No gross weak ness appreciated  CNS: Oriented to person, place and time. CXR:   CT CHEST PULMONARY EMBOLISM W CONTRAST   Final Result   1. No evidence of pulmonary embolism or acute pulmonary abnormality. 2. Coronary artery disease. XR CHEST PORTABLE   Final Result   No radiographic evidence of acute cardiopulmonary disease. EKG:  I have reviewed the EKG. CBC   Recent Labs     12/23/22 1925   WBC 7.8   HGB 13.3   HCT 40.8         RENAL  Recent Labs     12/23/22 1925   *   K 5.0      CO2 34*   BUN 23*   CREATININE 1.0     LFT'S  Recent Labs     12/23/22 1925   AST 12*   ALT 11   BILITOT 0.9   ALKPHOS 120     COAG  No results for input(s): INR in the last 72 hours.   CARDIAC ENZYMES  Recent Labs     12/24/22  0700 12/24/22  1300 12/24/22  1405   TROPONINI <0.01 0.01 <0.01       U/A:    Lab Results   Component Value Date/Time    COLORU Yellow 02/21/2018 02:57 AM    WBCUA 0-2 02/21/2018 02:57 AM    RBCUA None seen 02/21/2018 02:57 AM    BACTERIA Rare 02/21/2018 02:57 AM    CLARITYU Clear 02/21/2018 02:57 AM    SPECGRAV 1.015 02/21/2018 02:57 AM    LEUKOCYTESUR Negative 02/21/2018 02:57 AM    BLOODU Negative 02/21/2018 02:57 AM    GLUCOSEU Negative 02/21/2018 02:57 AM       ABG  No results found for: VVL8QRA, BEART, K8YQQCTJ, PHART, THGBART, CUP8ZLI, PO2ART, PLL8IOM        Active Hospital Problems    Diagnosis Date Noted    Chest pain [R07.9] 12/23/2022     Priority: Medium           ASSESSMENT/PLAN:  Chest pain-mi r/out  Uncontrolled dm-monitor c/s  Htn  Cad  djd    Pt is stable. Discussed with staff and pt about the plan. See the orders for further plan. Will proceed further acc to pt's progress.     Electronically signed by Pia Ward MD on 12/25/2022 at 8:55 AM

## 2022-12-25 NOTE — CARE COORDINATION
INITIAL ASSESSMENT AND DISCHARGE SUMMARY:     Spoke w/pt to address barriers to dc. HOME: pt resides in a mobile home with her 23year old grandson. [employer] which pt reported no difficulty with. DME/O2: Pt reported that she has no DME at home. Patient to return home with home oxygen through [email or web address]. ACTIVE SERVICES: Pt stated that she is independent with all self care but stated that her ex spouse will likely stay with her for a few days after she returns home to assist her as needed, until she is feeling back to herself. TRANSPORTATION: Pt stated that she does not drive, that her dgtr or her ex spouse provide transportation to her as needed. PHARMACY: denies difficulty obtaining/taking meds. Pt has all scripts filled at [facility] on [facility]. PCP: Dr. Ne Benito: verified address/phone number as correct     INSURANCE:  [facility]     HD/PD: No     THERAPY RECS Not ordered    SW/CM provided contact information for patient or family to call with any questions. SW/CM will follow and assist as needed.       Electronically signed by Shiv Jones RN on 12/25/2022 at 3:59 PM

## 2022-12-25 NOTE — PROGRESS NOTES
Pt alert and oriented x4. Pt has denied any pain throughout the night. Pt currently on 2L NC. Pt SOB with exertion to the bathroom, and o2 sats drop to low 80s. Pt able to come back up to >90% with the 2L afterwards. Heparin gtt infusing. Pt denies further needs at this time. Call light within reach.      Electronically signed by Diane Moore RN on 12/25/2022 at 6:06 AM

## 2022-12-27 NOTE — DISCHARGE SUMMARY
Hospitalist Discharge Summary   12/27/2022 2:14 PM  Subjective:   Admit Date: 12/23/2022  PCP: Thania Moncada MD  Interval History: pt is ready for the discharge  Feels better  Rest of the events as in progress notes and chart  Admitted for the chest pain  Mi r/out  Seen by the cardiology  No w/up at this time  Denies any other complaints  Chart reviewed. Diet: No diet orders on file  Medications:   Scheduled Meds:  Continuous Infusions:  CBC: No results for input(s): WBC, HGB, PLT in the last 72 hours. BMP:  No results for input(s): NA, K, CL, CO2, BUN, CREATININE, GLUCOSE in the last 72 hours. Hepatic: No results for input(s): AST, ALT, ALB, BILITOT, ALKPHOS in the last 72 hours. Troponin: No results for input(s): TROPONINI in the last 72 hours. BNP: No results for input(s): BNP in the last 72 hours. Lipids: No results for input(s): CHOL, HDL in the last 72 hours. Invalid input(s): LDLCALCU  INR: No results for input(s): INR in the last 72 hours. Orders Placed This Encounter   Procedures    COVID-19, Rapid     Standing Status:   Standing     Number of Occurrences:   1     Order Specific Question:   Is this test for diagnosis or screening? Answer:   Diagnosis of ill patient     Order Specific Question:   Symptomatic for COVID-19 as defined by CDC? Answer:   Yes     Order Specific Question:   Date of Symptom Onset     Answer:   12/23/2022     Order Specific Question:   Hospitalized for COVID-19? Answer:   No     Order Specific Question:   Admitted to ICU for COVID-19? Answer:   No     Order Specific Question:   Employed in healthcare setting? Answer:   No     Order Specific Question:   Resident in a congregate (group) care setting? Answer:   No     Order Specific Question:   Pregnant? Answer:   No     Order Specific Question:   Previously tested for COVID-19?      Answer:   Yes    Rapid influenza A/B antigens     Standing Status:   Standing     Number of Occurrences:   1 XR CHEST PORTABLE     Standing Status:   Standing     Number of Occurrences:   1     Order Specific Question:   Reason for exam:     Answer:   chest pain    CT CHEST PULMONARY EMBOLISM W CONTRAST     Standing Status:   Standing     Number of Occurrences:   1     Order Specific Question:   Reason for exam:     Answer:   Chest Pain     Order Specific Question:   Decision Support Exception - unselect if not a suspected or confirmed emergency medical condition     Answer:   Emergency Medical Condition (MA) [1]    CBC with Auto Differential     Standing Status:   Standing     Number of Occurrences:   1    CMP w/ Reflex to MG     Standing Status:   Standing     Number of Occurrences:   1    Lipase     Standing Status:   Standing     Number of Occurrences:   1    Troponin     Standing Status:   Standing     Number of Occurrences:   1    Brain Natriuretic Peptide     Standing Status:   Standing     Number of Occurrences:   1    APTT     Standing Status:   Standing     Number of Occurrences:   1    APTT     Standing Status:   Standing     Number of Occurrences:   1    Troponin     Standing Status:   Standing     Number of Occurrences:   1    APTT     Standing Status:   Standing     Number of Occurrences:   1    APTT     Standing Status:   Standing     Number of Occurrences:   1    Cardiac Monitor - ED Only     Standing Status:   Standing     Number of Occurrences:   1     Order Specific Question:   Patient may go off unit without monitor     Answer:   Yes     Order Specific Question:   Off unit: Answer:   w/ Tele only    Inpatient consult to Cardiology     Standing Status:   Standing     Number of Occurrences:   1     Order Specific Question:   Reason for Consult?      Answer:   chest pain    Home O2 eval (desaturation screen)     Standing Status:   Standing     Number of Occurrences:   1    POCT Glucose     Standing Status:   Standing     Number of Occurrences:   1    POCT Glucose     Standing Status:   Standing Number of Occurrences:   1    POCT Glucose     Standing Status:   Standing     Number of Occurrences:   1    POCT Glucose     Standing Status:   Standing     Number of Occurrences:   1    EKG 12 Lead     Standing Status:   Standing     Number of Occurrences:   1     Order Specific Question:   Reason for Exam?     Answer:   Chest pain    EKG Rhythm Strip     Standing Status:   Standing     Number of Occurrences:   1    Saline lock IV     Standing Status:   Standing     Number of Occurrences:   1    ADMIT TO INPATIENT     Standing Status:   Standing     Number of Occurrences:   1     Order Specific Question:   Discharge Plan:     Answer: Other/Uknown (Specify)    ADMIT TO INPATIENT     Standing Status:   Standing     Number of Occurrences:   1     Order Specific Question:   Discharge Plan:     Answer:   Home with Office Follow-up    Discharge patient     Standing Status:   Standing     Number of Occurrences:   1     Order Specific Question:   Discharge Disposition     Answer:   Home       No current facility-administered medications for this encounter. Current Outpatient Medications   Medication Sig Dispense Refill    carvedilol (COREG) 12.5 MG tablet TAKE 1 TABLET BY MOUTH TWICE A DAY WITH MEALS 180 tablet 1    albuterol sulfate HFA (PROVENTIL;VENTOLIN;PROAIR) 108 (90 Base) MCG/ACT inhaler INHALE 2 PUFFS INTO THE LUNGS EVERY 6 HOURS AS NEEDED. 18 each 1    HYDROcodone-acetaminophen (NORCO) 5-325 MG per tablet Take 1 tablet by mouth 2 times daily for 30 days. 60 tablet 0    diazePAM (VALIUM) 5 MG tablet Take 1 tablet by mouth every 12 hours as needed for Anxiety or Sleep for up to 30 days.  60 tablet 2    ranolazine (RANEXA) 500 MG extended release tablet TAKE 1 TABLET BY MOUTH TWICE A  tablet 3    LANTUS SOLOSTAR 100 UNIT/ML injection pen INJECT 75 UNITS INTO THE SKIN NIGHTLY 5 Adjustable Dose Pre-filled Pen Syringe 3    Continuous Blood Gluc  (FREESTYLE JACIEL 2 READER) ASHWIN tid 90 each 2 Continuous Blood Gluc Sensor (FREESTYLE JACIEL 2 SENSOR) MISC tid 90 each 2    Continuous Blood Gluc Sensor (FREESTYLE JACIEL 14 DAY SENSOR) MISC tid 90 each 3    Continuous Blood Gluc  (FREESTYLE JACIEL 14 DAY READER) ASHWIN tid 90 each 2    atorvastatin (LIPITOR) 20 MG tablet TAKE 1 TABLET BY MOUTH EVERY DAY 90 tablet 2    DULoxetine (CYMBALTA) 60 MG extended release capsule 1 tab po daily 90 capsule 2    furosemide (LASIX) 20 MG tablet TAKE 1 TABLET BY MOUTH EVERY DAY 90 tablet 1    losartan (COZAAR) 100 MG tablet 1 TAB PO DAILY 90 tablet 3    Insulin Syringe-Needle U-100 (BD INSULIN SYRINGE U/F) 31G X 5/16\" 1 ML MISC USE AT BEDTIME WITH LANTUS 100 each 2    ezetimibe (ZETIA) 10 MG tablet TAKE 1 TABLET BY MOUTH EVERY DAY 90 tablet 1    famotidine (PEPCID) 20 MG tablet TAKE 1 TABLET BY MOUTH TWICE A  tablet 1    insulin lispro, 1 Unit Dial, 100 UNIT/ML SOPN INJECT 7 UNITS INTO THE SKIN 3 TIMES DAILY (BEFORE MEALS) 5 pen 1    SYMBICORT 160-4.5 MCG/ACT AERO TAKE 2 PUFFS BY MOUTH TWICE A DAY 1 Inhaler 0    Blood Glucose Monitoring Suppl (TRUE METRIX METER) ASHWIN As directed 1 Device 0    nitroGLYCERIN (NITROSTAT) 0.4 MG SL tablet Place 1 tablet under the tongue every 5 minutes as needed for Chest pain 25 tablet 3    aspirin 81 MG chewable tablet TAKE 1 TABLET BY MOUTH EVERY DAY 30 tablet 3    Insulin Pen Needle 32G X 4 MM MISC 1 each by Does not apply route daily 100 each 3    Blood Glucose Monitoring Suppl (FREESTYLE LITE) ASHWIN 1 Device by Does not apply route daily as needed (glucose monitoring) 1 Device 0    FreeStyle Lancets MISC 1 each by Does not apply route daily 200 each 0    MIRALAX powder TAKE 17 G BY MOUTH DAILY 510 g 2       Orders Placed This Encounter   Medications    aspirin chewable tablet 243 mg    nitroglycerin (NITRO-BID) 2 % ointment 1 inch    DISCONTD: morphine (PF) injection 4 mg    DISCONTD: carvedilol (COREG) tablet 12.5 mg    DISCONTD: ranolazine (RANEXA) extended release tablet 500 mg    iopamidol (ISOVUE-370) 76 % injection 100 mL    heparin (porcine) injection 4,000 Units    DISCONTD: heparin (porcine) injection 4,000 Units    DISCONTD: heparin (porcine) injection 2,000 Units    DISCONTD: heparin 25,000 unit in sodium chloride 0.45% 250 mL (premix) infusion    DISCONTD: aspirin chewable tablet 81 mg    DISCONTD: atorvastatin (LIPITOR) tablet 20 mg    DISCONTD: carvedilol (COREG) tablet 12.5 mg    DISCONTD: diazePAM (VALIUM) tablet 5 mg    DISCONTD: DULoxetine (CYMBALTA) extended release capsule 60 mg    DISCONTD: famotidine (PEPCID) tablet 20 mg    DISCONTD: ezetimibe (ZETIA) tablet 10 mg    DISCONTD: insulin glargine (LANTUS) injection vial 75 Units    DISCONTD: losartan (COZAAR) tablet 50 mg    DISCONTD: ranolazine (RANEXA) extended release tablet 500 mg    DISCONTD: mometasone-formoterol (DULERA) 200-5 MCG/ACT inhaler 2 puff    DISCONTD: insulin lispro (HUMALOG) injection vial 0-4 Units    DISCONTD: insulin lispro (HUMALOG) injection vial 0-4 Units    DISCONTD: ondansetron (ZOFRAN) injection 4 mg    heparin (porcine) injection 2,000 Units    heparin (porcine) injection 2,000 Units    DISCONTD: glucose chewable tablet 16 g    DISCONTD: dextrose bolus 10% 125 mL    DISCONTD: dextrose bolus 10% 250 mL    DISCONTD: glucagon (rDNA) injection 1 mg    DISCONTD: dextrose 10 % infusion    DISCONTD: carvedilol (COREG) tablet 12.5 mg    heparin (porcine) injection 2,000 Units    DISCONTD: ondansetron (ZOFRAN) injection 4 mg           Objective:   Vitals: BP (!) 160/76   Pulse 84   Temp 98.7 °F (37.1 °C) (Oral)   Resp 14   Ht 5' (1.524 m)   Wt 201 lb 11.5 oz (91.5 kg)   LMP 11/30/2005   SpO2 93%   BMI 39.40 kg/m²   General appearance: alert,awake,oriented x 3 and cooperative with exam  HEENT: Head: Normal, normocephalic, atraumatic, anicteric, pupils are reactive to light. Dry mucous membrane.   Neck: no adenopathy, no carotid bruit, supple, symmetrical, trachea midline and thyroid not enlarged, symmetric, no tenderness/mass/nodules  Lungs: clear  Heart: regular rate and rhythm, S1, S2 normal, no murmur, click, rub or gallop  Abdomen: soft, non-tender; bowel sounds normal; no masses,  no organomegaly  Extremities: extremities normal, Neurologic: Mental status: Alert, oriented, thought content appropriate    Assessment and Plan:   Chest pain-mi r/out  Uncontrolled dm  Patient Active Problem List:     Labral tear of shoulder     Acromioclavicular joint arthritis     Rotator cuff tendonitis     S/P shoulder surgery     STEMI (ST elevation myocardial infarction) ([address]     Acute ST elevation myocardial infarction (STEMI) involving right coronary artery ([address]     Hyperlipidemia LDL goal <70     Uncontrolled type 2 diabetes mellitus with hyperglycemia (HCC)     Shortness of breath     Stage 2 moderate COPD by GOLD classification ([address]     Personal history of tobacco use     Unstable angina (HCC)     Intermittent claudication (HCC)     Precordial chest pain    Pt is stable. Discussed with staff and pt about the plan. See the orders for further plan. Will proceed further acc to pt's progress. Time spent with management of the pt is- 20 mts  Follow up in office in 1 wk. See the [address] [place] and Med rec forms  Follow up with specialists as instructed by them. Advised the pt to call me in case of questions or worsening of symptoms. Pt voiced understanding of the instructions.   Condition and prognosis is guarded      Electronically signed by: Sultana Samuel MD, on 12/27/2022, at 2:14 PM

## 2023-01-25 NOTE — PROGRESS NOTES
[address]    Endy Peter  1965 January 26, 2023    CC: \"I have some left should pain that goes into the chest at rest.\"     HPI:  The patient is 61 y.o. female with a past medical history significant for CAD, hyperlipidemia and hypertension. She underwent LHC on 10/6/20 after presenting with chest pain resulting in SHEFALI x 2 to RCA and SHEFALI to LAD. She completed a stress test due to complaints of chest pain revealing a reversible defect. She underwent a heart catheterization on 3/8/21 that did not require any intervention. She underwent peripheral angiogram due to abnormal ABIs and claudication symptoms on 11/8/21. This revealed serial 80% lesions in right SFA that underwent angioplasty with drug coated balloon. claudication symptoms with leg pain and denies any improvement since her peripheral angiogram. This presents as calf pain when walking. She does admit to chronic back pain and does feel the calf pain will improve if leaning forward or walking with an aid. She does admit to occasional discoloration on her feet. Her previous angina presented as chest pain with associated left arm and jaw pain. Today, she presents for HFU after presenting to [facility] 12/25/2022 with CP, precordial, 5/10, pressure like, sometimes sharp. No associated SOB, diaphoresis. No aggravating or relieving factors. CP at rest. Uncontrolled BP. Today, she reports fatigue, left shoulder and into chest pains with off/on BLE edema. The pain occurs at rest or when in bed at night. She continues with chronic back issues with bilateral leg pain. She continues with bilateral leg pain. When at the grocery store, she will push cart with improvement in BLE pain. Patient denies exertional chest pain/pressure, dyspnea at rest, REHMAN, PND, orthopnea, palpitations, lightheadedness, weight changes, changes in LE edema, and syncope. She also admits to medical therapy compliance and tolerating.      Review of Systems:  Constitutional: No fatigue, weakness, night sweats or fever. HEENT: No new vision difficulties or ringing in the ears. Respiratory: No new SOB, PND, orthopnea or cough. Cardiovascular: See HPI   GI: No n/v, diarrhea, constipation, abdominal pain or changes in bowel habits. No melena, no hematochezia  : No urinary frequency, urgency, incontinence, hematuria or dysuria. Skin: No cyanosis or skin lesions. Musculoskeletal: No new muscle or joint pain. Neurological: No syncope or TIA-like symptoms.   Psychiatric: No anxiety, insomnia or depression     Past Medical History:   Diagnosis Date    Arthritis     Back pain     CAD (coronary artery disease)     COPD (chronic obstructive pulmonary disease) (HCC)     Depression     Diabetes mellitus (HCC)     REHMAN (dyspnea on exertion)     Fatty liver     GERD (gastroesophageal reflux disease)     Hyperlipidemia     Hypertension     MRSA (methicillin resistant staph aureus) culture positive 08/15/2018    arm    Stress incontinence     Thyroid disease     CYST ON THYROID--FOUND 20 YEARS AGO     Past Surgical History:   Procedure Laterality Date    CARDIAC SURGERY      3 stent placed OCt 20     SECTION      TIMES 3    CHOLECYSTECTOMY      COLONOSCOPY  2017      Colonoscopy with snare and biopsy    EYE SURGERY      muscle correction and eyelid     HERNIA REPAIR      UMBILICAL AREA x 2    HYSTERECTOMY (CERVIX STATUS UNKNOWN)      PTCA  10/2020    SHEFALI to RCA x 2; SHEFALI to LAD    SHOULDER ARTHROSCOPY Right 5-19-15    Right shoulder arthroscopy labral debridement open [place] subacromial decompression and chrondroplasty    UPPER GASTROINTESTINAL ENDOSCOPY  2017    Esophagogastroduodenoscopy with biopsy     Family History   Problem Relation Age of Onset    Cancer Mother         lung     Heart Disease Father     Stroke Brother      Social History     Tobacco Use    Smoking status: Former     Packs/day: 1.00     Years: 30.00     Pack years: 30.00 Types: Cigarettes     Quit date:      Years since quittin.0    Smokeless tobacco: Never   Vaping Use    Vaping Use: Never used   Substance Use Topics    Alcohol use: No    Drug use: No       Allergies   Allergen Reactions    Latex Itching and Rash    Tramadol Hives and Shortness Of Breath    Codeine Itching    Penicillins      Current Outpatient Medications   Medication Sig Dispense Refill    albuterol sulfate HFA (PROVENTIL;VENTOLIN;PROAIR) 108 (90 Base) MCG/ACT inhaler INHALE 2 PUFFS INTO THE LUNGS EVERY 6 HOURS AS NEEDED. 18 each 1    amoxicillin (AMOXIL) 500 MG capsule Take 1 capsule by mouth 3 times daily for 7 days 21 capsule 0    LANTUS SOLOSTAR 100 UNIT/ML injection pen INJECT 75 UNITS INTO THE SKIN NIGHTLY 15 Adjustable Dose Pre-filled Pen Syringe 1    HYDROcodone-acetaminophen (NORCO) 5-325 MG per tablet Take 1 tablet by mouth 2 times daily for 30 days.  60 tablet 0    carvedilol (COREG) 12.5 MG tablet TAKE 1 TABLET BY MOUTH TWICE A DAY WITH MEALS 180 tablet 1    ranolazine (RANEXA) 500 MG extended release tablet TAKE 1 TABLET BY MOUTH TWICE A  tablet 3    Continuous Blood Gluc  (FREESTYLE JACIEL 2 READER) ASHWIN tid 90 each 2    Continuous Blood Gluc Sensor (FREESTYLE JACIEL 2 SENSOR) MISC tid 90 each 2    Continuous Blood Gluc Sensor (FREESTYLE JACIEL 14 DAY SENSOR) MISC tid 90 each 3    Continuous Blood Gluc  (FREESTYLE JACIEL 14 DAY READER) ASHWIN tid 90 each 2    atorvastatin (LIPITOR) 20 MG tablet TAKE 1 TABLET BY MOUTH EVERY DAY 90 tablet 2    DULoxetine (CYMBALTA) 60 MG extended release capsule 1 tab po daily 90 capsule 2    furosemide (LASIX) 20 MG tablet TAKE 1 TABLET BY MOUTH EVERY DAY 90 tablet 1    losartan (COZAAR) 100 MG tablet 1 TAB PO DAILY 90 tablet 3    Insulin Syringe-Needle U-100 (BD INSULIN SYRINGE U/F) 31G X 5/16\" 1 ML MISC USE AT BEDTIME WITH LANTUS 100 each 2    ezetimibe (ZETIA) 10 MG tablet TAKE 1 TABLET BY MOUTH EVERY DAY 90 tablet 1    famotidine (PEPCID) 20 MG tablet TAKE 1 TABLET BY MOUTH TWICE A  tablet 1    SYMBICORT 160-4.5 MCG/ACT AERO TAKE 2 PUFFS BY MOUTH TWICE A DAY 1 Inhaler 0    Blood Glucose Monitoring Suppl (TRUE METRIX METER) ASHWIN As directed 1 Device 0    nitroGLYCERIN (NITROSTAT) 0.4 MG SL tablet Place 1 tablet under the tongue every 5 minutes as needed for Chest pain 25 tablet 3    aspirin 81 MG chewable tablet TAKE 1 TABLET BY MOUTH EVERY DAY 30 tablet 3    Insulin Pen Needle 32G X 4 MM MISC 1 each by Does not apply route daily 100 each 3    MIRALAX powder TAKE 17 G BY MOUTH DAILY 510 g 2    insulin lispro, 1 Unit Dial, 100 UNIT/ML SOPN INJECT 7 UNITS INTO THE SKIN 3 TIMES DAILY (BEFORE MEALS) 5 pen 1    Blood Glucose Monitoring Suppl (FREESTYLE LITE) ASHWIN 1 Device by Does not apply route daily as needed (glucose monitoring) 1 Device 0    FreeStyle Lancets MISC 1 each by Does not apply route daily 200 each 0     No current facility-administered medications for this visit. Physical Exam:   /72 (Site: Right Upper Arm, Position: Sitting)   Pulse 67   Ht 5' (1.524 m)   Wt 202 lb (91.6 kg)   LMP 11/30/2005   SpO2 94%   BMI 39.45 kg/m²     Wt Readings from Last 2 Encounters:   01/26/23 202 lb (91.6 kg)   12/25/22 201 lb 11.5 oz (91.5 kg)     Constitutional: She is oriented to person, place, and time. She appears well-developed and well-nourished. In no acute distress. Head: Normocephalic and atraumatic. Neck: Neck supple. No JVD present. Carotid bruit is not present. No mass and no thyromegaly present. No lymphadenopathy present. Cardiovascular: Normal rate, regular rhythm, normal heart sounds and intact distal pulses. Exam reveals no gallop and no friction rub. No murmur heard. Pulmonary/Chest: Effort normal and breath sounds normal. No respiratory distress. She has no wheezes, rhonchi or rales. Abdominal: Soft, non-tender. Bowel sounds and aorta are normal. She exhibits no organomegaly, mass or bruit.    Extremities: No edema, cyanosis, or clubbing. Pulses are 2+ radial/carotid/dorsalis pedis and posterior tibial bilaterally. Neurological: She is alert and oriented to person, place, and time. She has normal reflexes. No cranial nerve deficit. Coordination normal.   Skin: Skin is warm and dry. There is no rash or diaphoresis. Psychiatric: She has a normal mood and affect. Her speech is normal and behavior is normal.       EKG 1/26/23 SR. Procedures:     [facility] 10/6/20  1. Right-dominant coronary arterial system with a 99% mid RCA lesion  successfully intervened upon with two 2.75 x 15 mm overlapping [place]  drug-eluting stents dilated to 2.80 mm in diameter. 2.  In the left system, there was a 90% mid LAD lesion that was  intervened upon with a 2.75 x 15 mm [place] drug-eluting stent dilated to  2.80 mm in diameter. There was a 60% proximal LAD disease and a 60%  focal lesion distal to this. In the circumflex, there were small OM  branches and one small OM branch has a 99% lesion. This is too small to  intervene upon. 3.  Normal left ventricular systolic function with an LV ejection  fraction of 60%. 4.  Left ventricular end-diastolic pressure of 10 mmHg. 5.  No gradient across the aortic valve on pullback to suggest aortic  stenosis. [facility] 3/8/21  1. Codominant coronary arterial circulation. The right coronary artery  has patent stents in the midportion. They are overlapping with no  significant in-stent restenosis. The distal PDA has diffuse 70%  lesions. In the left system, there is no left main disease. The left  anterior descending artery has 50% proximal disease and patent stents in  the midportion. There is a 60% lesion in the distal LAD. In the  circumflex artery, there is 25% disease diffusely. The OM1 branch has  diffuse 70% stenosis in the small branching artery. 2.  Elevated left ventricular end-diastolic pressure at 18 mmHg. 3.  Normal left ventricular systolic function.   LV ejection fraction  60%. 4.  No gradient across the aortic valve on pullback to suggest aortic  stenosis. Peripheral angiogram 11/8/21  1. Patent abdominal aorta with patent bilateral renal arteries. 2.  Patent bilateral common, external and internal iliac arteries with  mild atherosclerotic plaque disease of 25%. 3.  Patent bilateral common femoral and profunda femoris arteries. 4.  The right superficial femoral artery is patent but had serial 80%  lesions present that underwent angioplasty eventually with drug-coated  balloon and a 5 mm size, resulting in 0% residual stenosis. On the  left, there are serial 75% lesions present in the mid SFA. Patent  bilateral popliteal arteries with 0% stenosis. 5.  Three-vessel runoff bilaterally to the foot. Imaging:     Echo 11/30/20  There is concentric left ventricular hypertrophy. Ejection fraction is visually estimated to be 55-60%. RV is mildly dilated  Right ventricular systolic function is normal .    Stress perfusion 2/25/21  Pharmacological Stress/MPI Results:        1. Technically a satisfactory study. 2. Moderate size reversible perfusion defect suggestive of ischemia    involving the inferior wall    3. Gated Study shows normal LV size and Systolic function; EF is 70 %. Lower extremity arterial duplex 9/9/21  Right Impression   Right AUTUMN 0.73. This is consistent with moderate PAD at rest.   Elevated velocity of the proximal superficial femoral artery suggests a 50-70%   stenosis. The majority of the waveforms are multiphasic throughout the right lower   extremity. Left Impression   Left AUTUMN 1.0. This is consistent with no PAD at rest.   Elevated velocity of the common femoral, proximal superficial femoral and mid   superficial femoral artery suggests a less than 50% stenosis. The majority of the waveforms are triphasic throughout the left lower   extremity.        Lab Review:   Lab Results   Component Value Date/Time    TRIG 96 11/14/2022 08:35 AM    HDL 28 11/14/2022 08:35 AM    LDLCALC 58 11/14/2022 08:35 AM    LDLDIRECT 116 04/02/2019 07:52 AM    LABVLDL 19 11/14/2022 08:35 AM     Lab Results   Component Value Date/Time     12/23/2022 07:25 PM    K 5.0 12/23/2022 07:25 PM    BUN 23 12/23/2022 07:25 PM    CREATININE 1.0 12/23/2022 07:25 PM       Assessment:  1. CAD of native coronary arteries without angina   2. Hyperlipidemia with LDL goal <70 mg/dL   3. Essential hypertension   4. PAD   5. Psuedoclaudication    Plan:    Santosh Shetty presents for her yearly follow up. She endorses daytime fatigue along with left shoulder pain that radiates into the chest with off/on BLE edema. The pain occurs at rest or when in bed at night. She continues with chronic back issues with bilateral leg pain. She continues with bilateral leg pain however, when at the grocery store, she will push cart with improvement in BLE pain. Her last TSH and CBC were within normal limits. We discussed her symptoms at length and will proceed with a Lexiscan myoview to rule out progressive CAD. Her blood pressure is well controlled today in the office. I have again discussed with her that her leg pain she describes is more likely related to chronic back pain (pseudoclaudication) and I have encouraged her to follow up with Dr. Griselda Rosales regarding any further workup at this time. Her most recent lipid profile was favorable on her current statin therapy. I have encouraged her to continue to stay active as tolerated and adhere to a heart healthy diet. I have personally reviewed all previous testing for this visit today including imaging, lab results and EKG as detailed above. I will see her in the office for follow up in 6 months stress test dependent        This note was scribed in the presence of Dr. Vikki Keane MD by Delio Carpenter RN. Physician Attestation:  The scribes documentation has been prepared under my direction and personally reviewed by me in its entirety.      I, Dr. Venus Mcclendon personally performed the services described in this documentation as scribed by my RN in my presence, and I confirm that the note above accurately reflects all work, treatment, procedures, and medical decision making performed by me.

## 2023-01-26 ENCOUNTER — OFFICE VISIT (OUTPATIENT)
Dept: CARDIOLOGY CLINIC | Age: 58
End: 2023-01-26
Payer: COMMERCIAL

## 2023-01-26 VITALS
DIASTOLIC BLOOD PRESSURE: 72 MMHG | HEIGHT: 60 IN | WEIGHT: 202 LBS | HEART RATE: 67 BPM | BODY MASS INDEX: 39.66 KG/M2 | SYSTOLIC BLOOD PRESSURE: 136 MMHG | OXYGEN SATURATION: 94 %

## 2023-01-26 DIAGNOSIS — M25.512 LEFT SHOULDER PAIN, UNSPECIFIED CHRONICITY: ICD-10-CM

## 2023-01-26 DIAGNOSIS — M48.062 PSEUDOCLAUDICATION: ICD-10-CM

## 2023-01-26 DIAGNOSIS — I73.9 PAD (PERIPHERAL ARTERY DISEASE) (HCC): ICD-10-CM

## 2023-01-26 DIAGNOSIS — E78.5 HYPERLIPIDEMIA LDL GOAL <70: ICD-10-CM

## 2023-01-26 DIAGNOSIS — I10 ESSENTIAL HYPERTENSION: ICD-10-CM

## 2023-01-26 DIAGNOSIS — I25.119 CORONARY ARTERY DISEASE INVOLVING NATIVE CORONARY ARTERY OF NATIVE HEART WITH ANGINA PECTORIS (HCC): Primary | ICD-10-CM

## 2023-01-26 PROCEDURE — G8417 CALC BMI ABV UP PARAM F/U: HCPCS | Performed by: INTERNAL MEDICINE

## 2023-01-26 PROCEDURE — 3078F DIAST BP <80 MM HG: CPT | Performed by: INTERNAL MEDICINE

## 2023-01-26 PROCEDURE — G8427 DOCREV CUR MEDS BY ELIG CLIN: HCPCS | Performed by: INTERNAL MEDICINE

## 2023-01-26 PROCEDURE — 1036F TOBACCO NON-USER: CPT | Performed by: INTERNAL MEDICINE

## 2023-01-26 PROCEDURE — 99214 OFFICE O/P EST MOD 30 MIN: CPT | Performed by: INTERNAL MEDICINE

## 2023-01-26 PROCEDURE — G8484 FLU IMMUNIZE NO ADMIN: HCPCS | Performed by: INTERNAL MEDICINE

## 2023-01-26 PROCEDURE — 3074F SYST BP LT 130 MM HG: CPT | Performed by: INTERNAL MEDICINE

## 2023-01-26 PROCEDURE — 93000 ELECTROCARDIOGRAM COMPLETE: CPT | Performed by: INTERNAL MEDICINE

## 2023-01-26 PROCEDURE — 3017F COLORECTAL CA SCREEN DOC REV: CPT | Performed by: INTERNAL MEDICINE

## 2023-03-09 ENCOUNTER — HOSPITAL ENCOUNTER (OUTPATIENT)
Dept: NON INVASIVE DIAGNOSTICS | Age: 58
Discharge: HOME | End: 2023-03-09
Payer: COMMERCIAL

## 2023-03-09 ENCOUNTER — TELEPHONE (OUTPATIENT)
Dept: CARDIOLOGY CLINIC | Age: 58
End: 2023-03-09

## 2023-03-09 DIAGNOSIS — I25.119 CHEST PAIN DUE TO CAD (HCC): ICD-10-CM

## 2023-03-09 DIAGNOSIS — I25.119 CORONARY ARTERY DISEASE INVOLVING NATIVE CORONARY ARTERY OF NATIVE HEART WITH ANGINA PECTORIS (HCC): ICD-10-CM

## 2023-03-09 DIAGNOSIS — M25.512 LEFT SHOULDER PAIN, UNSPECIFIED CHRONICITY: ICD-10-CM

## 2023-03-09 DIAGNOSIS — I25.119 CORONARY ARTERY DISEASE INVOLVING NATIVE CORONARY ARTERY OF NATIVE HEART WITH ANGINA PECTORIS (HCC): Primary | ICD-10-CM

## 2023-03-09 DIAGNOSIS — R94.39 ABNORMAL STRESS TEST: ICD-10-CM

## 2023-03-09 LAB
LV EF: 67 %
LVEF MODALITY: NORMAL

## 2023-03-09 PROCEDURE — 78452 HT MUSCLE IMAGE SPECT MULT: CPT

## 2023-03-09 PROCEDURE — A9502 TC99M TETROFOSMIN: HCPCS | Performed by: INTERNAL MEDICINE

## 2023-03-09 PROCEDURE — 93017 CV STRESS TEST TRACING ONLY: CPT

## 2023-03-09 PROCEDURE — 6360000002 HC RX W HCPCS: Performed by: INTERNAL MEDICINE

## 2023-03-09 PROCEDURE — 3430000000 HC RX DIAGNOSTIC RADIOPHARMACEUTICAL: Performed by: INTERNAL MEDICINE

## 2023-03-09 RX ADMIN — REGADENOSON 0.4 MG: 0.08 INJECTION, SOLUTION INTRAVENOUS at 10:01

## 2023-03-09 RX ADMIN — TETROFOSMIN 10 MILLICURIE: 1.38 INJECTION, POWDER, LYOPHILIZED, FOR SOLUTION INTRAVENOUS at 09:07

## 2023-03-09 RX ADMIN — TETROFOSMIN 30 MILLICURIE: 1.38 INJECTION, POWDER, LYOPHILIZED, FOR SOLUTION INTRAVENOUS at 10:04

## 2023-03-09 NOTE — TELEPHONE ENCOUNTER
Please call patient to facilitate scheduling of a LHC with Dr. Kimo Breen at [facility]. Thank you. The morning of your procedure you will park in the hospital parking lot and report directly to the cath lab to check in.     Pre-Procedure Instructions   You will need to fast for at least 8 hours prior to procedure. No caffeine the morning of. Hold your diuretic, Lasix the morning of procedure. Hold all diabetic medications including, Metfomin. If you take Lantus/Levemir only take ½ your normal dose the evening before. All other medications can be taken in the morning with sips of water. You will need to take 325 mg aspirin the morning of. If you are currently taking 81 mg please take 4 tablets that morning. Do not use any lotions, creams or perfume the morning of procedure. Pre-procedure lab work will need to be completed 5-7 days prior to procedure. Please have a responsible adult to drive you home after procedure. We advise you have someone to stay with you for 24 hours following procedure for precautionary measures. Depending on procedure you may require an overnight stay. Cath lab will provide you with all post procedure instructions. If you have any questions regarding the procedure itself or medications, please call [phone] and ask to speak with a nurse.

## 2023-03-13 NOTE — TELEPHONE ENCOUNTER
Spoke with the patient and got her scheduled for the procedure. We went over instructions below and she verbalized understanding. Procedure - [facility]   Date: 3/27/2023  Arrival time: 6:30 am   Procedure time: 8:00 am     The morning of your procedure you will park in the hospital parking lot and report directly to the cath lab to check in.      Pre-Procedure Instructions   You will need to fast for at least 8 hours prior to procedure. No caffeine the morning of. Hold your diuretic, Lasix the morning of procedure. Hold all diabetic medications including, Metfomin. If you take Lantus/Levemir only take ½ your normal dose the evening before. All other medications can be taken in the morning with sips of water. You will need to take 325 mg aspirin the morning of. If you are currently taking 81 mg please take 4 tablets that morning. Do not use any lotions, creams or perfume the morning of procedure. Pre-procedure lab work will need to be completed 5-7 days prior to procedure. Please have a responsible adult to drive you home after procedure. We advise you have someone to stay with you for 24 hours following procedure for precautionary measures. Depending on procedure you may require an overnight stay. Cath lab will provide you with all post procedure instructions. If you have any questions regarding the procedure itself or medications, please call [phone] and ask to speak with a nurse.      [facility] / emailed cath lab

## 2023-03-27 ENCOUNTER — HOSPITAL ENCOUNTER (OUTPATIENT)
Dept: CARDIAC CATH/INVASIVE PROCEDURES | Age: 58
Discharge: HOME | End: 2023-03-27
Payer: COMMERCIAL

## 2023-03-27 VITALS
HEIGHT: 60 IN | HEART RATE: 72 BPM | WEIGHT: 201 LBS | RESPIRATION RATE: 16 BRPM | TEMPERATURE: 97.2 F | BODY MASS INDEX: 39.46 KG/M2 | DIASTOLIC BLOOD PRESSURE: 81 MMHG | SYSTOLIC BLOOD PRESSURE: 190 MMHG | OXYGEN SATURATION: 94 %

## 2023-03-27 PROBLEM — R94.39 ABNORMAL CARDIOVASCULAR STRESS TEST: Status: ACTIVE | Noted: 2023-03-27

## 2023-03-27 LAB
ANION GAP SERPL CALCULATED.3IONS-SCNC: 10 MMOL/L (ref 3–16)
BUN SERPL-MCNC: 26 MG/DL (ref 7–20)
CALCIUM SERPL-MCNC: 9.3 MG/DL (ref 8.3–10.6)
CHLORIDE SERPL-SCNC: 108 MMOL/L (ref 99–110)
CO2 SERPL-SCNC: 27 MMOL/L (ref 21–32)
CREAT SERPL-MCNC: 1.3 MG/DL (ref 0.6–1.1)
DEPRECATED RDW RBC AUTO: 16.8 % (ref 12.4–15.4)
EKG ATRIAL RATE: 63 BPM
EKG DIAGNOSIS: NORMAL
EKG P AXIS: 56 DEGREES
EKG P-R INTERVAL: 152 MS
EKG Q-T INTERVAL: 458 MS
EKG QRS DURATION: 146 MS
EKG QTC CALCULATION (BAZETT): 468 MS
EKG R AXIS: 46 DEGREES
EKG T AXIS: 10 DEGREES
EKG VENTRICULAR RATE: 63 BPM
GFR SERPLBLD CREATININE-BSD FMLA CKD-EPI: 47 ML/MIN/{1.73_M2}
GLUCOSE SERPL-MCNC: 118 MG/DL (ref 70–99)
HCT VFR BLD AUTO: 40.6 % (ref 36–48)
HGB BLD-MCNC: 13.5 G/DL (ref 12–16)
MCH RBC QN AUTO: 28.8 PG (ref 26–34)
MCHC RBC AUTO-ENTMCNC: 33.2 G/DL (ref 31–36)
MCV RBC AUTO: 86.8 FL (ref 80–100)
PLATELET # BLD AUTO: 123 K/UL (ref 135–450)
PMV BLD AUTO: 9.1 FL (ref 5–10.5)
POC ACT LR: >400 SEC
POTASSIUM SERPL-SCNC: 4.2 MMOL/L (ref 3.5–5.1)
RBC # BLD AUTO: 4.68 M/UL (ref 4–5.2)
SODIUM SERPL-SCNC: 145 MMOL/L (ref 136–145)
WBC # BLD AUTO: 8.7 K/UL (ref 4–11)

## 2023-03-27 PROCEDURE — C1874 STENT, COATED/COV W/DEL SYS: HCPCS

## 2023-03-27 PROCEDURE — 2580000003 HC RX 258

## 2023-03-27 PROCEDURE — 6370000000 HC RX 637 (ALT 250 FOR IP)

## 2023-03-27 PROCEDURE — 92928 PRQ TCAT PLMT NTRAC ST 1 LES: CPT | Performed by: INTERNAL MEDICINE

## 2023-03-27 PROCEDURE — 2709999900 HC NON-CHARGEABLE SUPPLY

## 2023-03-27 PROCEDURE — 99152 MOD SED SAME PHYS/QHP 5/>YRS: CPT | Performed by: INTERNAL MEDICINE

## 2023-03-27 PROCEDURE — 93458 L HRT ARTERY/VENTRICLE ANGIO: CPT | Performed by: INTERNAL MEDICINE

## 2023-03-27 PROCEDURE — 93458 L HRT ARTERY/VENTRICLE ANGIO: CPT

## 2023-03-27 PROCEDURE — 80048 BASIC METABOLIC PNL TOTAL CA: CPT

## 2023-03-27 PROCEDURE — 85027 COMPLETE CBC AUTOMATED: CPT

## 2023-03-27 PROCEDURE — 6360000002 HC RX W HCPCS

## 2023-03-27 PROCEDURE — C1887 CATHETER, GUIDING: HCPCS

## 2023-03-27 PROCEDURE — C1769 GUIDE WIRE: HCPCS

## 2023-03-27 PROCEDURE — 99152 MOD SED SAME PHYS/QHP 5/>YRS: CPT

## 2023-03-27 PROCEDURE — 93010 ELECTROCARDIOGRAM REPORT: CPT | Performed by: INTERNAL MEDICINE

## 2023-03-27 PROCEDURE — C1894 INTRO/SHEATH, NON-LASER: HCPCS

## 2023-03-27 PROCEDURE — 85347 COAGULATION TIME ACTIVATED: CPT

## 2023-03-27 PROCEDURE — 92928 PRQ TCAT PLMT NTRAC ST 1 LES: CPT

## 2023-03-27 PROCEDURE — C1725 CATH, TRANSLUMIN NON-LASER: HCPCS

## 2023-03-27 PROCEDURE — 93571 IV DOP VEL&/PRESS C FLO 1ST: CPT | Performed by: INTERNAL MEDICINE

## 2023-03-27 PROCEDURE — 93571 IV DOP VEL&/PRESS C FLO 1ST: CPT

## 2023-03-27 PROCEDURE — 6360000004 HC RX CONTRAST MEDICATION: Performed by: INTERNAL MEDICINE

## 2023-03-27 PROCEDURE — 93005 ELECTROCARDIOGRAM TRACING: CPT | Performed by: INTERNAL MEDICINE

## 2023-03-27 PROCEDURE — 2500000003 HC RX 250 WO HCPCS

## 2023-03-27 PROCEDURE — 92921 HC PRQ CARDIAC ANGIO ADDL ART: CPT

## 2023-03-27 PROCEDURE — 99153 MOD SED SAME PHYS/QHP EA: CPT

## 2023-03-27 RX ORDER — ASPIRIN 325 MG
325 TABLET ORAL ONCE
Status: DISCONTINUED | OUTPATIENT
Start: 2023-03-27 | End: 2023-03-28 | Stop reason: HOSPADM

## 2023-03-27 RX ORDER — SODIUM CHLORIDE 0.9 % (FLUSH) 0.9 %
5-40 SYRINGE (ML) INJECTION EVERY 12 HOURS SCHEDULED
Status: DISCONTINUED | OUTPATIENT
Start: 2023-03-27 | End: 2023-03-28 | Stop reason: HOSPADM

## 2023-03-27 RX ORDER — SODIUM CHLORIDE 9 MG/ML
INJECTION, SOLUTION INTRAVENOUS PRN
Status: DISCONTINUED | OUTPATIENT
Start: 2023-03-27 | End: 2023-03-27 | Stop reason: SDUPTHER

## 2023-03-27 RX ORDER — ONDANSETRON 2 MG/ML
4 INJECTION INTRAMUSCULAR; INTRAVENOUS EVERY 6 HOURS PRN
Status: DISCONTINUED | OUTPATIENT
Start: 2023-03-27 | End: 2023-03-28 | Stop reason: HOSPADM

## 2023-03-27 RX ORDER — HYDRALAZINE HYDROCHLORIDE 20 MG/ML
10 INJECTION INTRAMUSCULAR; INTRAVENOUS
Status: DISCONTINUED | OUTPATIENT
Start: 2023-03-27 | End: 2023-03-28 | Stop reason: HOSPADM

## 2023-03-27 RX ORDER — SODIUM CHLORIDE 0.9 % (FLUSH) 0.9 %
5-40 SYRINGE (ML) INJECTION PRN
Status: DISCONTINUED | OUTPATIENT
Start: 2023-03-27 | End: 2023-03-27 | Stop reason: SDUPTHER

## 2023-03-27 RX ORDER — MORPHINE SULFATE 2 MG/ML
2 INJECTION, SOLUTION INTRAMUSCULAR; INTRAVENOUS
Status: DISCONTINUED | OUTPATIENT
Start: 2023-03-27 | End: 2023-03-28 | Stop reason: HOSPADM

## 2023-03-27 RX ORDER — SODIUM CHLORIDE 0.9 % (FLUSH) 0.9 %
5-40 SYRINGE (ML) INJECTION PRN
Status: DISCONTINUED | OUTPATIENT
Start: 2023-03-27 | End: 2023-03-28 | Stop reason: HOSPADM

## 2023-03-27 RX ORDER — ATROPINE SULFATE 0.4 MG/ML
0.5 AMPUL (ML) INJECTION
Status: DISCONTINUED | OUTPATIENT
Start: 2023-03-27 | End: 2023-03-28 | Stop reason: HOSPADM

## 2023-03-27 RX ORDER — 0.9 % SODIUM CHLORIDE 0.9 %
250 INTRAVENOUS SOLUTION INTRAVENOUS PRN
Status: DISCONTINUED | OUTPATIENT
Start: 2023-03-27 | End: 2023-03-28 | Stop reason: HOSPADM

## 2023-03-27 RX ORDER — ACETAMINOPHEN 325 MG/1
650 TABLET ORAL EVERY 4 HOURS PRN
Status: DISCONTINUED | OUTPATIENT
Start: 2023-03-27 | End: 2023-03-28 | Stop reason: HOSPADM

## 2023-03-27 RX ORDER — SODIUM CHLORIDE 9 MG/ML
INJECTION, SOLUTION INTRAVENOUS PRN
Status: DISCONTINUED | OUTPATIENT
Start: 2023-03-27 | End: 2023-03-28 | Stop reason: HOSPADM

## 2023-03-27 RX ORDER — SODIUM CHLORIDE 0.9 % (FLUSH) 0.9 %
5-40 SYRINGE (ML) INJECTION EVERY 12 HOURS SCHEDULED
Status: DISCONTINUED | OUTPATIENT
Start: 2023-03-27 | End: 2023-03-27 | Stop reason: SDUPTHER

## 2023-03-27 RX ADMIN — IOPAMIDOL 170 ML: 755 INJECTION, SOLUTION INTRAVENOUS at 08:57

## 2023-03-27 NOTE — PROCEDURES
with a 2-mm balloon more distally to this. We did balloon  through the stent struts into the ostium of the posterolateral branch  restoring MARILIN 3 flow here. There are patent stents in the mid RCA with  no significant in-stent restenosis. On the left system, there is  trivial plaque disease in the left main. The circumflex artery has 25%  plaque disease in the midportion on the left anterior descending artery,  has 50% disease in the proximal to mid LAD. There is a 50% lesion in  the distal LAD past the previously placed stent in the mid LAD. 2.  Elevated left ventricular end-diastolic pressure at 20 mmHg. 3.  Normal left ventricular systolic function. LV ejection fraction of  65%. 4.  No gradient across the aortic valve on pullback to suggest aortic  stenosis. 5.  Systemic hypertension.         Wilfred Cobian MD    D: 03/27/2023 9:13:47       T: 03/27/2023 9:17:34     [email or web address]  Job#: [id number]     Doc#: [id number]    CC:  Bradley Luna MD

## 2023-03-27 NOTE — H&P
Resp: 16   Temp: 97.2 °F (36.2 °C)   SpO2: 94%     I have reviewed the most recent H&P for this patient and independently examined the patient. There have been no changes. We will proceed with the planned procedure.     Issa Gómez MD

## 2023-03-27 NOTE — ANESTHESIA PRE-OP
Brief Pre-Op Note/Sedation Assessment      Vini Nunes  1965  [id number]  8:09 AM    Planned Procedure: Cardiac Catheterization Procedure  Post Procedure Plan: Return to same level of care  Consent: I have discussed with the patient and/or the patient representative the indication, alternatives, and the possible risks and/or complications of the planned procedure and the anesthesia methods. The patient and/or patient representative appear to understand and agree to proceed. Chief Complaint:   Chest Pain/Pressure      Indications for Cath Procedure:  Presentation:  Worsening Angina  2. Anginal Classification within 2 weeks:  CCS III - Symptoms with everyday living activities, i.e., moderate limitation  3. Angina Symptoms Assessment:  Typical Chest Pain  4. Heart Failure Class within last 2 weeks:  No symptoms  5. Cardiovascular Instability:  No    Prior Ischemic Workup/Eval:  Pre-Procedural Medications: Yes: Aspirin, Beta Blockers, and STATIN  2. Stress Test Completed? Yes:  Stress or Imaging Studies Performed (within ANY time period):   Type:  Stress Nuclear  Results:  Positive:  Myocardial Perfusion Defects (Nuclear) Extent of Ischemia:  Intermediate    Does Patient need surgery? Cath Valve Surgery:  No    Pre-Procedure Medical History:  Vital Signs:  BP (!) 190/81   Pulse 72   Temp 97.2 °F (36.2 °C)   Resp 16   Ht 5' (1.524 m)   Wt 201 lb (91.2 kg)   LMP 11/30/2005   SpO2 94%   BMI 39.26 kg/m²     Allergies: Allergies   Allergen Reactions    Latex Itching and Rash    Tramadol Hives and Shortness Of Breath    Codeine Itching    Penicillins      Medications:    Current Outpatient Medications   Medication Sig Dispense Refill    JARDIANCE 10 MG tablet TAKE 1 TABLET BY MOUTH EVERY DAY (Patient not taking: Reported on 3/27/2023) 30 tablet 0    diazePAM (VALIUM) 5 MG tablet Take 1 tablet by mouth every 12 hours as needed for Anxiety or Sleep for up to 30 days.  60 tablet 2

## 2023-04-06 ENCOUNTER — OFFICE VISIT (OUTPATIENT)
Dept: CARDIOLOGY CLINIC | Age: 58
End: 2023-04-06
Payer: COMMERCIAL

## 2023-04-06 VITALS
DIASTOLIC BLOOD PRESSURE: 68 MMHG | HEIGHT: 61 IN | SYSTOLIC BLOOD PRESSURE: 136 MMHG | WEIGHT: 200 LBS | BODY MASS INDEX: 37.76 KG/M2 | HEART RATE: 70 BPM | OXYGEN SATURATION: 97 %

## 2023-04-06 DIAGNOSIS — I10 ESSENTIAL HYPERTENSION: ICD-10-CM

## 2023-04-06 DIAGNOSIS — E78.5 HYPERLIPIDEMIA LDL GOAL <70: ICD-10-CM

## 2023-04-06 DIAGNOSIS — M48.062 PSEUDOCLAUDICATION: ICD-10-CM

## 2023-04-06 DIAGNOSIS — I25.119 CORONARY ARTERY DISEASE INVOLVING NATIVE CORONARY ARTERY OF NATIVE HEART WITH ANGINA PECTORIS (HCC): Primary | ICD-10-CM

## 2023-04-06 DIAGNOSIS — I73.9 PAD (PERIPHERAL ARTERY DISEASE) (HCC): ICD-10-CM

## 2023-04-06 PROCEDURE — 3074F SYST BP LT 130 MM HG: CPT | Performed by: INTERNAL MEDICINE

## 2023-04-06 PROCEDURE — 1036F TOBACCO NON-USER: CPT | Performed by: INTERNAL MEDICINE

## 2023-04-06 PROCEDURE — 99214 OFFICE O/P EST MOD 30 MIN: CPT | Performed by: INTERNAL MEDICINE

## 2023-04-06 PROCEDURE — 3017F COLORECTAL CA SCREEN DOC REV: CPT | Performed by: INTERNAL MEDICINE

## 2023-04-06 PROCEDURE — 3078F DIAST BP <80 MM HG: CPT | Performed by: INTERNAL MEDICINE

## 2023-04-06 PROCEDURE — 93000 ELECTROCARDIOGRAM COMPLETE: CPT | Performed by: INTERNAL MEDICINE

## 2023-04-06 PROCEDURE — G8417 CALC BMI ABV UP PARAM F/U: HCPCS | Performed by: INTERNAL MEDICINE

## 2023-04-06 PROCEDURE — G8427 DOCREV CUR MEDS BY ELIG CLIN: HCPCS | Performed by: INTERNAL MEDICINE

## 2023-05-10 ENCOUNTER — HOSPITAL ENCOUNTER (OUTPATIENT)
Dept: WOMENS IMAGING | Age: 58
Discharge: HOME | End: 2023-05-10
Payer: COMMERCIAL

## 2023-05-10 ENCOUNTER — HOSPITAL ENCOUNTER (OUTPATIENT)
Dept: VASCULAR LAB | Age: 58
Discharge: HOME | End: 2023-05-10
Payer: COMMERCIAL

## 2023-05-10 DIAGNOSIS — Z12.31 VISIT FOR SCREENING MAMMOGRAM: ICD-10-CM

## 2023-05-10 PROCEDURE — 77067 SCR MAMMO BI INCL CAD: CPT

## 2023-05-10 PROCEDURE — 93925 LOWER EXTREMITY STUDY: CPT

## 2023-05-31 RX ORDER — RANOLAZINE 500 MG/1
500 TABLET, EXTENDED RELEASE ORAL 2 TIMES DAILY
Qty: 180 TABLET | Refills: 3 | Status: SHIPPED | OUTPATIENT
Start: 2023-05-31

## 2023-05-31 NOTE — TELEPHONE ENCOUNTER
Medication Refill    Medication needing refilled:  ranolazine (RANEXA)    Dosage of the medication:  500 MG extended release tablet     How are you taking this medication (QD, BID, TID, QID, PRN):  TAKE 1 TABLET BY MOUTH TWICE A DAY    30 or 90 day supply called in:   90 day supply    When will you run out of your medication:  New Rx sent to new Pharmacy    Which Pharmacy are we sending the medication to?:    [address] [id number] [facility], [facility] [address] [phone]

## 2023-05-31 NOTE — TELEPHONE ENCOUNTER
Last OV: 4/6/23  Next OV: 10/19/23  Last refill: 10/11/22  Most recent Labs: BMP 3/27/23  Last EKG (if needed):

## 2023-06-26 ENCOUNTER — OFFICE VISIT (OUTPATIENT)
Dept: ORTHOPEDIC SURGERY | Age: 58
End: 2023-06-26
Payer: COMMERCIAL

## 2023-06-26 VITALS — HEIGHT: 60 IN | RESPIRATION RATE: 18 BRPM | BODY MASS INDEX: 39.27 KG/M2 | WEIGHT: 200 LBS

## 2023-06-26 DIAGNOSIS — M25.512 ACUTE PAIN OF LEFT SHOULDER: Primary | ICD-10-CM

## 2023-06-26 PROCEDURE — L3660 SO 8 AB RSTR CAN/WEB PRE OTS: HCPCS | Performed by: STUDENT IN AN ORGANIZED HEALTH CARE EDUCATION/TRAINING PROGRAM

## 2023-06-26 PROCEDURE — 1036F TOBACCO NON-USER: CPT | Performed by: STUDENT IN AN ORGANIZED HEALTH CARE EDUCATION/TRAINING PROGRAM

## 2023-06-26 PROCEDURE — G8427 DOCREV CUR MEDS BY ELIG CLIN: HCPCS | Performed by: STUDENT IN AN ORGANIZED HEALTH CARE EDUCATION/TRAINING PROGRAM

## 2023-06-26 PROCEDURE — 3017F COLORECTAL CA SCREEN DOC REV: CPT | Performed by: STUDENT IN AN ORGANIZED HEALTH CARE EDUCATION/TRAINING PROGRAM

## 2023-06-26 PROCEDURE — G8417 CALC BMI ABV UP PARAM F/U: HCPCS | Performed by: STUDENT IN AN ORGANIZED HEALTH CARE EDUCATION/TRAINING PROGRAM

## 2023-06-26 PROCEDURE — 99203 OFFICE O/P NEW LOW 30 MIN: CPT | Performed by: STUDENT IN AN ORGANIZED HEALTH CARE EDUCATION/TRAINING PROGRAM

## 2023-06-28 ENCOUNTER — TELEPHONE (OUTPATIENT)
Dept: ORTHOPEDIC SURGERY | Age: 58
End: 2023-06-28

## 2023-06-30 ENCOUNTER — TELEPHONE (OUTPATIENT)
Dept: CARDIOLOGY CLINIC | Age: 58
End: 2023-06-30

## 2023-07-03 NOTE — TELEPHONE ENCOUNTER
I was able to print off stent placement report from 11/8/21 and 3/27/23, but neither I or Kamla Kaminski were able to print off the report from 10/7/20. Faxed 2021 and [phone], patient will need to request 2022 from Medical Records. Left voicemail to return call.

## 2023-07-05 ENCOUNTER — TELEPHONE (OUTPATIENT)
Dept: ORTHOPEDIC SURGERY | Age: 58
End: 2023-07-05

## 2023-07-05 NOTE — TELEPHONE ENCOUNTER
Other PATIENT IS REQUESTING MRI ORDER. SHE WOULD LIKE IT DONE AT [facility]. PATIENT SAY [facility] DIDN'T GET ALL RECORDS FOR STINT SHE HAS AND SHE WOULD FEEL MORE COMFORTABLE RECEIVING  [address]  [address] [place] [phone]

## 2023-07-05 NOTE — TELEPHONE ENCOUNTER
Jerald Bloom called in stating that she was returning [address], phone call.     Jerald Bloom can be reached at [phone]

## 2023-07-05 NOTE — TELEPHONE ENCOUNTER
Please update location on prior authorization to [facility].     Once updated with insurance, patient will be contacted.

## 2023-07-05 NOTE — TELEPHONE ENCOUNTER
S/W [facility]  MRI facility updated, she may contact [facility] at [phone] to schedule MRI at her convenience. She was asked to schedule her follow up appointment for no less than 7-10 days afterwards to review results and treatment options. Patient voiced understanding of the conversation and will contact the office with further questions or concerns.

## 2023-07-25 RX ORDER — TICAGRELOR 90 MG/1
TABLET ORAL
Qty: 180 TABLET | Refills: 3 | Status: SHIPPED | OUTPATIENT
Start: 2023-07-25

## 2023-07-25 NOTE — TELEPHONE ENCOUNTER
Last OV: 4/6/23  Next OV: 10/19/23  Last refill: 3/27/23  #60  3 R/F  Most recent Labs: 3/27/23  Last EKG (if needed): 4/6/23

## 2023-08-04 ENCOUNTER — HOSPITAL ENCOUNTER (OUTPATIENT)
Dept: MRI IMAGING | Age: 58
Discharge: HOME | End: 2023-08-04
Payer: COMMERCIAL

## 2023-08-04 DIAGNOSIS — M25.512 ACUTE PAIN OF LEFT SHOULDER: ICD-10-CM

## 2023-08-04 PROCEDURE — 73221 MRI JOINT UPR EXTREM W/O DYE: CPT

## 2023-08-14 ENCOUNTER — OFFICE VISIT (OUTPATIENT)
Dept: ORTHOPEDIC SURGERY | Age: 58
End: 2023-08-14
Payer: COMMERCIAL

## 2023-08-14 VITALS — BODY MASS INDEX: 38.87 KG/M2 | WEIGHT: 198 LBS | HEIGHT: 60 IN

## 2023-08-14 DIAGNOSIS — E11.59 TYPE 2 DIABETES MELLITUS WITH OTHER CIRCULATORY COMPLICATION, UNSPECIFIED WHETHER LONG TERM INSULIN USE (HCC): ICD-10-CM

## 2023-08-14 DIAGNOSIS — M79.18 MYOFASCIAL PAIN: ICD-10-CM

## 2023-08-14 DIAGNOSIS — M75.82 ROTATOR CUFF TENDINITIS, LEFT: Primary | ICD-10-CM

## 2023-08-14 LAB
ALBUMIN SERPL-MCNC: 4.1 G/DL (ref 3.4–5)
ALBUMIN/GLOB SERPL: 1.8 {RATIO} (ref 1.1–2.2)
ALP SERPL-CCNC: 118 U/L (ref 40–129)
ALT SERPL-CCNC: 24 U/L (ref 10–40)
ANION GAP SERPL CALCULATED.3IONS-SCNC: 8 MMOL/L (ref 3–16)
AST SERPL-CCNC: 13 U/L (ref 15–37)
BASOPHILS # BLD: 0 K/UL (ref 0–0.2)
BASOPHILS NFR BLD: 0.5 %
BILIRUB SERPL-MCNC: 0.7 MG/DL (ref 0–1)
BUN SERPL-MCNC: 20 MG/DL (ref 7–20)
CALCIUM SERPL-MCNC: 9.2 MG/DL (ref 8.3–10.6)
CHLORIDE SERPL-SCNC: 101 MMOL/L (ref 99–110)
CHOLEST SERPL-MCNC: 119 MG/DL (ref 0–199)
CO2 SERPL-SCNC: 32 MMOL/L (ref 21–32)
CREAT SERPL-MCNC: 1.1 MG/DL (ref 0.6–1.1)
DEPRECATED RDW RBC AUTO: 15.7 % (ref 12.4–15.4)
EOSINOPHIL # BLD: 0.1 K/UL (ref 0–0.6)
EOSINOPHIL NFR BLD: 1.5 %
GFR SERPLBLD CREATININE-BSD FMLA CKD-EPI: 58 ML/MIN/{1.73_M2}
GLUCOSE SERPL-MCNC: 143 MG/DL (ref 70–99)
HCT VFR BLD AUTO: 37.7 % (ref 36–48)
HDLC SERPL-MCNC: 35 MG/DL (ref 40–60)
HGB BLD-MCNC: 12.7 G/DL (ref 12–16)
LDLC SERPL CALC-MCNC: 61 MG/DL
LYMPHOCYTES # BLD: 1.6 K/UL (ref 1–5.1)
LYMPHOCYTES NFR BLD: 20.5 %
MCH RBC QN AUTO: 30 PG (ref 26–34)
MCHC RBC AUTO-ENTMCNC: 33.6 G/DL (ref 31–36)
MCV RBC AUTO: 89.2 FL (ref 80–100)
MONOCYTES # BLD: 0.5 K/UL (ref 0–1.3)
MONOCYTES NFR BLD: 7 %
NEUTROPHILS # BLD: 5.4 K/UL (ref 1.7–7.7)
NEUTROPHILS NFR BLD: 70.5 %
PLATELET # BLD AUTO: 150 K/UL (ref 135–450)
PMV BLD AUTO: 9 FL (ref 5–10.5)
POTASSIUM SERPL-SCNC: 4.1 MMOL/L (ref 3.5–5.1)
PROT SERPL-MCNC: 6.4 G/DL (ref 6.4–8.2)
RBC # BLD AUTO: 4.23 M/UL (ref 4–5.2)
SODIUM SERPL-SCNC: 141 MMOL/L (ref 136–145)
T4 FREE SERPL-MCNC: 1.4 NG/DL (ref 0.9–1.8)
TRIGL SERPL-MCNC: 115 MG/DL (ref 0–150)
TSH SERPL DL<=0.005 MIU/L-ACNC: 4.81 UIU/ML (ref 0.27–4.2)
VLDLC SERPL CALC-MCNC: 23 MG/DL
WBC # BLD AUTO: 7.7 K/UL (ref 4–11)

## 2023-08-14 PROCEDURE — 1036F TOBACCO NON-USER: CPT | Performed by: ORTHOPAEDIC SURGERY

## 2023-08-14 PROCEDURE — 99213 OFFICE O/P EST LOW 20 MIN: CPT | Performed by: ORTHOPAEDIC SURGERY

## 2023-08-14 PROCEDURE — G8427 DOCREV CUR MEDS BY ELIG CLIN: HCPCS | Performed by: ORTHOPAEDIC SURGERY

## 2023-08-14 PROCEDURE — G8417 CALC BMI ABV UP PARAM F/U: HCPCS | Performed by: ORTHOPAEDIC SURGERY

## 2023-08-14 PROCEDURE — 3017F COLORECTAL CA SCREEN DOC REV: CPT | Performed by: ORTHOPAEDIC SURGERY

## 2023-08-14 RX ORDER — CYCLOBENZAPRINE HCL 5 MG
5 TABLET ORAL 3 TIMES DAILY PRN
Qty: 30 TABLET | Refills: 0 | Status: SHIPPED | OUTPATIENT
Start: 2023-08-14 | End: 2023-08-24

## 2023-08-15 LAB
EST. AVERAGE GLUCOSE BLD GHB EST-MCNC: 148.5 MG/DL
HBA1C MFR BLD: 6.8 %

## 2023-08-22 ENCOUNTER — HOSPITAL ENCOUNTER (OUTPATIENT)
Dept: PHYSICAL THERAPY | Age: 58
Setting detail: THERAPIES SERIES
Discharge: HOME | End: 2023-08-22
Payer: COMMERCIAL

## 2023-08-22 DIAGNOSIS — Z78.9 DECREASED ACTIVITIES OF DAILY LIVING (ADL): Primary | ICD-10-CM

## 2023-08-22 PROCEDURE — 97110 THERAPEUTIC EXERCISES: CPT

## 2023-08-22 PROCEDURE — 97161 PT EVAL LOW COMPLEX 20 MIN: CPT

## 2023-08-22 NOTE — PROGRESS NOTES
608 [facility], [address] [facility] [place] [address]  Phone: [phone]   Fax:     [phone]        Physical Therapy Treatment Note/ Progress Report:       Date:  2023    Patient Name:  Darrin Garcia    :  1965  MRN: [id number]    Pertinent Medical History:     Referring Provider:   Dr. Jannette Corral                              Evaluation Date: 2023                                                 Medical Diagnosis:  Rotator cuff tendinitis, left [M75.82]  Myofascial pain [M79.18]     Treatment Diagnosis:      ICD-10-CM     1. Decreased activities of daily living (ADL)  Z78.9                                      Insurance information: PT Insurance Information:  [facility] signed (Y/N): N    Date of Patient follow up with Physician:      Progress Report: []  Yes  [x]  No     Date Range for reporting period:  Beginnin23  Ending:      Progress report due (10 Rx/or 30 days whichever is less):      Recertification due (POC duration/ or 90 days whichever is less):      Visit # POC/Insurance Allowable Auth Needed   1 tbd [x]Yes    []No     Pain level:  10/10 L shoulder     History of Injury:Patient stated she started to have L shoulder pain approximately several month ago with insidious onset. Pain has been \"progressively getting worse. \" Pt stated she has a small tear, arthritis and swelling. Pain is aggravated by reaching up, reaching behind the back and lifting. Pt \"cannot sleep at night. The pain is so bad. . it feels like something is pulled away from the shoulder. \"     Pt is L hand dominant.      Relevant Medical History:  Per review from Dr. Killian Mccabe office note on 23:  \"Right Shoulder X-Ray:  [facility] Joint: narrowing mild  Glenohumeral joint: no abnormalities noted  Elevation humeral head: absent     Left Shoulder MRI: I independently reviewed the images, as well as the No

## 2023-08-25 ENCOUNTER — HOSPITAL ENCOUNTER (OUTPATIENT)
Dept: PHYSICAL THERAPY | Age: 58
Setting detail: THERAPIES SERIES
Discharge: HOME | End: 2023-08-25
Payer: COMMERCIAL

## 2023-08-25 NOTE — FLOWSHEET NOTE
608 [facility], [address] [place] [address]  Phone: [phone]   Fax:     [phone]     Physical Therapy  Cancellation/No-show Note  Patient Name:  Keira Haney  :  1965   Date:  2023    PT department cancelled treatment as PT has not yet been approved by insurance.     Electronically signed by:  Eleanor Nelson PT

## 2023-08-29 ENCOUNTER — APPOINTMENT (OUTPATIENT)
Dept: PHYSICAL THERAPY | Age: 58
End: 2023-08-29
Payer: COMMERCIAL

## 2023-08-31 ENCOUNTER — HOSPITAL ENCOUNTER (OUTPATIENT)
Dept: PHYSICAL THERAPY | Age: 58
Setting detail: THERAPIES SERIES
Discharge: HOME | End: 2023-08-31
Payer: COMMERCIAL

## 2023-08-31 PROCEDURE — 97035 APP MDLTY 1+ULTRASOUND EA 15: CPT

## 2023-08-31 PROCEDURE — 97110 THERAPEUTIC EXERCISES: CPT

## 2023-08-31 PROCEDURE — 97140 MANUAL THERAPY 1/> REGIONS: CPT

## 2023-08-31 NOTE — PROGRESS NOTES
images, as well as the radiology report. No full-thickness rotator cuff tear. Small linear interstitial tear of the  supraspinatus tendon. Moderate glenohumeral degenerative change. Mildly thickened inferior glenohumeral ligament, consider adhesive capsulitis. \"       SUBJECTIVE:  See eval  8/31/23 HEP is \"hurting a lot\" in particular table slide and scapular retraction; likes pendulum    OBJECTIVE:   Observation:   Test measurements:      ROM Left   Shoulder Flex AROM 118  PROM 120   Shoulder Abd AROM 90  PROM 110   Shoulder ER AROM 55  PROM 55   Shoulder IR AROM WFL      IR behind back To L buttock   Elbow flex WFL   Elbow ext [facility]   Wrist [facility] [place]         Strength  Left   Shoulder Flex 3-/5   Shoulder Scap 3-/5   Shoulder ER 4/5       RESTRICTIONS/PRECAUTIONS: DM, cardiac surgery, MRSA. Avoid supine due to COPD    Exercises/Interventions:   Therapeutic Ex (22931)  Min: Resistance/Repetitions CUES/Notes   Overhead pulley 30x, 1/2 range    [place] 30x Shortest setting   Roll ball on table  Flexion  [place] ball  20x  20x small range Pt learned to maintain exercises in a comfortable range    Shrugs 10x    [place]  Flexion  Hor abd/add   20x  20x                   Manual:     STM L biceps tendon and supraspinatus Gently, 15'         Modalities:  Min     US L biceps tendon 1.5 w/cm2, 100%, 8'           Other Therapeutic Activities:  Pt was educated on PT POC, Diagnosis, Prognosis, pathomechanics as well as frequency and duration of scheduling future physical therapy appointments. Time was also taken on this day to answer all patient questions and participation in PT. Reviewed appointment policy in detail with patient and patient verbalized understanding. Home Exercise Program:  Access Code: [email or web address]  URL: [email or web address]  Date: 08/22/2023  Prepared by: Renu Cover     Exercises  - Standing Scapular Retraction  - 1 x daily - 7 x weekly - 10 reps - 5 hold  - Standing Shoulder Shrugs  - 1 x daily -

## 2023-09-05 ENCOUNTER — HOSPITAL ENCOUNTER (OUTPATIENT)
Dept: PHYSICAL THERAPY | Age: 58
Setting detail: THERAPIES SERIES
Discharge: HOME | End: 2023-09-05
Payer: COMMERCIAL

## 2023-09-05 PROCEDURE — 97140 MANUAL THERAPY 1/> REGIONS: CPT

## 2023-09-05 PROCEDURE — 97110 THERAPEUTIC EXERCISES: CPT

## 2023-09-05 PROCEDURE — 97035 APP MDLTY 1+ULTRASOUND EA 15: CPT

## 2023-09-07 ENCOUNTER — APPOINTMENT (OUTPATIENT)
Dept: PHYSICAL THERAPY | Age: 58
End: 2023-09-07
Payer: COMMERCIAL

## 2023-09-11 ENCOUNTER — HOSPITAL ENCOUNTER (OUTPATIENT)
Dept: PHYSICAL THERAPY | Age: 58
Setting detail: THERAPIES SERIES
Discharge: HOME | End: 2023-09-11
Payer: COMMERCIAL

## 2023-09-11 NOTE — FLOWSHEET NOTE
[address] [facility], [address] [place] [address]  Phone: [phone]   Fax:     [phone]     Physical Therapy  Cancellation/No-show Note  Patient Name:  Agnel Schmitt  :  1965   Date:  2023  Cancelled visits to date: 1  No-shows to date: 0    Patient status for today's appointment patient:  [x]  Cancelled  []  Rescheduled appointment  []  No-show     Reason given by patient:  []  Patient ill  []  Conflicting appointment  [x]  No transportation    []  Conflict with work  []  No reason given  []  Other:     Comments:      Phone call information:   []  Phone call made today to patient at _ time at number provided:      []  Patient answered, conversation as follows:    []  Patient did not answer, message left as follows:  []  Phone call not made today    Electronically signed by:  Kate Paz PTA

## 2023-09-14 ENCOUNTER — HOSPITAL ENCOUNTER (OUTPATIENT)
Dept: PHYSICAL THERAPY | Age: 58
Setting detail: THERAPIES SERIES
Discharge: HOME | End: 2023-09-14
Payer: COMMERCIAL

## 2023-09-14 PROCEDURE — 97140 MANUAL THERAPY 1/> REGIONS: CPT

## 2023-09-14 PROCEDURE — 97035 APP MDLTY 1+ULTRASOUND EA 15: CPT

## 2023-09-14 NOTE — PROGRESS NOTES
house/yardwork, driving/computer work  [] (711[address]) Reviewed/Progressed HEP activities related to improving balance, coordination, kinesthetic sense, posture, motor skill, proprioception of scapular, scapulothoracic and UE control with self care, reaching, carrying, lifting, house/yardwork, driving/computer work      Manual Treatments:  PROM / STM / Oscillations-Mobs:  G-I, II, III, IV (PA's, Inf., Post.)  [] ([address]) Provided manual therapy to mobilize soft tissue/joints of cervical/CT, scapular GHJ and UE for the purpose of modulating pain, promoting relaxation,  increasing ROM, reducing/eliminating soft tissue swelling/inflammation/restriction, improving soft tissue extensibility and allowing for proper ROM for normal function with self care, reaching, carrying, lifting, house/yardwork, driving/computer work    Charges:  Timed Code Treatment Minutes: 45   Total Treatment Minutes: 45       [] EVAL (LOW) 89491 (typically 20 minutes face-to-face)  [] EVAL (MOD) 44629 (typically 30 minutes face-to-face)  [] EVAL (HIGH) 11744 (typically 45 minutes face-to-face)  [] RE-EVAL     [] YW(45723) x       [] NMR (32802) x       [x] Manual (67752) x 2  [x] Ultrasound (18199) x  [] TA (87407) x                 Approval Dates:  CPT Code Units Approved Units Used  Date Updated:                       GOALS:  Patient stated goal: \"be able to use my arm again to do everyday life things, cooking, cleaning\"  [] Progressing: [] Met: [] Not Met: [] Adjusted     Therapist goals for Patient:   Short Term Goals: To be achieved in: 2 weeks  1. Independent in HEP and progression per patient tolerance, in order to prevent re-injury. [] Progressing: [] Met: [] Not Met: [] Adjusted  2. Patient will have a decrease in pain to facilitate improvement in movement, function, and ADLs as indicated by Functional Deficits. [] Progressing: [] Met: [] Not Met: [] Adjusted     Long Term Goals: To be achieved in: 10 weeks  1.  Disability index score of 50%

## 2023-09-19 ENCOUNTER — APPOINTMENT (OUTPATIENT)
Dept: PHYSICAL THERAPY | Age: 58
End: 2023-09-19
Payer: COMMERCIAL

## 2023-09-21 ENCOUNTER — HOSPITAL ENCOUNTER (OUTPATIENT)
Dept: PHYSICAL THERAPY | Age: 58
Setting detail: THERAPIES SERIES
Discharge: HOME | End: 2023-09-21
Payer: COMMERCIAL

## 2023-09-21 PROCEDURE — 97140 MANUAL THERAPY 1/> REGIONS: CPT

## 2023-09-21 PROCEDURE — 97110 THERAPEUTIC EXERCISES: CPT

## 2023-09-21 PROCEDURE — 97035 APP MDLTY 1+ULTRASOUND EA 15: CPT

## 2023-09-21 NOTE — PROGRESS NOTES
Other    Prognosis for POC: [x] Good [] Fair  [] Poor    Patient requires continued skilled intervention: [x] Yes  [] No      PLAN: Resume exercises, avoid supine  [x] Continue per plan of care [] Alter current plan (see comments)  [] Plan of care initiated [] Hold pending MD visit [] Discharge    Electronically signed by: Yuniel Bullock PT  , COLT, [id number]      Note: If patient does not return for scheduled/recommended follow up visits, this note will serve as a discharge from care along with the most recent update on progress.

## 2023-09-26 ENCOUNTER — APPOINTMENT (OUTPATIENT)
Dept: PHYSICAL THERAPY | Age: 58
End: 2023-09-26
Payer: COMMERCIAL

## 2023-09-28 ENCOUNTER — HOSPITAL ENCOUNTER (OUTPATIENT)
Dept: PHYSICAL THERAPY | Age: 58
Setting detail: THERAPIES SERIES
Discharge: HOME | End: 2023-09-28
Payer: COMMERCIAL

## 2023-09-28 PROCEDURE — 97110 THERAPEUTIC EXERCISES: CPT

## 2023-09-28 PROCEDURE — 97140 MANUAL THERAPY 1/> REGIONS: CPT

## 2023-09-28 PROCEDURE — 97112 NEUROMUSCULAR REEDUCATION: CPT

## 2023-09-28 NOTE — PROGRESS NOTES
posture, motor skill, proprioception and motor activation to allow for proper function of scapular, scapulothoracic and UE control with self care, carrying, lifting, driving/computer work. Home Exercise Program:    [x] (67276) Reviewed/Progressed HEP activities related to strengthening, flexibility, endurance, ROM of scapular, scapulothoracic and UE control with self care, reaching, carrying, lifting, house/yardwork, driving/computer work  [] (11559) Reviewed/Progressed HEP activities related to improving balance, coordination, kinesthetic sense, posture, motor skill, proprioception of scapular, scapulothoracic and UE control with self care, reaching, carrying, lifting, house/yardwork, driving/computer work      Manual Treatments:  PROM / STM / Oscillations-Mobs:  G-I, II, III, IV (PA's, Inf., Post.)  [] (38934) Provided manual therapy to mobilize soft tissue/joints of cervical/CT, scapular GHJ and UE for the purpose of modulating pain, promoting relaxation,  increasing ROM, reducing/eliminating soft tissue swelling/inflammation/restriction, improving soft tissue extensibility and allowing for proper ROM for normal function with self care, reaching, carrying, lifting, house/yardwork, driving/computer work    Charges:  Timed Code Treatment Minutes: 45   Total Treatment Minutes: 45       [] EVAL (LOW) 20396 (typically 20 minutes face-to-face)  [] EVAL (MOD) 02625 (typically 30 minutes face-to-face)  [] EVAL (HIGH) 24928 (typically 45 minutes face-to-face)  [] RE-EVAL     [x] DE(65570) x       [x] NMR (27692) x       [x] Manual (92967) x   [] Ultrasound (08219) x  [] TA (59488) x                 Approval Dates:  CPT Code Units Approved Units Used  Date Updated:                       GOALS:  Patient stated goal: \"be able to use my arm again to do everyday life things, cooking, cleaning\"  [] Progressing: [] Met: [] Not Met: [] Adjusted     Therapist goals for Patient:   Short Term Goals:  To be achieved in: 2

## 2023-10-02 ENCOUNTER — HOSPITAL ENCOUNTER (OUTPATIENT)
Dept: PHYSICAL THERAPY | Age: 58
Setting detail: THERAPIES SERIES
Discharge: HOME | End: 2023-10-02
Payer: COMMERCIAL

## 2023-10-02 PROCEDURE — 97112 NEUROMUSCULAR REEDUCATION: CPT

## 2023-10-02 PROCEDURE — 97110 THERAPEUTIC EXERCISES: CPT

## 2023-10-02 PROCEDURE — 97140 MANUAL THERAPY 1/> REGIONS: CPT

## 2023-10-02 NOTE — FLOWSHEET NOTE
608 [facility], [address] [place] [address]. [place] [address]  Phone: [phone]   Fax:     [phone]        Physical Therapy Treatment Note/ Progress Report:       Date:  10/2/2023    Patient Name:  Angel Schmitt    :  1965  MRN: [id number]    Pertinent Medical History:     Referring Provider:   Dr. Yoseph Gauthier                              Evaluation Date: 2023                                                 Medical Diagnosis:  Rotator cuff tendinitis, left [M75.82]  Myofascial pain [M79.18]     Treatment Diagnosis:      ICD-10-CM     1. Decreased activities of daily living (ADL)  Z78.9                                      Insurance information: PT Insurance Information:  [facility] signed (Y/N): Y    Date of Patient follow up with Physician:      Progress Report: []  Yes  [x]  No     Date Range for reporting period:  Beginnin23  Ending:      Progress report due (10 Rx/or 30 days whichever is less):      Recertification due (POC duration/ or 90 days whichever is less):      Visit # POC/Insurance Allowable Auth Needed   7 Tbd- approved thru 23 [x]Yes    []No     Pain level:  10/10 L shoulder     History of Injury:Patient stated she started to have L shoulder pain approximately several month ago with insidious onset. Pain has been \"progressively getting worse. \" Pt stated she has a small tear, arthritis and swelling. Pain is aggravated by reaching up, reaching behind the back and lifting. Pt \"cannot sleep at night. The pain is so bad. . it feels like something is pulled away from the shoulder. \"     Pt is L hand dominant.      Relevant Medical History:  Per review from Dr. Clinton Henry office note on 23:  \"Right Shoulder X-Ray:  [facility] Joint: narrowing mild  Glenohumeral joint: no abnormalities noted  Elevation humeral head: absent     Left Shoulder MRI: I independently reviewed the

## 2023-10-05 ENCOUNTER — APPOINTMENT (OUTPATIENT)
Dept: PHYSICAL THERAPY | Age: 58
End: 2023-10-05
Payer: COMMERCIAL

## 2023-10-09 ENCOUNTER — APPOINTMENT (OUTPATIENT)
Dept: PHYSICAL THERAPY | Age: 58
End: 2023-10-09
Payer: COMMERCIAL

## 2023-10-11 ENCOUNTER — HOSPITAL ENCOUNTER (OUTPATIENT)
Dept: PHYSICAL THERAPY | Age: 58
Setting detail: THERAPIES SERIES
Discharge: HOME | End: 2023-10-11
Payer: COMMERCIAL

## 2023-10-11 NOTE — FLOWSHEET NOTE
608 [facility], [address] [facility].  [place] Car[place] [address]  Phone: [phone]   Fax:     [phone]     Physical Therapy  Cancellation/No-show Note  Patient Name:  Geno Ojeda  :  1965   Date:  10/11/2023  Cancelled visits to date: 1  No-shows to date: 1    Patient status for today's appointment patient:  []  Cancelled  []  Rescheduled appointment  []  No-show     Reason given by patient:  []  Patient ill  []  Conflicting appointment  []  No transportation    []  Conflict with work  []  No reason given  []  Other:     Comments:      Phone call information:   [x]  Phone call made today to patient at _ time at number provided:      []  Patient answered, conversation as follows:    [x]  Patient did not answer, message left as follows: PT was unable to leave message as mailbox was full  []  Phone call not made today    Electronically signed by:  Amna Barnes PT

## 2023-10-19 ENCOUNTER — OFFICE VISIT (OUTPATIENT)
Dept: CARDIOLOGY CLINIC | Age: 58
End: 2023-10-19
Payer: COMMERCIAL

## 2023-10-19 VITALS
SYSTOLIC BLOOD PRESSURE: 142 MMHG | WEIGHT: 199.8 LBS | DIASTOLIC BLOOD PRESSURE: 72 MMHG | HEART RATE: 71 BPM | HEIGHT: 60 IN | OXYGEN SATURATION: 92 % | BODY MASS INDEX: 39.23 KG/M2

## 2023-10-19 DIAGNOSIS — E78.5 HYPERLIPIDEMIA LDL GOAL <70: ICD-10-CM

## 2023-10-19 DIAGNOSIS — I73.9 PAD (PERIPHERAL ARTERY DISEASE) (HCC): ICD-10-CM

## 2023-10-19 DIAGNOSIS — M48.062 PSEUDOCLAUDICATION: ICD-10-CM

## 2023-10-19 DIAGNOSIS — I25.119 CORONARY ARTERY DISEASE INVOLVING NATIVE CORONARY ARTERY OF NATIVE HEART WITH ANGINA PECTORIS (HCC): Primary | ICD-10-CM

## 2023-10-19 DIAGNOSIS — I10 ESSENTIAL HYPERTENSION: ICD-10-CM

## 2023-10-19 PROCEDURE — 1036F TOBACCO NON-USER: CPT | Performed by: INTERNAL MEDICINE

## 2023-10-19 PROCEDURE — G8484 FLU IMMUNIZE NO ADMIN: HCPCS | Performed by: INTERNAL MEDICINE

## 2023-10-19 PROCEDURE — 3017F COLORECTAL CA SCREEN DOC REV: CPT | Performed by: INTERNAL MEDICINE

## 2023-10-19 PROCEDURE — 93000 ELECTROCARDIOGRAM COMPLETE: CPT | Performed by: INTERNAL MEDICINE

## 2023-10-19 PROCEDURE — G8417 CALC BMI ABV UP PARAM F/U: HCPCS | Performed by: INTERNAL MEDICINE

## 2023-10-19 PROCEDURE — 3074F SYST BP LT 130 MM HG: CPT | Performed by: INTERNAL MEDICINE

## 2023-10-19 PROCEDURE — G8427 DOCREV CUR MEDS BY ELIG CLIN: HCPCS | Performed by: INTERNAL MEDICINE

## 2023-10-19 PROCEDURE — 99214 OFFICE O/P EST MOD 30 MIN: CPT | Performed by: INTERNAL MEDICINE

## 2023-10-19 PROCEDURE — 3078F DIAST BP <80 MM HG: CPT | Performed by: INTERNAL MEDICINE

## 2023-10-19 NOTE — PROGRESS NOTES
[address] [facility] [address]  1965 October 19, 2023    CC: \"I was having chest pain\"     HPI:  The patient is 60 y.o. female with a past medical history significant for CAD, COPD, PAD, hyperlipidemia and hypertension. She underwent LHC on 10/6/20 after presenting with chest pain resulting in SHEFALI x 2 to RCA and SHEFALI to LAD. She completed a stress test due to complaints of chest pain revealing a reversible defect. She underwent a heart catheterization on 3/8/21 that did not require any intervention. She underwent peripheral angiogram due to abnormal ABIs and claudication symptoms on 11/8/21. This revealed serial 80% lesions in right SFA that underwent angioplasty with drug coated balloon. She presented to the ED 12/23/22 for chest pain and her cardiac workup was unremarkable. She completed an abnormal stress test 3/9/23 and underwent a left heart catheretization 3/27/23 that resulted in x1DES to the PDA. She reports an episode of chest pain the other week, but denies any recurrence. The episode was constant and resolved without any particular intervention. She states she did injure her left arm recently and wonders if the pain is related to this. The episode was not associated with exertion. She denies any worsening shortness of breath. She reports medication compliance and is tolerating. She denies any abnormal bleeding or bruising. She denies exertional chest pain/pressure, dyspnea at rest, worsening REHMAN, PND, orthopnea, palpitations, lightheadedness, weight changes, changes in LE edema, and syncope. Review of Systems:  Constitutional: No fatigue, weakness, night sweats or fever. HEENT: No new vision difficulties or ringing in the ears. Respiratory: No new SOB, PND, orthopnea or cough. Cardiovascular: See HPI   GI: No n/v, diarrhea, constipation, abdominal pain or changes in bowel habits.   No melena, no hematochezia  : No urinary frequency, urgency, incontinence,

## 2023-10-30 ENCOUNTER — OFFICE VISIT (OUTPATIENT)
Dept: ORTHOPEDIC SURGERY | Age: 58
End: 2023-10-30
Payer: COMMERCIAL

## 2023-10-30 VITALS — WEIGHT: 202 LBS | HEIGHT: 60 IN | BODY MASS INDEX: 39.66 KG/M2

## 2023-10-30 DIAGNOSIS — M79.18 MYOFASCIAL PAIN: Primary | ICD-10-CM

## 2023-10-30 PROCEDURE — 1036F TOBACCO NON-USER: CPT | Performed by: ORTHOPAEDIC SURGERY

## 2023-10-30 PROCEDURE — G8427 DOCREV CUR MEDS BY ELIG CLIN: HCPCS | Performed by: ORTHOPAEDIC SURGERY

## 2023-10-30 PROCEDURE — 99213 OFFICE O/P EST LOW 20 MIN: CPT | Performed by: ORTHOPAEDIC SURGERY

## 2023-10-30 PROCEDURE — 3017F COLORECTAL CA SCREEN DOC REV: CPT | Performed by: ORTHOPAEDIC SURGERY

## 2023-10-30 PROCEDURE — G8417 CALC BMI ABV UP PARAM F/U: HCPCS | Performed by: ORTHOPAEDIC SURGERY

## 2023-10-30 PROCEDURE — G8484 FLU IMMUNIZE NO ADMIN: HCPCS | Performed by: ORTHOPAEDIC SURGERY

## 2023-10-30 RX ORDER — GABAPENTIN 300 MG/1
300 CAPSULE ORAL 3 TIMES DAILY
Qty: 27 CAPSULE | Refills: 0 | Status: ON HOLD | OUTPATIENT
Start: 2023-10-30 | End: 2023-11-08

## 2023-11-02 ENCOUNTER — APPOINTMENT (OUTPATIENT)
Dept: GENERAL RADIOLOGY | Age: 58
DRG: 140 | End: 2023-11-02
Payer: COMMERCIAL

## 2023-11-02 ENCOUNTER — APPOINTMENT (OUTPATIENT)
Dept: CT IMAGING | Age: 58
DRG: 140 | End: 2023-11-02
Payer: COMMERCIAL

## 2023-11-02 ENCOUNTER — HOSPITAL ENCOUNTER (INPATIENT)
Age: 58
LOS: 3 days | Discharge: HOME | DRG: 140 | End: 2023-11-06
Attending: EMERGENCY MEDICINE | Admitting: INTERNAL MEDICINE
Payer: COMMERCIAL

## 2023-11-02 DIAGNOSIS — I26.99 ACUTE PULMONARY EMBOLISM, UNSPECIFIED PULMONARY EMBOLISM TYPE, UNSPECIFIED WHETHER ACUTE COR PULMONALE PRESENT (HCC): Primary | ICD-10-CM

## 2023-11-02 DIAGNOSIS — J18.0 BRONCHOPNEUMONIA: ICD-10-CM

## 2023-11-02 DIAGNOSIS — J90 PLEURAL EFFUSION, BILATERAL: ICD-10-CM

## 2023-11-02 DIAGNOSIS — R09.02 HYPOXIA: ICD-10-CM

## 2023-11-02 LAB
ALBUMIN SERPL-MCNC: 3.6 G/DL (ref 3.4–5)
ALBUMIN/GLOB SERPL: 1.2 {RATIO} (ref 1.1–2.2)
ALP SERPL-CCNC: 111 U/L (ref 40–129)
ALT SERPL-CCNC: 18 U/L (ref 10–40)
ANION GAP SERPL CALCULATED.3IONS-SCNC: 10 MMOL/L (ref 3–16)
AST SERPL-CCNC: 13 U/L (ref 15–37)
BACTERIA URNS QL MICRO: ABNORMAL /HPF
BASOPHILS # BLD: 0 K/UL (ref 0–0.2)
BASOPHILS NFR BLD: 0.3 %
BILIRUB SERPL-MCNC: 1.5 MG/DL (ref 0–1)
BILIRUB UR QL STRIP.AUTO: NEGATIVE
BUN SERPL-MCNC: 19 MG/DL (ref 7–20)
CALCIUM SERPL-MCNC: 9.1 MG/DL (ref 8.3–10.6)
CHARACTER UR: ABNORMAL
CHLORIDE SERPL-SCNC: 97 MMOL/L (ref 99–110)
CLARITY UR: CLEAR
CO2 SERPL-SCNC: 29 MMOL/L (ref 21–32)
COLOR UR: YELLOW
CREAT SERPL-MCNC: 0.7 MG/DL (ref 0.6–1.1)
D DIMER: 0.37 UG/ML FEU (ref 0–0.6)
DEPRECATED RDW RBC AUTO: 15.3 % (ref 12.4–15.4)
EOSINOPHIL # BLD: 0.1 K/UL (ref 0–0.6)
EOSINOPHIL NFR BLD: 0.7 %
GFR SERPLBLD CREATININE-BSD FMLA CKD-EPI: >60 ML/MIN/{1.73_M2}
GLUCOSE SERPL-MCNC: 220 MG/DL (ref 70–99)
GLUCOSE UR STRIP.AUTO-MCNC: NEGATIVE MG/DL
HCT VFR BLD AUTO: 32 % (ref 36–48)
HGB BLD-MCNC: 10.5 G/DL (ref 12–16)
HGB UR QL STRIP.AUTO: ABNORMAL
IMM GRANULOCYTES # BLD: 0 K/UL (ref 0–0.2)
IMM GRANULOCYTES NFR BLD: 0.3 %
KETONES UR STRIP.AUTO-MCNC: NEGATIVE MG/DL
LACTATE BLDV-SCNC: 1.2 MMOL/L (ref 0.4–2)
LEUKOCYTE ESTERASE UR QL STRIP.AUTO: ABNORMAL
LYMPHOCYTES # BLD: 1 K/UL (ref 1–5.1)
LYMPHOCYTES NFR BLD: 13.6 %
MCH RBC QN AUTO: 30.9 PG (ref 26–34)
MCHC RBC AUTO-ENTMCNC: 32.8 G/DL (ref 32–36.4)
MCV RBC AUTO: 94.1 FL (ref 80–100)
MONOCYTES # BLD: 0.5 K/UL (ref 0–1.3)
MONOCYTES NFR BLD: 7.5 %
NEUTROPHILS # BLD: 5.6 K/UL (ref 1.7–7.7)
NEUTROPHILS NFR BLD: 77.6 %
NITRITE UR QL STRIP.AUTO: NEGATIVE
NT-PROBNP SERPL-MCNC: 1128 PG/ML (ref 0–124)
PH UR STRIP.AUTO: 5.5 [PH] (ref 5–8)
PLATELET # BLD AUTO: 126 K/UL (ref 135–450)
PMV BLD AUTO: 10.5 FL (ref 5–10.5)
POTASSIUM SERPL-SCNC: 4.4 MMOL/L (ref 3.5–5.1)
PROT SERPL-MCNC: 6.6 G/DL (ref 6.4–8.2)
PROT UR STRIP.AUTO-MCNC: NEGATIVE MG/DL
RBC # BLD AUTO: 3.4 M/UL (ref 4–5.2)
RBC #/AREA URNS HPF: ABNORMAL /HPF (ref 0–4)
SARS-COV-2 RDRP RESP QL NAA+PROBE: NOT DETECTED
SODIUM SERPL-SCNC: 136 MMOL/L (ref 136–145)
SP GR UR STRIP.AUTO: 1.01 (ref 1–1.03)
TROPONIN, HIGH SENSITIVITY: 13 NG/L (ref 0–14)
TROPONIN, HIGH SENSITIVITY: 13 NG/L (ref 0–14)
UA COMPLETE W REFLEX CULTURE PNL UR: ABNORMAL
UA DIPSTICK W REFLEX MICRO PNL UR: YES
URN SPEC COLLECT METH UR: ABNORMAL
UROBILINOGEN UR STRIP-ACNC: 1 E.U./DL
WBC # BLD AUTO: 7.2 K/UL (ref 4–11)
WBC #/AREA URNS HPF: ABNORMAL /HPF (ref 0–5)

## 2023-11-02 PROCEDURE — 83605 ASSAY OF LACTIC ACID: CPT

## 2023-11-02 PROCEDURE — 6360000002 HC RX W HCPCS: Performed by: EMERGENCY MEDICINE

## 2023-11-02 PROCEDURE — 83880 ASSAY OF NATRIURETIC PEPTIDE: CPT

## 2023-11-02 PROCEDURE — 81001 URINALYSIS AUTO W/SCOPE: CPT

## 2023-11-02 PROCEDURE — 99285 EMERGENCY DEPT VISIT HI MDM: CPT

## 2023-11-02 PROCEDURE — 85025 COMPLETE CBC W/AUTO DIFF WBC: CPT

## 2023-11-02 PROCEDURE — 87040 BLOOD CULTURE FOR BACTERIA: CPT

## 2023-11-02 PROCEDURE — 6360000004 HC RX CONTRAST MEDICATION: Performed by: EMERGENCY MEDICINE

## 2023-11-02 PROCEDURE — 6370000000 HC RX 637 (ALT 250 FOR IP): Performed by: EMERGENCY MEDICINE

## 2023-11-02 PROCEDURE — 96375 TX/PRO/DX INJ NEW DRUG ADDON: CPT

## 2023-11-02 PROCEDURE — 80053 COMPREHEN METABOLIC PANEL: CPT

## 2023-11-02 PROCEDURE — 85379 FIBRIN DEGRADATION QUANT: CPT

## 2023-11-02 PROCEDURE — 71045 X-RAY EXAM CHEST 1 VIEW: CPT

## 2023-11-02 PROCEDURE — 87635 SARS-COV-2 COVID-19 AMP PRB: CPT

## 2023-11-02 PROCEDURE — 36415 COLL VENOUS BLD VENIPUNCTURE: CPT

## 2023-11-02 PROCEDURE — 96365 THER/PROPH/DIAG IV INF INIT: CPT

## 2023-11-02 PROCEDURE — 93005 ELECTROCARDIOGRAM TRACING: CPT | Performed by: EMERGENCY MEDICINE

## 2023-11-02 PROCEDURE — 2580000003 HC RX 258: Performed by: EMERGENCY MEDICINE

## 2023-11-02 PROCEDURE — 71260 CT THORAX DX C+: CPT

## 2023-11-02 PROCEDURE — 84484 ASSAY OF TROPONIN QUANT: CPT

## 2023-11-02 RX ORDER — DEXAMETHASONE SODIUM PHOSPHATE 4 MG/ML
6 INJECTION, SOLUTION INTRA-ARTICULAR; INTRALESIONAL; INTRAMUSCULAR; INTRAVENOUS; SOFT TISSUE ONCE
Status: COMPLETED | OUTPATIENT
Start: 2023-11-02 | End: 2023-11-02

## 2023-11-02 RX ORDER — AZITHROMYCIN 500 MG/1
500 TABLET, FILM COATED ORAL ONCE
Status: COMPLETED | OUTPATIENT
Start: 2023-11-02 | End: 2023-11-02

## 2023-11-02 RX ADMIN — DEXAMETHASONE SODIUM PHOSPHATE 6 MG: 4 INJECTION INTRA-ARTICULAR; INTRALESIONAL; INTRAMUSCULAR; INTRAVENOUS; SOFT TISSUE at 23:03

## 2023-11-02 RX ADMIN — APIXABAN 10 MG: 5 TABLET, FILM COATED ORAL at 23:36

## 2023-11-02 RX ADMIN — CEFTRIAXONE 1000 MG: 1 INJECTION, POWDER, FOR SOLUTION INTRAMUSCULAR; INTRAVENOUS at 23:35

## 2023-11-02 RX ADMIN — AZITHROMYCIN 500 MG: 500 TABLET, FILM COATED ORAL at 23:35

## 2023-11-02 RX ADMIN — IOMEPROL INJECTION 100 ML: 714 INJECTION, SOLUTION INTRAVASCULAR at 21:23

## 2023-11-02 ASSESSMENT — PATIENT HEALTH QUESTIONNAIRE - PHQ9
SUM OF ALL RESPONSES TO PHQ QUESTIONS 1-9: 0
2. FEELING DOWN, DEPRESSED OR HOPELESS: 0
1. LITTLE INTEREST OR PLEASURE IN DOING THINGS: 0
SUM OF ALL RESPONSES TO PHQ QUESTIONS 1-9: 0
SUM OF ALL RESPONSES TO PHQ9 QUESTIONS 1 & 2: 0
SUM OF ALL RESPONSES TO PHQ QUESTIONS 1-9: 0
SUM OF ALL RESPONSES TO PHQ QUESTIONS 1-9: 0

## 2023-11-02 ASSESSMENT — PAIN SCALES - GENERAL: PAINLEVEL_OUTOF10: 3

## 2023-11-02 ASSESSMENT — LIFESTYLE VARIABLES
HOW MANY STANDARD DRINKS CONTAINING ALCOHOL DO YOU HAVE ON A TYPICAL DAY: PATIENT DOES NOT DRINK
HOW OFTEN DO YOU HAVE A DRINK CONTAINING ALCOHOL: NEVER

## 2023-11-02 ASSESSMENT — PAIN - FUNCTIONAL ASSESSMENT: PAIN_FUNCTIONAL_ASSESSMENT: 0-10

## 2023-11-02 ASSESSMENT — PAIN DESCRIPTION - LOCATION: LOCATION: CHEST

## 2023-11-02 NOTE — ED PROVIDER NOTES
[facility] Emergency Department      Pt Name: Javier Phipps  MRN: [id number]  [address] [facility] [place] 1965  Date of evaluation: 11/2/2023  Provider: Amanda Rincon MD  CHIEF COMPLAINT  Chief Complaint   Patient presents with    Shortness of Breath     Pt states hx of COPD, feeling SOB since 10/30/23, tired, UTI symptoms. 86-88% on room air. Pt states she wears home 02 2L NC PRN, presents without o2, 86-88% on room air at triage 98% on 2L. Chest pain pressure states history of MI. 3/10 pain. States not medicated for pain. HPI  Javier Phipps is a 60 y.o. female who presents because of difficulty breathing, fatigue, dysuria, chest discomfort. Symptoms have been coming on for several days. She says that the tightness in her chest started yesterday. Does have a history of coronary artery disease, COPD, peripheral vascular disease, chronic kidney disease, supplemental oxygen use at home as needed. She denies cough or fever. Does have some dysuria and has a painful tooth. She has needed to cancel some dental appointments because of the blood thinner she was taking. Her cardiologist took her off the blood thinner several days ago at the office visit. She had cardiac stents placed in 2020. She has nausea without vomiting. REVIEW OF SYSTEMS:  No fever, no abdominal pain today, leg swelling a few days ago pertinent positives and negatives as per the HPI. All other pertinent review of systems reviewed and negative. Nursing notes reviewed.     PAST MEDICAL HISTORY  Past Medical History:   Diagnosis Date    Arthritis     Back pain     CAD (coronary artery disease)     Chronic kidney disease     COPD (chronic obstructive pulmonary disease) (HCC)     Depression     Diabetes mellitus (HCC)     REHMAN (dyspnea on exertion)     Fatty liver     GERD (gastroesophageal reflux disease)     Hyperlipidemia     Hypertension     MRSA (methicillin resistant staph aureus) culture positive 08/15/2018    arm    Stress

## 2023-11-02 NOTE — ED NOTES
Pt bundled with a warm blanket. Bed locked. Lowered. Rails x2. light in reach. Bedside table next to bed. No further questions or needs made known at this time.          Jonny Price RN  11/02/23 1944

## 2023-11-02 NOTE — ED TRIAGE NOTES
Pt from home. Per pt her Ex  drove her to the ED to be seen for concern for Shortness of breath, Chest pain, UTI symptoms, feeling \"tired\" and nausea. Pt reports a prior MI, she sees a cardiologist, History of COPD, Home o2 2L PRN. Pt states she is independent at baseline. Pt presents A&0x4, brought back via wheelchair due to shortness of breath when walking today. She states her symptoms started Monday 10/30/23. Pt rates her pain as 3/10 in her chest. She states \" I feel pressure in the center front of my chest and a like stabbing pain in my back. \" The Pt was 86-88% on room air during triage assessment, 2L NC placed and Pt sat's were %. Pts skin is dry, warm and color is appropriate for ethnicity. At this time she is breathing easy and equal. Pt into a hospital gown and non skid socks. The nights plan of care, goals, fall prevention and safety have been reviewed with the pt who acknowledges understanding. Bed locked. Lowered. Rails x2. Call light in reach. Bedside table next to bed. Opportunity for questions, concerns, medication review offered. Pt states that her Ex- Alcides Jimenez ([phone]) is allowed to have \" any information about me and this visit and also my Daughter Rei Vang [facility]). \" Pt unsure of Mary Ellen's Number. No further questions or needs made known at this time.

## 2023-11-03 PROBLEM — J96.00 ACUTE RESPIRATORY FAILURE (HCC): Status: ACTIVE | Noted: 2023-11-03

## 2023-11-03 PROBLEM — I26.99 ACUTE PULMONARY EMBOLISM (HCC): Status: ACTIVE | Noted: 2023-11-03

## 2023-11-03 LAB
EKG ATRIAL RATE: 65 BPM
EKG DIAGNOSIS: NORMAL
EKG P AXIS: 65 DEGREES
EKG P-R INTERVAL: 152 MS
EKG Q-T INTERVAL: 480 MS
EKG QRS DURATION: 136 MS
EKG QTC CALCULATION (BAZETT): 499 MS
EKG R AXIS: 50 DEGREES
EKG T AXIS: -25 DEGREES
EKG VENTRICULAR RATE: 65 BPM
GLUCOSE BLD-MCNC: 338 MG/DL (ref 70–99)
GLUCOSE BLD-MCNC: 352 MG/DL (ref 70–99)
GLUCOSE BLD-MCNC: 378 MG/DL (ref 70–99)
GLUCOSE BLD-MCNC: 393 MG/DL (ref 70–99)
GLUCOSE BLD-MCNC: 400 MG/DL (ref 70–99)
PERFORMED ON: ABNORMAL

## 2023-11-03 PROCEDURE — 93970 EXTREMITY STUDY: CPT

## 2023-11-03 PROCEDURE — 94640 AIRWAY INHALATION TREATMENT: CPT

## 2023-11-03 PROCEDURE — 1200000000 HC SEMI PRIVATE

## 2023-11-03 PROCEDURE — 6370000000 HC RX 637 (ALT 250 FOR IP): Performed by: INTERNAL MEDICINE

## 2023-11-03 PROCEDURE — 94760 N-INVAS EAR/PLS OXIMETRY 1: CPT

## 2023-11-03 PROCEDURE — 93010 ELECTROCARDIOGRAM REPORT: CPT | Performed by: INTERNAL MEDICINE

## 2023-11-03 PROCEDURE — 99223 1ST HOSP IP/OBS HIGH 75: CPT | Performed by: INTERNAL MEDICINE

## 2023-11-03 PROCEDURE — 6360000002 HC RX W HCPCS: Performed by: INTERNAL MEDICINE

## 2023-11-03 PROCEDURE — 2580000003 HC RX 258: Performed by: INTERNAL MEDICINE

## 2023-11-03 PROCEDURE — 2700000000 HC OXYGEN THERAPY PER DAY

## 2023-11-03 RX ORDER — INSULIN LISPRO 100 [IU]/ML
0-8 INJECTION, SOLUTION INTRAVENOUS; SUBCUTANEOUS
Status: DISCONTINUED | OUTPATIENT
Start: 2023-11-03 | End: 2023-11-06 | Stop reason: HOSPADM

## 2023-11-03 RX ORDER — DULOXETIN HYDROCHLORIDE 60 MG/1
60 CAPSULE, DELAYED RELEASE ORAL DAILY
Status: DISCONTINUED | OUTPATIENT
Start: 2023-11-03 | End: 2023-11-03

## 2023-11-03 RX ORDER — INSULIN LISPRO 100 [IU]/ML
0-4 INJECTION, SOLUTION INTRAVENOUS; SUBCUTANEOUS NIGHTLY
Status: DISCONTINUED | OUTPATIENT
Start: 2023-11-03 | End: 2023-11-03

## 2023-11-03 RX ORDER — AZITHROMYCIN 500 MG/1
500 TABLET, FILM COATED ORAL DAILY
Status: COMPLETED | OUTPATIENT
Start: 2023-11-03 | End: 2023-11-06

## 2023-11-03 RX ORDER — CARVEDILOL 12.5 MG/1
12.5 TABLET ORAL 2 TIMES DAILY WITH MEALS
Status: DISCONTINUED | OUTPATIENT
Start: 2023-11-03 | End: 2023-11-06 | Stop reason: HOSPADM

## 2023-11-03 RX ORDER — HYDROCODONE BITARTRATE AND ACETAMINOPHEN 5; 325 MG/1; MG/1
1 TABLET ORAL 2 TIMES DAILY
Status: DISCONTINUED | OUTPATIENT
Start: 2023-11-03 | End: 2023-11-06 | Stop reason: HOSPADM

## 2023-11-03 RX ORDER — INSULIN LISPRO 100 [IU]/ML
0-4 INJECTION, SOLUTION INTRAVENOUS; SUBCUTANEOUS
Status: DISCONTINUED | OUTPATIENT
Start: 2023-11-03 | End: 2023-11-03

## 2023-11-03 RX ORDER — INSULIN LISPRO 100 [IU]/ML
0-8 INJECTION, SOLUTION INTRAVENOUS; SUBCUTANEOUS
Status: DISCONTINUED | OUTPATIENT
Start: 2023-11-03 | End: 2023-11-03

## 2023-11-03 RX ORDER — PREDNISONE 20 MG/1
20 TABLET ORAL DAILY
Status: DISCONTINUED | OUTPATIENT
Start: 2023-11-03 | End: 2023-11-05

## 2023-11-03 RX ORDER — INSULIN LISPRO 100 [IU]/ML
8 INJECTION, SOLUTION INTRAVENOUS; SUBCUTANEOUS
Status: DISCONTINUED | OUTPATIENT
Start: 2023-11-03 | End: 2023-11-03

## 2023-11-03 RX ORDER — LOSARTAN POTASSIUM 25 MG/1
25 TABLET ORAL DAILY
Status: DISCONTINUED | OUTPATIENT
Start: 2023-11-03 | End: 2023-11-04

## 2023-11-03 RX ORDER — DEXTROSE MONOHYDRATE 100 MG/ML
INJECTION, SOLUTION INTRAVENOUS CONTINUOUS PRN
Status: DISCONTINUED | OUTPATIENT
Start: 2023-11-03 | End: 2023-11-06 | Stop reason: HOSPADM

## 2023-11-03 RX ORDER — ASPIRIN 81 MG/1
81 TABLET, CHEWABLE ORAL DAILY
Status: DISCONTINUED | OUTPATIENT
Start: 2023-11-03 | End: 2023-11-06 | Stop reason: HOSPADM

## 2023-11-03 RX ORDER — EZETIMIBE 10 MG/1
10 TABLET ORAL DAILY
Status: DISCONTINUED | OUTPATIENT
Start: 2023-11-03 | End: 2023-11-06 | Stop reason: HOSPADM

## 2023-11-03 RX ORDER — FUROSEMIDE 20 MG/1
20 TABLET ORAL DAILY
Status: DISCONTINUED | OUTPATIENT
Start: 2023-11-03 | End: 2023-11-06 | Stop reason: HOSPADM

## 2023-11-03 RX ORDER — INSULIN LISPRO 100 [IU]/ML
12 INJECTION, SOLUTION INTRAVENOUS; SUBCUTANEOUS
Status: DISCONTINUED | OUTPATIENT
Start: 2023-11-04 | End: 2023-11-06 | Stop reason: HOSPADM

## 2023-11-03 RX ORDER — DIAZEPAM 5 MG/1
5 TABLET ORAL EVERY 12 HOURS PRN
Status: DISCONTINUED | OUTPATIENT
Start: 2023-11-03 | End: 2023-11-06 | Stop reason: HOSPADM

## 2023-11-03 RX ORDER — GABAPENTIN 300 MG/1
300 CAPSULE ORAL 3 TIMES DAILY
Status: DISCONTINUED | OUTPATIENT
Start: 2023-11-03 | End: 2023-11-05

## 2023-11-03 RX ORDER — ATORVASTATIN CALCIUM 20 MG/1
20 TABLET, FILM COATED ORAL DAILY
Status: DISCONTINUED | OUTPATIENT
Start: 2023-11-03 | End: 2023-11-06 | Stop reason: HOSPADM

## 2023-11-03 RX ORDER — RANOLAZINE 500 MG/1
500 TABLET, EXTENDED RELEASE ORAL 2 TIMES DAILY
Status: DISCONTINUED | OUTPATIENT
Start: 2023-11-03 | End: 2023-11-06 | Stop reason: HOSPADM

## 2023-11-03 RX ORDER — INSULIN LISPRO 100 [IU]/ML
0-8 INJECTION, SOLUTION INTRAVENOUS; SUBCUTANEOUS NIGHTLY
Status: DISCONTINUED | OUTPATIENT
Start: 2023-11-03 | End: 2023-11-06 | Stop reason: HOSPADM

## 2023-11-03 RX ORDER — ACETAMINOPHEN 500 MG
500 TABLET ORAL EVERY 6 HOURS PRN
Status: DISCONTINUED | OUTPATIENT
Start: 2023-11-03 | End: 2023-11-06 | Stop reason: HOSPADM

## 2023-11-03 RX ORDER — FAMOTIDINE 20 MG/1
20 TABLET, FILM COATED ORAL 2 TIMES DAILY
Status: DISCONTINUED | OUTPATIENT
Start: 2023-11-03 | End: 2023-11-05

## 2023-11-03 RX ORDER — IPRATROPIUM BROMIDE AND ALBUTEROL SULFATE 2.5; .5 MG/3ML; MG/3ML
1 SOLUTION RESPIRATORY (INHALATION)
Status: DISCONTINUED | OUTPATIENT
Start: 2023-11-03 | End: 2023-11-05

## 2023-11-03 RX ORDER — INSULIN GLARGINE 100 [IU]/ML
75 INJECTION, SOLUTION SUBCUTANEOUS NIGHTLY
Status: DISCONTINUED | OUTPATIENT
Start: 2023-11-03 | End: 2023-11-06 | Stop reason: HOSPADM

## 2023-11-03 RX ORDER — ONDANSETRON 2 MG/ML
4 INJECTION INTRAMUSCULAR; INTRAVENOUS EVERY 4 HOURS PRN
Status: DISCONTINUED | OUTPATIENT
Start: 2023-11-03 | End: 2023-11-06 | Stop reason: HOSPADM

## 2023-11-03 RX ADMIN — INSULIN GLARGINE 75 UNITS: 100 INJECTION, SOLUTION SUBCUTANEOUS at 21:51

## 2023-11-03 RX ADMIN — AZITHROMYCIN 500 MG: 500 TABLET, FILM COATED ORAL at 20:35

## 2023-11-03 RX ADMIN — ACETAMINOPHEN 500 MG: 500 TABLET ORAL at 13:25

## 2023-11-03 RX ADMIN — EZETIMIBE 10 MG: 10 TABLET ORAL at 08:49

## 2023-11-03 RX ADMIN — CARVEDILOL 12.5 MG: 12.5 TABLET, FILM COATED ORAL at 08:50

## 2023-11-03 RX ADMIN — CARVEDILOL 12.5 MG: 12.5 TABLET, FILM COATED ORAL at 17:46

## 2023-11-03 RX ADMIN — INSULIN LISPRO 8 UNITS: 100 INJECTION, SOLUTION INTRAVENOUS; SUBCUTANEOUS at 12:56

## 2023-11-03 RX ADMIN — ATORVASTATIN CALCIUM 20 MG: 20 TABLET, FILM COATED ORAL at 08:48

## 2023-11-03 RX ADMIN — IPRATROPIUM BROMIDE AND ALBUTEROL SULFATE 1 DOSE: .5; 3 SOLUTION RESPIRATORY (INHALATION) at 19:55

## 2023-11-03 RX ADMIN — INSULIN LISPRO 4 UNITS: 100 INJECTION, SOLUTION INTRAVENOUS; SUBCUTANEOUS at 21:50

## 2023-11-03 RX ADMIN — HYDROCODONE BITARTRATE AND ACETAMINOPHEN 1 TABLET: 5; 325 TABLET ORAL at 21:33

## 2023-11-03 RX ADMIN — FUROSEMIDE 20 MG: 20 TABLET ORAL at 08:50

## 2023-11-03 RX ADMIN — APIXABAN 10 MG: 5 TABLET, FILM COATED ORAL at 20:35

## 2023-11-03 RX ADMIN — IPRATROPIUM BROMIDE AND ALBUTEROL SULFATE 1 DOSE: .5; 3 SOLUTION RESPIRATORY (INHALATION) at 16:13

## 2023-11-03 RX ADMIN — RANOLAZINE 500 MG: 500 TABLET, FILM COATED, EXTENDED RELEASE ORAL at 20:35

## 2023-11-03 RX ADMIN — RANOLAZINE 500 MG: 500 TABLET, FILM COATED, EXTENDED RELEASE ORAL at 08:49

## 2023-11-03 RX ADMIN — CEFTRIAXONE 1000 MG: 1 INJECTION, POWDER, FOR SOLUTION INTRAMUSCULAR; INTRAVENOUS at 20:33

## 2023-11-03 RX ADMIN — INSULIN LISPRO 8 UNITS: 100 INJECTION, SOLUTION INTRAVENOUS; SUBCUTANEOUS at 17:46

## 2023-11-03 RX ADMIN — ASPIRIN 81 MG: 81 TABLET, CHEWABLE ORAL at 08:48

## 2023-11-03 RX ADMIN — HYDROCODONE BITARTRATE AND ACETAMINOPHEN 1 TABLET: 5; 325 TABLET ORAL at 08:50

## 2023-11-03 RX ADMIN — INSULIN LISPRO 8 UNITS: 100 INJECTION, SOLUTION INTRAVENOUS; SUBCUTANEOUS at 08:47

## 2023-11-03 RX ADMIN — INSULIN LISPRO 8 UNITS: 100 INJECTION, SOLUTION INTRAVENOUS; SUBCUTANEOUS at 12:55

## 2023-11-03 RX ADMIN — IPRATROPIUM BROMIDE AND ALBUTEROL SULFATE 1 DOSE: .5; 3 SOLUTION RESPIRATORY (INHALATION) at 08:08

## 2023-11-03 RX ADMIN — PREDNISONE 20 MG: 20 TABLET ORAL at 18:49

## 2023-11-03 RX ADMIN — IPRATROPIUM BROMIDE AND ALBUTEROL SULFATE 1 DOSE: .5; 3 SOLUTION RESPIRATORY (INHALATION) at 12:03

## 2023-11-03 RX ADMIN — APIXABAN 10 MG: 5 TABLET, FILM COATED ORAL at 08:48

## 2023-11-03 RX ADMIN — LOSARTAN POTASSIUM 25 MG: 25 TABLET, FILM COATED ORAL at 08:50

## 2023-11-03 RX ADMIN — INSULIN LISPRO 8 UNITS: 100 INJECTION, SOLUTION INTRAVENOUS; SUBCUTANEOUS at 17:45

## 2023-11-03 ASSESSMENT — PAIN SCALES - GENERAL
PAINLEVEL_OUTOF10: 0
PAINLEVEL_OUTOF10: 3
PAINLEVEL_OUTOF10: 0
PAINLEVEL_OUTOF10: 0

## 2023-11-03 ASSESSMENT — PAIN DESCRIPTION - LOCATION: LOCATION: SHOULDER

## 2023-11-03 ASSESSMENT — PAIN - FUNCTIONAL ASSESSMENT
PAIN_FUNCTIONAL_ASSESSMENT: NONE - DENIES PAIN
PAIN_FUNCTIONAL_ASSESSMENT: PREVENTS OR INTERFERES SOME ACTIVE ACTIVITIES AND ADLS

## 2023-11-03 ASSESSMENT — PAIN DESCRIPTION - ORIENTATION: ORIENTATION: LEFT

## 2023-11-03 ASSESSMENT — PAIN DESCRIPTION - DESCRIPTORS: DESCRIPTORS: SHARP

## 2023-11-03 NOTE — ED NOTES
Pt's Ex  returned to bedside at this time. All known needs met. Call light remains in reach.      Anaid Asencio RN  11/02/23 9992

## 2023-11-03 NOTE — ED NOTES
This RN called [employer] to check on Squad d/t squad being a half hr past given ETA at this time. Spoke with Wayne Steward who states squad was running a bit behind d/t their last run and should be 10 minutes away. New updated ETA [id number], Pt updated. All known needs met. Call light remains in reach.       Ana Palomares RN  11/03/23 0111

## 2023-11-03 NOTE — ED NOTES
Transfer of Pt care Report called to [address] [place] [address] at [facility] on [address] for room 3361. RN states understanding and had no further questions at this time.       Tyrell Jiménez RN  11/02/23 1463

## 2023-11-03 NOTE — ED NOTES
Pt's Ex- back at bedside with food for Pt. All known Pt needs met. Call light remains in reach.       Jonny Price RN  11/02/23 4155

## 2023-11-03 NOTE — ED NOTES
MD at bedside updating Pt and her Ex  on findings and going over plan of care. Bed remains locked. Lowered. Rails x2. Call light in reach. Bedside table next to bed. No further questions or needs made known at this time.          Echo Nicole RN  11/02/23 6320

## 2023-11-03 NOTE — ED NOTES
This RN called [employer] and Booked a BLS unit for this Pt. Pt needs reviewed, No ISO, 2L NC, NSL PIV. Spoke with Saad Hendricks who states ETA of [phone]. Pt and family updated. All known needs met. Call light remains in reach.       John Walton RN  11/02/23 0722

## 2023-11-03 NOTE — CARE COORDINATION
Discharge Planning:      (CM) reviewed the patient's chart to assess needs. Patient's Readmission Risk Score is 14%   . Patient's medical insurance is  Payor: Pato Langston / Plan: Genna Given / Product Type: *No Product type* / .  Patient's PCP is Ej Tay MD .  No needs anticipated, at this time. CM team to follow. Staff to inform CM if additional discharge needs arise.     Pts preferred pharmacy is   [facility] [phone] - [address] [facility] [address], [address] [facility] [phone] [facility] [phone]  [address] [facility]  [address]  Phone: [phone] Fax: [phone]    EMMA Carballo  [phone]  Electronically signed by EMMA Gordon on 11/3/2023 at 8:42 AM

## 2023-11-03 NOTE — ED NOTES
Pt asked if she was able to Eat. RN asked MD if Pt can have food. MD states Pt is allowed to eat/Drink at this time. Pt's Ex- states he plans to go get her food. All known needs met. Call light remains in reach.       Sylvester Armstrong RN  11/02/23 0871

## 2023-11-03 NOTE — ED NOTES
Mario Alberto Registration called [facility] to update them RN has booked Pt Transport.       Sylvester Armstrong RN  11/02/23 3206

## 2023-11-03 NOTE — ED NOTES
Transfer of Care report given to [facility] EMT with [employer]. EMT states understanding and had no further questions. Face sheet, ambulance certification, ED synopsis and copy of ED EKG to EMT. Pt sent on 2L NC 02,  in a hospital gown, Non skid socks, 20g PIV NSL ETOH Capped. Pt has allergy and ID bracelet on. Pt's personal items in hospital bag with ID label. Pt's personal items with stretcher. Crew assumes Pt care at this time.      Sylvester Armstrong, RN  11/03/23 0122 No

## 2023-11-03 NOTE — ED NOTES
Patient ambulated to bathroom with nurse stand by to obtain urine specimen. Patient dyspneic upon returning to bed. All monitoring devices placed on patient. She is sinus rhythm on cardiac monitor. O2 intact at 2 LPM. Patient given ice water, lights in room dimmed, side rails up on bed and call light near. Patient denies further needs at this time.       Chandana Mcneill., RN  11/02/23 2013

## 2023-11-04 PROBLEM — E66.09 CLASS 2 OBESITY DUE TO EXCESS CALORIES WITH BODY MASS INDEX (BMI) OF 39.0 TO 39.9 IN ADULT: Status: ACTIVE | Noted: 2023-11-04

## 2023-11-04 PROBLEM — Z71.89 DIABETES EDUCATION, ENCOUNTER FOR: Status: ACTIVE | Noted: 2023-11-04

## 2023-11-04 PROBLEM — R78.81 POSITIVE BLOOD CULTURE: Status: ACTIVE | Noted: 2023-11-04

## 2023-11-04 PROBLEM — J18.9 COMMUNITY ACQUIRED PNEUMONIA: Status: ACTIVE | Noted: 2023-11-04

## 2023-11-04 PROBLEM — J18.0 BRONCHOPNEUMONIA: Status: ACTIVE | Noted: 2023-11-04

## 2023-11-04 PROBLEM — R09.02 HYPOXIA: Status: ACTIVE | Noted: 2023-11-04

## 2023-11-04 PROBLEM — R73.9 HYPERGLYCEMIA: Status: ACTIVE | Noted: 2023-11-04

## 2023-11-04 PROBLEM — E66.812 CLASS 2 OBESITY DUE TO EXCESS CALORIES WITH BODY MASS INDEX (BMI) OF 39.0 TO 39.9 IN ADULT: Status: ACTIVE | Noted: 2023-11-04

## 2023-11-04 PROBLEM — J44.1 ACUTE EXACERBATION OF CHRONIC OBSTRUCTIVE PULMONARY DISEASE (COPD) (HCC): Status: ACTIVE | Noted: 2023-11-04

## 2023-11-04 PROBLEM — I10 ESSENTIAL HYPERTENSION: Status: ACTIVE | Noted: 2023-11-04

## 2023-11-04 PROBLEM — J90 PLEURAL EFFUSION, BILATERAL: Status: ACTIVE | Noted: 2023-11-04

## 2023-11-04 PROBLEM — I25.10 CORONARY ARTERY DISEASE DUE TO LIPID RICH PLAQUE: Status: ACTIVE | Noted: 2023-11-04

## 2023-11-04 PROBLEM — I25.83 CORONARY ARTERY DISEASE DUE TO LIPID RICH PLAQUE: Status: ACTIVE | Noted: 2023-11-04

## 2023-11-04 PROBLEM — Z71.3 WEIGHT LOSS COUNSELING, ENCOUNTER FOR: Status: ACTIVE | Noted: 2023-11-04

## 2023-11-04 LAB
GLUCOSE BLD-MCNC: 357 MG/DL (ref 70–99)
GLUCOSE BLD-MCNC: 359 MG/DL (ref 70–99)
GLUCOSE BLD-MCNC: 361 MG/DL (ref 70–99)
GLUCOSE BLD-MCNC: 408 MG/DL (ref 70–99)
GLUCOSE BLD-MCNC: 439 MG/DL (ref 70–99)
PERFORMED ON: ABNORMAL
RESP PATH DNA+RNA PNL NPH NAA+NON-PROBE: NORMAL

## 2023-11-04 PROCEDURE — 82103 ALPHA-1-ANTITRYPSIN TOTAL: CPT

## 2023-11-04 PROCEDURE — 1200000000 HC SEMI PRIVATE

## 2023-11-04 PROCEDURE — 6370000000 HC RX 637 (ALT 250 FOR IP): Performed by: INTERNAL MEDICINE

## 2023-11-04 PROCEDURE — 2700000000 HC OXYGEN THERAPY PER DAY

## 2023-11-04 PROCEDURE — 94760 N-INVAS EAR/PLS OXIMETRY 1: CPT

## 2023-11-04 PROCEDURE — 2580000003 HC RX 258: Performed by: INTERNAL MEDICINE

## 2023-11-04 PROCEDURE — 94640 AIRWAY INHALATION TREATMENT: CPT

## 2023-11-04 PROCEDURE — 6360000002 HC RX W HCPCS: Performed by: INTERNAL MEDICINE

## 2023-11-04 PROCEDURE — 82104 ALPHA-1-ANTITRYPSIN PHENO: CPT

## 2023-11-04 PROCEDURE — 6370000000 HC RX 637 (ALT 250 FOR IP): Performed by: SPECIALIST

## 2023-11-04 PROCEDURE — 0202U NFCT DS 22 TRGT SARS-COV-2: CPT

## 2023-11-04 PROCEDURE — 99231 SBSQ HOSP IP/OBS SF/LOW 25: CPT | Performed by: NURSE PRACTITIONER

## 2023-11-04 PROCEDURE — 99255 IP/OBS CONSLTJ NEW/EST HI 80: CPT | Performed by: INTERNAL MEDICINE

## 2023-11-04 RX ORDER — LOSARTAN POTASSIUM 25 MG/1
25 TABLET ORAL 2 TIMES DAILY
Status: DISCONTINUED | OUTPATIENT
Start: 2023-11-04 | End: 2023-11-05

## 2023-11-04 RX ORDER — CLONIDINE HYDROCHLORIDE 0.1 MG/1
0.1 TABLET ORAL DAILY PRN
Status: DISCONTINUED | OUTPATIENT
Start: 2023-11-04 | End: 2023-11-06 | Stop reason: HOSPADM

## 2023-11-04 RX ADMIN — INSULIN LISPRO 4 UNITS: 100 INJECTION, SOLUTION INTRAVENOUS; SUBCUTANEOUS at 21:31

## 2023-11-04 RX ADMIN — INSULIN LISPRO 8 UNITS: 100 INJECTION, SOLUTION INTRAVENOUS; SUBCUTANEOUS at 10:26

## 2023-11-04 RX ADMIN — INSULIN LISPRO 8 UNITS: 100 INJECTION, SOLUTION INTRAVENOUS; SUBCUTANEOUS at 12:51

## 2023-11-04 RX ADMIN — ASPIRIN 81 MG: 81 TABLET, CHEWABLE ORAL at 09:01

## 2023-11-04 RX ADMIN — EZETIMIBE 10 MG: 10 TABLET ORAL at 09:02

## 2023-11-04 RX ADMIN — CEFTRIAXONE 1000 MG: 1 INJECTION, POWDER, FOR SOLUTION INTRAMUSCULAR; INTRAVENOUS at 21:39

## 2023-11-04 RX ADMIN — INSULIN LISPRO 12 UNITS: 100 INJECTION, SOLUTION INTRAVENOUS; SUBCUTANEOUS at 09:13

## 2023-11-04 RX ADMIN — HYDROCODONE BITARTRATE AND ACETAMINOPHEN 1 TABLET: 5; 325 TABLET ORAL at 21:28

## 2023-11-04 RX ADMIN — RANOLAZINE 500 MG: 500 TABLET, FILM COATED, EXTENDED RELEASE ORAL at 21:28

## 2023-11-04 RX ADMIN — INSULIN LISPRO 12 UNITS: 100 INJECTION, SOLUTION INTRAVENOUS; SUBCUTANEOUS at 17:30

## 2023-11-04 RX ADMIN — CARVEDILOL 12.5 MG: 12.5 TABLET, FILM COATED ORAL at 09:02

## 2023-11-04 RX ADMIN — AZITHROMYCIN 500 MG: 500 TABLET, FILM COATED ORAL at 09:01

## 2023-11-04 RX ADMIN — LOSARTAN POTASSIUM 25 MG: 25 TABLET, FILM COATED ORAL at 09:02

## 2023-11-04 RX ADMIN — INSULIN LISPRO 12 UNITS: 100 INJECTION, SOLUTION INTRAVENOUS; SUBCUTANEOUS at 12:51

## 2023-11-04 RX ADMIN — IPRATROPIUM BROMIDE AND ALBUTEROL SULFATE 1 DOSE: .5; 3 SOLUTION RESPIRATORY (INHALATION) at 11:22

## 2023-11-04 RX ADMIN — INSULIN GLARGINE 75 UNITS: 100 INJECTION, SOLUTION SUBCUTANEOUS at 21:31

## 2023-11-04 RX ADMIN — LOSARTAN POTASSIUM 25 MG: 25 TABLET, FILM COATED ORAL at 22:24

## 2023-11-04 RX ADMIN — INSULIN LISPRO 8 UNITS: 100 INJECTION, SOLUTION INTRAVENOUS; SUBCUTANEOUS at 17:30

## 2023-11-04 RX ADMIN — IPRATROPIUM BROMIDE AND ALBUTEROL SULFATE 1 DOSE: .5; 3 SOLUTION RESPIRATORY (INHALATION) at 15:30

## 2023-11-04 RX ADMIN — APIXABAN 10 MG: 5 TABLET, FILM COATED ORAL at 21:28

## 2023-11-04 RX ADMIN — RANOLAZINE 500 MG: 500 TABLET, FILM COATED, EXTENDED RELEASE ORAL at 09:02

## 2023-11-04 RX ADMIN — PREDNISONE 20 MG: 20 TABLET ORAL at 09:02

## 2023-11-04 RX ADMIN — ATORVASTATIN CALCIUM 20 MG: 20 TABLET, FILM COATED ORAL at 09:01

## 2023-11-04 RX ADMIN — FUROSEMIDE 20 MG: 20 TABLET ORAL at 09:02

## 2023-11-04 RX ADMIN — APIXABAN 10 MG: 5 TABLET, FILM COATED ORAL at 09:01

## 2023-11-04 RX ADMIN — IPRATROPIUM BROMIDE AND ALBUTEROL SULFATE 1 DOSE: .5; 3 SOLUTION RESPIRATORY (INHALATION) at 20:09

## 2023-11-04 RX ADMIN — IPRATROPIUM BROMIDE AND ALBUTEROL SULFATE 1 DOSE: .5; 3 SOLUTION RESPIRATORY (INHALATION) at 07:46

## 2023-11-04 RX ADMIN — CARVEDILOL 12.5 MG: 12.5 TABLET, FILM COATED ORAL at 17:30

## 2023-11-04 RX ADMIN — HYDROCODONE BITARTRATE AND ACETAMINOPHEN 1 TABLET: 5; 325 TABLET ORAL at 09:02

## 2023-11-04 ASSESSMENT — ENCOUNTER SYMPTOMS
CONSTIPATION: 0
EYE DISCHARGE: 0
SHORTNESS OF BREATH: 0
EYE REDNESS: 0
ABDOMINAL PAIN: 0
SINUS PAIN: 0
DIARRHEA: 0
RHINORRHEA: 0
COUGH: 0
SORE THROAT: 0
BACK PAIN: 0
SINUS PRESSURE: 0
NAUSEA: 0
WHEEZING: 0

## 2023-11-04 NOTE — H&P
Hospital Medicine History & Physical    Patient:  Darrin Garcia  YOB: 1965  Date of Service: 11/3/23  MRN: [id number]    PCP: Marissa Rivera MD    Date of Admission: 2023      Chief Complaint:    Chief Complaint   Patient presents with    Shortness of Breath     Pt states hx of COPD, feeling SOB since 10/30/23, tired, UTI symptoms. 86-88% on room air. Pt states she wears home 02 2L NC PRN, presents without o2, 86-88% on room air at triage 98% on 2L. Chest pain pressure states history of MI. 3/10 pain. States not medicated for pain. History Of Present Illness:     The patient is a 60 y.o. female who presents [facility] with c/o as above  Feels better now    Past Medical History:        Diagnosis Date    Arthritis     Back pain     CAD (coronary artery disease)     Chronic kidney disease     COPD (chronic obstructive pulmonary disease) (HCC)     Depression     Diabetes mellitus ([address])     REHMAN (dyspnea on exertion)     Fatty liver     GERD (gastroesophageal reflux disease)     Hyperlipidemia     Hypertension     MRSA (methicillin resistant staph aureus) culture positive 08/15/2018    arm    Stress incontinence     Thyroid disease     CYST ON THYROID--FOUND 20 YEARS AGO       Past Surgical History:        Procedure Laterality Date    CARDIAC SURGERY      3 stent placed OCt 20     SECTION      TIMES 3    CHOLECYSTECTOMY      COLONOSCOPY  2017      Colonoscopy with snare and biopsy    EYE SURGERY      muscle correction and eyelid     HERNIA REPAIR      UMBILICAL AREA x 2    HYSTERECTOMY (CERVIX STATUS UNKNOWN)      PTCA  10/2020    SHEFALI to RCA x 2; SHEFALI to LAD    SHOULDER ARTHROSCOPY Right 5-19-15    Right shoulder arthroscopy labral debridement open [place] subacromial decompression and chrondroplasty    UPPER

## 2023-11-04 NOTE — CONSULTS
pericardium demonstrate no acute abnormality. There are coronary  artery calcifications which are unchanged. There is no acute abnormality of  the thoracic aorta. Lungs/pleura: The lungs are hyperinflated and emphysematous. There some hazy  ground-glass opacities scattered along the upper lobes extending inferiorly  into the perihilar regions. There are small bibasilar pleural effusions and  mild bibasilar atelectasis posteriorly which is more prominent. There is no  pulmonary nodule or mass. Upper Abdomen: There is some chronic liver changes with fatty replacement  throughout. The right adrenal is normal.  There is a tiny adrenal adenoma on  the left measuring less than 1 cm which is unchanged. The gallbladder has  been removed with clips in the gallbladder fossa. Soft Tissues/Bones: No acute bone or soft tissue abnormality. Impression  Small subsegmental, nonoccluding, pulmonary embolus to the right upper lobe    COPD with hazy ground-glass opacities along the upper lobes extending into  the perihilar regions which more apparent and could represent early  multisegmental bronchopneumonia. Small bibasilar pleural effusions and associated bibasilar atelectasis. Borderline enlarged mediastinal lymph nodes which may be reactive in  etiology. Recommend follow-up. The findings were called to Maine Zaidi in the emergency room at 10 p.m.       CXR PA/LAT: Results for orders placed during the hospital encounter of 02/14/18    XR CHEST STANDARD (2 VW)    Narrative  EXAMINATION:  TWO VIEWS OF THE CHEST    2/14/2018 7:46 am    COMPARISON:  05/19/2015    HISTORY:  ORDERING PHYSICIAN PROVIDED HISTORY: cough  TECHNOLOGIST PROVIDED HISTORY:  Technologist Provided Reason for Exam: cough, congestion, and sob for the  last 5 days  Acuity: Acute  Type of Encounter: Initial  Relevant Medical/Surgical History: quit smoking 3 yrs ago    FINDINGS:  Cardiac silhouette, mediastinal hilar contours are normal.
(Patient not taking: Reported on 11/3/2023)  albuterol sulfate HFA (VENTOLIN HFA) 108 (90 Base) MCG/ACT inhaler, INHALE 2 PUFFS INTO THE LUNGS EVERY 6 HOURS AS NEEDED.   furosemide (LASIX) 20 MG tablet, TAKE 1 TABLET BY MOUTH EVERY DAY (Patient taking differently: Take 1 tablet by mouth 2 times daily)  losartan (COZAAR) 100 MG tablet, 1 TAB PO DAILY  atorvastatin (LIPITOR) 20 MG tablet, TAKE 1 TABLET BY MOUTH EVERY DAY  [DISCONTINUED] DULoxetine (CYMBALTA) 60 MG extended release capsule, 1 tab po daily (Patient not taking: Reported on 11/3/2023)  [DISCONTINUED] famotidine (PEPCID) 20 MG tablet, TAKE 1 TABLET BY MOUTH TWICE A DAY (Patient not taking: Reported on 11/3/2023)  Continuous Blood Gluc  (FREESTYLE JACIEL 2 READER) ASHWIN, tid  Insulin Syringe-Needle U-100 (BD INSULIN SYRINGE U/F) 31G X 5/16\" 1 ML MISC, USE AT BEDTIME WITH LANTUS  [DISCONTINUED] insulin lispro, 1 Unit Dial, 100 UNIT/ML SOPN, INJECT 7 UNITS INTO THE SKIN 3 TIMES DAILY (BEFORE MEALS) (Patient taking differently: Inject into the skin 3 times daily (before meals) Sliding scale)  Blood Glucose Monitoring Suppl (TRUE METRIX METER) ASHWIN, As directed (Patient not taking: Reported on 11/3/2023)  nitroGLYCERIN (NITROSTAT) 0.4 MG SL tablet, Place 1 tablet under the tongue every 5 minutes as needed for Chest pain (Patient not taking: Reported on 11/3/2023)  aspirin 81 MG chewable tablet, TAKE 1 TABLET BY MOUTH EVERY DAY  Insulin Pen Needle 32G X 4 MM MISC, 1 each by Does not apply route daily  Blood Glucose Monitoring Suppl (FREESTYLE LITE) ASHWIN, 1 Device by Does not apply route daily as needed (glucose monitoring)  FreeStyle Lancets MISC, 1 each by Does not apply route daily  MIRALAX powder, TAKE 17 G BY MOUTH DAILY     aspirin  81 mg Oral Daily    atorvastatin  20 mg Oral Daily    carvedilol  12.5 mg Oral BID with meals    famotidine  20 mg Oral BID    ezetimibe  10 mg Oral Daily    furosemide  20 mg Oral Daily    gabapentin  300 mg Oral TID

## 2023-11-05 LAB
GLUCOSE BLD-MCNC: 110 MG/DL (ref 70–99)
GLUCOSE BLD-MCNC: 117 MG/DL (ref 70–99)
GLUCOSE BLD-MCNC: 139 MG/DL (ref 70–99)
GLUCOSE BLD-MCNC: 267 MG/DL (ref 70–99)
PERFORMED ON: ABNORMAL

## 2023-11-05 PROCEDURE — 94760 N-INVAS EAR/PLS OXIMETRY 1: CPT

## 2023-11-05 PROCEDURE — 6370000000 HC RX 637 (ALT 250 FOR IP): Performed by: SPECIALIST

## 2023-11-05 PROCEDURE — 6360000002 HC RX W HCPCS: Performed by: SPECIALIST

## 2023-11-05 PROCEDURE — 6370000000 HC RX 637 (ALT 250 FOR IP): Performed by: INTERNAL MEDICINE

## 2023-11-05 PROCEDURE — 99231 SBSQ HOSP IP/OBS SF/LOW 25: CPT | Performed by: NURSE PRACTITIONER

## 2023-11-05 PROCEDURE — 6360000002 HC RX W HCPCS: Performed by: INTERNAL MEDICINE

## 2023-11-05 PROCEDURE — 2580000003 HC RX 258: Performed by: INTERNAL MEDICINE

## 2023-11-05 PROCEDURE — 94640 AIRWAY INHALATION TREATMENT: CPT

## 2023-11-05 PROCEDURE — 2700000000 HC OXYGEN THERAPY PER DAY

## 2023-11-05 PROCEDURE — 1200000000 HC SEMI PRIVATE

## 2023-11-05 PROCEDURE — 99233 SBSQ HOSP IP/OBS HIGH 50: CPT | Performed by: INTERNAL MEDICINE

## 2023-11-05 RX ORDER — FUROSEMIDE 10 MG/ML
20 INJECTION INTRAMUSCULAR; INTRAVENOUS ONCE
Status: COMPLETED | OUTPATIENT
Start: 2023-11-05 | End: 2023-11-05

## 2023-11-05 RX ORDER — IPRATROPIUM BROMIDE AND ALBUTEROL SULFATE 2.5; .5 MG/3ML; MG/3ML
1 SOLUTION RESPIRATORY (INHALATION)
Status: DISCONTINUED | OUTPATIENT
Start: 2023-11-05 | End: 2023-11-06 | Stop reason: HOSPADM

## 2023-11-05 RX ORDER — IPRATROPIUM BROMIDE AND ALBUTEROL SULFATE 2.5; .5 MG/3ML; MG/3ML
1 SOLUTION RESPIRATORY (INHALATION) EVERY 4 HOURS PRN
Status: DISCONTINUED | OUTPATIENT
Start: 2023-11-05 | End: 2023-11-06 | Stop reason: HOSPADM

## 2023-11-05 RX ORDER — PREDNISONE 10 MG/1
10 TABLET ORAL DAILY
Status: DISCONTINUED | OUTPATIENT
Start: 2023-11-05 | End: 2023-11-06 | Stop reason: HOSPADM

## 2023-11-05 RX ORDER — LOSARTAN POTASSIUM 50 MG/1
50 TABLET ORAL 2 TIMES DAILY
Status: DISCONTINUED | OUTPATIENT
Start: 2023-11-05 | End: 2023-11-06 | Stop reason: HOSPADM

## 2023-11-05 RX ADMIN — RANOLAZINE 500 MG: 500 TABLET, FILM COATED, EXTENDED RELEASE ORAL at 21:02

## 2023-11-05 RX ADMIN — LOSARTAN POTASSIUM 50 MG: 50 TABLET, FILM COATED ORAL at 09:47

## 2023-11-05 RX ADMIN — RANOLAZINE 500 MG: 500 TABLET, FILM COATED, EXTENDED RELEASE ORAL at 09:38

## 2023-11-05 RX ADMIN — LOSARTAN POTASSIUM 50 MG: 50 TABLET, FILM COATED ORAL at 21:02

## 2023-11-05 RX ADMIN — IPRATROPIUM BROMIDE AND ALBUTEROL SULFATE 1 DOSE: .5; 3 SOLUTION RESPIRATORY (INHALATION) at 20:04

## 2023-11-05 RX ADMIN — INSULIN LISPRO 12 UNITS: 100 INJECTION, SOLUTION INTRAVENOUS; SUBCUTANEOUS at 13:34

## 2023-11-05 RX ADMIN — APIXABAN 10 MG: 5 TABLET, FILM COATED ORAL at 09:38

## 2023-11-05 RX ADMIN — CLONIDINE HYDROCHLORIDE 0.1 MG: 0.1 TABLET ORAL at 00:36

## 2023-11-05 RX ADMIN — CARVEDILOL 12.5 MG: 12.5 TABLET, FILM COATED ORAL at 18:13

## 2023-11-05 RX ADMIN — APIXABAN 10 MG: 5 TABLET, FILM COATED ORAL at 21:02

## 2023-11-05 RX ADMIN — INSULIN LISPRO 12 UNITS: 100 INJECTION, SOLUTION INTRAVENOUS; SUBCUTANEOUS at 18:13

## 2023-11-05 RX ADMIN — IPRATROPIUM BROMIDE AND ALBUTEROL SULFATE 1 DOSE: .5; 3 SOLUTION RESPIRATORY (INHALATION) at 08:01

## 2023-11-05 RX ADMIN — CEFTRIAXONE 1000 MG: 1 INJECTION, POWDER, FOR SOLUTION INTRAMUSCULAR; INTRAVENOUS at 21:08

## 2023-11-05 RX ADMIN — HYDROCODONE BITARTRATE AND ACETAMINOPHEN 1 TABLET: 5; 325 TABLET ORAL at 09:38

## 2023-11-05 RX ADMIN — EZETIMIBE 10 MG: 10 TABLET ORAL at 09:38

## 2023-11-05 RX ADMIN — PREDNISONE 10 MG: 10 TABLET ORAL at 09:47

## 2023-11-05 RX ADMIN — CARVEDILOL 12.5 MG: 12.5 TABLET, FILM COATED ORAL at 09:38

## 2023-11-05 RX ADMIN — ATORVASTATIN CALCIUM 20 MG: 20 TABLET, FILM COATED ORAL at 09:38

## 2023-11-05 RX ADMIN — FUROSEMIDE 20 MG: 10 INJECTION, SOLUTION INTRAMUSCULAR; INTRAVENOUS at 09:47

## 2023-11-05 RX ADMIN — ASPIRIN 81 MG: 81 TABLET, CHEWABLE ORAL at 09:38

## 2023-11-05 RX ADMIN — HYDROCODONE BITARTRATE AND ACETAMINOPHEN 1 TABLET: 5; 325 TABLET ORAL at 22:43

## 2023-11-05 RX ADMIN — INSULIN LISPRO 12 UNITS: 100 INJECTION, SOLUTION INTRAVENOUS; SUBCUTANEOUS at 09:41

## 2023-11-05 RX ADMIN — AZITHROMYCIN 500 MG: 500 TABLET, FILM COATED ORAL at 09:38

## 2023-11-05 RX ADMIN — INSULIN GLARGINE 75 UNITS: 100 INJECTION, SOLUTION SUBCUTANEOUS at 21:02

## 2023-11-05 ASSESSMENT — PAIN DESCRIPTION - PAIN TYPE: TYPE: CHRONIC PAIN

## 2023-11-05 ASSESSMENT — ENCOUNTER SYMPTOMS
SHORTNESS OF BREATH: 0
SINUS PAIN: 0
SORE THROAT: 0
EYE REDNESS: 0
WHEEZING: 0
DIARRHEA: 0
ABDOMINAL PAIN: 0
CONSTIPATION: 0
COUGH: 0
RHINORRHEA: 0
BACK PAIN: 0
NAUSEA: 0
EYE DISCHARGE: 0
SINUS PRESSURE: 0

## 2023-11-05 ASSESSMENT — PAIN DESCRIPTION - ONSET: ONSET: ON-GOING

## 2023-11-05 ASSESSMENT — PAIN DESCRIPTION - DESCRIPTORS
DESCRIPTORS: SHARP
DESCRIPTORS: SHARP

## 2023-11-05 ASSESSMENT — PAIN - FUNCTIONAL ASSESSMENT
PAIN_FUNCTIONAL_ASSESSMENT: PREVENTS OR INTERFERES SOME ACTIVE ACTIVITIES AND ADLS
PAIN_FUNCTIONAL_ASSESSMENT: PREVENTS OR INTERFERES SOME ACTIVE ACTIVITIES AND ADLS

## 2023-11-05 ASSESSMENT — PAIN DESCRIPTION - ORIENTATION
ORIENTATION: LEFT
ORIENTATION: LEFT

## 2023-11-05 ASSESSMENT — PAIN DESCRIPTION - LOCATION
LOCATION: SHOULDER
LOCATION: SHOULDER

## 2023-11-05 ASSESSMENT — PAIN SCALES - GENERAL
PAINLEVEL_OUTOF10: 5
PAINLEVEL_OUTOF10: 0
PAINLEVEL_OUTOF10: 5
PAINLEVEL_OUTOF10: 6
PAINLEVEL_OUTOF10: 5

## 2023-11-05 ASSESSMENT — PAIN SCALES - WONG BAKER
WONGBAKER_NUMERICALRESPONSE: 0
WONGBAKER_NUMERICALRESPONSE: 2
WONGBAKER_NUMERICALRESPONSE: 2
WONGBAKER_NUMERICALRESPONSE: 0

## 2023-11-05 ASSESSMENT — PAIN DESCRIPTION - FREQUENCY: FREQUENCY: CONTINUOUS

## 2023-11-05 NOTE — FLOWSHEET NOTE
BP despite receiving additional dose of losartan at HS and clonidine at 0036. Pt asymptomatic.       11/05/23 0431   Vital Signs   Pulse 71   Heart Rate Source Monitor   Respirations 16   BP (!) 181/76   MAP (Calculated) 111   MAP (mmHg) 111   BP Location Right Arm   BP Method Automatic   Patient Position Lying left side

## 2023-11-05 NOTE — FLOWSHEET NOTE
Pt's BP after receiving 25 mg of losarten at ~ 2230. Pt calm and sitting in chair playing games on her tablet. RN medicated pt with prn clonidine 0.1 mg PO. RN explained that she would be back later to evaluate pt's response to the medication. Pt VU of plan.       11/05/23 0031   Vital Signs   Pulse 65   Heart Rate Source Monitor   Respirations 18   BP (!) 192/76   MAP (Calculated) 115   MAP (mmHg) 115

## 2023-11-05 NOTE — RT PROTOCOL NOTE
hours PRN for wheezing or increased work of breathing using Per Protocol order mode. 4-6 - enter or revise RT Bronchodilator order(s) to two equivalent RT bronchodilator orders with one order with BID Frequency and one order with Frequency of every 4 hours PRN wheezing or increased work of breathing using Per Protocol order mode. 7-10 - enter or revise RT Bronchodilator order(s) to two equivalent RT bronchodilator orders with one order with TID Frequency and one order with Frequency of every 4 hours PRN wheezing or increased work of breathing using Per Protocol order mode. 11-13 - enter or revise RT Bronchodilator order(s) to one equivalent RT bronchodilator order with QID Frequency and an Albuterol order with Frequency of every 4 hours PRN wheezing or increased work of breathing using Per Protocol order mode. Greater than 13 - enter or revise RT Bronchodilator order(s) to one equivalent RT bronchodilator order with every 4 hours Frequency and an Albuterol order with Frequency of every 2 hours PRN wheezing or increased work of breathing using Per Protocol order mode. RT to enter RT Home Evaluation for COPD & MDI Assessment order using Per Protocol order mode.     Electronically signed by Elian Pascual RCP on 11/5/2023 at 8:09 AM

## 2023-11-05 NOTE — FLOWSHEET NOTE
Day lights savings time occurred between BP checks. Pt's BP rechecked and improved with clonidine. Pt states she is going to get ready for bed.       11/05/23 0031 11/05/23 0112   Vital Signs   BP (!) 192/76 (!) 177/65   MAP (Calculated) 115 102   MAP (mmHg) 115 98   BP Location Left lower arm Left upper arm   BP Method Automatic Automatic   Patient Position Up in chair Up in chair

## 2023-11-05 NOTE — FLOWSHEET NOTE
11/04/23 2143 11/04/23 2208   Treatment Team Notification   Reason for Communication Review case  (GLUCOSE 357, /79)  --    Name of Team Member Notified Amber Freitas   Treatment Team Role Attending Provider Attending Provider   Method of Communication Page Call   Response Waiting for response See orders  (Increased frequency of losarten to BID with first dose now.  Added clonidine 0.1 mg PO daily prn for SBP > 160 or DBP > 90.)   Notification Time 2143 [id number]

## 2023-11-06 VITALS
HEART RATE: 64 BPM | WEIGHT: 204.59 LBS | OXYGEN SATURATION: 93 % | RESPIRATION RATE: 18 BRPM | HEIGHT: 60 IN | DIASTOLIC BLOOD PRESSURE: 53 MMHG | SYSTOLIC BLOOD PRESSURE: 147 MMHG | TEMPERATURE: 97.6 F | BODY MASS INDEX: 40.17 KG/M2

## 2023-11-06 LAB
BACTERIA BLD CULT: ABNORMAL
GLUCOSE BLD-MCNC: 218 MG/DL (ref 70–99)
GLUCOSE BLD-MCNC: 92 MG/DL (ref 70–99)
ORGANISM: ABNORMAL
PERFORMED ON: ABNORMAL
PERFORMED ON: NORMAL

## 2023-11-06 PROCEDURE — 94640 AIRWAY INHALATION TREATMENT: CPT

## 2023-11-06 PROCEDURE — 94760 N-INVAS EAR/PLS OXIMETRY 1: CPT

## 2023-11-06 PROCEDURE — 2700000000 HC OXYGEN THERAPY PER DAY

## 2023-11-06 PROCEDURE — 6370000000 HC RX 637 (ALT 250 FOR IP): Performed by: INTERNAL MEDICINE

## 2023-11-06 PROCEDURE — 99232 SBSQ HOSP IP/OBS MODERATE 35: CPT | Performed by: INTERNAL MEDICINE

## 2023-11-06 PROCEDURE — 99233 SBSQ HOSP IP/OBS HIGH 50: CPT | Performed by: INTERNAL MEDICINE

## 2023-11-06 PROCEDURE — 99238 HOSP IP/OBS DSCHRG MGMT 30/<: CPT | Performed by: INTERNAL MEDICINE

## 2023-11-06 PROCEDURE — 6370000000 HC RX 637 (ALT 250 FOR IP): Performed by: SPECIALIST

## 2023-11-06 RX ORDER — PREDNISONE 10 MG/1
10 TABLET ORAL DAILY
Qty: 5 TABLET | Refills: 0 | Status: SHIPPED | OUTPATIENT
Start: 2023-11-06 | End: 2023-11-10

## 2023-11-06 RX ORDER — CEFUROXIME AXETIL 500 MG/1
500 TABLET ORAL 2 TIMES DAILY
Qty: 14 TABLET | Refills: 0 | Status: SHIPPED | OUTPATIENT
Start: 2023-11-06 | End: 2023-11-10

## 2023-11-06 RX ORDER — IPRATROPIUM BROMIDE AND ALBUTEROL SULFATE 2.5; .5 MG/3ML; MG/3ML
3 SOLUTION RESPIRATORY (INHALATION)
Qty: 360 ML | Refills: 2 | Status: SHIPPED | OUTPATIENT
Start: 2023-11-06

## 2023-11-06 RX ADMIN — LOSARTAN POTASSIUM 50 MG: 50 TABLET, FILM COATED ORAL at 10:02

## 2023-11-06 RX ADMIN — RANOLAZINE 500 MG: 500 TABLET, FILM COATED, EXTENDED RELEASE ORAL at 10:02

## 2023-11-06 RX ADMIN — EZETIMIBE 10 MG: 10 TABLET ORAL at 10:02

## 2023-11-06 RX ADMIN — PREDNISONE 10 MG: 10 TABLET ORAL at 10:02

## 2023-11-06 RX ADMIN — IPRATROPIUM BROMIDE AND ALBUTEROL SULFATE 1 DOSE: .5; 3 SOLUTION RESPIRATORY (INHALATION) at 07:45

## 2023-11-06 RX ADMIN — CLONIDINE HYDROCHLORIDE 0.1 MG: 0.1 TABLET ORAL at 00:39

## 2023-11-06 RX ADMIN — HYDROCODONE BITARTRATE AND ACETAMINOPHEN 1 TABLET: 5; 325 TABLET ORAL at 10:02

## 2023-11-06 RX ADMIN — CARVEDILOL 12.5 MG: 12.5 TABLET, FILM COATED ORAL at 10:02

## 2023-11-06 RX ADMIN — ATORVASTATIN CALCIUM 20 MG: 20 TABLET, FILM COATED ORAL at 10:02

## 2023-11-06 RX ADMIN — ASPIRIN 81 MG: 81 TABLET, CHEWABLE ORAL at 10:02

## 2023-11-06 RX ADMIN — AZITHROMYCIN 500 MG: 500 TABLET, FILM COATED ORAL at 10:02

## 2023-11-06 RX ADMIN — INSULIN LISPRO 2 UNITS: 100 INJECTION, SOLUTION INTRAVENOUS; SUBCUTANEOUS at 12:22

## 2023-11-06 RX ADMIN — INSULIN LISPRO 12 UNITS: 100 INJECTION, SOLUTION INTRAVENOUS; SUBCUTANEOUS at 12:22

## 2023-11-06 RX ADMIN — APIXABAN 10 MG: 5 TABLET, FILM COATED ORAL at 10:02

## 2023-11-06 ASSESSMENT — PAIN DESCRIPTION - DESCRIPTORS: DESCRIPTORS: SHARP;ACHING

## 2023-11-06 ASSESSMENT — PAIN DESCRIPTION - PAIN TYPE: TYPE: CHRONIC PAIN

## 2023-11-06 ASSESSMENT — PAIN SCALES - GENERAL
PAINLEVEL_OUTOF10: 5
PAINLEVEL_OUTOF10: 2
PAINLEVEL_OUTOF10: 5

## 2023-11-06 ASSESSMENT — PAIN DESCRIPTION - FREQUENCY: FREQUENCY: CONTINUOUS

## 2023-11-06 ASSESSMENT — PAIN - FUNCTIONAL ASSESSMENT: PAIN_FUNCTIONAL_ASSESSMENT: ACTIVITIES ARE NOT PREVENTED

## 2023-11-06 ASSESSMENT — PAIN DESCRIPTION - LOCATION: LOCATION: SHOULDER

## 2023-11-06 ASSESSMENT — PAIN SCALES - WONG BAKER: WONGBAKER_NUMERICALRESPONSE: 2

## 2023-11-06 ASSESSMENT — PAIN DESCRIPTION - ORIENTATION: ORIENTATION: LEFT

## 2023-11-06 ASSESSMENT — PAIN DESCRIPTION - ONSET: ONSET: ON-GOING

## 2023-11-06 NOTE — CARE COORDINATION
Discharge Planning:  JEN contacted Bernice Edwards at [facility]. Message left requesting a call back as there is an order for a nebulizer. Per ID MD, pt will be able to return home on oral abx. Pt remains on 1L of O2. SW will continue to follow and will assist with d/c needs. Romelia Law, MS LS  [phone]  Electronically signed by EMMA Yap on 11/6/2023 at 8:10 AM    Addendum:  JEN received a call back from Bernice Edwards with [employer]. Bernice Edwards will provide the nebulizer to this pt.    Romelia Law, MSW LS  [phone]  Electronically signed by EMMA Yap on 11/6/2023 at 9:18 AM

## 2023-11-06 NOTE — CARE COORDINATION
Discharge Planning:  SW was notified that pt has an order for a home O2 eval and a DME order for home O2. Anthony spoke with Trang Acevedo at [address] [place] [address]. Trang Acevedo was able to confirm this pt is already active with [employer] and is on 2-3L continuous. Pt has an oxygen concentrator with portable tanks at home. Pt has confirmed that her ride is bringing her portable oxygen tank.     EMMA Andersen  [phone]  Electronically signed by EMMA Luu on 11/6/2023 at 2:01 PM

## 2023-11-06 NOTE — PLAN OF CARE
Problem: Discharge Planning  Goal: Discharge to home or other facility with appropriate resources  11/3/2023 1957 by Jeff Murray RN  Outcome: Progressing  Flowsheets (Taken 11/3/2023 1134)  Discharge to home or other facility with appropriate resources:   Identify barriers to discharge with patient and caregiver   Arrange for needed discharge resources and transportation as appropriate   Identify discharge learning needs (meds, wound care, etc)  11/3/2023 0633 by Joel Sommers RN  Outcome: Progressing     Problem: Safety - Adult  Goal: Free from fall injury  11/3/2023 1957 by Jeff Murray RN  Outcome: Progressing  Flowsheets (Taken 11/3/2023 1957)  Free From Fall Injury: Instruct family/caregiver on patient safety  11/3/2023 0633 by Joel Sommers RN  Outcome: Progressing     Problem: ABCDS Injury Assessment  Goal: Absence of physical injury  11/3/2023 1957 by Jeff Murray RN  Outcome: Progressing  Flowsheets (Taken 11/3/2023 1957)  Absence of Physical Injury: Implement safety measures based on patient assessment  11/3/2023 0633 by Joel Sommers RN  Outcome: Progressing     Problem: Neurosensory - Adult  Goal: Achieves stable or improved neurological status  Outcome: Progressing  Flowsheets (Taken 11/3/2023 1957)  Achieves stable or improved neurological status: Assess for and report changes in neurological status     Problem: Respiratory - Adult  Goal: Achieves optimal ventilation and oxygenation  Outcome: Progressing  Flowsheets (Taken 11/3/2023 1957)  Achieves optimal ventilation and oxygenation:   Assess for changes in respiratory status   Assess for changes in mentation and behavior   Position to facilitate oxygenation and minimize respiratory effort   Oxygen supplementation based on oxygen saturation or arterial blood gases     Problem: Cardiovascular - Adult  Goal: Maintains optimal cardiac output and hemodynamic stability  Outcome: Progressing  Flowsheets (Taken 11/3/2023 1957)  Maintains optimal cardiac
Problem: Discharge Planning  Goal: Discharge to home or other facility with appropriate resources  11/3/2023 2321 by Roshni Mcdermott RN  Outcome: Progressing  Flowsheets (Taken 11/3/2023 2016)  Discharge to home or other facility with appropriate resources: Identify barriers to discharge with patient and caregiver     Problem: Respiratory - Adult  Goal: Achieves optimal ventilation and oxygenation  11/3/2023 2321 by Roshni Mcdermott RN  Outcome: Progressing  Flowsheets  Taken 11/3/2023 2321  Achieves optimal ventilation and oxygenation:   Assess for changes in respiratory status   Position to facilitate oxygenation and minimize respiratory effort     Problem: Cardiovascular - Adult  Goal: Maintains optimal cardiac output and hemodynamic stability  11/3/2023 2321 by Roshni Mcdermott RN  Outcome: Progressing  Flowsheets  Taken 11/3/2023 2321  Maintains optimal cardiac output and hemodynamic stability: Monitor blood pressure and heart rate    Problem: Gastrointestinal - Adult  Goal: Minimal or absence of nausea and vomiting  Recent Flowsheet Documentation  Taken 11/3/2023 2016 by Roshni Mcdermott RN  Minimal or absence of nausea and vomiting: Administer IV fluids as ordered to ensure adequate hydration    Problem: Infection - Adult  Goal: Absence of infection at discharge  11/3/2023 2321 by Roshni Mcdermott RN  Outcome: Progressing  Flowsheets (Taken 11/3/2023 2016)  Absence of infection at discharge: Assess and monitor for signs and symptoms of infection     Problem: Metabolic/Fluid and Electrolytes - Adult  Goal: Electrolytes maintained within normal limits  Recent Flowsheet Documentation  Taken 11/3/2023 2016 by Roshni Mcdermott RN  Electrolytes maintained within normal limits: Monitor labs and assess patient for signs and symptoms of electrolyte imbalances    Problem: Hematologic - Adult  Goal: Maintains hematologic stability  Recent Flowsheet Documentation  Taken 11/3/2023 2016 by Roshni Mcdermott RN  Maintains hematologic
Problem: Discharge Planning  Goal: Discharge to home or other facility with appropriate resources  11/4/2023 0941 by Shaneka Gruber RN  Outcome: Progressing     Problem: Safety - Adult  Goal: Free from fall injury  11/4/2023 0941 by Shaneka Gruber RN  Outcome: Progressing     Problem: ABCDS Injury Assessment  Goal: Absence of physical injury  11/4/2023 0941 by Shaneka Gruber RN  Outcome: Progressing     Problem: Neurosensory - Adult  Goal: Achieves stable or improved neurological status  11/4/2023 0941 by Shaneka Gruber RN  Outcome: Progressing  Flowsheets (Taken 11/3/2023 2016 by Katie Cohen RN)  Achieves stable or improved neurological status: Assess for and report changes in neurological status     Problem: Respiratory - Adult  Goal: Achieves optimal ventilation and oxygenation  11/4/2023 0941 by Shaneka Gruber RN  Outcome: Progressing     Problem: Cardiovascular - Adult  Goal: Maintains optimal cardiac output and hemodynamic stability  11/4/2023 0941 by Shaneka Gruber RN  Outcome: Progressing     Problem: Skin/Tissue Integrity - Adult  Goal: Skin integrity remains intact  11/4/2023 0941 by Shaneka Gruber RN  Outcome: Progressing  Flowsheets  Taken 11/3/2023 2327 by Katie Cohen RN  Skin Integrity Remains Intact: Monitor for areas of redness and/or skin breakdown  Taken 11/3/2023 2016 by Katie Cohen RN  Skin Integrity Remains Intact: Monitor for areas of redness and/or skin breakdown  Taken 11/3/2023 2000 by Cecilio White RN  Skin Integrity Remains Intact: Monitor for areas of redness and/or skin breakdown     Problem: Musculoskeletal - Adult  Goal: Return mobility to safest level of function  11/4/2023 0941 by Shaneka Gruber RN  Outcome: Progressing  Flowsheets (Taken 11/3/2023 2016 by Katie Cohen RN)  Return Mobility to Safest Level of Function: Assess patient stability and activity tolerance for standing, transferring and ambulating with or without assistive devices     Problem: Musculoskeletal -
Problem: Discharge Planning  Goal: Discharge to home or other facility with appropriate resources  Outcome: Progressing     Problem: Safety - Adult  Goal: Free from fall injury  Outcome: Progressing     Problem: ABCDS Injury Assessment  Goal: Absence of physical injury  Outcome: Progressing
Problem: Discharge Planning  Goal: Discharge to home or other facility with appropriate resources  Outcome: Progressing     Problem: Safety - Adult  Goal: Free from fall injury  Outcome: Progressing     Problem: ABCDS Injury Assessment  Goal: Absence of physical injury  Outcome: Progressing     Problem: Neurosensory - Adult  Goal: Achieves stable or improved neurological status  Outcome: Progressing     Problem: Respiratory - Adult  Goal: Achieves optimal ventilation and oxygenation  Outcome: Progressing     Problem: Cardiovascular - Adult  Goal: Maintains optimal cardiac output and hemodynamic stability  Outcome: Progressing     Problem: Skin/Tissue Integrity - Adult  Goal: Skin integrity remains intact  Outcome: Progressing     Problem: Musculoskeletal - Adult  Goal: Return mobility to safest level of function  Outcome: Progressing  Goal: Return ADL status to a safe level of function  Outcome: Progressing     Problem: Gastrointestinal - Adult  Goal: Minimal or absence of nausea and vomiting  Outcome: Progressing     Problem: Genitourinary - Adult  Goal: Absence of urinary retention  Outcome: Progressing     Problem: Infection - Adult  Goal: Absence of infection at discharge  Outcome: Progressing     Problem: Metabolic/Fluid and Electrolytes - Adult  Goal: Electrolytes maintained within normal limits  Outcome: Progressing     Problem: Hematologic - Adult  Goal: Maintains hematologic stability  Outcome: Progressing     Problem: Pain  Goal: Verbalizes/displays adequate comfort level or baseline comfort level  Outcome: Progressing
Problem: Discharge Planning  Goal: Discharge to home or other facility with appropriate resources  Outcome: Progressing  Flowsheets (Taken 11/6/2023 1148)  Discharge to home or other facility with appropriate resources:   Identify barriers to discharge with patient and caregiver   Identify discharge learning needs (meds, wound care, etc)   Refer to discharge planning if patient needs post-hospital services based on physician order or complex needs related to functional status, cognitive ability or social support system   Arrange for needed discharge resources and transportation as appropriate     Problem: Safety - Adult  Goal: Free from fall injury  Outcome: Progressing  Flowsheets (Taken 11/6/2023 1148)  Free From Fall Injury: Instruct family/caregiver on patient safety     Problem: ABCDS Injury Assessment  Goal: Absence of physical injury  Outcome: Progressing  Flowsheets (Taken 11/6/2023 1148)  Absence of Physical Injury: Implement safety measures based on patient assessment     Problem: Neurosensory - Adult  Goal: Achieves stable or improved neurological status  Outcome: Progressing  Flowsheets (Taken 11/6/2023 1148)  Achieves stable or improved neurological status: Assess for and report changes in neurological status     Problem: Respiratory - Adult  Goal: Achieves optimal ventilation and oxygenation  Outcome: Progressing  Flowsheets (Taken 11/6/2023 1148)  Achieves optimal ventilation and oxygenation:   Assess for changes in respiratory status   Position to facilitate oxygenation and minimize respiratory effort   Assess for changes in mentation and behavior     Problem: Cardiovascular - Adult  Goal: Maintains optimal cardiac output and hemodynamic stability  Outcome: Progressing  Flowsheets (Taken 11/6/2023 1148)  Maintains optimal cardiac output and hemodynamic stability: Monitor blood pressure and heart rate     Problem: Skin/Tissue Integrity - Adult  Goal: Skin integrity remains intact  Outcome:
Ambulatory

## 2023-11-06 NOTE — FLOWSHEET NOTE
Pt's BP outside of goal range. RN administered scheduled losartan 50 mg PO for /66. Pt's repeat BP was 167/79. RN administered clonidine 0.1 mg and requested that the pt call RN before going to bed so repeat BP can be done.  Pt VU.    11/05/23 2102 11/06/23 0033   Vital Signs   BP (!) 163/66 (!) 167/79   MAP (Calculated) 98 108   MAP (mmHg) 96 108   BP Location Right upper arm Left Arm   BP Method Automatic Automatic

## 2023-11-07 LAB
A1AT PHENOTYP SERPL-IMP: NORMAL
A1AT SERPL-MCNC: 185 MG/DL (ref 90–200)
BACTERIA BLD CULT ORG #2: NORMAL

## 2023-11-29 ENCOUNTER — TELEPHONE (OUTPATIENT)
Dept: PULMONOLOGY | Age: 58
End: 2023-11-29

## 2023-11-29 ENCOUNTER — OFFICE VISIT (OUTPATIENT)
Dept: PULMONOLOGY | Age: 58
End: 2023-11-29
Payer: COMMERCIAL

## 2023-11-29 VITALS
RESPIRATION RATE: 18 BRPM | DIASTOLIC BLOOD PRESSURE: 84 MMHG | OXYGEN SATURATION: 90 % | HEIGHT: 60 IN | SYSTOLIC BLOOD PRESSURE: 158 MMHG | BODY MASS INDEX: 39.85 KG/M2 | HEART RATE: 74 BPM | TEMPERATURE: 97.6 F | WEIGHT: 203 LBS

## 2023-11-29 DIAGNOSIS — J44.9 COPD, SEVERE (HCC): Primary | ICD-10-CM

## 2023-11-29 DIAGNOSIS — J44.9 COPD, SEVERE (HCC): ICD-10-CM

## 2023-11-29 DIAGNOSIS — G47.10 HYPERSOMNIA: Primary | ICD-10-CM

## 2023-11-29 DIAGNOSIS — Z87.891 PERSONAL HISTORY OF TOBACCO USE: ICD-10-CM

## 2023-11-29 PROCEDURE — 3077F SYST BP >= 140 MM HG: CPT | Performed by: INTERNAL MEDICINE

## 2023-11-29 PROCEDURE — 99214 OFFICE O/P EST MOD 30 MIN: CPT | Performed by: INTERNAL MEDICINE

## 2023-11-29 PROCEDURE — G8427 DOCREV CUR MEDS BY ELIG CLIN: HCPCS | Performed by: INTERNAL MEDICINE

## 2023-11-29 PROCEDURE — 1036F TOBACCO NON-USER: CPT | Performed by: INTERNAL MEDICINE

## 2023-11-29 PROCEDURE — 3023F SPIROM DOC REV: CPT | Performed by: INTERNAL MEDICINE

## 2023-11-29 PROCEDURE — G0296 VISIT TO DETERM LDCT ELIG: HCPCS | Performed by: INTERNAL MEDICINE

## 2023-11-29 PROCEDURE — 3079F DIAST BP 80-89 MM HG: CPT | Performed by: INTERNAL MEDICINE

## 2023-11-29 PROCEDURE — G8484 FLU IMMUNIZE NO ADMIN: HCPCS | Performed by: INTERNAL MEDICINE

## 2023-11-29 PROCEDURE — 1111F DSCHRG MED/CURRENT MED MERGE: CPT | Performed by: INTERNAL MEDICINE

## 2023-11-29 PROCEDURE — G8417 CALC BMI ABV UP PARAM F/U: HCPCS | Performed by: INTERNAL MEDICINE

## 2023-11-29 PROCEDURE — 3017F COLORECTAL CA SCREEN DOC REV: CPT | Performed by: INTERNAL MEDICINE

## 2023-11-29 RX ORDER — BUDESONIDE, GLYCOPYRROLATE, AND FORMOTEROL FUMARATE 160; 9; 4.8 UG/1; UG/1; UG/1
2 AEROSOL, METERED RESPIRATORY (INHALATION) 2 TIMES DAILY
Qty: 1 EACH | Refills: 0 | Status: SHIPPED | COMMUNITY
Start: 2023-11-29 | End: 2023-12-01

## 2023-11-29 ASSESSMENT — COPD QUESTIONNAIRES
QUESTION2_CHESTPHLEGM: 3
CAT_TOTALSCORE: 27
QUESTION6_LEAVINGHOUSE: 2
QUESTION3_CHESTTIGHTNESS: 3
QUESTION8_ENERGYLEVEL: 4
QUESTION7_SLEEPQUALITY: 3
QUESTION1_COUGHFREQUENCY: 4
QUESTION5_HOMEACTIVITIES: 3
QUESTION4_WALKINCLINE: 5

## 2023-11-29 NOTE — PATIENT INSTRUCTIONS
Fully engaged
Learning About Lung Cancer Screening  What is screening for lung cancer? Lung cancer screening is a way to find some lung cancers early, before a person has any symptoms of the cancer. Lung cancer screening may help those who have the highest risk for lung cancer--people age 48 and older who are or were heavy smokers. For most people, who aren't at increased risk, screening for lung cancer probably isn't helpful. Screening won't prevent cancer. And it may not find all lung cancers. Lung cancer screening may lower the risk of dying from lung cancer in a small number of people. How is it done? Lung cancer screening is done with a low-dose CT (computed tomography) scan. A CT scan uses X-rays, or radiation, to make detailed pictures of your body. Experts recommend that screening be done in medical centers that focus on finding and treating lung cancer. Who is screening recommended for? Lung cancer screening is recommended for people age 48 and older who are or were heavy smokers. That means people with a smoking history of at least 20 pack years. A pack year is a way to measure how heavy a smoker you are or were. To figure out your pack years, multiply how many packs a day on average (assuming 20 cigarettes per pack) you have smoked by how many years you have smoked. For example: If you smoked 1 pack a day for 20 years, that's 1 times 20. So you have a smoking history of 20 pack years. If you smoked 2 packs a day for 10 years, that's 2 times 10. So you have a smoking history of 20 pack years. Experts agree that screening is for people who have a high risk of lung cancer. But experts don't agree on what high risk means. Some say people age 48 or older with at least a 20-pack-year smoking history are high risk. Others say it's people age 54 or older with a 30-pack-year history. To see if you could benefit from screening, first find out if you are at high risk for lung cancer.  Your doctor can help you
Fully engaged

## 2023-11-29 NOTE — TELEPHONE ENCOUNTER
Submitted PA for BREZTRI  Via [facility] Key: [id number]  STATUS: PENDING.    Follow up done daily; if no response in three days we will refax for status check.  If another three days goes by with no response we will call the insurance for status.

## 2023-11-30 ENCOUNTER — TELEPHONE (OUTPATIENT)
Dept: PULMONOLOGY | Age: 58
End: 2023-11-30

## 2023-11-30 NOTE — TELEPHONE ENCOUNTER
Received fax from [employer] needed for [employer] [id number]. [employer]/ACT Aerosol     KEY:  [id number]  Patient name: Rosemary Cat  : 1965       Sent to [facility]

## 2023-11-30 NOTE — TELEPHONE ENCOUNTER
Geronimo calls and states that the order for the POC needs to state the pulse setting on it. Please submit an order that has the pulse setting on it.   Fax to [phone]

## 2023-11-30 NOTE — TELEPHONE ENCOUNTER
The medication was DENIED; DENIAL letter uploaded to [employer].    If you want an APPEAL; please note in this encounter what new information you would like to APPEAL with.  Once complete route back to PA POOL.    If this requires a response please respond to the pool ( P MHCX PSC MEDICATION PRE-AUTH).      Thank you please advise patient.

## 2023-12-01 ENCOUNTER — APPOINTMENT (OUTPATIENT)
Dept: GENERAL RADIOLOGY | Age: 58
End: 2023-12-01
Payer: COMMERCIAL

## 2023-12-01 ENCOUNTER — HOSPITAL ENCOUNTER (EMERGENCY)
Age: 58
Discharge: HOME | End: 2023-12-01
Attending: EMERGENCY MEDICINE
Payer: COMMERCIAL

## 2023-12-01 VITALS
SYSTOLIC BLOOD PRESSURE: 149 MMHG | TEMPERATURE: 98.1 F | WEIGHT: 206.13 LBS | RESPIRATION RATE: 16 BRPM | DIASTOLIC BLOOD PRESSURE: 79 MMHG | OXYGEN SATURATION: 98 % | BODY MASS INDEX: 40.47 KG/M2 | HEART RATE: 69 BPM | HEIGHT: 60 IN

## 2023-12-01 DIAGNOSIS — S80.211A ABRASION OF KNEE, BILATERAL: ICD-10-CM

## 2023-12-01 DIAGNOSIS — S93.402A SPRAIN OF LEFT ANKLE, UNSPECIFIED LIGAMENT, INITIAL ENCOUNTER: ICD-10-CM

## 2023-12-01 DIAGNOSIS — S80.212A ABRASION OF KNEE, BILATERAL: ICD-10-CM

## 2023-12-01 DIAGNOSIS — S82.831A CLOSED TRAUMATIC NONDISPLACED FRACTURE OF DISTAL END OF RIGHT FIBULA, INITIAL ENCOUNTER: Primary | ICD-10-CM

## 2023-12-01 PROCEDURE — 73610 X-RAY EXAM OF ANKLE: CPT

## 2023-12-01 PROCEDURE — 73630 X-RAY EXAM OF FOOT: CPT

## 2023-12-01 PROCEDURE — 73590 X-RAY EXAM OF LOWER LEG: CPT

## 2023-12-01 PROCEDURE — 99284 EMERGENCY DEPT VISIT MOD MDM: CPT

## 2023-12-01 ASSESSMENT — PAIN DESCRIPTION - PAIN TYPE
TYPE: ACUTE PAIN

## 2023-12-01 ASSESSMENT — PAIN SCALES - GENERAL
PAINLEVEL_OUTOF10: 5
PAINLEVEL_OUTOF10: 6
PAINLEVEL_OUTOF10: 2
PAINLEVEL_OUTOF10: 7

## 2023-12-01 ASSESSMENT — PAIN DESCRIPTION - LOCATION
LOCATION: ANKLE
LOCATION: ANKLE;FOOT

## 2023-12-01 ASSESSMENT — PAIN - FUNCTIONAL ASSESSMENT
PAIN_FUNCTIONAL_ASSESSMENT: 0-10
PAIN_FUNCTIONAL_ASSESSMENT: 0-10

## 2023-12-01 ASSESSMENT — PAIN DESCRIPTION - FREQUENCY
FREQUENCY: CONTINUOUS
FREQUENCY: CONTINUOUS

## 2023-12-01 ASSESSMENT — PAIN DESCRIPTION - ORIENTATION
ORIENTATION: RIGHT

## 2023-12-01 ASSESSMENT — PAIN DESCRIPTION - DESCRIPTORS
DESCRIPTORS: ACHING
DESCRIPTORS: ACHING
DESCRIPTORS: DISCOMFORT;ACHING

## 2023-12-01 NOTE — ED NOTES
Patient states, her blood sugar was 67. She was eating candy. Gave patient apple juice, crackers, and peanut butter. She rechecked her BS up 88.  MD harriet Greenberg RN  12/01/23 3218

## 2023-12-01 NOTE — ED NOTES
MaidaRN placed high top walker boot on right foot per order. Harrison Motley gave patient patient discharged instructions.  Patient discharged to home     Melchor Neumann RN  12/01/23 3833

## 2023-12-01 NOTE — DISCHARGE INSTRUCTIONS
Keep fracture boot in place. Use walker to limit weightbearing is much as possible. Elevate and apply ice every 2-3 hours to help with pain. Continue Tylenol or your current pain medications as needed. Call orthopedic referral for follow-up in the next 3 to 5 days for definitive treatment.

## 2023-12-01 NOTE — ED NOTES
Patient was change over from her home o2 tank to ED wall oxygen at 2 liter n/c, Patient has dry scabs on both knees.  Right ankle/foot swollen with bruising      Angelica Otero RN  12/01/23 9839 Alert and oriented x 3, normal mood and affect, no apparent risk to self or others.

## 2023-12-06 ENCOUNTER — OFFICE VISIT (OUTPATIENT)
Dept: ORTHOPEDIC SURGERY | Age: 58
End: 2023-12-06
Payer: COMMERCIAL

## 2023-12-06 ENCOUNTER — TELEPHONE (OUTPATIENT)
Dept: CARDIOLOGY CLINIC | Age: 58
End: 2023-12-06

## 2023-12-06 ENCOUNTER — TELEPHONE (OUTPATIENT)
Dept: ORTHOPEDIC SURGERY | Age: 58
End: 2023-12-06

## 2023-12-06 ENCOUNTER — ANESTHESIA EVENT (OUTPATIENT)
Dept: OPERATING ROOM | Age: 58
End: 2023-12-06
Payer: COMMERCIAL

## 2023-12-06 VITALS — HEIGHT: 60 IN | WEIGHT: 206.13 LBS | BODY MASS INDEX: 40.47 KG/M2

## 2023-12-06 DIAGNOSIS — S93.492A SPRAIN OF ANTERIOR TALOFIBULAR LIGAMENT OF LEFT ANKLE, INITIAL ENCOUNTER: ICD-10-CM

## 2023-12-06 DIAGNOSIS — S82.61XA CLOSED DISPLACED FRACTURE OF LATERAL MALLEOLUS OF RIGHT FIBULA, INITIAL ENCOUNTER: Primary | ICD-10-CM

## 2023-12-06 PROCEDURE — 1036F TOBACCO NON-USER: CPT | Performed by: ORTHOPAEDIC SURGERY

## 2023-12-06 PROCEDURE — 3017F COLORECTAL CA SCREEN DOC REV: CPT | Performed by: ORTHOPAEDIC SURGERY

## 2023-12-06 PROCEDURE — G8417 CALC BMI ABV UP PARAM F/U: HCPCS | Performed by: ORTHOPAEDIC SURGERY

## 2023-12-06 PROCEDURE — G8484 FLU IMMUNIZE NO ADMIN: HCPCS | Performed by: ORTHOPAEDIC SURGERY

## 2023-12-06 PROCEDURE — 1111F DSCHRG MED/CURRENT MED MERGE: CPT | Performed by: ORTHOPAEDIC SURGERY

## 2023-12-06 PROCEDURE — G8427 DOCREV CUR MEDS BY ELIG CLIN: HCPCS | Performed by: ORTHOPAEDIC SURGERY

## 2023-12-06 PROCEDURE — 99204 OFFICE O/P NEW MOD 45 MIN: CPT | Performed by: ORTHOPAEDIC SURGERY

## 2023-12-06 RX ORDER — CEPHALEXIN 500 MG/1
500 CAPSULE ORAL 4 TIMES DAILY
Qty: 40 CAPSULE | Refills: 0 | Status: SHIPPED | OUTPATIENT
Start: 2023-12-06 | End: 2023-12-16

## 2023-12-06 NOTE — PROGRESS NOTES
CHIEF COMPLAINT:   1-Right ankle pain / lateral malleolus fracture. 2-Left ankle pain/ ankle ATFL sprain    DATE OF INJURY: 2023    HISTORY:  Ms. Meaghan Mcneal 60 y.o.  female presents today for the first visit for evaluation of a bilateral ankle injury which occurred when she was walking and tripped stepping off a curb. She was first seen and evaluated in [facility], where she was x-rayed, splinted and asked to f/u with Orthopedics. She is complaining of lateral left ankle and medial and lateral right ankle pain and swelling 8/10. This is better with elevation and worse with bearing any wt. The pain is sharp and not radiating. No numbness or tingling sensation. Alleviating factors: ice, elevation, and rest. No other complaint. She quit smoking 9 years ago, but she has COPD and is on 24/7 oxygen therapy. She is a diabetic and has diabetic neuropathy. She is on Eliquis for a history of PE.     Past Medical History:   Diagnosis Date    Arthritis     Back pain     CAD (coronary artery disease)     Chronic kidney disease     COPD (chronic obstructive pulmonary disease) (HCC)     Depression     Diabetes mellitus ([address])     REHMAN (dyspnea on exertion)     Fatty liver     GERD (gastroesophageal reflux disease)     Hyperlipidemia     Hypertension     MRSA (methicillin resistant staph aureus) culture positive 08/15/2018    arm    On home O2     Stress incontinence     Thyroid disease     CYST ON THYROID--FOUND 20 YEARS AGO       Past Surgical History:   Procedure Laterality Date    CARDIAC SURGERY      3 stent placed OCt 20     SECTION      TIMES 3    CHOLECYSTECTOMY      COLONOSCOPY  2017      Colonoscopy with snare and biopsy    EYE SURGERY      muscle correction and eyelid     HERNIA REPAIR      UMBILICAL AREA x 2    HYSTERECTOMY (CERVIX STATUS UNKNOWN)      PTCA  10/2020    SHEFALI to RCA x 2; SHEFALI to LAD    SHOULDER ARTHROSCOPY Right 5-19-15    Right shoulder arthroscopy labral

## 2023-12-06 NOTE — TELEPHONE ENCOUNTER
Per Dr. Devin Thurman, patient is cleared and may hold medications. Please notify and prepare letter if necessary. Thanks.

## 2023-12-06 NOTE — TELEPHONE ENCOUNTER
Auth: NPR  Date: 12/07/23  Reference # None  Spoke with: Online  Type of SX: Outpatient  Location: [facility]  CPT: 47362   DX: S18.146H  SX area: Rt ankle  Insurance: [facility]ash
headache

## 2023-12-06 NOTE — TELEPHONE ENCOUNTER
Dr. Igor Enrique, please review and advise for a cardiac clearance. Thanks. Patient is scheduled to undergo RIGHT OPEN REDUCTION AND INTERNAL FIXATION MEDIAL MALLEOLUS AND SYNDESMOTIC REPAIR-MICHAEL tomorrow, 12/7. Requesting to hold ASA and Eliquis today and tomorrow. She recently had a PCI to PDA 3/27/23 and was diagnosed with a PE 11/2/23, CT report below.      IMPRESSION:  Small subsegmental, nonoccluding, pulmonary embolus to the right upper lobe

## 2023-12-06 NOTE — TELEPHONE ENCOUNTER
Patient would like a return call if cleared for procedure, 12/7/23    CARDIAC CLEARANCE     What type of procedure are you having? RIGHT OPEN REDUCTION AND INTERNAL FIXATION MEDIAL MALLEOLUS AND SYNDESMOTIC REPAIR-MICHAEL     Which physician is performing your procedure? DR. Laxmi Navas    When is your procedure scheduled for?  12/7/23    Where are you having this procedure? [facility]    Are you taking Blood Thinners? If so what? (Name/dose/frequesncy)  apixaban (ELIQUIS) 5 MG TABS tablet   aspirin 81 MG chewable tablet      Does the surgeon want you to stop your blood thinner? If so for how long?   HOLD TODAY AND DAY OF PROCEDURE  (TOOK ASA THIS MORNING)    Phone Number and Contact Name for Physicians office:  [phone]    Fax number to send information:  [phone]

## 2023-12-07 ENCOUNTER — HOSPITAL ENCOUNTER (OUTPATIENT)
Age: 58
Setting detail: OUTPATIENT SURGERY
Discharge: HOME | End: 2023-12-07
Attending: ORTHOPAEDIC SURGERY | Admitting: ORTHOPAEDIC SURGERY
Payer: COMMERCIAL

## 2023-12-07 ENCOUNTER — ANESTHESIA (OUTPATIENT)
Dept: OPERATING ROOM | Age: 58
End: 2023-12-07
Payer: COMMERCIAL

## 2023-12-07 ENCOUNTER — APPOINTMENT (OUTPATIENT)
Dept: GENERAL RADIOLOGY | Age: 58
End: 2023-12-07
Attending: ORTHOPAEDIC SURGERY
Payer: COMMERCIAL

## 2023-12-07 VITALS
RESPIRATION RATE: 22 BRPM | HEIGHT: 60 IN | TEMPERATURE: 97.7 F | DIASTOLIC BLOOD PRESSURE: 63 MMHG | SYSTOLIC BLOOD PRESSURE: 131 MMHG | WEIGHT: 209.6 LBS | BODY MASS INDEX: 41.15 KG/M2 | HEART RATE: 68 BPM | OXYGEN SATURATION: 95 %

## 2023-12-07 PROBLEM — S93.431A SYNDESMOTIC DISRUPTION OF RIGHT ANKLE: Status: ACTIVE | Noted: 2023-12-07

## 2023-12-07 LAB
ANION GAP SERPL CALCULATED.3IONS-SCNC: 5 MMOL/L (ref 3–16)
BUN SERPL-MCNC: 18 MG/DL (ref 7–20)
CALCIUM SERPL-MCNC: 9 MG/DL (ref 8.3–10.6)
CHLORIDE SERPL-SCNC: 101 MMOL/L (ref 99–110)
CO2 SERPL-SCNC: 35 MMOL/L (ref 21–32)
CREAT SERPL-MCNC: 0.8 MG/DL (ref 0.6–1.1)
GFR SERPLBLD CREATININE-BSD FMLA CKD-EPI: >60 ML/MIN/{1.73_M2}
GLUCOSE BLD-MCNC: 191 MG/DL (ref 70–99)
GLUCOSE BLD-MCNC: 268 MG/DL (ref 70–99)
GLUCOSE SERPL-MCNC: 269 MG/DL (ref 70–99)
PERFORMED ON: ABNORMAL
PERFORMED ON: ABNORMAL
POTASSIUM SERPL-SCNC: 4.4 MMOL/L (ref 3.5–5.1)
SODIUM SERPL-SCNC: 141 MMOL/L (ref 136–145)

## 2023-12-07 PROCEDURE — 2500000003 HC RX 250 WO HCPCS: Performed by: NURSE ANESTHETIST, CERTIFIED REGISTERED

## 2023-12-07 PROCEDURE — 7100000011 HC PHASE II RECOVERY - ADDTL 15 MIN: Performed by: ORTHOPAEDIC SURGERY

## 2023-12-07 PROCEDURE — 6360000002 HC RX W HCPCS: Performed by: ORTHOPAEDIC SURGERY

## 2023-12-07 PROCEDURE — 7100000010 HC PHASE II RECOVERY - FIRST 15 MIN: Performed by: ORTHOPAEDIC SURGERY

## 2023-12-07 PROCEDURE — 3600000014 HC SURGERY LEVEL 4 ADDTL 15MIN: Performed by: ORTHOPAEDIC SURGERY

## 2023-12-07 PROCEDURE — 2580000003 HC RX 258: Performed by: ORTHOPAEDIC SURGERY

## 2023-12-07 PROCEDURE — 3700000001 HC ADD 15 MINUTES (ANESTHESIA): Performed by: ORTHOPAEDIC SURGERY

## 2023-12-07 PROCEDURE — 6360000002 HC RX W HCPCS: Performed by: NURSE ANESTHETIST, CERTIFIED REGISTERED

## 2023-12-07 PROCEDURE — A4217 STERILE WATER/SALINE, 500 ML: HCPCS | Performed by: ORTHOPAEDIC SURGERY

## 2023-12-07 PROCEDURE — 2709999900 HC NON-CHARGEABLE SUPPLY: Performed by: ORTHOPAEDIC SURGERY

## 2023-12-07 PROCEDURE — 2580000003 HC RX 258: Performed by: ANESTHESIOLOGY

## 2023-12-07 PROCEDURE — 7100000001 HC PACU RECOVERY - ADDTL 15 MIN: Performed by: ORTHOPAEDIC SURGERY

## 2023-12-07 PROCEDURE — 3700000000 HC ANESTHESIA ATTENDED CARE: Performed by: ORTHOPAEDIC SURGERY

## 2023-12-07 PROCEDURE — C1713 ANCHOR/SCREW BN/BN,TIS/BN: HCPCS | Performed by: ORTHOPAEDIC SURGERY

## 2023-12-07 PROCEDURE — 3600000004 HC SURGERY LEVEL 4 BASE: Performed by: ORTHOPAEDIC SURGERY

## 2023-12-07 PROCEDURE — 2720000010 HC SURG SUPPLY STERILE: Performed by: ORTHOPAEDIC SURGERY

## 2023-12-07 PROCEDURE — 7100000000 HC PACU RECOVERY - FIRST 15 MIN: Performed by: ORTHOPAEDIC SURGERY

## 2023-12-07 PROCEDURE — 80048 BASIC METABOLIC PNL TOTAL CA: CPT

## 2023-12-07 PROCEDURE — 73600 X-RAY EXAM OF ANKLE: CPT

## 2023-12-07 PROCEDURE — 6370000000 HC RX 637 (ALT 250 FOR IP): Performed by: NURSE ANESTHETIST, CERTIFIED REGISTERED

## 2023-12-07 PROCEDURE — C1769 GUIDE WIRE: HCPCS | Performed by: ORTHOPAEDIC SURGERY

## 2023-12-07 DEVICE — SCREW BNE L50MM DIA3.5MM HD DIA2.7MM LNG PERIARTC ST: Type: IMPLANTABLE DEVICE | Site: ANKLE | Status: FUNCTIONAL

## 2023-12-07 DEVICE — SCREW BNE L14MM DIA3.5MM HD DIA2.7MM CORT PERIARTC S STL ST: Type: IMPLANTABLE DEVICE | Site: ANKLE | Status: FUNCTIONAL

## 2023-12-07 DEVICE — SCREW BNE L14MM DIA2.7MM HEX HD DIA2.5MM CANC BIODUR 108C: Type: IMPLANTABLE DEVICE | Site: ANKLE | Status: FUNCTIONAL

## 2023-12-07 DEVICE — SCREW BNE L16MM DIA2.7MM HEX HD DIA2.5MM CANC BIODUR 108C: Type: IMPLANTABLE DEVICE | Site: ANKLE | Status: FUNCTIONAL

## 2023-12-07 DEVICE — SCREW BNE L48MM DIA3.5MM CORT PERIARTC S STL ST: Type: IMPLANTABLE DEVICE | Site: ANKLE | Status: FUNCTIONAL

## 2023-12-07 DEVICE — SCREW BNE L18MM DIA2.7MM HEX HD DIA2.5MM CANC BIODUR 108C: Type: IMPLANTABLE DEVICE | Site: ANKLE | Status: FUNCTIONAL

## 2023-12-07 DEVICE — PLATE BNE L80MM 4 H R LAT DST PERIARTC FIBULAR S STL LOK: Type: IMPLANTABLE DEVICE | Site: ANKLE | Status: FUNCTIONAL

## 2023-12-07 RX ORDER — ALBUTEROL SULFATE 90 UG/1
AEROSOL, METERED RESPIRATORY (INHALATION) PRN
Status: DISCONTINUED | OUTPATIENT
Start: 2023-12-07 | End: 2023-12-07 | Stop reason: SDUPTHER

## 2023-12-07 RX ORDER — EPHEDRINE SULFATE/0.9% NACL/PF 50 MG/5 ML
SYRINGE (ML) INTRAVENOUS PRN
Status: DISCONTINUED | OUTPATIENT
Start: 2023-12-07 | End: 2023-12-07 | Stop reason: SDUPTHER

## 2023-12-07 RX ORDER — HYDROMORPHONE HYDROCHLORIDE 2 MG/ML
0.25 INJECTION, SOLUTION INTRAMUSCULAR; INTRAVENOUS; SUBCUTANEOUS EVERY 5 MIN PRN
Status: DISCONTINUED | OUTPATIENT
Start: 2023-12-07 | End: 2023-12-07 | Stop reason: HOSPADM

## 2023-12-07 RX ORDER — SODIUM CHLORIDE 0.9 % (FLUSH) 0.9 %
5-40 SYRINGE (ML) INJECTION EVERY 12 HOURS SCHEDULED
Status: DISCONTINUED | OUTPATIENT
Start: 2023-12-07 | End: 2023-12-07 | Stop reason: HOSPADM

## 2023-12-07 RX ORDER — PHENYLEPHRINE HCL IN 0.9% NACL 1 MG/10 ML
SYRINGE (ML) INTRAVENOUS PRN
Status: DISCONTINUED | OUTPATIENT
Start: 2023-12-07 | End: 2023-12-07 | Stop reason: SDUPTHER

## 2023-12-07 RX ORDER — MEPERIDINE HYDROCHLORIDE 25 MG/ML
12.5 INJECTION INTRAMUSCULAR; INTRAVENOUS; SUBCUTANEOUS EVERY 5 MIN PRN
Status: DISCONTINUED | OUTPATIENT
Start: 2023-12-07 | End: 2023-12-07 | Stop reason: HOSPADM

## 2023-12-07 RX ORDER — MIDAZOLAM HYDROCHLORIDE 1 MG/ML
INJECTION INTRAMUSCULAR; INTRAVENOUS PRN
Status: DISCONTINUED | OUTPATIENT
Start: 2023-12-07 | End: 2023-12-07 | Stop reason: SDUPTHER

## 2023-12-07 RX ORDER — ONDANSETRON 2 MG/ML
4 INJECTION INTRAMUSCULAR; INTRAVENOUS
Status: DISCONTINUED | OUTPATIENT
Start: 2023-12-07 | End: 2023-12-07 | Stop reason: HOSPADM

## 2023-12-07 RX ORDER — SODIUM CHLORIDE 9 MG/ML
INJECTION, SOLUTION INTRAVENOUS PRN
Status: DISCONTINUED | OUTPATIENT
Start: 2023-12-07 | End: 2023-12-07 | Stop reason: HOSPADM

## 2023-12-07 RX ORDER — FENTANYL CITRATE 50 UG/ML
INJECTION, SOLUTION INTRAMUSCULAR; INTRAVENOUS PRN
Status: DISCONTINUED | OUTPATIENT
Start: 2023-12-07 | End: 2023-12-07 | Stop reason: SDUPTHER

## 2023-12-07 RX ORDER — ACETAMINOPHEN 500 MG
1000 TABLET ORAL ONCE
Status: DISCONTINUED | OUTPATIENT
Start: 2023-12-07 | End: 2023-12-07

## 2023-12-07 RX ORDER — OXYCODONE HYDROCHLORIDE 5 MG/1
5 TABLET ORAL
Status: DISCONTINUED | OUTPATIENT
Start: 2023-12-07 | End: 2023-12-07 | Stop reason: HOSPADM

## 2023-12-07 RX ORDER — ROCURONIUM BROMIDE 10 MG/ML
INJECTION, SOLUTION INTRAVENOUS PRN
Status: DISCONTINUED | OUTPATIENT
Start: 2023-12-07 | End: 2023-12-07 | Stop reason: SDUPTHER

## 2023-12-07 RX ORDER — ONDANSETRON 2 MG/ML
INJECTION INTRAMUSCULAR; INTRAVENOUS PRN
Status: DISCONTINUED | OUTPATIENT
Start: 2023-12-07 | End: 2023-12-07 | Stop reason: SDUPTHER

## 2023-12-07 RX ORDER — SODIUM CHLORIDE 0.9 % (FLUSH) 0.9 %
5-40 SYRINGE (ML) INJECTION PRN
Status: DISCONTINUED | OUTPATIENT
Start: 2023-12-07 | End: 2023-12-07 | Stop reason: HOSPADM

## 2023-12-07 RX ORDER — BUPIVACAINE HYDROCHLORIDE 5 MG/ML
INJECTION, SOLUTION EPIDURAL; INTRACAUDAL
Status: COMPLETED | OUTPATIENT
Start: 2023-12-07 | End: 2023-12-07

## 2023-12-07 RX ORDER — PROPOFOL 10 MG/ML
INJECTION, EMULSION INTRAVENOUS PRN
Status: DISCONTINUED | OUTPATIENT
Start: 2023-12-07 | End: 2023-12-07 | Stop reason: SDUPTHER

## 2023-12-07 RX ORDER — HYDROMORPHONE HYDROCHLORIDE 2 MG/ML
0.5 INJECTION, SOLUTION INTRAMUSCULAR; INTRAVENOUS; SUBCUTANEOUS EVERY 5 MIN PRN
Status: DISCONTINUED | OUTPATIENT
Start: 2023-12-07 | End: 2023-12-07 | Stop reason: HOSPADM

## 2023-12-07 RX ORDER — LIDOCAINE HYDROCHLORIDE 40 MG/ML
SOLUTION TOPICAL PRN
Status: DISCONTINUED | OUTPATIENT
Start: 2023-12-07 | End: 2023-12-07 | Stop reason: SDUPTHER

## 2023-12-07 RX ADMIN — Medication 200 MCG: at 10:32

## 2023-12-07 RX ADMIN — ROCURONIUM BROMIDE 50 MG: 10 INJECTION, SOLUTION INTRAVENOUS at 10:25

## 2023-12-07 RX ADMIN — MIDAZOLAM 2 MG: 1 INJECTION INTRAMUSCULAR; INTRAVENOUS at 10:18

## 2023-12-07 RX ADMIN — Medication 5 MG: at 10:45

## 2023-12-07 RX ADMIN — SUGAMMADEX 200 MG: 100 INJECTION, SOLUTION INTRAVENOUS at 11:10

## 2023-12-07 RX ADMIN — SODIUM CHLORIDE: 9 INJECTION, SOLUTION INTRAVENOUS at 08:59

## 2023-12-07 RX ADMIN — FENTANYL CITRATE 50 MCG: 50 INJECTION, SOLUTION INTRAMUSCULAR; INTRAVENOUS at 10:39

## 2023-12-07 RX ADMIN — Medication 100 MCG: at 10:51

## 2023-12-07 RX ADMIN — Medication 100 MCG: at 10:57

## 2023-12-07 RX ADMIN — Medication 200 MCG: at 10:39

## 2023-12-07 RX ADMIN — LIDOCAINE HYDROCHLORIDE 4 ML: 40 SOLUTION TOPICAL at 10:27

## 2023-12-07 RX ADMIN — Medication 10 MG: at 10:57

## 2023-12-07 RX ADMIN — CEFAZOLIN 2000 MG: 2 INJECTION, POWDER, FOR SOLUTION INTRAMUSCULAR; INTRAVENOUS at 10:15

## 2023-12-07 RX ADMIN — PROPOFOL 150 MG: 10 INJECTION, EMULSION INTRAVENOUS at 10:24

## 2023-12-07 RX ADMIN — ONDANSETRON 4 MG: 2 INJECTION INTRAMUSCULAR; INTRAVENOUS at 10:59

## 2023-12-07 RX ADMIN — ALBUTEROL SULFATE 2 PUFF: 90 AEROSOL, METERED RESPIRATORY (INHALATION) at 10:45

## 2023-12-07 RX ADMIN — Medication 10 MG: at 11:03

## 2023-12-07 RX ADMIN — Medication 100 MCG: at 11:03

## 2023-12-07 RX ADMIN — Medication 5 MG: at 10:46

## 2023-12-07 RX ADMIN — Medication 200 MCG: at 10:44

## 2023-12-07 RX ADMIN — Medication 100 MCG: at 10:48

## 2023-12-07 ASSESSMENT — ENCOUNTER SYMPTOMS
SHORTNESS OF BREATH: 1
DYSPNEA ACTIVITY LEVEL: NO INTERVAL CHANGE

## 2023-12-07 ASSESSMENT — PAIN DESCRIPTION - DESCRIPTORS: DESCRIPTORS: SHARP;ACHING

## 2023-12-07 ASSESSMENT — PAIN DESCRIPTION - ORIENTATION: ORIENTATION: RIGHT

## 2023-12-07 ASSESSMENT — COPD QUESTIONNAIRES: CAT_SEVERITY: MODERATE

## 2023-12-07 ASSESSMENT — PAIN DESCRIPTION - FREQUENCY: FREQUENCY: CONTINUOUS

## 2023-12-07 ASSESSMENT — PAIN DESCRIPTION - PAIN TYPE: TYPE: ACUTE PAIN

## 2023-12-07 ASSESSMENT — PAIN DESCRIPTION - LOCATION: LOCATION: ANKLE;LEG

## 2023-12-07 ASSESSMENT — PAIN SCALES - GENERAL: PAINLEVEL_OUTOF10: 6

## 2023-12-07 NOTE — ANESTHESIA POSTPROCEDURE EVALUATION
Department of Anesthesiology  Postprocedure Note    Patient: Yoli Vicente  MRN: [id number]  YOB: 1965  Date of evaluation: 12/7/2023      Procedure Summary     Date: 12/07/23 Room / Location: [address]    Anesthesia Start: 1018 Anesthesia Stop: 1129    Procedure: RIGHT OPEN REDUCTION AND INTERNAL FIXATION LATERAL MALLEOLUS AND SYNDESMOTIC REPAIR-MICHAEL (Right: Ankle) Diagnosis:       Closed displaced fracture of lateral malleolus of right fibula, initial encounter      (Closed displaced fracture of lateral malleolus of right fibula, initial encounter Benson Khoury)    Surgeons: Jack Beltre MD Responsible Provider: Bernardo Ramirez MD    Anesthesia Type: General ASA Status: 3          Anesthesia Type: General    Kiko Phase I: Kiko Score: 8    Kiko Phase II:        Anesthesia Post Evaluation    Patient location during evaluation: PACU  Patient participation: complete - patient participated  Level of consciousness: awake  Pain score: 0  Airway patency: patent  Nausea & Vomiting: no nausea and no vomiting  Complications: no  Cardiovascular status: hemodynamically stable  Respiratory status: acceptable  Hydration status: stable  Multimodal analgesia pain management approach  Pain management: adequate

## 2023-12-07 NOTE — H&P
Preoperative H&P Update    The patient's History and Physical in the medical record from 12/6/2023 was reviewed by me today. Past Medical History:   Diagnosis Date    Arthritis     Back pain     CAD (coronary artery disease)     Chronic kidney disease     COPD (chronic obstructive pulmonary disease) (HCC)     Depression     Diabetes mellitus (HCC)     REHMAN (dyspnea on exertion)     Eye abnormalities     bleed in back of eyes with injections Q 8 weeks    Fatty liver     GERD (gastroesophageal reflux disease)     Hx of blood clots     ? PE 3 weeks ago ~11/22    Hyperlipidemia     Hypertension     MRSA (methicillin resistant staph aureus) culture positive 08/15/2018    arm    On home O2     2 l/m    Stress incontinence     Thyroid disease     CYST ON THYROID--FOUND 20 YEARS AGO     Past Surgical History:   Procedure Laterality Date    CARDIAC SURGERY      3 stent placed OCt 20    [address] 3    CHOLECYSTECTOMY      COLONOSCOPY  11/30/2017      Colonoscopy with snare and biopsy    EYE SURGERY      muscle correction and eyelid     HERNIA REPAIR      UMBILICAL AREA x 2    HYSTERECTOMY (CERVIX STATUS UNKNOWN)      PTCA  10/2020    SHEFALI to RCA x 2; SHEFALI to LAD    SHOULDER ARTHROSCOPY Right 5-19-15    Right shoulder arthroscopy labral debridement open [place] subacromial decompression and chrondroplasty    UPPER GASTROINTESTINAL ENDOSCOPY  11/30/2017    Esophagogastroduodenoscopy with biopsy     No current facility-administered medications on file prior to encounter. Current Outpatient Medications on File Prior to Encounter   Medication Sig Dispense Refill    cephALEXin (KEFLEX) 500 MG capsule Take 1 capsule by mouth 4 times daily for 10 days (Patient not taking: Reported on 12/6/2023) 40 capsule 0    mometasone-formoterol (DULERA) 200-5 MCG/ACT inhaler Inhale 2 puffs into the lungs in the morning and 2 puffs in the evening.  (Patient not taking: Reported on 12/6/2023) 1 each 11    furosemide (LASIX)

## 2023-12-07 NOTE — DISCHARGE INSTRUCTIONS
responsible adult needs to be with you for 24 hours. You may experience lightheadedness,dizziness,or sleepiness following surgery. Rest at home today- increase activity as tolerated. Progress slowly to a regular diet unless your physician has instructed you otherwise. Drink plenty of water. If nausea becomes a problem call your physician. Coughing,sore throat,and muscle aches are other side effects of anesthesia,and should improve with time. Do not drive,operate machinery while taking narcotics. Females of childbearing potential and on hormonal birth control, should use two forms of contraception following procedure if given a medication called sugammadex and/or emend. Additional contraception should be used for 7 days for sugammadex and/or 28 days for emend. These medications have a potential to reduce the effectiveness of hormonal birth control.

## 2023-12-07 NOTE — PROGRESS NOTES
Informed Dr. Avalos Better that patient did have PE in 11/2.
Patient arrived from OR to PACU. On 8 L simple mask. NSR on the monitor. VSS. Drsg to right leg CDI. Elevated and ice applied.
Patient on low dose ASA and Eliquis. Questioned patient if her cardiologist knew she was having surgery and she stated \"no, I just found out I was having surgery tomorrow. \" Requested that she let her cardiologist know and to ask about her blood thinners. She stated she would let him know. She has sent a message to cardiologist today. Reviewed case with Dr. Vladimir Bella. No orders given.
Pt placed in Phase II Recovery. VSS, tolerating po fluids. 02 remains on @ 2 lpm via N/C and is pt's baseline.
Pt ready for discharge. VSS. Instructions reviewed with pt &  Samuel Laurents. All questions answered; verbalized understanding. Pt assisted with dressing and all belongings returned to her. Pt left department via wheelchair to private vehicle.
Report given to [facility]
Teaching / education initiated regarding perioperative experience, expectations, and pain management during stay. Patient verbalized understanding.
bring picture ID and insurance card. 19.  Visit our web site for additional information:  [email or web address]              20.During flu season no children under the age of 15 are permitted in the hospital for the safety of all patients. 21. If you take a long acting insulin in the evening only  take half of your usual  dose the night  before your procedure              22. If you use a c-pap please bring DOS if staying overnight,             23.For your convenience [facility] has a pharmacy on site to fill your prescriptions. 24. If you use oxygen and have a portable tank please bring it  with you the DOS             25. Bring a complete list of all your medications with name and dose include any supplements. 26. Other_use inhalers and nebs a prescribed_________________________________________   *Please call pre admission testing if you any further questions   [facility]  [address] [facility]  [phone]   [address] [place] [address]       VISITOR POLICY(subject to change)    Current policy is 2 visitors per patient. No children. Mask is  at the discretion of the facility. Visiting hours are 8a-8p. Overnight visitors will be at the discretion of the nurse. All policies subject to change. All above information reviewed with patient in person or by phone. Patient verbalizes understanding. All questions and concerns addressed.                                                                                                  Patient/Rep___patient_________________                                                                                                                                    PRE OP INSTRUCTIONS

## 2023-12-07 NOTE — ANESTHESIA PRE PROCEDURE
Department of Anesthesiology  Preprocedure Note       Name:  Luis Manuel Escobar   Age:  60 y.o.  :  1965                                          MRN:  [id number]         Date:  2023      Surgeon: Blaire Mcclain):  Tai Abdi MD    Procedure: Procedure(s):  RIGHT OPEN REDUCTION AND INTERNAL FIXATION LATERAL MALLEOLUS AND SYNDESMOTIC REPAIR-MICHAEL    Medications prior to admission:   Prior to Admission medications    Medication Sig Start Date End Date Taking? Authorizing Provider   cephALEXin (KEFLEX) 500 MG capsule Take 1 capsule by mouth 4 times daily for 10 days  Patient not taking: Reported on 23  Tai Abdi MD   mometasone-formoterol [facility]) 200-5 MCG/ACT inhaler Inhale 2 puffs into the lungs in the morning and 2 puffs in the evening. 23   Kathleen Roman MD   furosemide (LASIX) 20 MG tablet TAKE ONE TABLET BY MOUTH DAILY  Patient taking differently: Take 1 tablet by mouth 2 times daily 23   Eve Lin MD   atorvastatin (LIPITOR) 20 MG tablet TAKE ONE TABLET BY MOUTH DAILY 23   Nathaniel Stevens MD   Insulin Syringe-Needle U-100 (BD INSULIN SYRINGE U/F) 31G X 5/16\" 1 ML MISC USE AS DIRECTED WITH INSULIN INJECTIONS NIGHTLY 23   Eve Lin MD   carvedilol (COREG) 12.5 MG tablet Take 1 tablet by mouth 2 times daily 11/10/23   Eve Lin MD   losartan (COZAAR) 50 MG tablet Take 1 tablet by mouth 2 times daily 11/10/23   Eve Lin MD   HYDROcodone-acetaminophen (NORCO) 5-325 MG per tablet Take 1 tablet by mouth 2 times daily for 30 days. 11/10/23 12/10/23  Nathaniel Stevens MD   diazePAM (VALIUM) 5 MG tablet Take 1 tablet by mouth every 12 hours as needed for Anxiety or Sleep for up to 30 days.  11/10/23 12/10/23  Nathaniel Stevens MD   apixaban (ELIQUIS) 5 MG TABS tablet 10 mg po bid for 5 days then 1 tab po bid 23   Rosario Stevens MD   ipratropium 0.5 mg-albuterol 2.5 mg (DUONEB) 0.5-2.5 (3)

## 2023-12-11 DIAGNOSIS — S82.61XA CLOSED DISPLACED FRACTURE OF LATERAL MALLEOLUS OF RIGHT FIBULA, INITIAL ENCOUNTER: Primary | ICD-10-CM

## 2023-12-11 RX ORDER — CLINDAMYCIN HYDROCHLORIDE 300 MG/1
600 CAPSULE ORAL 3 TIMES DAILY
Qty: 60 CAPSULE | Refills: 0 | Status: SHIPPED | OUTPATIENT
Start: 2023-12-11 | End: 2023-12-21

## 2023-12-11 NOTE — TELEPHONE ENCOUNTER
Other patient daughter  Herman Ambrocio called and she stated that her mother rt ankle is swelling and she is in a lot of pain and the medication is not working she stated her mother can not take the meds that was prescribe to her for pain because it is making her sick. She just need call back regarding this matter.

## 2023-12-11 NOTE — TELEPHONE ENCOUNTER
OTHER    PATIENT'S DAUGHTER CALLED TO RETURN MISSED CALL TO CLINICAL TO DISCUSS MOTHER'S RT ANKLE.     PLEASE ADVISE

## 2023-12-11 NOTE — TELEPHONE ENCOUNTER
Called and left voicemail for Katelyn Collins to call back to discuss her mother's condition. Clindamycin will be prescribed by Dr. Monica Toscano. He would like her to be seen tomorrow by Zana Armstrong.

## 2023-12-11 NOTE — TELEPHONE ENCOUNTER
Called and spoke with patient's daughter. She stated that her mother has significant swelling in her foot and knee and some discoloration around the wrap. Reports no foul odor, no bloody saturation of the wrap. Patient had a fever of 101 degrees Saturday, but the fever was gone Sunday. She has not been to visit her mother yet this morning to check her temperature. Daughter also stated that she is concerned that patient has an infection. Stated that she has been attempting to take the antibiotic, with and without food, but vomits shortly after taking it. Daughter stated that patient does have a sensitivity to Penicillin. I suggested the daughter attempt to send a photo. Will discuss with Dr. Yasemin Morgan today how to move forward.

## 2023-12-11 NOTE — TELEPHONE ENCOUNTER
Spoke with Herman Ambrocio, Patient's daughter. Informed her that Dr. Bj Plascencia would like to send in a new script of antibiotics and schedule a follow up with Natacha Gutierrez tomorrow. Herman Ambrocio needed to call her father to verify a time before scheduling. Upon return call,   Please schedule at [facility] Tomorrow with WOODY villanueva in morning or at 1:15. OK to over book if requesting morning appointment.

## 2024-01-24 ENCOUNTER — TELEPHONE (OUTPATIENT)
Dept: CARDIOLOGY CLINIC | Age: 59
End: 2024-01-24

## 2024-01-24 NOTE — TELEPHONE ENCOUNTER
Timmy from [facility] called into the office to request if Donny can send over a detailed order or script for the reauthorization of [facility] oxygen. Certification for oxygen has .     Timmy states she will send a request as well via fax.     Please advise.     Timmy can be reached at [phone].

## 2024-01-24 NOTE — TELEPHONE ENCOUNTER
Was able to get a better direct number to reach Timmy ([phone]) to inform her that this request needs to be sent to [facility] pulmonologist. Representative transferred me to Timmy's line and was unable to LVM but representative was able to leave a msg for Timmy. Left name and callback number if Timmy calls back.

## 2024-01-29 RX ORDER — RANOLAZINE 500 MG/1
500 TABLET, EXTENDED RELEASE ORAL 2 TIMES DAILY
Qty: 180 TABLET | Refills: 3 | Status: SHIPPED | OUTPATIENT
Start: 2024-01-29

## 2024-01-31 ENCOUNTER — OFFICE VISIT (OUTPATIENT)
Dept: ORTHOPEDIC SURGERY | Age: 59
End: 2024-01-31

## 2024-01-31 DIAGNOSIS — S82.61XA CLOSED DISPLACED FRACTURE OF LATERAL MALLEOLUS OF RIGHT FIBULA, INITIAL ENCOUNTER: Primary | ICD-10-CM

## 2024-01-31 PROCEDURE — APPNB15 APP NON BILLABLE TIME 0-15 MINS: Performed by: NURSE PRACTITIONER

## 2024-01-31 PROCEDURE — 99024 POSTOP FOLLOW-UP VISIT: CPT | Performed by: NURSE PRACTITIONER

## 2024-01-31 NOTE — PROGRESS NOTES
DIAGNOSIS:    1-Right ankle lateral malleolus displaced fracture, status post ORIF.  2-Right ankle distal tibiofibular syndesmosis disruption, status post ORIF syndesmosis screw    DATE OF SURGERY: 12/7/2023.    HISTORY OF PRESENT ILLNESS:  Ms. Lucio 58 y.o.  female who came in today for 8 weeks postoperative visit.  The patient denies any significant pain in the right ankle. Rates pain a 4/10 VAS mild, aching, intermittent and are improving. Aggravating factors movement.  She has intermittent lateral ankle burning pain.  Alleviating factors rest. She has been in a boot, and non WB.  No numbness or tingling sensation. No fever or Chills.  She is on continuous oxygen.  She is on chronic pain medication.  She has a history of a PE and is on Eliquis.    PHYSICAL EXAMINATION:  The incision healing well.  No signs of any erythema or drainage, minimal swelling. She has no pain with the active or passive range of motion of the right ankle, but decrease ROM.  She has intact sensation distally, and she is neurovascularly intact.    IMAGING:  Three views right ankle taken today in the office showed anatomic alignment of the fracture, plate and screws in good position, no loosening. Ankle mortise is well centered.    IMPRESSION:  8 weeks out from   1-Right ankle lateral malleolus displaced fracture, status post ORIF.  2-Right ankle distal tibiofibular syndesmosis disruption, status post ORIF syndesmosis screw      PLAN: She will be WBAT in the boot, and start aggressive ROM and peroneal strengthening exercise. Off the boot in 1 weeks. No heavy impact activities. The patient will come back for a follow up in 6 weeks.  At that time, we will take 3 views of the right ankle standing. She will likely need a staged procedure for syndesmosis screw removal at 4-5 months.             Lorraine Donovan, MOY - CNP

## 2024-02-07 DIAGNOSIS — E11.59 TYPE 2 DIABETES MELLITUS WITH OTHER CIRCULATORY COMPLICATION, UNSPECIFIED WHETHER LONG TERM INSULIN USE (HCC): ICD-10-CM

## 2024-02-07 LAB
ALBUMIN SERPL-MCNC: 4.2 G/DL (ref 3.4–5)
ALBUMIN/GLOB SERPL: 2 {RATIO} (ref 1.1–2.2)
ALP SERPL-CCNC: 111 U/L (ref 40–129)
ALT SERPL-CCNC: 14 U/L (ref 10–40)
ANION GAP SERPL CALCULATED.3IONS-SCNC: 14 MMOL/L (ref 3–16)
AST SERPL-CCNC: 11 U/L (ref 15–37)
BASOPHILS # BLD: 0 K/UL (ref 0–0.2)
BASOPHILS NFR BLD: 0.6 %
BILIRUB SERPL-MCNC: 0.6 MG/DL (ref 0–1)
BUN SERPL-MCNC: 21 MG/DL (ref 7–20)
CALCIUM SERPL-MCNC: 8.4 MG/DL (ref 8.3–10.6)
CHLORIDE SERPL-SCNC: 100 MMOL/L (ref 99–110)
CHOLEST SERPL-MCNC: 131 MG/DL (ref 0–199)
CO2 SERPL-SCNC: 28 MMOL/L (ref 21–32)
CREAT SERPL-MCNC: 0.8 MG/DL (ref 0.6–1.1)
DEPRECATED RDW RBC AUTO: 17.6 % (ref 12.4–15.4)
EOSINOPHIL # BLD: 0.1 K/UL (ref 0–0.6)
EOSINOPHIL NFR BLD: 1.5 %
FOLATE SERPL-MCNC: 5.36 NG/ML (ref 4.78–24.2)
GFR SERPLBLD CREATININE-BSD FMLA CKD-EPI: >60 ML/MIN/{1.73_M2}
GLUCOSE SERPL-MCNC: 272 MG/DL (ref 70–99)
HCT VFR BLD AUTO: 35.7 % (ref 36–48)
HDLC SERPL-MCNC: 38 MG/DL (ref 40–60)
HGB BLD-MCNC: 12 G/DL (ref 12–16)
LDLC SERPL CALC-MCNC: 69 MG/DL
LYMPHOCYTES # BLD: 1.1 K/UL (ref 1–5.1)
LYMPHOCYTES NFR BLD: 16 %
MCH RBC QN AUTO: 28.4 PG (ref 26–34)
MCHC RBC AUTO-ENTMCNC: 33.5 G/DL (ref 31–36)
MCV RBC AUTO: 84.7 FL (ref 80–100)
MONOCYTES # BLD: 0.4 K/UL (ref 0–1.3)
MONOCYTES NFR BLD: 5.5 %
NEUTROPHILS # BLD: 5.2 K/UL (ref 1.7–7.7)
NEUTROPHILS NFR BLD: 76.4 %
PLATELET # BLD AUTO: 129 K/UL (ref 135–450)
PMV BLD AUTO: 8.8 FL (ref 5–10.5)
POTASSIUM SERPL-SCNC: 4.2 MMOL/L (ref 3.5–5.1)
PROT SERPL-MCNC: 6.3 G/DL (ref 6.4–8.2)
RBC # BLD AUTO: 4.22 M/UL (ref 4–5.2)
SODIUM SERPL-SCNC: 142 MMOL/L (ref 136–145)
TRIGL SERPL-MCNC: 120 MG/DL (ref 0–150)
TSH SERPL DL<=0.005 MIU/L-ACNC: 1.81 UIU/ML (ref 0.27–4.2)
VIT B12 SERPL-MCNC: 413 PG/ML (ref 211–911)
VLDLC SERPL CALC-MCNC: 24 MG/DL
WBC # BLD AUTO: 6.8 K/UL (ref 4–11)

## 2024-02-08 LAB
EST. AVERAGE GLUCOSE BLD GHB EST-MCNC: 148.5 MG/DL
HBA1C MFR BLD: 6.8 %

## 2024-02-14 ENCOUNTER — APPOINTMENT (OUTPATIENT)
Dept: CT IMAGING | Age: 59
DRG: 113 | End: 2024-02-14
Payer: COMMERCIAL

## 2024-02-14 ENCOUNTER — HOSPITAL ENCOUNTER (INPATIENT)
Age: 59
LOS: 4 days | Discharge: HOME | DRG: 113 | End: 2024-02-18
Attending: EMERGENCY MEDICINE | Admitting: INTERNAL MEDICINE
Payer: COMMERCIAL

## 2024-02-14 DIAGNOSIS — J18.9 PNEUMONIA DUE TO INFECTIOUS ORGANISM, UNSPECIFIED LATERALITY, UNSPECIFIED PART OF LUNG: ICD-10-CM

## 2024-02-14 DIAGNOSIS — R09.02 HYPOXIA: ICD-10-CM

## 2024-02-14 DIAGNOSIS — R07.9 CHEST PAIN, UNSPECIFIED TYPE: Primary | ICD-10-CM

## 2024-02-14 LAB
ANION GAP SERPL CALCULATED.3IONS-SCNC: 9 MMOL/L (ref 3–16)
BASOPHILS # BLD: 0 K/UL (ref 0–0.2)
BASOPHILS NFR BLD: 0.5 %
BUN SERPL-MCNC: 19 MG/DL (ref 7–20)
CALCIUM SERPL-MCNC: 8.8 MG/DL (ref 8.3–10.6)
CHLORIDE SERPL-SCNC: 100 MMOL/L (ref 99–110)
CO2 SERPL-SCNC: 33 MMOL/L (ref 21–32)
CREAT SERPL-MCNC: 0.8 MG/DL (ref 0.6–1.2)
DEPRECATED RDW RBC AUTO: 17.3 % (ref 12.4–15.4)
EKG ATRIAL RATE: 71 BPM
EKG DIAGNOSIS: NORMAL
EKG P AXIS: 71 DEGREES
EKG P-R INTERVAL: 156 MS
EKG Q-T INTERVAL: 450 MS
EKG QRS DURATION: 134 MS
EKG QTC CALCULATION (BAZETT): 489 MS
EKG R AXIS: 29 DEGREES
EKG T AXIS: 2 DEGREES
EKG VENTRICULAR RATE: 71 BPM
EOSINOPHIL # BLD: 0 K/UL (ref 0–0.6)
EOSINOPHIL NFR BLD: 1.1 %
FLUAV RNA UPPER RESP QL NAA+PROBE: NEGATIVE
FLUBV AG NPH QL: NEGATIVE
GFR SERPLBLD CREATININE-BSD FMLA CKD-EPI: >60 ML/MIN/{1.73_M2}
GLUCOSE BLD-MCNC: 171 MG/DL (ref 70–99)
GLUCOSE BLD-MCNC: 182 MG/DL (ref 70–99)
GLUCOSE BLD-MCNC: 299 MG/DL (ref 70–99)
GLUCOSE BLD-MCNC: 92 MG/DL (ref 70–99)
GLUCOSE SERPL-MCNC: 190 MG/DL (ref 70–99)
HCT VFR BLD AUTO: 33.4 % (ref 36–48)
HGB BLD-MCNC: 11.2 G/DL (ref 12–16)
LACTATE BLDV-SCNC: 0.8 MMOL/L (ref 0.4–2)
LYMPHOCYTES # BLD: 0.7 K/UL (ref 1–5.1)
LYMPHOCYTES NFR BLD: 19.7 %
MCH RBC QN AUTO: 28.7 PG (ref 26–34)
MCHC RBC AUTO-ENTMCNC: 33.7 G/DL (ref 31–36)
MCV RBC AUTO: 85.1 FL (ref 80–100)
MONOCYTES # BLD: 0.5 K/UL (ref 0–1.3)
MONOCYTES NFR BLD: 14.5 %
NEUTROPHILS # BLD: 2.4 K/UL (ref 1.7–7.7)
NEUTROPHILS NFR BLD: 64.2 %
NT-PROBNP SERPL-MCNC: 363 PG/ML (ref 0–124)
PERFORMED ON: ABNORMAL
PERFORMED ON: NORMAL
PLATELET # BLD AUTO: 129 K/UL (ref 135–450)
PMV BLD AUTO: 8.5 FL (ref 5–10.5)
POTASSIUM SERPL-SCNC: 3.6 MMOL/L (ref 3.5–5.1)
PROCALCITONIN SERPL IA-MCNC: 0.05 NG/ML (ref 0–0.15)
RBC # BLD AUTO: 3.92 M/UL (ref 4–5.2)
SARS-COV-2 RDRP RESP QL NAA+PROBE: NOT DETECTED
SODIUM SERPL-SCNC: 142 MMOL/L (ref 136–145)
TROPONIN, HIGH SENSITIVITY: 20 NG/L (ref 0–14)
WBC # BLD AUTO: 3.7 K/UL (ref 4–11)

## 2024-02-14 PROCEDURE — 84484 ASSAY OF TROPONIN QUANT: CPT

## 2024-02-14 PROCEDURE — 83880 ASSAY OF NATRIURETIC PEPTIDE: CPT

## 2024-02-14 PROCEDURE — 6370000000 HC RX 637 (ALT 250 FOR IP): Performed by: INTERNAL MEDICINE

## 2024-02-14 PROCEDURE — 71260 CT THORAX DX C+: CPT

## 2024-02-14 PROCEDURE — 99285 EMERGENCY DEPT VISIT HI MDM: CPT

## 2024-02-14 PROCEDURE — 93010 ELECTROCARDIOGRAM REPORT: CPT | Performed by: INTERNAL MEDICINE

## 2024-02-14 PROCEDURE — 87635 SARS-COV-2 COVID-19 AMP PRB: CPT

## 2024-02-14 PROCEDURE — 2060000000 HC ICU INTERMEDIATE R&B

## 2024-02-14 PROCEDURE — 2700000000 HC OXYGEN THERAPY PER DAY

## 2024-02-14 PROCEDURE — 94760 N-INVAS EAR/PLS OXIMETRY 1: CPT

## 2024-02-14 PROCEDURE — 83605 ASSAY OF LACTIC ACID: CPT

## 2024-02-14 PROCEDURE — 99222 1ST HOSP IP/OBS MODERATE 55: CPT | Performed by: INTERNAL MEDICINE

## 2024-02-14 PROCEDURE — 94640 AIRWAY INHALATION TREATMENT: CPT

## 2024-02-14 PROCEDURE — 6360000004 HC RX CONTRAST MEDICATION: Performed by: EMERGENCY MEDICINE

## 2024-02-14 PROCEDURE — 87804 INFLUENZA ASSAY W/OPTIC: CPT

## 2024-02-14 PROCEDURE — 2580000003 HC RX 258: Performed by: EMERGENCY MEDICINE

## 2024-02-14 PROCEDURE — 96365 THER/PROPH/DIAG IV INF INIT: CPT

## 2024-02-14 PROCEDURE — 87040 BLOOD CULTURE FOR BACTERIA: CPT

## 2024-02-14 PROCEDURE — 84145 PROCALCITONIN (PCT): CPT

## 2024-02-14 PROCEDURE — 96375 TX/PRO/DX INJ NEW DRUG ADDON: CPT

## 2024-02-14 PROCEDURE — 6360000002 HC RX W HCPCS: Performed by: EMERGENCY MEDICINE

## 2024-02-14 PROCEDURE — 80048 BASIC METABOLIC PNL TOTAL CA: CPT

## 2024-02-14 PROCEDURE — 85025 COMPLETE CBC W/AUTO DIFF WBC: CPT

## 2024-02-14 PROCEDURE — 93005 ELECTROCARDIOGRAM TRACING: CPT | Performed by: EMERGENCY MEDICINE

## 2024-02-14 RX ORDER — HYDROCODONE BITARTRATE AND ACETAMINOPHEN 5; 325 MG/1; MG/1
1 TABLET ORAL 2 TIMES DAILY PRN
Status: DISCONTINUED | OUTPATIENT
Start: 2024-02-14 | End: 2024-02-18 | Stop reason: HOSPADM

## 2024-02-14 RX ORDER — ASPIRIN 81 MG/1
81 TABLET, CHEWABLE ORAL DAILY
Status: DISCONTINUED | OUTPATIENT
Start: 2024-02-14 | End: 2024-02-18 | Stop reason: HOSPADM

## 2024-02-14 RX ORDER — IPRATROPIUM BROMIDE AND ALBUTEROL SULFATE 2.5; .5 MG/3ML; MG/3ML
1 SOLUTION RESPIRATORY (INHALATION)
Status: DISCONTINUED | OUTPATIENT
Start: 2024-02-14 | End: 2024-02-18 | Stop reason: HOSPADM

## 2024-02-14 RX ORDER — RANOLAZINE 500 MG/1
500 TABLET, EXTENDED RELEASE ORAL 2 TIMES DAILY
Status: DISCONTINUED | OUTPATIENT
Start: 2024-02-14 | End: 2024-02-18 | Stop reason: HOSPADM

## 2024-02-14 RX ORDER — DEXTROSE MONOHYDRATE 100 MG/ML
INJECTION, SOLUTION INTRAVENOUS CONTINUOUS PRN
Status: DISCONTINUED | OUTPATIENT
Start: 2024-02-14 | End: 2024-02-18 | Stop reason: HOSPADM

## 2024-02-14 RX ORDER — FUROSEMIDE 20 MG/1
20 TABLET ORAL DAILY
Status: DISCONTINUED | OUTPATIENT
Start: 2024-02-14 | End: 2024-02-15

## 2024-02-14 RX ORDER — 0.9 % SODIUM CHLORIDE 0.9 %
1000 INTRAVENOUS SOLUTION INTRAVENOUS ONCE
Status: COMPLETED | OUTPATIENT
Start: 2024-02-14 | End: 2024-02-14

## 2024-02-14 RX ORDER — DIPHENHYDRAMINE HYDROCHLORIDE 50 MG/ML
12.5 INJECTION INTRAMUSCULAR; INTRAVENOUS ONCE
Status: COMPLETED | OUTPATIENT
Start: 2024-02-14 | End: 2024-02-14

## 2024-02-14 RX ORDER — INSULIN GLARGINE 100 [IU]/ML
75 INJECTION, SOLUTION SUBCUTANEOUS NIGHTLY
Status: DISCONTINUED | OUTPATIENT
Start: 2024-02-14 | End: 2024-02-18 | Stop reason: HOSPADM

## 2024-02-14 RX ORDER — LOSARTAN POTASSIUM 50 MG/1
50 TABLET ORAL 2 TIMES DAILY
Status: DISCONTINUED | OUTPATIENT
Start: 2024-02-14 | End: 2024-02-18 | Stop reason: HOSPADM

## 2024-02-14 RX ORDER — CARVEDILOL 12.5 MG/1
12.5 TABLET ORAL 2 TIMES DAILY WITH MEALS
Status: DISCONTINUED | OUTPATIENT
Start: 2024-02-14 | End: 2024-02-17

## 2024-02-14 RX ORDER — GLUCAGON 1 MG/ML
1 KIT INJECTION PRN
Status: DISCONTINUED | OUTPATIENT
Start: 2024-02-14 | End: 2024-02-18 | Stop reason: HOSPADM

## 2024-02-14 RX ORDER — EZETIMIBE 10 MG/1
10 TABLET ORAL DAILY
Status: DISCONTINUED | OUTPATIENT
Start: 2024-02-14 | End: 2024-02-18 | Stop reason: HOSPADM

## 2024-02-14 RX ORDER — LOSARTAN POTASSIUM 25 MG/1
100 TABLET ORAL DAILY
Status: DISCONTINUED | OUTPATIENT
Start: 2024-02-14 | End: 2024-02-14

## 2024-02-14 RX ORDER — DIAZEPAM 5 MG/1
5 TABLET ORAL DAILY PRN
Status: DISCONTINUED | OUTPATIENT
Start: 2024-02-14 | End: 2024-02-18 | Stop reason: HOSPADM

## 2024-02-14 RX ORDER — METOCLOPRAMIDE HYDROCHLORIDE 5 MG/ML
10 INJECTION INTRAMUSCULAR; INTRAVENOUS ONCE
Status: COMPLETED | OUTPATIENT
Start: 2024-02-14 | End: 2024-02-14

## 2024-02-14 RX ORDER — DULOXETIN HYDROCHLORIDE 60 MG/1
60 CAPSULE, DELAYED RELEASE ORAL DAILY
Status: DISCONTINUED | OUTPATIENT
Start: 2024-02-14 | End: 2024-02-18 | Stop reason: HOSPADM

## 2024-02-14 RX ORDER — INSULIN LISPRO 100 [IU]/ML
0-4 INJECTION, SOLUTION INTRAVENOUS; SUBCUTANEOUS
Status: DISCONTINUED | OUTPATIENT
Start: 2024-02-15 | End: 2024-02-16

## 2024-02-14 RX ORDER — ATORVASTATIN CALCIUM 20 MG/1
20 TABLET, FILM COATED ORAL DAILY
Status: DISCONTINUED | OUTPATIENT
Start: 2024-02-14 | End: 2024-02-18 | Stop reason: HOSPADM

## 2024-02-14 RX ORDER — FAMOTIDINE 20 MG/1
20 TABLET, FILM COATED ORAL 2 TIMES DAILY
Status: DISCONTINUED | OUTPATIENT
Start: 2024-02-14 | End: 2024-02-14

## 2024-02-14 RX ORDER — INSULIN LISPRO 100 [IU]/ML
0-4 INJECTION, SOLUTION INTRAVENOUS; SUBCUTANEOUS NIGHTLY
Status: DISCONTINUED | OUTPATIENT
Start: 2024-02-14 | End: 2024-02-16

## 2024-02-14 RX ADMIN — FUROSEMIDE 20 MG: 20 TABLET ORAL at 12:16

## 2024-02-14 RX ADMIN — IPRATROPIUM BROMIDE AND ALBUTEROL SULFATE 1 DOSE: 2.5; .5 SOLUTION RESPIRATORY (INHALATION) at 12:06

## 2024-02-14 RX ADMIN — IPRATROPIUM BROMIDE AND ALBUTEROL SULFATE 1 DOSE: 2.5; .5 SOLUTION RESPIRATORY (INHALATION) at 21:31

## 2024-02-14 RX ADMIN — CARVEDILOL 12.5 MG: 12.5 TABLET, FILM COATED ORAL at 17:40

## 2024-02-14 RX ADMIN — RANOLAZINE 500 MG: 500 TABLET, EXTENDED RELEASE ORAL at 12:44

## 2024-02-14 RX ADMIN — RANOLAZINE 500 MG: 500 TABLET, EXTENDED RELEASE ORAL at 21:49

## 2024-02-14 RX ADMIN — METOCLOPRAMIDE 10 MG: 5 INJECTION, SOLUTION INTRAMUSCULAR; INTRAVENOUS at 05:49

## 2024-02-14 RX ADMIN — INSULIN GLARGINE 75 UNITS: 100 INJECTION, SOLUTION SUBCUTANEOUS at 21:49

## 2024-02-14 RX ADMIN — ATORVASTATIN CALCIUM 20 MG: 20 TABLET, FILM COATED ORAL at 12:16

## 2024-02-14 RX ADMIN — LOSARTAN POTASSIUM 50 MG: 50 TABLET, FILM COATED ORAL at 12:15

## 2024-02-14 RX ADMIN — CARVEDILOL 12.5 MG: 12.5 TABLET, FILM COATED ORAL at 12:15

## 2024-02-14 RX ADMIN — AZITHROMYCIN MONOHYDRATE 500 MG: 500 INJECTION, POWDER, LYOPHILIZED, FOR SOLUTION INTRAVENOUS at 05:23

## 2024-02-14 RX ADMIN — WATER 1000 MG: 1 INJECTION INTRAMUSCULAR; INTRAVENOUS; SUBCUTANEOUS at 05:20

## 2024-02-14 RX ADMIN — APIXABAN 5 MG: 5 TABLET, FILM COATED ORAL at 12:16

## 2024-02-14 RX ADMIN — LOSARTAN POTASSIUM 50 MG: 50 TABLET, FILM COATED ORAL at 21:49

## 2024-02-14 RX ADMIN — EZETIMIBE 10 MG: 10 TABLET ORAL at 12:44

## 2024-02-14 RX ADMIN — DIPHENHYDRAMINE HYDROCHLORIDE 12.5 MG: 50 INJECTION INTRAMUSCULAR; INTRAVENOUS at 05:47

## 2024-02-14 RX ADMIN — IPRATROPIUM BROMIDE AND ALBUTEROL SULFATE 1 DOSE: 2.5; .5 SOLUTION RESPIRATORY (INHALATION) at 15:59

## 2024-02-14 RX ADMIN — ASPIRIN 81 MG: 81 TABLET, CHEWABLE ORAL at 12:16

## 2024-02-14 RX ADMIN — DULOXETINE HYDROCHLORIDE 60 MG: 60 CAPSULE, DELAYED RELEASE ORAL at 12:16

## 2024-02-14 RX ADMIN — SODIUM CHLORIDE 1000 ML: 9 INJECTION, SOLUTION INTRAVENOUS at 05:23

## 2024-02-14 RX ADMIN — APIXABAN 5 MG: 5 TABLET, FILM COATED ORAL at 21:49

## 2024-02-14 RX ADMIN — IOPAMIDOL 75 ML: 755 INJECTION, SOLUTION INTRAVENOUS at 04:15

## 2024-02-14 ASSESSMENT — PAIN DESCRIPTION - LOCATION
LOCATION: BACK
LOCATION: BACK
LOCATION: CHEST
LOCATION: BACK
LOCATION: BACK

## 2024-02-14 ASSESSMENT — PAIN DESCRIPTION - PAIN TYPE
TYPE: ACUTE PAIN

## 2024-02-14 ASSESSMENT — PAIN DESCRIPTION - DESCRIPTORS
DESCRIPTORS: DISCOMFORT
DESCRIPTORS: ACHING
DESCRIPTORS: ACHING
DESCRIPTORS: DISCOMFORT;ACHING

## 2024-02-14 ASSESSMENT — PAIN SCALES - GENERAL
PAINLEVEL_OUTOF10: 7
PAINLEVEL_OUTOF10: 4
PAINLEVEL_OUTOF10: 3
PAINLEVEL_OUTOF10: 4
PAINLEVEL_OUTOF10: 5

## 2024-02-14 ASSESSMENT — PAIN DESCRIPTION - ORIENTATION
ORIENTATION: LOWER

## 2024-02-14 ASSESSMENT — LIFESTYLE VARIABLES: HOW OFTEN DO YOU HAVE A DRINK CONTAINING ALCOHOL: NEVER

## 2024-02-14 ASSESSMENT — PAIN - FUNCTIONAL ASSESSMENT
PAIN_FUNCTIONAL_ASSESSMENT: 0-10
PAIN_FUNCTIONAL_ASSESSMENT: 0-10

## 2024-02-14 NOTE — ED TRIAGE NOTES
Pt arrives via hospital wheelchair for eval of sob onset Saturday, sts sob as well, sts normal home O2 NC 2L , pt 80% on 2L NC after moving to bed, O2 moved to 4L at 100% so turned back to 2L now 96%.  Pt sts productive cough with tan sputum. Pt is a/xo4, rsp mildly labored and skin race appropriate, warm and dry.

## 2024-02-14 NOTE — ED NOTES
Pt only complaint currently is nausea. Pt being medicated for same. Significant other at bedside. Bed in low position. Call light within reach. Pt provided ice water to drink. Pt voices no other needs at this time

## 2024-02-14 NOTE — ED NOTES
Pt transferred up to room 5275 via hospital bed. Pt on 3L NC. Pt tolerating well. Pt dneies needs at this time

## 2024-02-14 NOTE — FLOWSHEET NOTE
02/14/24 1307   Vital Signs   Temp 99.8 °F (37.7 °C)   Temp Source Oral   Heart Rate Source Monitor   Respirations 16   BP (!) 157/83   MAP (Calculated) 108   BP Location Right upper arm   BP Method Automatic   Patient Position Semi arianawlers   Pain Assessment   Pain Assessment 0-10   Pain Level 4   Patient's Stated Pain Goal 0 - No pain   Pain Location Back   Pain Orientation Lower   Pain Descriptors Discomfort;Aching   Pain Type Acute pain   Oxygen Therapy   SpO2 97 %   O2 Device Nasal cannula   O2 Flow Rate (L/min) 3 L/min     Pt arrived to floor via bed. VS listed above. Pt applied to tele and confirmed with CMU. Pt dyspneic with exertion and desat to 85% when ambulating on 3L. Pt O2 recovered once returning to bed. Pt oriented to room and call light. Standard safety precautions initiated. Care ongoing.    Electronically signed by Christi Garrison RN on 2/14/2024 at 2:05 PM

## 2024-02-14 NOTE — ED PROVIDER NOTES
I PERSONALLY SAW THE PATIENT AND PERFORMED A SUBSTANTIVE PORTION OF THE VISIT INCLUDING ALL ASPECTS OF THE MEDICAL DECISION MAKING PROCESS.    [facility] EMERGENCY DEPARTMENT  EMERGENCY DEPARTMENT ENCOUNTER      Pt Name: Lindsay Lucio  MRN: [id number]  Birthdate 1965  Date of evaluation: 2/14/2024  Provider: Pedrito Khan MD    CHIEF COMPLAINT       Chief Complaint   Patient presents with    Chest Pain     Pt arrives via hospital wheelchair for eval of sob onset Saturday, sts sob as well, sts normal home O2 NC 2L , pt 80% on 2L NC after moving to bed        HISTORY OF PRESENT ILLNESS    Lindsay Lucio is a 59 y.o. female who presents to the emergency department with shortness of breath and chest pain.  Patient presents shortness of breath chest pain from home.  Started Saturday.  Positive for shortness of breath.  Exertional.  80% on room air.  Placed on nasal cannula on arrival.  No other associated symptoms.  No back pain.    Nursing Notes were reviewed. Including nursing noted for FM, Surgical History, Past Medical History, Social History, vitals, and allergies; agree with all.     REVIEW OF SYSTEMS       Review of Systems    Except as noted above the remainder of the review of systems was reviewed and negative.     PAST MEDICAL HISTORY     Past Medical History:   Diagnosis Date    Arthritis     Back pain     CAD (coronary artery disease)     Chronic kidney disease     COPD (chronic obstructive pulmonary disease) (HCC)     Depression     Diabetes mellitus (HCC)     REHMAN (dyspnea on exertion)     Eye abnormalities     bleed in back of eyes with injections Q 8 weeks    Fatty liver     GERD (gastroesophageal reflux disease)     Hx of blood clots     ? PE 3 weeks ago ~11/22    Hyperlipidemia     Hypertension     MRSA (methicillin resistant staph aureus) culture positive 08/15/2018    arm    On home O2     2 l/m    Oxygen deficiency     Stress incontinence     Thyroid disease     CYST ON

## 2024-02-14 NOTE — ED NOTES
Pt up to bed side commode. Pt then back in bed. Pt O2 sat dropped to 85% with exertion with O2 on. Pt recovered back to 93% once she was resting in bed

## 2024-02-15 LAB
BASE EXCESS BLDV CALC-SCNC: 9.1 MMOL/L
CO2 BLDV-SCNC: 38 MMOL/L
COHGB MFR BLDV: 2.3 %
GLUCOSE BLD-MCNC: 137 MG/DL (ref 70–99)
GLUCOSE BLD-MCNC: 366 MG/DL (ref 70–99)
GLUCOSE BLD-MCNC: 443 MG/DL (ref 70–99)
GLUCOSE BLD-MCNC: 98 MG/DL (ref 70–99)
HCO3 BLDV-SCNC: 36 MMOL/L (ref 23–29)
METHGB MFR BLDV: 0.2 %
O2 THERAPY: ABNORMAL
ORGANISM: ABNORMAL
PCO2 BLDV: 63 MMHG (ref 40–50)
PERFORMED ON: ABNORMAL
PERFORMED ON: NORMAL
PH BLDV: 7.37 [PH] (ref 7.35–7.45)
PO2 BLDV: 56 MMHG
REPORT: NORMAL
RESP PATH DNA+RNA PNL NPH NAA+NON-PROBE: ABNORMAL
SAO2 % BLDV: 89 %

## 2024-02-15 PROCEDURE — 82803 BLOOD GASES ANY COMBINATION: CPT

## 2024-02-15 PROCEDURE — 2700000000 HC OXYGEN THERAPY PER DAY

## 2024-02-15 PROCEDURE — 6360000002 HC RX W HCPCS: Performed by: INTERNAL MEDICINE

## 2024-02-15 PROCEDURE — 99232 SBSQ HOSP IP/OBS MODERATE 35: CPT | Performed by: INTERNAL MEDICINE

## 2024-02-15 PROCEDURE — 94760 N-INVAS EAR/PLS OXIMETRY 1: CPT

## 2024-02-15 PROCEDURE — 94761 N-INVAS EAR/PLS OXIMETRY MLT: CPT

## 2024-02-15 PROCEDURE — 2060000000 HC ICU INTERMEDIATE R&B

## 2024-02-15 PROCEDURE — 6370000000 HC RX 637 (ALT 250 FOR IP): Performed by: INTERNAL MEDICINE

## 2024-02-15 PROCEDURE — 2580000003 HC RX 258: Performed by: INTERNAL MEDICINE

## 2024-02-15 PROCEDURE — 94640 AIRWAY INHALATION TREATMENT: CPT

## 2024-02-15 PROCEDURE — 6370000000 HC RX 637 (ALT 250 FOR IP): Performed by: NURSE PRACTITIONER

## 2024-02-15 PROCEDURE — 36415 COLL VENOUS BLD VENIPUNCTURE: CPT

## 2024-02-15 PROCEDURE — 99223 1ST HOSP IP/OBS HIGH 75: CPT | Performed by: INTERNAL MEDICINE

## 2024-02-15 PROCEDURE — 93306 TTE W/DOPPLER COMPLETE: CPT

## 2024-02-15 PROCEDURE — 0202U NFCT DS 22 TRGT SARS-COV-2: CPT

## 2024-02-15 RX ORDER — INSULIN LISPRO 100 [IU]/ML
4 INJECTION, SOLUTION INTRAVENOUS; SUBCUTANEOUS ONCE
Status: COMPLETED | OUTPATIENT
Start: 2024-02-15 | End: 2024-02-15

## 2024-02-15 RX ORDER — FUROSEMIDE 10 MG/ML
40 INJECTION INTRAMUSCULAR; INTRAVENOUS DAILY
Status: DISCONTINUED | OUTPATIENT
Start: 2024-02-15 | End: 2024-02-16

## 2024-02-15 RX ORDER — OSELTAMIVIR PHOSPHATE 75 MG/1
75 CAPSULE ORAL 2 TIMES DAILY
Status: DISCONTINUED | OUTPATIENT
Start: 2024-02-15 | End: 2024-02-18 | Stop reason: HOSPADM

## 2024-02-15 RX ORDER — METHYLPREDNISOLONE SODIUM SUCCINATE 40 MG/ML
40 INJECTION, POWDER, LYOPHILIZED, FOR SOLUTION INTRAMUSCULAR; INTRAVENOUS DAILY
Status: DISCONTINUED | OUTPATIENT
Start: 2024-02-15 | End: 2024-02-17

## 2024-02-15 RX ORDER — ONDANSETRON 2 MG/ML
4 INJECTION INTRAMUSCULAR; INTRAVENOUS EVERY 6 HOURS PRN
Status: DISCONTINUED | OUTPATIENT
Start: 2024-02-15 | End: 2024-02-18 | Stop reason: HOSPADM

## 2024-02-15 RX ADMIN — ASPIRIN 81 MG: 81 TABLET, CHEWABLE ORAL at 08:48

## 2024-02-15 RX ADMIN — FUROSEMIDE 20 MG: 20 TABLET ORAL at 08:48

## 2024-02-15 RX ADMIN — METHYLPREDNISOLONE SODIUM SUCCINATE 40 MG: 40 INJECTION INTRAMUSCULAR; INTRAVENOUS at 12:19

## 2024-02-15 RX ADMIN — FUROSEMIDE 40 MG: 10 INJECTION, SOLUTION INTRAMUSCULAR; INTRAVENOUS at 12:19

## 2024-02-15 RX ADMIN — LOSARTAN POTASSIUM 50 MG: 50 TABLET, FILM COATED ORAL at 20:54

## 2024-02-15 RX ADMIN — DULOXETINE HYDROCHLORIDE 60 MG: 60 CAPSULE, DELAYED RELEASE ORAL at 08:48

## 2024-02-15 RX ADMIN — IPRATROPIUM BROMIDE AND ALBUTEROL SULFATE 1 DOSE: 2.5; .5 SOLUTION RESPIRATORY (INHALATION) at 17:02

## 2024-02-15 RX ADMIN — RANOLAZINE 500 MG: 500 TABLET, EXTENDED RELEASE ORAL at 20:54

## 2024-02-15 RX ADMIN — WATER 1000 MG: 1 INJECTION INTRAMUSCULAR; INTRAVENOUS; SUBCUTANEOUS at 05:30

## 2024-02-15 RX ADMIN — OSELTAMIVIR PHOSPHATE 75 MG: 75 CAPSULE ORAL at 20:54

## 2024-02-15 RX ADMIN — AZITHROMYCIN MONOHYDRATE 500 MG: 500 INJECTION, POWDER, LYOPHILIZED, FOR SOLUTION INTRAVENOUS at 05:46

## 2024-02-15 RX ADMIN — EZETIMIBE 10 MG: 10 TABLET ORAL at 08:48

## 2024-02-15 RX ADMIN — CARVEDILOL 12.5 MG: 12.5 TABLET, FILM COATED ORAL at 15:46

## 2024-02-15 RX ADMIN — APIXABAN 5 MG: 5 TABLET, FILM COATED ORAL at 20:54

## 2024-02-15 RX ADMIN — CARVEDILOL 12.5 MG: 12.5 TABLET, FILM COATED ORAL at 08:48

## 2024-02-15 RX ADMIN — ATORVASTATIN CALCIUM 20 MG: 20 TABLET, FILM COATED ORAL at 08:48

## 2024-02-15 RX ADMIN — INSULIN LISPRO 4 UNITS: 100 INJECTION, SOLUTION INTRAVENOUS; SUBCUTANEOUS at 18:06

## 2024-02-15 RX ADMIN — RANOLAZINE 500 MG: 500 TABLET, EXTENDED RELEASE ORAL at 08:48

## 2024-02-15 RX ADMIN — INSULIN LISPRO 4 UNITS: 100 INJECTION, SOLUTION INTRAVENOUS; SUBCUTANEOUS at 21:06

## 2024-02-15 RX ADMIN — OSELTAMIVIR PHOSPHATE 75 MG: 75 CAPSULE ORAL at 15:41

## 2024-02-15 RX ADMIN — IPRATROPIUM BROMIDE AND ALBUTEROL SULFATE 1 DOSE: 2.5; .5 SOLUTION RESPIRATORY (INHALATION) at 11:45

## 2024-02-15 RX ADMIN — APIXABAN 5 MG: 5 TABLET, FILM COATED ORAL at 08:48

## 2024-02-15 RX ADMIN — LOSARTAN POTASSIUM 50 MG: 50 TABLET, FILM COATED ORAL at 08:48

## 2024-02-15 RX ADMIN — IPRATROPIUM BROMIDE AND ALBUTEROL SULFATE 1 DOSE: 2.5; .5 SOLUTION RESPIRATORY (INHALATION) at 21:17

## 2024-02-15 RX ADMIN — INSULIN LISPRO 4 UNITS: 100 INJECTION, SOLUTION INTRAVENOUS; SUBCUTANEOUS at 21:07

## 2024-02-15 RX ADMIN — INSULIN GLARGINE 75 UNITS: 100 INJECTION, SOLUTION SUBCUTANEOUS at 21:06

## 2024-02-15 NOTE — CONSULTS
CP  TECHNOLOGIST PROVIDED HISTORY:  Reason for exam:->CP  Reason for Exam: SOB x 4 days  Additional signs and symptoms: COPD    FINDINGS:  The lungs are without acute focal process.  There is no effusion or  pneumothorax. The cardiomediastinal silhouette is without acute process. The  osseous structures are without acute process.    Impression  No acute process.                     Erum Pabon MD, MJohnsonD.            2/15/2024, 10:17 AM

## 2024-02-15 NOTE — PLAN OF CARE
Problem: Discharge Planning  Goal: Discharge to home or other facility with appropriate resources  2/15/2024 0056 by Chanda Sainz RN  Outcome: Progressing  Flowsheets (Taken 2/14/2024 1953)  Discharge to home or other facility with appropriate resources:   Identify barriers to discharge with patient and caregiver   Arrange for needed discharge resources and transportation as appropriate   Identify discharge learning needs (meds, wound care, etc)       Problem: Pain  Goal: Verbalizes/displays adequate comfort level or baseline comfort level  2/15/2024 0056 by Chanda Sainz RN  Outcome: Progressing  Flowsheets (Taken 2/15/2024 0056)  Verbalizes/displays adequate comfort level or baseline comfort level: Encourage patient to monitor pain and request assistance       Problem: ABCDS Injury Assessment  Goal: Absence of physical injury  2/15/2024 0056 by Chanda Sainz RN  Outcome: Progressing  Flowsheets (Taken 2/15/2024 0055)  Absence of Physical Injury: Implement safety measures based on patient assessment       Problem: Safety - Adult  Goal: Free from fall injury  Outcome: Progressing  Flowsheets (Taken 2/15/2024 0055)  Free From Fall Injury: Instruct family/caregiver on patient safety     Problem: Respiratory - Adult  Goal: Achieves optimal ventilation and oxygenation  Outcome: Progressing  Flowsheets (Taken 2/15/2024 0056)  Achieves optimal ventilation and oxygenation:   Assess for changes in respiratory status   Assess and instruct to report shortness of breath or any respiratory difficulty     Problem: Cardiovascular - Adult  Goal: Maintains optimal cardiac output and hemodynamic stability  Outcome: Progressing  Flowsheets (Taken 2/15/2024 0056)  Maintains optimal cardiac output and hemodynamic stability: Monitor blood pressure and heart rate     Problem: Skin/Tissue Integrity - Adult  Goal: Skin integrity remains intact  Outcome: Progressing  Flowsheets (Taken 2/15/2024 0056)  Skin Integrity Remains

## 2024-02-16 LAB
ALBUMIN SERPL-MCNC: 3.5 G/DL (ref 3.4–5)
ALBUMIN/GLOB SERPL: 1.1 {RATIO} (ref 1.1–2.2)
ALP SERPL-CCNC: 99 U/L (ref 40–129)
ALT SERPL-CCNC: 11 U/L (ref 10–40)
ANION GAP SERPL CALCULATED.3IONS-SCNC: 8 MMOL/L (ref 3–16)
AST SERPL-CCNC: 10 U/L (ref 15–37)
BILIRUB SERPL-MCNC: 0.6 MG/DL (ref 0–1)
BUN SERPL-MCNC: 22 MG/DL (ref 7–20)
CALCIUM SERPL-MCNC: 8.9 MG/DL (ref 8.3–10.6)
CHLORIDE SERPL-SCNC: 96 MMOL/L (ref 99–110)
CO2 SERPL-SCNC: 35 MMOL/L (ref 21–32)
CREAT SERPL-MCNC: 0.8 MG/DL (ref 0.6–1.2)
DEPRECATED RDW RBC AUTO: 16.8 % (ref 12.4–15.4)
GFR SERPLBLD CREATININE-BSD FMLA CKD-EPI: >60 ML/MIN/{1.73_M2}
GLUCOSE BLD-MCNC: 220 MG/DL (ref 70–99)
GLUCOSE BLD-MCNC: 275 MG/DL (ref 70–99)
GLUCOSE BLD-MCNC: 327 MG/DL (ref 70–99)
GLUCOSE BLD-MCNC: 436 MG/DL (ref 70–99)
GLUCOSE BLD-MCNC: 466 MG/DL (ref 70–99)
GLUCOSE SERPL-MCNC: 258 MG/DL (ref 70–99)
HCT VFR BLD AUTO: 29.6 % (ref 36–48)
HGB BLD-MCNC: 10.1 G/DL (ref 12–16)
MCH RBC QN AUTO: 28.6 PG (ref 26–34)
MCHC RBC AUTO-ENTMCNC: 34.3 G/DL (ref 31–36)
MCV RBC AUTO: 83.3 FL (ref 80–100)
PERFORMED ON: ABNORMAL
PLATELET # BLD AUTO: 139 K/UL (ref 135–450)
PMV BLD AUTO: 8.7 FL (ref 5–10.5)
POTASSIUM SERPL-SCNC: 4 MMOL/L (ref 3.5–5.1)
PROT SERPL-MCNC: 6.6 G/DL (ref 6.4–8.2)
RBC # BLD AUTO: 3.55 M/UL (ref 4–5.2)
SODIUM SERPL-SCNC: 139 MMOL/L (ref 136–145)
WBC # BLD AUTO: 4.6 K/UL (ref 4–11)

## 2024-02-16 PROCEDURE — 2580000003 HC RX 258: Performed by: INTERNAL MEDICINE

## 2024-02-16 PROCEDURE — 6360000002 HC RX W HCPCS: Performed by: INTERNAL MEDICINE

## 2024-02-16 PROCEDURE — 80053 COMPREHEN METABOLIC PANEL: CPT

## 2024-02-16 PROCEDURE — 94640 AIRWAY INHALATION TREATMENT: CPT

## 2024-02-16 PROCEDURE — 94761 N-INVAS EAR/PLS OXIMETRY MLT: CPT

## 2024-02-16 PROCEDURE — 2060000000 HC ICU INTERMEDIATE R&B

## 2024-02-16 PROCEDURE — 2580000003 HC RX 258

## 2024-02-16 PROCEDURE — 36415 COLL VENOUS BLD VENIPUNCTURE: CPT

## 2024-02-16 PROCEDURE — 6370000000 HC RX 637 (ALT 250 FOR IP): Performed by: INTERNAL MEDICINE

## 2024-02-16 PROCEDURE — 99232 SBSQ HOSP IP/OBS MODERATE 35: CPT | Performed by: INTERNAL MEDICINE

## 2024-02-16 PROCEDURE — 2700000000 HC OXYGEN THERAPY PER DAY

## 2024-02-16 PROCEDURE — 85027 COMPLETE CBC AUTOMATED: CPT

## 2024-02-16 RX ORDER — INSULIN LISPRO 100 [IU]/ML
0-4 INJECTION, SOLUTION INTRAVENOUS; SUBCUTANEOUS NIGHTLY
Status: DISCONTINUED | OUTPATIENT
Start: 2024-02-16 | End: 2024-02-18

## 2024-02-16 RX ORDER — WATER 10 ML/10ML
INJECTION INTRAMUSCULAR; INTRAVENOUS; SUBCUTANEOUS
Status: COMPLETED
Start: 2024-02-16 | End: 2024-02-16

## 2024-02-16 RX ORDER — FUROSEMIDE 20 MG/1
20 TABLET ORAL DAILY
Status: DISCONTINUED | OUTPATIENT
Start: 2024-02-16 | End: 2024-02-18 | Stop reason: HOSPADM

## 2024-02-16 RX ORDER — INSULIN LISPRO 100 [IU]/ML
0-16 INJECTION, SOLUTION INTRAVENOUS; SUBCUTANEOUS
Status: DISCONTINUED | OUTPATIENT
Start: 2024-02-17 | End: 2024-02-18

## 2024-02-16 RX ADMIN — FUROSEMIDE 40 MG: 10 INJECTION, SOLUTION INTRAMUSCULAR; INTRAVENOUS at 09:27

## 2024-02-16 RX ADMIN — APIXABAN 5 MG: 5 TABLET, FILM COATED ORAL at 20:31

## 2024-02-16 RX ADMIN — IPRATROPIUM BROMIDE AND ALBUTEROL SULFATE 1 DOSE: 2.5; .5 SOLUTION RESPIRATORY (INHALATION) at 11:52

## 2024-02-16 RX ADMIN — IPRATROPIUM BROMIDE AND ALBUTEROL SULFATE 1 DOSE: 2.5; .5 SOLUTION RESPIRATORY (INHALATION) at 20:34

## 2024-02-16 RX ADMIN — LOSARTAN POTASSIUM 50 MG: 50 TABLET, FILM COATED ORAL at 09:26

## 2024-02-16 RX ADMIN — INSULIN GLARGINE 75 UNITS: 100 INJECTION, SOLUTION SUBCUTANEOUS at 20:31

## 2024-02-16 RX ADMIN — METHYLPREDNISOLONE SODIUM SUCCINATE 40 MG: 40 INJECTION INTRAMUSCULAR; INTRAVENOUS at 09:27

## 2024-02-16 RX ADMIN — RANOLAZINE 500 MG: 500 TABLET, EXTENDED RELEASE ORAL at 20:31

## 2024-02-16 RX ADMIN — ATORVASTATIN CALCIUM 20 MG: 20 TABLET, FILM COATED ORAL at 09:26

## 2024-02-16 RX ADMIN — INSULIN LISPRO 2 UNITS: 100 INJECTION, SOLUTION INTRAVENOUS; SUBCUTANEOUS at 13:09

## 2024-02-16 RX ADMIN — LOSARTAN POTASSIUM 50 MG: 50 TABLET, FILM COATED ORAL at 20:31

## 2024-02-16 RX ADMIN — OSELTAMIVIR PHOSPHATE 75 MG: 75 CAPSULE ORAL at 20:31

## 2024-02-16 RX ADMIN — CARVEDILOL 12.5 MG: 12.5 TABLET, FILM COATED ORAL at 18:19

## 2024-02-16 RX ADMIN — OSELTAMIVIR PHOSPHATE 75 MG: 75 CAPSULE ORAL at 09:26

## 2024-02-16 RX ADMIN — CARVEDILOL 12.5 MG: 12.5 TABLET, FILM COATED ORAL at 09:26

## 2024-02-16 RX ADMIN — ASPIRIN 81 MG: 81 TABLET, CHEWABLE ORAL at 09:26

## 2024-02-16 RX ADMIN — INSULIN LISPRO 1 UNITS: 100 INJECTION, SOLUTION INTRAVENOUS; SUBCUTANEOUS at 09:23

## 2024-02-16 RX ADMIN — INSULIN LISPRO 4 UNITS: 100 INJECTION, SOLUTION INTRAVENOUS; SUBCUTANEOUS at 18:14

## 2024-02-16 RX ADMIN — WATER 10 ML: 1 INJECTION INTRAMUSCULAR; INTRAVENOUS; SUBCUTANEOUS at 09:27

## 2024-02-16 RX ADMIN — INSULIN LISPRO 4 UNITS: 100 INJECTION, SOLUTION INTRAVENOUS; SUBCUTANEOUS at 20:30

## 2024-02-16 RX ADMIN — WATER 1000 MG: 1 INJECTION INTRAMUSCULAR; INTRAVENOUS; SUBCUTANEOUS at 05:48

## 2024-02-16 RX ADMIN — FUROSEMIDE 20 MG: 20 TABLET ORAL at 13:13

## 2024-02-16 RX ADMIN — DULOXETINE HYDROCHLORIDE 60 MG: 60 CAPSULE, DELAYED RELEASE ORAL at 09:26

## 2024-02-16 RX ADMIN — APIXABAN 5 MG: 5 TABLET, FILM COATED ORAL at 09:26

## 2024-02-16 RX ADMIN — RANOLAZINE 500 MG: 500 TABLET, EXTENDED RELEASE ORAL at 09:26

## 2024-02-16 RX ADMIN — IPRATROPIUM BROMIDE AND ALBUTEROL SULFATE 1 DOSE: 2.5; .5 SOLUTION RESPIRATORY (INHALATION) at 08:11

## 2024-02-16 RX ADMIN — IPRATROPIUM BROMIDE AND ALBUTEROL SULFATE 1 DOSE: 2.5; .5 SOLUTION RESPIRATORY (INHALATION) at 17:09

## 2024-02-16 RX ADMIN — AZITHROMYCIN MONOHYDRATE 500 MG: 500 INJECTION, POWDER, LYOPHILIZED, FOR SOLUTION INTRAVENOUS at 05:55

## 2024-02-16 RX ADMIN — EZETIMIBE 10 MG: 10 TABLET ORAL at 09:26

## 2024-02-16 NOTE — PLAN OF CARE
Problem: ABCDS Injury Assessment  Goal: Absence of physical injury  Outcome: Progressing     Problem: Respiratory - Adult  Goal: Achieves optimal ventilation and oxygenation  Outcome: Progressing     Problem: Cardiovascular - Adult  Goal: Maintains optimal cardiac output and hemodynamic stability  Outcome: Progressing     Problem: Skin/Tissue Integrity - Adult  Goal: Skin integrity remains intact  Outcome: Progressing     Problem: Musculoskeletal - Adult  Goal: Return ADL status to a safe level of function  Outcome: Progressing     Problem: Infection - Adult  Goal: Absence of infection at discharge  Outcome: Progressing     Problem: Chronic Conditions and Co-morbidities  Goal: Patient's chronic conditions and co-morbidity symptoms are monitored and maintained or improved  Outcome: Progressing

## 2024-02-17 LAB
GLUCOSE BLD-MCNC: 138 MG/DL (ref 70–99)
GLUCOSE BLD-MCNC: 184 MG/DL (ref 70–99)
GLUCOSE BLD-MCNC: 327 MG/DL (ref 70–99)
GLUCOSE BLD-MCNC: 405 MG/DL (ref 70–99)
PERFORMED ON: ABNORMAL

## 2024-02-17 PROCEDURE — 6360000002 HC RX W HCPCS: Performed by: INTERNAL MEDICINE

## 2024-02-17 PROCEDURE — 2060000000 HC ICU INTERMEDIATE R&B

## 2024-02-17 PROCEDURE — 97165 OT EVAL LOW COMPLEX 30 MIN: CPT

## 2024-02-17 PROCEDURE — 6370000000 HC RX 637 (ALT 250 FOR IP): Performed by: NURSE PRACTITIONER

## 2024-02-17 PROCEDURE — 6370000000 HC RX 637 (ALT 250 FOR IP): Performed by: INTERNAL MEDICINE

## 2024-02-17 PROCEDURE — 94760 N-INVAS EAR/PLS OXIMETRY 1: CPT

## 2024-02-17 PROCEDURE — 97530 THERAPEUTIC ACTIVITIES: CPT

## 2024-02-17 PROCEDURE — 2700000000 HC OXYGEN THERAPY PER DAY

## 2024-02-17 PROCEDURE — 94640 AIRWAY INHALATION TREATMENT: CPT

## 2024-02-17 PROCEDURE — 99232 SBSQ HOSP IP/OBS MODERATE 35: CPT | Performed by: INTERNAL MEDICINE

## 2024-02-17 PROCEDURE — 97535 SELF CARE MNGMENT TRAINING: CPT

## 2024-02-17 PROCEDURE — 2580000003 HC RX 258: Performed by: INTERNAL MEDICINE

## 2024-02-17 RX ORDER — PREDNISONE 20 MG/1
40 TABLET ORAL DAILY
Status: DISCONTINUED | OUTPATIENT
Start: 2024-02-17 | End: 2024-02-18 | Stop reason: HOSPADM

## 2024-02-17 RX ORDER — INSULIN LISPRO 100 [IU]/ML
4 INJECTION, SOLUTION INTRAVENOUS; SUBCUTANEOUS ONCE
Status: COMPLETED | OUTPATIENT
Start: 2024-02-17 | End: 2024-02-17

## 2024-02-17 RX ADMIN — IPRATROPIUM BROMIDE AND ALBUTEROL SULFATE 1 DOSE: 2.5; .5 SOLUTION RESPIRATORY (INHALATION) at 20:56

## 2024-02-17 RX ADMIN — OSELTAMIVIR PHOSPHATE 75 MG: 75 CAPSULE ORAL at 20:51

## 2024-02-17 RX ADMIN — IPRATROPIUM BROMIDE AND ALBUTEROL SULFATE 1 DOSE: 2.5; .5 SOLUTION RESPIRATORY (INHALATION) at 16:07

## 2024-02-17 RX ADMIN — CARVEDILOL 18.75 MG: 12.5 TABLET, FILM COATED ORAL at 09:47

## 2024-02-17 RX ADMIN — AZITHROMYCIN MONOHYDRATE 500 MG: 500 INJECTION, POWDER, LYOPHILIZED, FOR SOLUTION INTRAVENOUS at 06:05

## 2024-02-17 RX ADMIN — OSELTAMIVIR PHOSPHATE 75 MG: 75 CAPSULE ORAL at 09:00

## 2024-02-17 RX ADMIN — RANOLAZINE 500 MG: 500 TABLET, EXTENDED RELEASE ORAL at 08:59

## 2024-02-17 RX ADMIN — APIXABAN 5 MG: 5 TABLET, FILM COATED ORAL at 08:59

## 2024-02-17 RX ADMIN — LOSARTAN POTASSIUM 50 MG: 50 TABLET, FILM COATED ORAL at 09:01

## 2024-02-17 RX ADMIN — ATORVASTATIN CALCIUM 20 MG: 20 TABLET, FILM COATED ORAL at 08:59

## 2024-02-17 RX ADMIN — WATER 1000 MG: 1 INJECTION INTRAMUSCULAR; INTRAVENOUS; SUBCUTANEOUS at 05:53

## 2024-02-17 RX ADMIN — IPRATROPIUM BROMIDE AND ALBUTEROL SULFATE 1 DOSE: 2.5; .5 SOLUTION RESPIRATORY (INHALATION) at 08:58

## 2024-02-17 RX ADMIN — EZETIMIBE 10 MG: 10 TABLET ORAL at 09:00

## 2024-02-17 RX ADMIN — LOSARTAN POTASSIUM 50 MG: 50 TABLET, FILM COATED ORAL at 20:51

## 2024-02-17 RX ADMIN — PREDNISONE 40 MG: 20 TABLET ORAL at 09:47

## 2024-02-17 RX ADMIN — INSULIN LISPRO 4 UNITS: 100 INJECTION, SOLUTION INTRAVENOUS; SUBCUTANEOUS at 21:55

## 2024-02-17 RX ADMIN — FUROSEMIDE 20 MG: 20 TABLET ORAL at 09:00

## 2024-02-17 RX ADMIN — DULOXETINE HYDROCHLORIDE 60 MG: 60 CAPSULE, DELAYED RELEASE ORAL at 09:00

## 2024-02-17 RX ADMIN — ASPIRIN 81 MG: 81 TABLET, CHEWABLE ORAL at 08:59

## 2024-02-17 RX ADMIN — APIXABAN 5 MG: 5 TABLET, FILM COATED ORAL at 20:51

## 2024-02-17 RX ADMIN — IPRATROPIUM BROMIDE AND ALBUTEROL SULFATE 1 DOSE: 2.5; .5 SOLUTION RESPIRATORY (INHALATION) at 12:14

## 2024-02-17 RX ADMIN — INSULIN GLARGINE 75 UNITS: 100 INJECTION, SOLUTION SUBCUTANEOUS at 20:50

## 2024-02-17 RX ADMIN — CARVEDILOL 18.75 MG: 12.5 TABLET, FILM COATED ORAL at 18:31

## 2024-02-17 RX ADMIN — INSULIN LISPRO 4 UNITS: 100 INJECTION, SOLUTION INTRAVENOUS; SUBCUTANEOUS at 20:50

## 2024-02-17 RX ADMIN — INSULIN LISPRO 10 UNITS: 100 INJECTION, SOLUTION INTRAVENOUS; SUBCUTANEOUS at 18:42

## 2024-02-17 RX ADMIN — RANOLAZINE 500 MG: 500 TABLET, EXTENDED RELEASE ORAL at 20:51

## 2024-02-17 NOTE — PLAN OF CARE
Problem: Discharge Planning  Goal: Discharge to home or other facility with appropriate resources  Outcome: Progressing  Flowsheets (Taken 2/16/2024 2137)  Discharge to home or other facility with appropriate resources:   Identify barriers to discharge with patient and caregiver   Arrange for needed discharge resources and transportation as appropriate   Identify discharge learning needs (meds, wound care, etc)     Problem: Pain  Goal: Verbalizes/displays adequate comfort level or baseline comfort level  Outcome: Progressing  Flowsheets (Taken 2/16/2024 2137)  Verbalizes/displays adequate comfort level or baseline comfort level:   Encourage patient to monitor pain and request assistance   Assess pain using appropriate pain scale   Administer analgesics based on type and severity of pain and evaluate response     Problem: ABCDS Injury Assessment  Goal: Absence of physical injury  2/16/2024 2137 by April Garcia RN  Outcome: Progressing  Flowsheets (Taken 2/16/2024 2137)  Absence of Physical Injury: Implement safety measures based on patient assessment     Problem: Safety - Adult  Goal: Free from fall injury  Outcome: Progressing  Flowsheets (Taken 2/16/2024 2137)  Free From Fall Injury: Instruct family/caregiver on patient safety     Problem: Respiratory - Adult  Goal: Achieves optimal ventilation and oxygenation  2/16/2024 2137 by April Garcia RN  Outcome: Progressing  Flowsheets (Taken 2/16/2024 2137)  Achieves optimal ventilation and oxygenation:   Assess for changes in respiratory status   Assess for changes in mentation and behavior     Problem: Cardiovascular - Adult  Goal: Maintains optimal cardiac output and hemodynamic stability  2/16/2024 2137 by April Garcia RN  Outcome: Progressing  Flowsheets (Taken 2/16/2024 2137)  Maintains optimal cardiac output and hemodynamic stability: Monitor blood pressure and heart rate     Problem: Musculoskeletal - Adult  Goal: Return ADL status to a safe level of

## 2024-02-17 NOTE — PLAN OF CARE
Problem: Discharge Planning  Goal: Discharge to home or other facility with appropriate resources  Outcome: Progressing     Problem: Pain  Goal: Verbalizes/displays adequate comfort level or baseline comfort level  Outcome: Progressing     Problem: ABCDS Injury Assessment  Goal: Absence of physical injury  Outcome: Progressing     Problem: Safety - Adult  Goal: Free from fall injury  Outcome: Progressing     Problem: Respiratory - Adult  Goal: Achieves optimal ventilation and oxygenation  Outcome: Progressing     Problem: Cardiovascular - Adult  Goal: Maintains optimal cardiac output and hemodynamic stability  Outcome: Progressing     Problem: Skin/Tissue Integrity - Adult  Goal: Skin integrity remains intact  Outcome: Progressing  Flowsheets (Taken 2/17/2024 8148)  Skin Integrity Remains Intact: Monitor for areas of redness and/or skin breakdown     Problem: Musculoskeletal - Adult  Goal: Return ADL status to a safe level of function  Outcome: Progressing     Problem: Infection - Adult  Goal: Absence of infection at discharge  Outcome: Progressing     Problem: Chronic Conditions and Co-morbidities  Goal: Patient's chronic conditions and co-morbidity symptoms are monitored and maintained or improved  Outcome: Progressing

## 2024-02-18 VITALS
TEMPERATURE: 98 F | BODY MASS INDEX: 39.39 KG/M2 | HEIGHT: 60 IN | DIASTOLIC BLOOD PRESSURE: 78 MMHG | OXYGEN SATURATION: 94 % | RESPIRATION RATE: 20 BRPM | HEART RATE: 68 BPM | WEIGHT: 200.62 LBS | SYSTOLIC BLOOD PRESSURE: 172 MMHG

## 2024-02-18 LAB
ALBUMIN SERPL-MCNC: 3.4 G/DL (ref 3.4–5)
ALBUMIN/GLOB SERPL: 1.1 {RATIO} (ref 1.1–2.2)
ALP SERPL-CCNC: 83 U/L (ref 40–129)
ALT SERPL-CCNC: 9 U/L (ref 10–40)
ANION GAP SERPL CALCULATED.3IONS-SCNC: 4 MMOL/L (ref 3–16)
AST SERPL-CCNC: 9 U/L (ref 15–37)
BACTERIA BLD CULT: NORMAL
BACTERIA BLD CULT: NORMAL
BILIRUB SERPL-MCNC: 0.6 MG/DL (ref 0–1)
BUN SERPL-MCNC: 21 MG/DL (ref 7–20)
CALCIUM SERPL-MCNC: 8.5 MG/DL (ref 8.3–10.6)
CHLORIDE SERPL-SCNC: 100 MMOL/L (ref 99–110)
CO2 SERPL-SCNC: 35 MMOL/L (ref 21–32)
CREAT SERPL-MCNC: 0.8 MG/DL (ref 0.6–1.2)
DEPRECATED RDW RBC AUTO: 16.5 % (ref 12.4–15.4)
GFR SERPLBLD CREATININE-BSD FMLA CKD-EPI: >60 ML/MIN/{1.73_M2}
GLUCOSE BLD-MCNC: 100 MG/DL (ref 70–99)
GLUCOSE BLD-MCNC: 174 MG/DL (ref 70–99)
GLUCOSE BLD-MCNC: 323 MG/DL (ref 70–99)
GLUCOSE BLD-MCNC: 66 MG/DL (ref 70–99)
GLUCOSE BLD-MCNC: 76 MG/DL (ref 70–99)
GLUCOSE SERPL-MCNC: 133 MG/DL (ref 70–99)
HCT VFR BLD AUTO: 29.4 % (ref 36–48)
HGB BLD-MCNC: 10 G/DL (ref 12–16)
MCH RBC QN AUTO: 28.3 PG (ref 26–34)
MCHC RBC AUTO-ENTMCNC: 34.2 G/DL (ref 31–36)
MCV RBC AUTO: 82.8 FL (ref 80–100)
PERFORMED ON: ABNORMAL
PERFORMED ON: NORMAL
PLATELET # BLD AUTO: 171 K/UL (ref 135–450)
PMV BLD AUTO: 8.4 FL (ref 5–10.5)
POTASSIUM SERPL-SCNC: 3.5 MMOL/L (ref 3.5–5.1)
PROT SERPL-MCNC: 6.4 G/DL (ref 6.4–8.2)
RBC # BLD AUTO: 3.55 M/UL (ref 4–5.2)
SODIUM SERPL-SCNC: 139 MMOL/L (ref 136–145)
WBC # BLD AUTO: 7 K/UL (ref 4–11)

## 2024-02-18 PROCEDURE — 97161 PT EVAL LOW COMPLEX 20 MIN: CPT

## 2024-02-18 PROCEDURE — 97530 THERAPEUTIC ACTIVITIES: CPT

## 2024-02-18 PROCEDURE — 94760 N-INVAS EAR/PLS OXIMETRY 1: CPT

## 2024-02-18 PROCEDURE — 2580000003 HC RX 258: Performed by: INTERNAL MEDICINE

## 2024-02-18 PROCEDURE — 80053 COMPREHEN METABOLIC PANEL: CPT

## 2024-02-18 PROCEDURE — 6360000002 HC RX W HCPCS: Performed by: INTERNAL MEDICINE

## 2024-02-18 PROCEDURE — 6370000000 HC RX 637 (ALT 250 FOR IP): Performed by: INTERNAL MEDICINE

## 2024-02-18 PROCEDURE — 36415 COLL VENOUS BLD VENIPUNCTURE: CPT

## 2024-02-18 PROCEDURE — 2700000000 HC OXYGEN THERAPY PER DAY

## 2024-02-18 PROCEDURE — 94640 AIRWAY INHALATION TREATMENT: CPT

## 2024-02-18 PROCEDURE — 85027 COMPLETE CBC AUTOMATED: CPT

## 2024-02-18 PROCEDURE — 99238 HOSP IP/OBS DSCHRG MGMT 30/<: CPT | Performed by: INTERNAL MEDICINE

## 2024-02-18 RX ORDER — LEVOFLOXACIN 500 MG/1
500 TABLET, FILM COATED ORAL DAILY
Qty: 4 TABLET | Refills: 0 | Status: SHIPPED | OUTPATIENT
Start: 2024-02-18 | End: 2024-02-22

## 2024-02-18 RX ORDER — PREDNISONE 20 MG/1
TABLET ORAL
Qty: 11 TABLET | Refills: 0 | Status: SHIPPED | OUTPATIENT
Start: 2024-02-18

## 2024-02-18 RX ORDER — OSELTAMIVIR PHOSPHATE 75 MG/1
75 CAPSULE ORAL 2 TIMES DAILY
Qty: 2 CAPSULE | Refills: 0 | Status: SHIPPED | OUTPATIENT
Start: 2024-02-18 | End: 2024-02-19

## 2024-02-18 RX ADMIN — EZETIMIBE 10 MG: 10 TABLET ORAL at 09:51

## 2024-02-18 RX ADMIN — ASPIRIN 81 MG: 81 TABLET, CHEWABLE ORAL at 09:53

## 2024-02-18 RX ADMIN — RANOLAZINE 500 MG: 500 TABLET, EXTENDED RELEASE ORAL at 10:04

## 2024-02-18 RX ADMIN — LOSARTAN POTASSIUM 50 MG: 50 TABLET, FILM COATED ORAL at 09:51

## 2024-02-18 RX ADMIN — FUROSEMIDE 20 MG: 20 TABLET ORAL at 09:51

## 2024-02-18 RX ADMIN — AZITHROMYCIN MONOHYDRATE 500 MG: 500 INJECTION, POWDER, LYOPHILIZED, FOR SOLUTION INTRAVENOUS at 04:49

## 2024-02-18 RX ADMIN — PREDNISONE 40 MG: 20 TABLET ORAL at 09:51

## 2024-02-18 RX ADMIN — ATORVASTATIN CALCIUM 20 MG: 20 TABLET, FILM COATED ORAL at 09:51

## 2024-02-18 RX ADMIN — OSELTAMIVIR PHOSPHATE 75 MG: 75 CAPSULE ORAL at 09:51

## 2024-02-18 RX ADMIN — APIXABAN 5 MG: 5 TABLET, FILM COATED ORAL at 09:51

## 2024-02-18 RX ADMIN — WATER 1000 MG: 1 INJECTION INTRAMUSCULAR; INTRAVENOUS; SUBCUTANEOUS at 05:55

## 2024-02-18 RX ADMIN — IPRATROPIUM BROMIDE AND ALBUTEROL SULFATE 1 DOSE: 2.5; .5 SOLUTION RESPIRATORY (INHALATION) at 08:56

## 2024-02-18 RX ADMIN — CARVEDILOL 18.75 MG: 12.5 TABLET, FILM COATED ORAL at 09:51

## 2024-02-18 RX ADMIN — DULOXETINE HYDROCHLORIDE 60 MG: 60 CAPSULE, DELAYED RELEASE ORAL at 09:51

## 2024-02-18 ASSESSMENT — PAIN SCALES - GENERAL: PAINLEVEL_OUTOF10: 0

## 2024-02-18 NOTE — CARE COORDINATION
Case Management Discharge Note          Date / Time of Note: 2/18/2024 10:48 AM                  Patient Name: Lindsay Lucio   YOB: 1965  Diagnosis: Hypoxia [R09.02]  Chest pain, unspecified type [R07.9]  Pneumonia due to infectious organism, unspecified laterality, unspecified part of lung [J18.9]  Acute pneumonia [J18.9]   Date / Time: 2/14/2024  3:36 AM    Financial:  Payor: [facility] / Plan: [facility] MEDICAID / Product Type: *No Product type* /      Pharmacy:    [id number] - [place], [place] - [address] [place] [address] - P [phone] - F [phone]  [address] [place] [address]  Phone: [phone] Fax: [phone]    [id number] - [place] - [address] - P [phone] - F [phone]  [address] [place] [address] [facility]  Phone: [phone] Fax: [phone]      Assistance purchasing medications?: Potential Assistance Purchasing Medications: No  Assistance provided by Case Management: None at this time    DISCHARGE Disposition: Home- No Services Needed    Durable Medical Equipment:  DME Provider: [facility]  Equipment obtained during hospitalization: rollator    Home Oxygen and Respiratory Equipment:  Oxygen needed at discharge?: Yes  Home Oxygen Company: [employer] Phone: [phone]   Portable tank available for discharge?: Yes, family to bring up portable tank for transport home.    Transportation:  Transportation PLAN for discharge: family   Mode of Transport: Private Car  Time of Transport: afternoon    IMM Completed:   Not Indicated    Additional CM Notes: Patient will return home with family. Declines home care needs. DME order for Rollator noted--asked MD for medical necessity note for Rollator as well. Delivered Rollator to patient's bedside from [employer]. No additional needs to note.     The Plan for Transition of Care is related to the following treatment goals of Hypoxia [R09.02]  Chest pain, unspecified type [R07.9]  Pneumonia 
Discharge Planning:   PT/OT: requested orders for possible DME needs   Equipment Needed: may need rollator   DME: Currently on 2 LO2 - baseline is 2 - 3 Lpm  Currently on iv lasix and Iv antibiotics - anticipate transition to oral medications at discharge.      PLAN: Home with family at discharge     NEED: verify PT/OT recommendations for DME; verify O2 is at baseline (2 -3 Lpm).   Anticipate transition to oral antibiotics at discharge.     EMMA Corona, LSW, Social Work/Case Management   [phone]  Electronically signed by EMMA Corona on 2/16/2024 at 11:22 AM    
SW attempted to meet with patient and nurse asked this writer to return as she was preparing to have her lunch.     Respectfully submitted,    Magdalene CARTER, DIANNA  [facility]   [phone]    Electronically signed by EMMA Garza, KENNYW on 2/14/2024 at 12:38 PM    
SW completed chart review, nursing rounds completed.   Pt keeps desating, and undergoing respioratory panel.    SW following,     Viet Steward LMSW, [facility] Social Work Case Management   Phone: [phone]  Fax: [phone]   
SW noting a newly admitted patient in the ED. We will arrive at bedside momentarily to assess. She is already set up with home oxygen from [employer] in the community and uses 2-3L at baseline.     Respectfully submitted,    Magdalene CARTER, DIANNA  [facility]   [phone]    Electronically signed by EMMA Garza, CHRIS on 2/14/2024 at 10:23 AM    
Therapy recs for Rollator noted. Will need DME orders & medical necessity progress note from MD to provide to patient.    Electronically signed by Rosemary Calderón RN Case Management on 2/17/2024 at 1:57 PM    
Worker  [phone]    Electronically signed by EMMA Garza, KENNYW on 2/14/2024 at 1:26 PM

## 2024-02-18 NOTE — PLAN OF CARE
Problem: Discharge Planning  Goal: Discharge to home or other facility with appropriate resources  2/17/2024 2305 by Yogi Jacome RN  Outcome: Progressing     Problem: Pain  Goal: Verbalizes/displays adequate comfort level or baseline comfort level  2/17/2024 2305 by Yogi Jacome RN  Outcome: Progressing     Problem: ABCDS Injury Assessment  Goal: Absence of physical injury  2/17/2024 2305 by Yogi Jacome RN  Outcome: Progressing     Problem: Safety - Adult  Goal: Free from fall injury  2/17/2024 2305 by Yogi Jacome RN  Outcome: Progressing     Problem: Respiratory - Adult  Goal: Achieves optimal ventilation and oxygenation  2/17/2024 2305 by Yogi Jacome RN  Outcome: Progressing     Problem: Cardiovascular - Adult  Goal: Maintains optimal cardiac output and hemodynamic stability  2/17/2024 2305 by Yogi Jacome RN  Outcome: Progressing     Problem: Skin/Tissue Integrity - Adult  Goal: Skin integrity remains intact  2/17/2024 2305 by Yogi Jacome RN  Outcome: Progressing     Problem: Musculoskeletal - Adult  Goal: Return ADL status to a safe level of function  2/17/2024 2305 by Yogi Jacome RN  Outcome: Progressing     Problem: Infection - Adult  Goal: Absence of infection at discharge  2/17/2024 2305 by Yogi Jacome RN  Outcome: Progressing     Problem: Chronic Conditions and Co-morbidities  Goal: Patient's chronic conditions and co-morbidity symptoms are monitored and maintained or improved  2/17/2024 2305 by Yogi Jacome RN  Outcome: Progressing

## 2024-02-18 NOTE — PLAN OF CARE
Problem: Discharge Planning  Goal: Discharge to home or other facility with appropriate resources  2/18/2024 1224 by Carol Amezcua RN  Outcome: Completed     Problem: Pain  Goal: Verbalizes/displays adequate comfort level or baseline comfort level  2/18/2024 1224 by Carol Amezcua RN  Outcome: Completed     Problem: ABCDS Injury Assessment  Goal: Absence of physical injury  2/18/2024 1224 by Carol Amezcua RN  Outcome: Completed     Problem: Safety - Adult  Goal: Free from fall injury  2/18/2024 1224 by Carol Amezcua RN  Outcome: Completed     Problem: Respiratory - Adult  Goal: Achieves optimal ventilation and oxygenation  2/18/2024 1224 by Carol Amezcua RN  Outcome: Completed     Problem: Cardiovascular - Adult  Goal: Maintains optimal cardiac output and hemodynamic stability  2/18/2024 1224 by Carol Amezcua RN  Outcome: Completed     Problem: Skin/Tissue Integrity - Adult  Goal: Skin integrity remains intact  2/18/2024 1224 by Carol Amezcua RN  Outcome: Completed     Problem: Musculoskeletal - Adult  Goal: Return ADL status to a safe level of function  2/18/2024 1224 by Carol Amezcua RN  Outcome: Completed     Problem: Infection - Adult  Goal: Absence of infection at discharge  2/18/2024 1224 by Carol Amezcua RN  Outcome: Completed     Problem: Chronic Conditions and Co-morbidities  Goal: Patient's chronic conditions and co-morbidity symptoms are monitored and maintained or improved  2/18/2024 1224 by Carol Amezcua RN  Outcome: Completed

## 2024-02-20 NOTE — PROGRESS NOTES
4 Eyes Skin Assessment     NAME:  Lindsay Lucio  YOB: 1965  MEDICAL RECORD NUMBER:  [id number]    The patient is being assessed for  Admission    I agree that at least one RN has performed a thorough Head to Toe Skin Assessment on the patient. ALL assessment sites listed below have been assessed.      Areas assessed by both nurses:    Head, Face, Ears, Shoulders, Back, Chest, Arms, Elbows, Hands, Sacrum. Buttock, Coccyx, Ischium, Legs. Feet and Heels, and Under Medical Devices         Does the Patient have a Wound? No noted wound(s)       Glynn Prevention initiated by RN: No  Wound Care Orders initiated by RN: No    Pressure Injury (Stage 3,4, Unstageable, DTI, NWPT, and Complex wounds) if present, place Wound referral order by RN under : No    New Ostomies, if present place, Ostomy referral order under : No     Nurse 1 eSignature: Electronically signed by Christi Garrison RN on 2/14/24 at 2:07 PM EST    **SHARE this note so that the co-signing nurse can place an eSignature**    Nurse 2 eSignature: Electronically signed by Faby Zabala RN on 2/14/24 at 4:20 PM EST   
Discharge instructions reviewed with patient; verbalizes understanding of medications and follow up appointment. Denies any further questions/concerns. Spouse present to transport patient home. Transportation notified to wheel patient out of hospital for discharge to home.  
Hospitalist Progress Note  2/15/2024 2:03 PM  Subjective:   Admit Date: 2/14/2024  PCP: Rosario Stevens MD  Interval History: pt feels little better  Oob to chair  Denies any other complaints  Chart reviewed.  Diet: ADULT DIET; Regular; 3 carb choices (45 gm/meal)  Medications:   Scheduled Meds:   methylPREDNISolone  40 mg IntraVENous Daily    furosemide  40 mg IntraVENous Daily    oseltamivir  75 mg Oral BID    apixaban  5 mg Oral BID    aspirin  81 mg Oral Daily    atorvastatin  20 mg Oral Daily    carvedilol  12.5 mg Oral BID WC    DULoxetine  60 mg Oral Daily    ezetimibe  10 mg Oral Daily    ipratropium 0.5 mg-albuterol 2.5 mg  1 Dose Inhalation Q4H WA RT    insulin glargine  75 Units SubCUTAneous Nightly    ranolazine  500 mg Oral BID    azithromycin  500 mg IntraVENous Q24H    cefTRIAXone (ROCEPHIN) IV  1,000 mg IntraVENous Q24H    losartan  50 mg Oral BID    insulin lispro  0-4 Units SubCUTAneous TID WC    insulin lispro  0-4 Units SubCUTAneous Nightly     Continuous Infusions:   dextrose       CBC:   Recent Labs     02/14/24  0356   WBC 3.7*   HGB 11.2*   *     BMP:    Recent Labs     02/14/24  0356      K 3.6      CO2 33*   BUN 19   CREATININE 0.8   GLUCOSE 190*     Hepatic: No results for input(s): \"AST\", \"ALT\", \"ALB\", \"BILITOT\", \"ALKPHOS\" in the last 72 hours.  Troponin: No results for input(s): \"TROPONINI\" in the last 72 hours.  BNP: No results for input(s): \"BNP\" in the last 72 hours.  Lipids: No results for input(s): \"CHOL\", \"HDL\" in the last 72 hours.    Invalid input(s): \"LDLCALCU\"  INR: No results for input(s): \"INR\" in the last 72 hours.    Objective:   Vitals: BP (!) 139/97   Pulse 90   Temp 98.9 °F (37.2 °C) (Oral)   Resp 22   Ht 1.524 m (5')   Wt 91 kg (200 lb 9.9 oz)   LMP 11/30/2005   SpO2 90%   BMI 39.18 kg/m²   General appearance: alert,awake,oriented x 3 and cooperative with exam  HEENT: Head: Normal, normocephalic, atraumatic, anicteric, pupils are reactive 
Hospitalist Progress Note  2/16/2024 12:17 PM  Subjective:   Admit Date: 2/14/2024  PCP: Rosario Stevens MD  Interval History: pt feels little better  Oob to chair  Denies any other complaints  Chart reviewed.  Diet: ADULT DIET; Regular; 3 carb choices (45 gm/meal)  Medications:   Scheduled Meds:   methylPREDNISolone  40 mg IntraVENous Daily    furosemide  40 mg IntraVENous Daily    oseltamivir  75 mg Oral BID    apixaban  5 mg Oral BID    aspirin  81 mg Oral Daily    atorvastatin  20 mg Oral Daily    carvedilol  12.5 mg Oral BID WC    DULoxetine  60 mg Oral Daily    ezetimibe  10 mg Oral Daily    ipratropium 0.5 mg-albuterol 2.5 mg  1 Dose Inhalation Q4H WA RT    insulin glargine  75 Units SubCUTAneous Nightly    ranolazine  500 mg Oral BID    azithromycin  500 mg IntraVENous Q24H    cefTRIAXone (ROCEPHIN) IV  1,000 mg IntraVENous Q24H    losartan  50 mg Oral BID    insulin lispro  0-4 Units SubCUTAneous TID WC    insulin lispro  0-4 Units SubCUTAneous Nightly     Continuous Infusions:   dextrose       CBC:   Recent Labs     02/14/24  0356 02/16/24 0433   WBC 3.7* 4.6   HGB 11.2* 10.1*   * 139       BMP:    Recent Labs     02/14/24  0356 02/16/24 0433    139   K 3.6 4.0    96*   CO2 33* 35*   BUN 19 22*   CREATININE 0.8 0.8   GLUCOSE 190* 258*       Hepatic:   Recent Labs     02/16/24 0433   AST 10*   ALT 11   BILITOT 0.6   ALKPHOS 99     Troponin: No results for input(s): \"TROPONINI\" in the last 72 hours.  BNP: No results for input(s): \"BNP\" in the last 72 hours.  Lipids: No results for input(s): \"CHOL\", \"HDL\" in the last 72 hours.    Invalid input(s): \"LDLCALCU\"  INR: No results for input(s): \"INR\" in the last 72 hours.    Objective:   Vitals: BP (!) 158/91   Pulse 74   Temp 97.8 °F (36.6 °C) (Oral)   Resp 21   Ht 1.524 m (5')   Wt 90.3 kg (199 lb 1.2 oz)   LMP 11/30/2005   SpO2 98%   BMI 38.88 kg/m²   General appearance: alert,awake,oriented x 3 and cooperative with exam  HEENT: 
Hospitalist Progress Note  2/17/2024 8:36 AM  Subjective:   Admit Date: 2/14/2024  PCP: Rosario Stevens MD  Interval History: pt feels little better  Oob to chair  Denies any other complaints  Chart reviewed.  Diet: ADULT DIET; Regular; 3 carb choices (45 gm/meal)  Medications:   Scheduled Meds:   furosemide  20 mg Oral Daily    insulin lispro  0-16 Units SubCUTAneous TID WC    insulin lispro  0-4 Units SubCUTAneous Nightly    methylPREDNISolone  40 mg IntraVENous Daily    oseltamivir  75 mg Oral BID    apixaban  5 mg Oral BID    aspirin  81 mg Oral Daily    atorvastatin  20 mg Oral Daily    carvedilol  12.5 mg Oral BID WC    DULoxetine  60 mg Oral Daily    ezetimibe  10 mg Oral Daily    ipratropium 0.5 mg-albuterol 2.5 mg  1 Dose Inhalation Q4H WA RT    insulin glargine  75 Units SubCUTAneous Nightly    ranolazine  500 mg Oral BID    azithromycin  500 mg IntraVENous Q24H    cefTRIAXone (ROCEPHIN) IV  1,000 mg IntraVENous Q24H    losartan  50 mg Oral BID     Continuous Infusions:   dextrose       CBC:   Recent Labs     02/16/24 0433   WBC 4.6   HGB 10.1*          BMP:    Recent Labs     02/16/24 0433      K 4.0   CL 96*   CO2 35*   BUN 22*   CREATININE 0.8   GLUCOSE 258*       Hepatic:   Recent Labs     02/16/24 0433   AST 10*   ALT 11   BILITOT 0.6   ALKPHOS 99       Troponin: No results for input(s): \"TROPONINI\" in the last 72 hours.  BNP: No results for input(s): \"BNP\" in the last 72 hours.  Lipids: No results for input(s): \"CHOL\", \"HDL\" in the last 72 hours.    Invalid input(s): \"LDLCALCU\"  INR: No results for input(s): \"INR\" in the last 72 hours.    Objective:   Vitals: BP (!) 156/51   Pulse 72   Temp 98.4 °F (36.9 °C) (Oral)   Resp 20   Ht 1.524 m (5')   Wt 90.2 kg (198 lb 13.7 oz)   LMP 11/30/2005   SpO2 93%   BMI 38.84 kg/m²   General appearance: alert,awake,oriented x 3 and cooperative with exam  HEENT: Head: Normal, normocephalic, atraumatic, anicteric, pupils are reactive to 
Medication Reconciliation    List of medications patient is currently taking is complete.     Source of information: 1. Conversation with patient at bedside                                      2. EPIC records        Notes regarding home medications:   1. Patient reports taking \"heart medications\" yesterday but didn't take any pain meds or amoxicillin due to nausea.  2. Patient was prescribed amoxicillin 500mg TID x 7 days starting Saturday.   3. Removed Pepcid and Dulera. Reports Dulera causes nausea, self discontinued a while ago      Grady Christopher [facility]   2/14/2024  10:53 AM    
Occupational Therapy  Facility/Department: [address] PROGRESSIVE CARE  Occupational Therapy Initial Assessment    Name: Lindsay Lucio  : 1965  MRN: [id number]  Date of Service: 2024    Discharge Recommendations:  Home with assist PRN  OT Equipment Recommendations  Equipment Needed: Yes  Mobility Devices: Walker  Walker: Rollator (4 Wheeled)  Other: pt would benefit from a rollator to improve endurance and to maximize pt's safety and independence; pt may also benefit from LB dressing/bathing AE including reacher, long handled shoe horn, sock aid and long handled sponge    Lindsay Lucio scored a 20/24 on the -[facility] ADL Inpatient form.  At this time, no further OT is recommended upon discharge. Recommend patient returns to prior setting with prior services.        Patient Diagnosis(es): The primary encounter diagnosis was Chest pain, unspecified type. Diagnoses of Pneumonia due to infectious organism, unspecified laterality, unspecified part of lung and Hypoxia were also pertinent to this visit.  Past Medical History:  has a past medical history of Arthritis, Back pain, CAD (coronary artery disease), Chronic kidney disease, COPD (chronic obstructive pulmonary disease) (HCC), Depression, Diabetes mellitus (HCC), REHMAN (dyspnea on exertion), Eye abnormalities, Fatty liver, GERD (gastroesophageal reflux disease), Hx of blood clots, Hyperlipidemia, Hypertension, MRSA (methicillin resistant staph aureus) culture positive, On home O2, Oxygen deficiency, Stress incontinence, and Thyroid disease.  Past Surgical History:  has a past surgical history that includes  section; Hysterectomy; Shoulder arthroscopy (Right, 5-19-15); Cholecystectomy; hernia repair; eye surgery; Upper gastrointestinal endoscopy (2017); Colonoscopy (2017); Percutaneous Transluminal Coronary Angio (10/2020); Cardiac surgery; and Ankle fracture surgery (Right, 2023).    Treatment Diagnosis: decreased 
Patient independent to BSC. Desats when up, but recovers quick. Patient on 2-3L of O2. Portable telemetry box. States she feels better today.   
Physical Therapy  Facility/Department: [address] PROGRESSIVE CARE  Physical Therapy Initial Assessment/Discharge Summary    Name: Lindsay Lucio  : 1965  MRN: [id number]  Date of Service: 2024    Discharge Recommendations:  Home with assist PRN   PT Equipment Recommendations  Equipment Needed: Yes  Mobility Devices: Walker  Walker: Rollator (4 Wheeled)  Other: Pt requires O2 2L (baseline); mobility limited to very short distances due to limited endurance.  Needs use of Rollator due to readiness of Rollator seat for frequent seated rest breaks.      Lindsay Lucio scored a 23/24 on the AM-PAC short mobility form.  At this time, no further PT is recommended upon discharge .     Assessment   Body Structures, Functions, Activity Limitations Requiring Skilled Therapeutic Intervention: Decreased functional mobility ;Decreased endurance  Assessment: 60 y/o female admit 2024 with Pneumonia, Hypoxia, Chest Pain; + Influenza. PMH as noted incluidng COPD, Pleural Effusion, PE, STEMI, Shld Surg, R Lat Malleolus Fx (2023; no restrictions at this time).  PTA pt living with family in mobile home with few steps to access; independent daily care and functional mobility. Pt currently requiring O2 2L (baseline). Pt andriy amb short distances within hospital room setting without assist device and then with use of Rollator. Amb slow/abit guarded and tend to \"furniture\" touch when amb without device. Pt amb with Rollator, less guarded/improved tolerance. Sats remain at least 90% although pt only andriy short amb distances; limited endurance.  Mobility improved/pt reports feeling steady with use of Rollator Walker.  Pt reports adequate assist/support for d/c home.  No further PT recommend at this time.  Therapy Prognosis: Good  Decision Making: Low Complexity  History: 60 y/o female admit 2024 with Pneumonia, Hypoxia, Chest Pain; + Influenza. PMH as noted incluidng COPD, Pleural Effusion, PE, STEMI, Shld Surg, R 
Pt nightly blood sugar 436. Call placed to Dr. Stevens office, voicemail left asking for any new orders. Pt already receiving 75 units of lantus and 4 units of humalog tonight. See new orders.   
Pt slept in the bed since about 11 pm.  A pure wick was placed for sleep, per her request and was removed around 6 am, and she got up to the bsc to void.  The 2 times she was observed standing and pivot to the bedside commode. She had increased shortness of breath. Her saturations dropped to the low 80's and was very slow to recover.  Her breathing this night has been labored, using accessory muscles. She offered no complaints of pain, but did state that she hoped the antibiotics would help her breathing.  
Pt states she takes sliding scale insulin at home. This RN left MD Stevens a message to acquire orders for POCT, sliding scale insulin, and PT orders. Still awaiting response.    Electronically signed by Christi Garrison RN on 2/14/2024 at 7:12 PM   
Pulmonary Progress Note    Date of Admission: 2/14/2024   LOS: 3 days     Chief Complaint   Patient presents with    Chest Pain     Pt arrives via hospital wheelchair for eval of sob onset Saturday, sts sob as well, sts normal home O2 NC 2L , pt 80% on 2L NC after moving to bed        Assessment/Plan:     Acute on chronic hypoxemic respiratory failure  -Continue to wean oxygen goal saturation 90%  Baseline oxygen requirement    Acute exacerbation of severe COPD, due to  Influenza A  -Scheduled bronchodilators, steroids  -Would recommend prolonged 12-day steroid taper on discharge        24 Hour Events/Subjective  No acute events overnight.  Oxygen requirement improved down to 2 L/min    ROS:   No nausea  No Vomiting  No chest pain      Intake/Output Summary (Last 24 hours) at 2/17/2024 1204  Last data filed at 2/16/2024 2044  Gross per 24 hour   Intake 120 ml   Output --   Net 120 ml         PHYSICAL EXAM:   Blood pressure (!) 156/51, pulse 72, temperature 98.4 °F (36.9 °C), temperature source Oral, resp. rate 18, height 1.524 m (5'), weight 90.2 kg (198 lb 13.7 oz), last menstrual period 11/30/2005, SpO2 94 %, not currently breastfeeding.'  Gen:  No acute distress.   Resp:  No crackles. No wheezes. No rhonchi. No dullness on percussion.  CV: Regular rate. Regular rhythm. No murmur or rub. No edema.   Neuro:  no focal neurologic deficit.  Moves all extremities  Psych: Awake and alert  Mood stable.      Labs reviewed:  CBC:   Recent Labs     02/16/24 0433   WBC 4.6   HGB 10.1*   HCT 29.6*   MCV 83.3        BMP:   Recent Labs     02/16/24 0433      K 4.0   CL 96*   CO2 35*   BUN 22*   CREATININE 0.8     LIVER PROFILE:   Recent Labs     02/16/24 0433   AST 10*   ALT 11   BILITOT 0.6   ALKPHOS 99     PT/INR: No results for input(s): \"PROTIME\", \"INR\" in the last 72 hours.        Films:  Radiology Review:  Pertinent images / reports were reviewed as a part of this visit.            This note was 
Voicemail left for MD concerning patient BG.    Electronically signed by Fatemeh Davila RN on 2/16/2024 at 6:27 PM    
Temp 97.8 °F (36.6 °C) (Oral)   Resp 21   Ht 1.524 m (5')   Wt 90.3 kg (199 lb 1.2 oz)   LMP 11/30/2005   SpO2 98%   BMI 38.88 kg/m²     Last Body weight:   Wt Readings from Last 3 Encounters:   02/16/24 90.3 kg (199 lb 1.2 oz)   12/20/23 94.8 kg (209 lb)   12/07/23 95.1 kg (209 lb 9.6 oz)       Body Mass Index : Body mass index is 38.88 kg/m².      Intake and Output summary:   Intake/Output Summary (Last 24 hours) at 2/16/2024 1159  Last data filed at 2/16/2024 1057  Gross per 24 hour   Intake 360 ml   Output 100 ml   Net 260 ml         Physical Examination:     PHYSICAL EXAM:    Gen:  moderate acute distress  Eyes: PERRL. Anicteric sclera. No conjunctival injection.   ENT: No discharge. Posterior oropharynx clear. External appearance of ears and nose normal.  Neck: Trachea midline. No mass, no lymphadenopathy    Resp:  moderate increase wob, wheezing, diminished breath sounds bilaterally   CV: Regular rate. Regular rhythm. No murmur or rub. ++ edema.   GI: Soft, Non-tender. Non-distended. +BS  Skin: Warm, dry, w/o erythema.   Lymph: No cervical or supraclavicular LAD.   M/S: No cyanosis. No clubbing.    Neuro:  CN 2-12 tested, no focal neurologic deficit.  Moves all extremities  Psych: Awake and alert, Oriented x 3.  Judgement and insight appropriate.  Mood stable.        Laboratory findings:-    CBC:   Recent Labs     02/16/24  0433   WBC 4.6   HGB 10.1*          BMP:    Recent Labs     02/14/24  0356 02/16/24  0433    139   K 3.6 4.0    96*   CO2 33* 35*   BUN 19 22*   CREATININE 0.8 0.8   GLUCOSE 190* 258*       S. Calcium:  Recent Labs     02/16/24  0433   CALCIUM 8.9         S. Glucose:  Recent Labs     02/15/24  2003 02/16/24  0045 02/16/24  0811   POCGLU 443* 327* 220*         Pancreatic functions:  Recent Labs     02/14/24  0444   LACTA 0.8       S. Lactic Acid:   Recent Labs     02/14/24  0444   LACTA 0.8         Thyroid functions:   Lab Results   Component Value Date/Time    TSH 
provided:  -- Acute Respiratory Failure as evidenced by, Please document evidence.  -- Acute Respiratory Failure ruled out after study and Chronic Respiratory   Failure confirmed  -- Other - I will add my own diagnosis  -- Disagree - Not applicable / Not valid  -- Disagree - Clinically unable to determine / Unknown  -- Refer to Clinical Documentation Reviewer    PROVIDER RESPONSE TEXT:    Acute Respiratory Failure ruled out after study and Chronic Respiratory   Failure confirmed.    Query created by: Alka Garcia on 2/16/2024 11:07 AM      Electronically signed by:  Erum Pabon MD 2/20/2024 11:07 AM

## 2024-02-23 PROBLEM — I25.2 HISTORY OF ACUTE MYOCARDIAL INFARCTION: Status: ACTIVE | Noted: 2020-10-06

## 2024-03-13 ENCOUNTER — OFFICE VISIT (OUTPATIENT)
Dept: ORTHOPEDIC SURGERY | Age: 59
End: 2024-03-13
Payer: COMMERCIAL

## 2024-03-13 VITALS — WEIGHT: 200.62 LBS | HEIGHT: 60 IN | BODY MASS INDEX: 39.39 KG/M2

## 2024-03-13 DIAGNOSIS — S93.431D SYNDESMOTIC DISRUPTION OF RIGHT ANKLE, SUBSEQUENT ENCOUNTER: ICD-10-CM

## 2024-03-13 DIAGNOSIS — S82.61XA CLOSED DISPLACED FRACTURE OF LATERAL MALLEOLUS OF RIGHT FIBULA, INITIAL ENCOUNTER: Primary | ICD-10-CM

## 2024-03-13 PROCEDURE — G8427 DOCREV CUR MEDS BY ELIG CLIN: HCPCS | Performed by: ORTHOPAEDIC SURGERY

## 2024-03-13 PROCEDURE — G8417 CALC BMI ABV UP PARAM F/U: HCPCS | Performed by: ORTHOPAEDIC SURGERY

## 2024-03-13 PROCEDURE — G8484 FLU IMMUNIZE NO ADMIN: HCPCS | Performed by: ORTHOPAEDIC SURGERY

## 2024-03-13 PROCEDURE — 1111F DSCHRG MED/CURRENT MED MERGE: CPT | Performed by: ORTHOPAEDIC SURGERY

## 2024-03-13 PROCEDURE — 99213 OFFICE O/P EST LOW 20 MIN: CPT | Performed by: ORTHOPAEDIC SURGERY

## 2024-03-13 PROCEDURE — 3017F COLORECTAL CA SCREEN DOC REV: CPT | Performed by: ORTHOPAEDIC SURGERY

## 2024-03-13 PROCEDURE — 1036F TOBACCO NON-USER: CPT | Performed by: ORTHOPAEDIC SURGERY

## 2024-03-13 NOTE — PROGRESS NOTES
instability both upper and lower extremities.    IMAGING:  Three views right ankle taken today in the office showed anatomic alignment of the fracture, plate and screws in good position, no loosening. Ankle mortise is well centered.  There are 2 syndesmosis screws intact.    IMPRESSION:  3 months out from   1-Right ankle lateral malleolus displaced fracture, status post ORIF.  2-Right ankle distal tibiofibular syndesmosis disruption, status post ORIF syndesmosis screw    PLAN: She will be WBAT and continue to work on ROM and peroneal strengthening exercise. The patient will come back for a follow up in 4 weeks.  At that time, we will take 3 views of the right ankle standing. She will likely need a staged procedure for syndesmosis screw removal at 4-5 months.       Wyatt Garsia MD

## 2024-04-10 ENCOUNTER — OFFICE VISIT (OUTPATIENT)
Dept: ORTHOPEDIC SURGERY | Age: 59
End: 2024-04-10
Payer: COMMERCIAL

## 2024-04-10 DIAGNOSIS — S82.61XD CLOSED DISPLACED FRACTURE OF LATERAL MALLEOLUS OF RIGHT FIBULA WITH ROUTINE HEALING, SUBSEQUENT ENCOUNTER: ICD-10-CM

## 2024-04-10 DIAGNOSIS — S93.431D SYNDESMOTIC DISRUPTION OF RIGHT ANKLE, SUBSEQUENT ENCOUNTER: Primary | ICD-10-CM

## 2024-04-10 PROCEDURE — G8417 CALC BMI ABV UP PARAM F/U: HCPCS | Performed by: ORTHOPAEDIC SURGERY

## 2024-04-10 PROCEDURE — G8427 DOCREV CUR MEDS BY ELIG CLIN: HCPCS | Performed by: ORTHOPAEDIC SURGERY

## 2024-04-10 PROCEDURE — 99213 OFFICE O/P EST LOW 20 MIN: CPT | Performed by: ORTHOPAEDIC SURGERY

## 2024-04-10 PROCEDURE — 3017F COLORECTAL CA SCREEN DOC REV: CPT | Performed by: ORTHOPAEDIC SURGERY

## 2024-04-10 PROCEDURE — 1036F TOBACCO NON-USER: CPT | Performed by: ORTHOPAEDIC SURGERY

## 2024-04-10 NOTE — PROGRESS NOTES
DIAGNOSIS:    1-Right ankle lateral malleolus displaced fracture, status post ORIF.  2-Right ankle distal tibiofibular syndesmosis disruption, status post ORIF syndesmosis screw    DATE OF SURGERY: 2023.    HISTORY OF PRESENT ILLNESS:  Ms. Lucio 59 y.o.  female who came in today for 4 months postoperative visit.  The patient denies any significant pain in the right ankle. Rates pain a 4/10 VAS mild, aching, intermittent and are improving. Aggravating factors walking.  She has intermittent lateral ankle burning pain.  Alleviating factors rest. She has been WB.  No numbness or tingling sensation. No fever or Chills.  She is on continuous oxygen.  She is on chronic pain medication.  She has a history of a PE and is on Eliquis.    Past Medical History:   Diagnosis Date    Acute ST elevation myocardial infarction (STEMI) involving right coronary artery (HCC)     Arthritis     Back pain     CAD (coronary artery disease)     Chronic kidney disease     COPD (chronic obstructive pulmonary disease) (HCC)     Depression     Diabetes mellitus (HCC)     REHMAN (dyspnea on exertion)     Eye abnormalities     bleed in back of eyes with injections Q 8 weeks    Fatty liver     GERD (gastroesophageal reflux disease)     Hx of blood clots     ? PE 3 weeks ago ~    Hyperlipidemia     Hypertension     MRSA (methicillin resistant staph aureus) culture positive 08/15/2018    arm    On home O2     2 l/m    Oxygen deficiency     STEMI (ST elevation myocardial infarction) (HCC) 10/6/2020    Stress incontinence     Thyroid disease     CYST ON THYROID--FOUND 20 YEARS AGO       Past Surgical History:   Procedure Laterality Date    ANKLE FRACTURE SURGERY Right 2023    RIGHT OPEN REDUCTION AND INTERNAL FIXATION LATERAL MALLEOLUS AND SYNDESMOTIC REPAIR-MICHAEL performed by Wyatt Garsia MD at [facility] OR    CARDIAC SURGERY      3 stent placed OCt 20     SECTION      TIMES 3    CHOLECYSTECTOMY      COLONOSCOPY

## 2024-04-18 ENCOUNTER — OFFICE VISIT (OUTPATIENT)
Dept: CARDIOLOGY CLINIC | Age: 59
End: 2024-04-18
Payer: COMMERCIAL

## 2024-04-18 VITALS
HEART RATE: 80 BPM | BODY MASS INDEX: 40.05 KG/M2 | WEIGHT: 204 LBS | SYSTOLIC BLOOD PRESSURE: 158 MMHG | OXYGEN SATURATION: 95 % | HEIGHT: 60 IN | DIASTOLIC BLOOD PRESSURE: 78 MMHG

## 2024-04-18 DIAGNOSIS — I25.119 CORONARY ARTERY DISEASE INVOLVING NATIVE CORONARY ARTERY OF NATIVE HEART WITH ANGINA PECTORIS (HCC): Primary | ICD-10-CM

## 2024-04-18 DIAGNOSIS — I10 ESSENTIAL HYPERTENSION: ICD-10-CM

## 2024-04-18 DIAGNOSIS — E78.5 HYPERLIPIDEMIA LDL GOAL <70: ICD-10-CM

## 2024-04-18 PROCEDURE — 3017F COLORECTAL CA SCREEN DOC REV: CPT | Performed by: INTERNAL MEDICINE

## 2024-04-18 PROCEDURE — G8417 CALC BMI ABV UP PARAM F/U: HCPCS | Performed by: INTERNAL MEDICINE

## 2024-04-18 PROCEDURE — 3077F SYST BP >= 140 MM HG: CPT | Performed by: INTERNAL MEDICINE

## 2024-04-18 PROCEDURE — 93000 ELECTROCARDIOGRAM COMPLETE: CPT | Performed by: INTERNAL MEDICINE

## 2024-04-18 PROCEDURE — 1036F TOBACCO NON-USER: CPT | Performed by: INTERNAL MEDICINE

## 2024-04-18 PROCEDURE — 3078F DIAST BP <80 MM HG: CPT | Performed by: INTERNAL MEDICINE

## 2024-04-18 PROCEDURE — 99214 OFFICE O/P EST MOD 30 MIN: CPT | Performed by: INTERNAL MEDICINE

## 2024-04-18 PROCEDURE — G8427 DOCREV CUR MEDS BY ELIG CLIN: HCPCS | Performed by: INTERNAL MEDICINE

## 2024-04-18 RX ORDER — NITROGLYCERIN 0.4 MG/1
0.4 TABLET SUBLINGUAL EVERY 5 MIN PRN
Qty: 25 TABLET | Refills: 3 | Status: SHIPPED | OUTPATIENT
Start: 2024-04-18

## 2024-04-18 NOTE — PROGRESS NOTES
[facility]    Lindsay Lucio  1965 April 18, 2024    CC: \"I have been having some Chest Pain\"    HPI:  The patient is 59 y.o. female with a past medical history significant for CAD, COPD, PAD, hyperlipidemia and hypertension. She underwent LHC on 10/6/20 after presenting with chest pain resulting in SHEFALI x 2 to RCA and SHEFALI to LAD. She completed a stress test due to complaints of chest pain revealing a reversible defect. She underwent a heart catheterization on 3/8/21 that did not require any intervention. She underwent peripheral angiogram due to abnormal ABIs and claudication symptoms on 11/8/21. This revealed serial 80% lesions in right SFA that underwent angioplasty with drug coated balloon. She presented to the ED 12/23/22 for chest pain and her cardiac workup was unremarkable. She completed an abnormal stress test 3/9/23 and underwent a left heart catheretization 3/27/23 that resulted in x1DES to the PDA.    Today she presents for follow up and states that overall she is feeling well. She denies any new sounding cardiac complaints. She reports is having Chest pain for 5 min at a time not every day. She also reports left shoulder and arm issues. She denies any chest pains or worsening shortness of breath. She reports medication compliance and is tolerating. She denies any abnormal bleeding or bruising. She denies exertional chest pain/pressure, dyspnea at rest, worsening REHMAN, PND, orthopnea, palpitations, lightheadedness, weight changes, changes in LE edema, and syncope.     Review of Systems:  Constitutional: No fatigue, weakness, night sweats or fever.   HEENT: No new vision difficulties or ringing in the ears.  Respiratory: No new SOB, PND, orthopnea or cough.   Cardiovascular: See HPI   GI: No n/v, diarrhea, constipation, abdominal pain or changes in bowel habits.  No melena, no hematochezia  : No urinary frequency, urgency, incontinence, hematuria or dysuria.  Skin:

## 2024-05-07 DIAGNOSIS — E11.59 TYPE 2 DIABETES MELLITUS WITH OTHER CIRCULATORY COMPLICATION, UNSPECIFIED WHETHER LONG TERM INSULIN USE (HCC): ICD-10-CM

## 2024-05-07 LAB
ALBUMIN SERPL-MCNC: 4 G/DL (ref 3.4–5)
ALBUMIN/GLOB SERPL: 1.7 {RATIO} (ref 1.1–2.2)
ALP SERPL-CCNC: 120 U/L (ref 40–129)
ALT SERPL-CCNC: 17 U/L (ref 10–40)
ANION GAP SERPL CALCULATED.3IONS-SCNC: 8 MMOL/L (ref 3–16)
AST SERPL-CCNC: 11 U/L (ref 15–37)
BASOPHILS # BLD: 0 K/UL (ref 0–0.2)
BASOPHILS NFR BLD: 0.5 %
BILIRUB SERPL-MCNC: 0.7 MG/DL (ref 0–1)
BUN SERPL-MCNC: 17 MG/DL (ref 7–20)
CALCIUM SERPL-MCNC: 9.3 MG/DL (ref 8.3–10.6)
CHLORIDE SERPL-SCNC: 101 MMOL/L (ref 99–110)
CHOLEST SERPL-MCNC: 133 MG/DL (ref 0–199)
CO2 SERPL-SCNC: 31 MMOL/L (ref 21–32)
CREAT SERPL-MCNC: 0.8 MG/DL (ref 0.6–1.2)
DEPRECATED RDW RBC AUTO: 16.1 % (ref 12.4–15.4)
EOSINOPHIL # BLD: 0.1 K/UL (ref 0–0.6)
EOSINOPHIL NFR BLD: 0.9 %
ERYTHROCYTE [SEDIMENTATION RATE] IN BLOOD BY WESTERGREN METHOD: 15 MM/HR (ref 0–30)
FOLATE SERPL-MCNC: 6.69 NG/ML (ref 4.78–24.2)
GFR SERPLBLD CREATININE-BSD FMLA CKD-EPI: 84 ML/MIN/{1.73_M2}
GLUCOSE SERPL-MCNC: 237 MG/DL (ref 70–99)
HCT VFR BLD AUTO: 36.1 % (ref 36–48)
HDLC SERPL-MCNC: 32 MG/DL (ref 40–60)
HGB BLD-MCNC: 12.5 G/DL (ref 12–16)
LDLC SERPL CALC-MCNC: 63 MG/DL
LYMPHOCYTES # BLD: 1 K/UL (ref 1–5.1)
LYMPHOCYTES NFR BLD: 12.8 %
MCH RBC QN AUTO: 29.5 PG (ref 26–34)
MCHC RBC AUTO-ENTMCNC: 34.7 G/DL (ref 31–36)
MCV RBC AUTO: 85.1 FL (ref 80–100)
MONOCYTES # BLD: 0.3 K/UL (ref 0–1.3)
MONOCYTES NFR BLD: 4 %
NEUTROPHILS # BLD: 6.3 K/UL (ref 1.7–7.7)
NEUTROPHILS NFR BLD: 81.8 %
PLATELET # BLD AUTO: 130 K/UL (ref 135–450)
PMV BLD AUTO: 9.2 FL (ref 5–10.5)
POTASSIUM SERPL-SCNC: 4.5 MMOL/L (ref 3.5–5.1)
PROT SERPL-MCNC: 6.4 G/DL (ref 6.4–8.2)
RBC # BLD AUTO: 4.24 M/UL (ref 4–5.2)
SODIUM SERPL-SCNC: 140 MMOL/L (ref 136–145)
TRIGL SERPL-MCNC: 190 MG/DL (ref 0–150)
TSH SERPL DL<=0.005 MIU/L-ACNC: 2 UIU/ML (ref 0.27–4.2)
VIT B12 SERPL-MCNC: 452 PG/ML (ref 211–911)
VLDLC SERPL CALC-MCNC: 38 MG/DL
WBC # BLD AUTO: 7.7 K/UL (ref 4–11)

## 2024-05-08 LAB
EST. AVERAGE GLUCOSE BLD GHB EST-MCNC: 180 MG/DL
HBA1C MFR BLD: 7.9 %

## 2024-06-21 RX ORDER — RANOLAZINE 500 MG/1
500 TABLET, EXTENDED RELEASE ORAL 2 TIMES DAILY
Qty: 180 TABLET | Refills: 3 | Status: SHIPPED | OUTPATIENT
Start: 2024-06-21

## 2024-08-23 NOTE — PROGRESS NOTES
[facility] Pre-Admission Testing Electronic Communication Worksheet for OR/ENDO Procedures        Patient: Lindsay Lucio    DOS: 9/4    Transportation Confirmed: [x] YES    []  NO    History and Physical:  [] YES    []  NO  [x] N/A  If yes, please list doctor or Urgent Care and date of H&P:     Additional Clearance(Cardiac, Pulmonary, etc):  [] YES    [x]  NO    Pre-Admission Testing Visit:  [] YES    [x]  NO If no, do labs/testing need to be done DOS?  [] YES    [x]  NO    Medication Reconciliation Complete:  [x] YES    []  NO        Additional Notes:                Interview Complete: [x] YES    []  NO          Margi Zamora RN  10:31 AM

## 2024-08-23 NOTE — PROGRESS NOTES
[facility] PRE-OPERATIVE INSTRUCTIONS    Day of Procedure:   9/4               Arrival time:   1100               Surgery time: 1230    Take the following medications with a sip of water:  Follow your MD/Surgeons pre-procedure instructions regarding your medications     Do not eat or drink anything after 12:00 midnight prior to your surgery.  This includes water chewing gum, mints and ice chips.   You may brush your teeth and gargle the morning of your surgery, but do not swallow the water     Please see your family doctor/pediatrician for a history and physical and/or concerning medications.   Bring any test results/reports from your physicians office.   If you are under the care of a heart doctor or specialist doctor, please be aware that you may be asked to them for clearance    You may be asked to stop blood thinners such as Coumadin, Plavix, Fragmin, Lovenox, etc., or any anti-inflammatories such as:  Aspirin, Ibuprofen, Advil, Naproxen prior to your surgery.    We also ask that you stop any OTC medications such as fish oil, vitamin E, glucosamine, garlic, Multivitamins, COQ 10, etc.    We ask that you do not smoke 24 hours prior to surgery  We ask that you do not  drink any alcoholic beverages 24 hours prior to surgery     You must make arrangements for a responsible adult to take you home after your surgery.    For your safety you will not be allowed to leave alone or drive yourself home.  Your surgery will be cancelled if you do not have a ride home.     Also for your safety, you must have someone stay with you the first 24 hours after your surgery.     A parent or legal guardian must accompany a child scheduled for surgery and plan to stay at the hospital until the child is discharged.    Please do not bring other children with you.    For your comfort, please wear simple loose fitting clothing to the hospital.  Please do not bring valuables.    Do not wear any make-up or nail polish on your  fingers or toes      For your safety, please do not wear any jewelry or body piercing's on the day of surgery.   All jewelry must be removed.      If you have dentures, they will be removed before going to operating room.    For your convenience, we will provide you with a container.    If you wear contact lenses or glasses, they will be removed, please bring a case for them.     If you have a living will and a durable power of  for healthcare, please bring in a copy.     As part of our patient safety program to minimize surgical site infections, we ask you to do the following:    Please notify your surgeon if you develop any illness between         now and the  day of your surgery.    This includes a cough, cold, fever, sore throat, nausea,         or vomiting, and diarrhea, etc.   Please notify your surgeon if you experience dizziness, shortness         of breath or blurred vision between now and the time of your surgery.      Do not shave your operative site 96 hours prior to surgery.   For face and neck surgery, men may use an electric razor 48 hours   prior to surgery.    You must shower the night before surgery and the morning of   your surgery with an antibacterial soap.    You will need to bring a photo ID and insurance card    [facility] has an onsite pharmacy, would you like to utilize our pharmacy     If you will be staying overnight and use a C-pap machine, please bring   your C-pap to hospital     Our goal is to provide you with excellent care, therefore, visitors will be limited to two(2) in the room at a time so that we may focus on providing this care for you.          Please contact pre-admission testing if you have any further questions.                 Vidhi Brady phone number:  [phone]     [facility] fax number:  [phone]  Please note these are generalized instructions for all surgical cases, you may be provided with more specific instructions according to your surgery.    C-Difficile

## 2024-08-27 ENCOUNTER — HOSPITAL ENCOUNTER (OUTPATIENT)
Dept: WOMENS IMAGING | Age: 59
Discharge: HOME | End: 2024-08-27
Payer: COMMERCIAL

## 2024-08-27 DIAGNOSIS — Z12.31 SCREENING MAMMOGRAM FOR BREAST CANCER: ICD-10-CM

## 2024-08-27 PROCEDURE — 77063 BREAST TOMOSYNTHESIS BI: CPT

## 2024-09-03 ENCOUNTER — ANESTHESIA EVENT (OUTPATIENT)
Dept: ENDOSCOPY | Age: 59
End: 2024-09-03
Payer: COMMERCIAL

## 2024-09-04 ENCOUNTER — APPOINTMENT (OUTPATIENT)
Dept: GENERAL RADIOLOGY | Age: 59
DRG: 005 | End: 2024-09-04
Payer: COMMERCIAL

## 2024-09-04 ENCOUNTER — ANESTHESIA (OUTPATIENT)
Dept: ENDOSCOPY | Age: 59
End: 2024-09-04
Payer: COMMERCIAL

## 2024-09-04 ENCOUNTER — HOSPITAL ENCOUNTER (INPATIENT)
Age: 59
LOS: 10 days | Discharge: NURSING, REHAB OR LONG-TERM CARE | DRG: 005 | End: 2024-09-14
Attending: EMERGENCY MEDICINE | Admitting: HOSPITALIST
Payer: COMMERCIAL

## 2024-09-04 ENCOUNTER — APPOINTMENT (OUTPATIENT)
Dept: CT IMAGING | Age: 59
DRG: 005 | End: 2024-09-04
Attending: EMERGENCY MEDICINE
Payer: COMMERCIAL

## 2024-09-04 ENCOUNTER — HOSPITAL ENCOUNTER (OUTPATIENT)
Age: 59
Setting detail: OUTPATIENT SURGERY
Discharge: OTHER INSTITUTION | DRG: 005 | End: 2024-09-04
Attending: INTERNAL MEDICINE | Admitting: INTERNAL MEDICINE
Payer: COMMERCIAL

## 2024-09-04 VITALS
HEIGHT: 60 IN | BODY MASS INDEX: 40.84 KG/M2 | DIASTOLIC BLOOD PRESSURE: 83 MMHG | RESPIRATION RATE: 16 BRPM | SYSTOLIC BLOOD PRESSURE: 184 MMHG | HEART RATE: 76 BPM | TEMPERATURE: 97 F | WEIGHT: 208 LBS | OXYGEN SATURATION: 98 %

## 2024-09-04 DIAGNOSIS — R79.89 ELEVATED TROPONIN: ICD-10-CM

## 2024-09-04 DIAGNOSIS — I46.9 CARDIAC ARREST: Primary | ICD-10-CM

## 2024-09-04 DIAGNOSIS — D64.9 ANEMIA, UNSPECIFIED TYPE: ICD-10-CM

## 2024-09-04 DIAGNOSIS — D69.6 THROMBOCYTOPENIA (HCC): ICD-10-CM

## 2024-09-04 DIAGNOSIS — I63.9 CEREBROVASCULAR ACCIDENT (CVA), UNSPECIFIED MECHANISM (HCC): ICD-10-CM

## 2024-09-04 LAB
ALBUMIN SERPL-MCNC: 3.7 G/DL (ref 3.4–5)
ALBUMIN/GLOB SERPL: 1.6 {RATIO} (ref 1.1–2.2)
ALP SERPL-CCNC: 101 U/L (ref 40–129)
ALT SERPL-CCNC: 33 U/L (ref 10–40)
ANION GAP SERPL CALCULATED.3IONS-SCNC: 11 MMOL/L (ref 3–16)
AST SERPL-CCNC: 33 U/L (ref 15–37)
BASE EXCESS BLDV CALC-SCNC: 4.1 MMOL/L
BASOPHILS # BLD: 0 K/UL (ref 0–0.2)
BASOPHILS NFR BLD: 0.2 %
BILIRUB SERPL-MCNC: 1.1 MG/DL (ref 0–1)
BUN SERPL-MCNC: 12 MG/DL (ref 7–20)
CALCIUM SERPL-MCNC: 8.6 MG/DL (ref 8.3–10.6)
CHLORIDE SERPL-SCNC: 103 MMOL/L (ref 99–110)
CO2 BLDV-SCNC: 32 MMOL/L
CO2 SERPL-SCNC: 28 MMOL/L (ref 21–32)
COHGB MFR BLDV: 1.8 %
CREAT SERPL-MCNC: 0.8 MG/DL (ref 0.6–1.2)
DEPRECATED RDW RBC AUTO: 17.5 % (ref 12.4–15.4)
EOSINOPHIL # BLD: 0 K/UL (ref 0–0.6)
EOSINOPHIL NFR BLD: 0.3 %
GFR SERPLBLD CREATININE-BSD FMLA CKD-EPI: 84 ML/MIN/{1.73_M2}
GLUCOSE BLD-MCNC: 276 MG/DL (ref 70–99)
GLUCOSE BLD-MCNC: 394 MG/DL (ref 70–99)
GLUCOSE SERPL-MCNC: 314 MG/DL (ref 70–99)
HCO3 BLDV-SCNC: 31 MMOL/L (ref 23–29)
HCT VFR BLD AUTO: 29.7 % (ref 36–48)
HGB BLD-MCNC: 10 G/DL (ref 12–16)
LYMPHOCYTES # BLD: 0.5 K/UL (ref 1–5.1)
LYMPHOCYTES NFR BLD: 5.2 %
MCH RBC QN AUTO: 28.5 PG (ref 26–34)
MCHC RBC AUTO-ENTMCNC: 33.6 G/DL (ref 31–36)
MCV RBC AUTO: 85 FL (ref 80–100)
METHGB MFR BLDV: 0.5 %
MONOCYTES # BLD: 0.5 K/UL (ref 0–1.3)
MONOCYTES NFR BLD: 4.7 %
NEUTROPHILS # BLD: 9.2 K/UL (ref 1.7–7.7)
NEUTROPHILS NFR BLD: 89.6 %
NT-PROBNP SERPL-MCNC: 460 PG/ML (ref 0–124)
O2 THERAPY: ABNORMAL
PCO2 BLDV: 55.6 MMHG (ref 40–50)
PERFORMED ON: ABNORMAL
PERFORMED ON: ABNORMAL
PH BLDV: 7.35 [PH] (ref 7.35–7.45)
PLATELET # BLD AUTO: 119 K/UL (ref 135–450)
PMV BLD AUTO: 8.3 FL (ref 5–10.5)
PO2 BLDV: 146 MMHG
POTASSIUM SERPL-SCNC: 4 MMOL/L (ref 3.5–5.1)
PROT SERPL-MCNC: 6 G/DL (ref 6.4–8.2)
RBC # BLD AUTO: 3.5 M/UL (ref 4–5.2)
SAO2 % BLDV: 100 %
SODIUM SERPL-SCNC: 142 MMOL/L (ref 136–145)
TROPONIN, HIGH SENSITIVITY: 19 NG/L (ref 0–14)
TROPONIN, HIGH SENSITIVITY: 27 NG/L (ref 0–14)
TROPONIN, HIGH SENSITIVITY: 32 NG/L (ref 0–14)
TROPONIN, HIGH SENSITIVITY: 35 NG/L (ref 0–14)
WBC # BLD AUTO: 10.3 K/UL (ref 4–11)

## 2024-09-04 PROCEDURE — 83880 ASSAY OF NATRIURETIC PEPTIDE: CPT

## 2024-09-04 PROCEDURE — 71260 CT THORAX DX C+: CPT

## 2024-09-04 PROCEDURE — 94640 AIRWAY INHALATION TREATMENT: CPT

## 2024-09-04 PROCEDURE — 2000000000 HC ICU R&B

## 2024-09-04 PROCEDURE — 3700000000 HC ANESTHESIA ATTENDED CARE: Performed by: INTERNAL MEDICINE

## 2024-09-04 PROCEDURE — 82803 BLOOD GASES ANY COMBINATION: CPT

## 2024-09-04 PROCEDURE — 0DJD8ZZ INSPECTION OF LOWER INTESTINAL TRACT, VIA NATURAL OR ARTIFICIAL OPENING ENDOSCOPIC: ICD-10-PCS | Performed by: INTERNAL MEDICINE

## 2024-09-04 PROCEDURE — 3609012400 HC EGD TRANSORAL BIOPSY SINGLE/MULTIPLE: Performed by: INTERNAL MEDICINE

## 2024-09-04 PROCEDURE — 6360000002 HC RX W HCPCS: Performed by: EMERGENCY MEDICINE

## 2024-09-04 PROCEDURE — 3609015300 HC ESOPHAGEAL DILATION MALONEY: Performed by: INTERNAL MEDICINE

## 2024-09-04 PROCEDURE — 2580000003 HC RX 258

## 2024-09-04 PROCEDURE — 80053 COMPREHEN METABOLIC PANEL: CPT

## 2024-09-04 PROCEDURE — 0DB98ZX EXCISION OF DUODENUM, VIA NATURAL OR ARTIFICIAL OPENING ENDOSCOPIC, DIAGNOSTIC: ICD-10-PCS | Performed by: INTERNAL MEDICINE

## 2024-09-04 PROCEDURE — 6360000004 HC RX CONTRAST MEDICATION: Performed by: INTERNAL MEDICINE

## 2024-09-04 PROCEDURE — 3700000001 HC ADD 15 MINUTES (ANESTHESIA): Performed by: INTERNAL MEDICINE

## 2024-09-04 PROCEDURE — 99285 EMERGENCY DEPT VISIT HI MDM: CPT

## 2024-09-04 PROCEDURE — 6360000002 HC RX W HCPCS: Performed by: HOSPITALIST

## 2024-09-04 PROCEDURE — 2500000003 HC RX 250 WO HCPCS

## 2024-09-04 PROCEDURE — 85025 COMPLETE CBC W/AUTO DIFF WBC: CPT

## 2024-09-04 PROCEDURE — 96374 THER/PROPH/DIAG INJ IV PUSH: CPT

## 2024-09-04 PROCEDURE — 70450 CT HEAD/BRAIN W/O DYE: CPT

## 2024-09-04 PROCEDURE — 71045 X-RAY EXAM CHEST 1 VIEW: CPT

## 2024-09-04 PROCEDURE — 36415 COLL VENOUS BLD VENIPUNCTURE: CPT

## 2024-09-04 PROCEDURE — 5A1955Z RESPIRATORY VENTILATION, GREATER THAN 96 CONSECUTIVE HOURS: ICD-10-PCS | Performed by: INTERNAL MEDICINE

## 2024-09-04 PROCEDURE — 6360000002 HC RX W HCPCS

## 2024-09-04 PROCEDURE — 0D758DZ DILATION OF ESOPHAGUS WITH INTRALUMINAL DEVICE, VIA NATURAL OR ARTIFICIAL OPENING ENDOSCOPIC: ICD-10-PCS | Performed by: INTERNAL MEDICINE

## 2024-09-04 PROCEDURE — 94002 VENT MGMT INPAT INIT DAY: CPT

## 2024-09-04 PROCEDURE — 3E033XZ INTRODUCTION OF VASOPRESSOR INTO PERIPHERAL VEIN, PERCUTANEOUS APPROACH: ICD-10-PCS | Performed by: INTERNAL MEDICINE

## 2024-09-04 PROCEDURE — 5A12012 PERFORMANCE OF CARDIAC OUTPUT, SINGLE, MANUAL: ICD-10-PCS | Performed by: INTERNAL MEDICINE

## 2024-09-04 PROCEDURE — 3609027000 HC COLONOSCOPY: Performed by: INTERNAL MEDICINE

## 2024-09-04 PROCEDURE — 2580000003 HC RX 258: Performed by: HOSPITALIST

## 2024-09-04 PROCEDURE — 0DB68ZX EXCISION OF STOMACH, VIA NATURAL OR ARTIFICIAL OPENING ENDOSCOPIC, DIAGNOSTIC: ICD-10-PCS | Performed by: INTERNAL MEDICINE

## 2024-09-04 PROCEDURE — 95717 EEG PHYS/QHP 2-12 HR W/O VID: CPT | Performed by: PSYCHIATRY & NEUROLOGY

## 2024-09-04 PROCEDURE — 31500 INSERT EMERGENCY AIRWAY: CPT

## 2024-09-04 PROCEDURE — 93005 ELECTROCARDIOGRAM TRACING: CPT | Performed by: EMERGENCY MEDICINE

## 2024-09-04 PROCEDURE — 88305 TISSUE EXAM BY PATHOLOGIST: CPT

## 2024-09-04 PROCEDURE — 6370000000 HC RX 637 (ALT 250 FOR IP): Performed by: HOSPITALIST

## 2024-09-04 PROCEDURE — 84484 ASSAY OF TROPONIN QUANT: CPT

## 2024-09-04 PROCEDURE — 2709999900 HC NON-CHARGEABLE SUPPLY: Performed by: INTERNAL MEDICINE

## 2024-09-04 PROCEDURE — 0BH17EZ INSERTION OF ENDOTRACHEAL AIRWAY INTO TRACHEA, VIA NATURAL OR ARTIFICIAL OPENING: ICD-10-PCS | Performed by: INTERNAL MEDICINE

## 2024-09-04 RX ORDER — MAGNESIUM SULFATE IN WATER 40 MG/ML
2000 INJECTION, SOLUTION INTRAVENOUS PRN
Status: DISCONTINUED | OUTPATIENT
Start: 2024-09-04 | End: 2024-09-14 | Stop reason: HOSPADM

## 2024-09-04 RX ORDER — SODIUM CHLORIDE 9 MG/ML
INJECTION, SOLUTION INTRAVENOUS PRN
Status: DISCONTINUED | OUTPATIENT
Start: 2024-09-04 | End: 2024-09-04 | Stop reason: HOSPADM

## 2024-09-04 RX ORDER — ALBUTEROL SULFATE 0.83 MG/ML
2.5 SOLUTION RESPIRATORY (INHALATION)
Status: DISCONTINUED | OUTPATIENT
Start: 2024-09-04 | End: 2024-09-14 | Stop reason: HOSPADM

## 2024-09-04 RX ORDER — SODIUM CHLORIDE 9 MG/ML
INJECTION, SOLUTION INTRAVENOUS PRN
Status: CANCELLED | OUTPATIENT
Start: 2024-09-04

## 2024-09-04 RX ORDER — SODIUM CHLORIDE 9 MG/ML
INJECTION, SOLUTION INTRAVENOUS CONTINUOUS PRN
Status: DISCONTINUED | OUTPATIENT
Start: 2024-09-04 | End: 2024-09-04 | Stop reason: SDUPTHER

## 2024-09-04 RX ORDER — SODIUM CHLORIDE 0.9 % (FLUSH) 0.9 %
5-40 SYRINGE (ML) INJECTION PRN
Status: DISCONTINUED | OUTPATIENT
Start: 2024-09-04 | End: 2024-09-14 | Stop reason: HOSPADM

## 2024-09-04 RX ORDER — PROPOFOL 10 MG/ML
5-50 INJECTION, EMULSION INTRAVENOUS CONTINUOUS
Status: DISCONTINUED | OUTPATIENT
Start: 2024-09-04 | End: 2024-09-10

## 2024-09-04 RX ORDER — LIDOCAINE HYDROCHLORIDE 20 MG/ML
INJECTION, SOLUTION EPIDURAL; INFILTRATION; INTRACAUDAL; PERINEURAL PRN
Status: DISCONTINUED | OUTPATIENT
Start: 2024-09-04 | End: 2024-09-04 | Stop reason: SDUPTHER

## 2024-09-04 RX ORDER — POTASSIUM CHLORIDE 1500 MG/1
40 TABLET, EXTENDED RELEASE ORAL PRN
Status: DISCONTINUED | OUTPATIENT
Start: 2024-09-04 | End: 2024-09-14 | Stop reason: HOSPADM

## 2024-09-04 RX ORDER — PROPOFOL 10 MG/ML
40 INJECTION, EMULSION INTRAVENOUS ONCE
Status: DISCONTINUED | OUTPATIENT
Start: 2024-09-04 | End: 2024-09-04

## 2024-09-04 RX ORDER — SODIUM CHLORIDE 9 MG/ML
INJECTION, SOLUTION INTRAVENOUS CONTINUOUS
Status: DISCONTINUED | OUTPATIENT
Start: 2024-09-04 | End: 2024-09-08

## 2024-09-04 RX ORDER — SODIUM CHLORIDE 0.9 % (FLUSH) 0.9 %
5-40 SYRINGE (ML) INJECTION EVERY 12 HOURS SCHEDULED
Status: CANCELLED | OUTPATIENT
Start: 2024-09-04

## 2024-09-04 RX ORDER — INSULIN GLARGINE 100 [IU]/ML
30 INJECTION, SOLUTION SUBCUTANEOUS NIGHTLY
Status: DISCONTINUED | OUTPATIENT
Start: 2024-09-04 | End: 2024-09-04 | Stop reason: DRUGHIGH

## 2024-09-04 RX ORDER — SODIUM CHLORIDE 9 MG/ML
INJECTION, SOLUTION INTRAVENOUS PRN
Status: DISCONTINUED | OUTPATIENT
Start: 2024-09-04 | End: 2024-09-14 | Stop reason: HOSPADM

## 2024-09-04 RX ORDER — PROPOFOL 10 MG/ML
5-50 INJECTION, EMULSION INTRAVENOUS ONCE
Status: COMPLETED | OUTPATIENT
Start: 2024-09-04 | End: 2024-09-04

## 2024-09-04 RX ORDER — ASPIRIN 81 MG/1
81 TABLET, CHEWABLE ORAL DAILY
Status: DISCONTINUED | OUTPATIENT
Start: 2024-09-05 | End: 2024-09-14 | Stop reason: HOSPADM

## 2024-09-04 RX ORDER — INSULIN LISPRO 100 [IU]/ML
0-16 INJECTION, SOLUTION INTRAVENOUS; SUBCUTANEOUS
Status: DISCONTINUED | OUTPATIENT
Start: 2024-09-04 | End: 2024-09-04 | Stop reason: SDUPTHER

## 2024-09-04 RX ORDER — ATORVASTATIN CALCIUM 20 MG/1
20 TABLET, FILM COATED ORAL DAILY
Status: DISCONTINUED | OUTPATIENT
Start: 2024-09-05 | End: 2024-09-14 | Stop reason: HOSPADM

## 2024-09-04 RX ORDER — INSULIN LISPRO 100 [IU]/ML
0-16 INJECTION, SOLUTION INTRAVENOUS; SUBCUTANEOUS
Status: DISCONTINUED | OUTPATIENT
Start: 2024-09-04 | End: 2024-09-05

## 2024-09-04 RX ORDER — INSULIN LISPRO 100 [IU]/ML
0-4 INJECTION, SOLUTION INTRAVENOUS; SUBCUTANEOUS NIGHTLY
Status: DISCONTINUED | OUTPATIENT
Start: 2024-09-04 | End: 2024-09-04 | Stop reason: SDUPTHER

## 2024-09-04 RX ORDER — MIDAZOLAM HYDROCHLORIDE 1 MG/ML
2 INJECTION INTRAMUSCULAR; INTRAVENOUS ONCE
Status: COMPLETED | OUTPATIENT
Start: 2024-09-04 | End: 2024-09-04

## 2024-09-04 RX ORDER — PROPOFOL 10 MG/ML
INJECTION, EMULSION INTRAVENOUS PRN
Status: DISCONTINUED | OUTPATIENT
Start: 2024-09-04 | End: 2024-09-04 | Stop reason: SDUPTHER

## 2024-09-04 RX ORDER — DEXTROSE MONOHYDRATE 100 MG/ML
INJECTION, SOLUTION INTRAVENOUS CONTINUOUS PRN
Status: DISCONTINUED | OUTPATIENT
Start: 2024-09-04 | End: 2024-09-14 | Stop reason: HOSPADM

## 2024-09-04 RX ORDER — INSULIN LISPRO 100 [IU]/ML
0-4 INJECTION, SOLUTION INTRAVENOUS; SUBCUTANEOUS NIGHTLY
Status: DISCONTINUED | OUTPATIENT
Start: 2024-09-04 | End: 2024-09-05

## 2024-09-04 RX ORDER — IPRATROPIUM BROMIDE AND ALBUTEROL SULFATE 2.5; .5 MG/3ML; MG/3ML
1 SOLUTION RESPIRATORY (INHALATION)
Status: CANCELLED | OUTPATIENT
Start: 2024-09-04 | End: 2024-09-05

## 2024-09-04 RX ORDER — ONDANSETRON 4 MG/1
4 TABLET, ORALLY DISINTEGRATING ORAL EVERY 8 HOURS PRN
Status: DISCONTINUED | OUTPATIENT
Start: 2024-09-04 | End: 2024-09-14 | Stop reason: HOSPADM

## 2024-09-04 RX ORDER — IOPAMIDOL 755 MG/ML
75 INJECTION, SOLUTION INTRAVASCULAR
Status: CANCELLED | OUTPATIENT
Start: 2024-09-04

## 2024-09-04 RX ORDER — SODIUM CHLORIDE 0.9 % (FLUSH) 0.9 %
5-40 SYRINGE (ML) INJECTION PRN
Status: CANCELLED | OUTPATIENT
Start: 2024-09-04

## 2024-09-04 RX ORDER — SODIUM CHLORIDE 0.9 % (FLUSH) 0.9 %
5-40 SYRINGE (ML) INJECTION EVERY 12 HOURS SCHEDULED
Status: DISCONTINUED | OUTPATIENT
Start: 2024-09-04 | End: 2024-09-14 | Stop reason: HOSPADM

## 2024-09-04 RX ORDER — INSULIN GLARGINE 100 [IU]/ML
35 INJECTION, SOLUTION SUBCUTANEOUS NIGHTLY
Status: DISCONTINUED | OUTPATIENT
Start: 2024-09-04 | End: 2024-09-14 | Stop reason: DRUGHIGH

## 2024-09-04 RX ORDER — POTASSIUM CHLORIDE 7.45 MG/ML
10 INJECTION INTRAVENOUS PRN
Status: DISCONTINUED | OUTPATIENT
Start: 2024-09-04 | End: 2024-09-14 | Stop reason: HOSPADM

## 2024-09-04 RX ORDER — SODIUM CHLORIDE 0.9 % (FLUSH) 0.9 %
5-40 SYRINGE (ML) INJECTION EVERY 12 HOURS SCHEDULED
Status: DISCONTINUED | OUTPATIENT
Start: 2024-09-04 | End: 2024-09-04 | Stop reason: HOSPADM

## 2024-09-04 RX ORDER — ONDANSETRON 2 MG/ML
4 INJECTION INTRAMUSCULAR; INTRAVENOUS EVERY 6 HOURS PRN
Status: DISCONTINUED | OUTPATIENT
Start: 2024-09-04 | End: 2024-09-14 | Stop reason: HOSPADM

## 2024-09-04 RX ORDER — RANOLAZINE 500 MG/1
500 TABLET, EXTENDED RELEASE ORAL 2 TIMES DAILY
Status: DISCONTINUED | OUTPATIENT
Start: 2024-09-04 | End: 2024-09-14 | Stop reason: HOSPADM

## 2024-09-04 RX ORDER — MIDAZOLAM HYDROCHLORIDE 1 MG/ML
1-10 INJECTION, SOLUTION INTRAVENOUS CONTINUOUS
Status: DISCONTINUED | OUTPATIENT
Start: 2024-09-04 | End: 2024-09-10

## 2024-09-04 RX ORDER — SODIUM CHLORIDE 0.9 % (FLUSH) 0.9 %
5-40 SYRINGE (ML) INJECTION PRN
Status: DISCONTINUED | OUTPATIENT
Start: 2024-09-04 | End: 2024-09-04 | Stop reason: HOSPADM

## 2024-09-04 RX ORDER — ACETAMINOPHEN 325 MG/1
650 TABLET ORAL EVERY 6 HOURS PRN
Status: DISCONTINUED | OUTPATIENT
Start: 2024-09-04 | End: 2024-09-14 | Stop reason: HOSPADM

## 2024-09-04 RX ORDER — IOPAMIDOL 755 MG/ML
75 INJECTION, SOLUTION INTRAVASCULAR
Status: COMPLETED | OUTPATIENT
Start: 2024-09-04 | End: 2024-09-04

## 2024-09-04 RX ORDER — GLUCAGON 1 MG/ML
1 KIT INJECTION PRN
Status: DISCONTINUED | OUTPATIENT
Start: 2024-09-04 | End: 2024-09-14 | Stop reason: HOSPADM

## 2024-09-04 RX ORDER — EPINEPHRINE 1 MG/ML
INJECTION INTRAMUSCULAR; INTRAVENOUS; SUBCUTANEOUS PRN
Status: DISCONTINUED | OUTPATIENT
Start: 2024-09-04 | End: 2024-09-04 | Stop reason: SDUPTHER

## 2024-09-04 RX ORDER — NALOXONE HYDROCHLORIDE 0.4 MG/ML
INJECTION, SOLUTION INTRAMUSCULAR; INTRAVENOUS; SUBCUTANEOUS PRN
Status: CANCELLED | OUTPATIENT
Start: 2024-09-04

## 2024-09-04 RX ORDER — GLYCOPYRROLATE 0.2 MG/ML
INJECTION INTRAMUSCULAR; INTRAVENOUS PRN
Status: DISCONTINUED | OUTPATIENT
Start: 2024-09-04 | End: 2024-09-04 | Stop reason: SDUPTHER

## 2024-09-04 RX ORDER — CARVEDILOL 12.5 MG/1
12.5 TABLET ORAL 2 TIMES DAILY
Status: DISCONTINUED | OUTPATIENT
Start: 2024-09-04 | End: 2024-09-14 | Stop reason: HOSPADM

## 2024-09-04 RX ORDER — POLYETHYLENE GLYCOL 3350 17 G/17G
17 POWDER, FOR SOLUTION ORAL DAILY PRN
Status: DISCONTINUED | OUTPATIENT
Start: 2024-09-04 | End: 2024-09-14 | Stop reason: HOSPADM

## 2024-09-04 RX ORDER — SODIUM CHLORIDE 9 MG/ML
INJECTION, SOLUTION INTRAVENOUS CONTINUOUS
Status: DISCONTINUED | OUTPATIENT
Start: 2024-09-04 | End: 2024-09-04

## 2024-09-04 RX ORDER — ACETAMINOPHEN 650 MG/1
650 SUPPOSITORY RECTAL EVERY 6 HOURS PRN
Status: DISCONTINUED | OUTPATIENT
Start: 2024-09-04 | End: 2024-09-14 | Stop reason: HOSPADM

## 2024-09-04 RX ADMIN — PROPOFOL 175 MCG/KG/MIN: 10 INJECTION, EMULSION INTRAVENOUS at 12:22

## 2024-09-04 RX ADMIN — PROPOFOL 40 MCG/KG/MIN: 10 INJECTION, EMULSION INTRAVENOUS at 13:10

## 2024-09-04 RX ADMIN — EPINEPHRINE 1 MG: 1 INJECTION PARENTERAL at 12:40

## 2024-09-04 RX ADMIN — INSULIN LISPRO 16 UNITS: 100 INJECTION, SOLUTION INTRAVENOUS; SUBCUTANEOUS at 20:53

## 2024-09-04 RX ADMIN — APIXABAN 5 MG: 5 TABLET, FILM COATED ORAL at 20:53

## 2024-09-04 RX ADMIN — SODIUM CHLORIDE, PRESERVATIVE FREE 10 ML: 5 INJECTION INTRAVENOUS at 23:55

## 2024-09-04 RX ADMIN — ALBUTEROL SULFATE 2.5 MG: 2.5 SOLUTION RESPIRATORY (INHALATION) at 21:11

## 2024-09-04 RX ADMIN — SODIUM CHLORIDE: 9 INJECTION, SOLUTION INTRAVENOUS at 12:16

## 2024-09-04 RX ADMIN — PROPOFOL 40 MCG/KG/MIN: 10 INJECTION, EMULSION INTRAVENOUS at 15:36

## 2024-09-04 RX ADMIN — MIDAZOLAM HYDROCHLORIDE 2 MG/HR: 1 INJECTION, SOLUTION INTRAVENOUS at 17:43

## 2024-09-04 RX ADMIN — GLYCOPYRROLATE 0.2 MG: 0.2 INJECTION, SOLUTION INTRAMUSCULAR; INTRAVENOUS at 12:31

## 2024-09-04 RX ADMIN — LIDOCAINE HYDROCHLORIDE 100 MG: 20 INJECTION, SOLUTION EPIDURAL; INFILTRATION; INTRACAUDAL; PERINEURAL at 12:21

## 2024-09-04 RX ADMIN — PROPOFOL 25 MCG/KG/MIN: 10 INJECTION, EMULSION INTRAVENOUS at 14:05

## 2024-09-04 RX ADMIN — PROPOFOL 50 MG: 10 INJECTION, EMULSION INTRAVENOUS at 12:21

## 2024-09-04 RX ADMIN — PROPOFOL 20 MG: 10 INJECTION, EMULSION INTRAVENOUS at 12:29

## 2024-09-04 RX ADMIN — IOPAMIDOL 75 ML: 755 INJECTION, SOLUTION INTRAVENOUS at 15:28

## 2024-09-04 RX ADMIN — PROPOFOL 35 MCG/KG/MIN: 10 INJECTION, EMULSION INTRAVENOUS at 15:01

## 2024-09-04 RX ADMIN — CARVEDILOL 12.5 MG: 12.5 TABLET, FILM COATED ORAL at 20:53

## 2024-09-04 RX ADMIN — MIDAZOLAM 2 MG: 1 INJECTION INTRAMUSCULAR; INTRAVENOUS at 16:47

## 2024-09-04 RX ADMIN — PROPOFOL 20 MCG/KG/MIN: 10 INJECTION, EMULSION INTRAVENOUS at 13:11

## 2024-09-04 RX ADMIN — EPINEPHRINE 1 MG: 1 INJECTION PARENTERAL at 12:42

## 2024-09-04 RX ADMIN — SODIUM CHLORIDE: 9 INJECTION, SOLUTION INTRAVENOUS at 19:57

## 2024-09-04 RX ADMIN — PROPOFOL 30 MG: 10 INJECTION, EMULSION INTRAVENOUS at 12:26

## 2024-09-04 RX ADMIN — PROPOFOL 30 MCG/KG/MIN: 10 INJECTION, EMULSION INTRAVENOUS at 14:35

## 2024-09-04 RX ADMIN — PROPOFOL 40 MCG/KG/MIN: 10 INJECTION, EMULSION INTRAVENOUS at 20:56

## 2024-09-04 RX ADMIN — EPINEPHRINE 0.5 MG: 1 INJECTION PARENTERAL at 12:38

## 2024-09-04 RX ADMIN — INSULIN GLARGINE 35 UNITS: 100 INJECTION, SOLUTION SUBCUTANEOUS at 20:53

## 2024-09-04 ASSESSMENT — PULMONARY FUNCTION TESTS
PIF_VALUE: 25
PIF_VALUE: 22
PIF_VALUE: 23
PIF_VALUE: 21
PIF_VALUE: 25
PIF_VALUE: 22
PIF_VALUE: 24
PIF_VALUE: 26
PIF_VALUE: 24
PIF_VALUE: 23
PIF_VALUE: 21
PIF_VALUE: 30
PIF_VALUE: 23
PIF_VALUE: 19
PIF_VALUE: 23
PIF_VALUE: 37
PIF_VALUE: 25
PIF_VALUE: 24
PIF_VALUE: 22
PIF_VALUE: 24
PIF_VALUE: 25
PIF_VALUE: 24
PIF_VALUE: 22
PIF_VALUE: 40
PIF_VALUE: 19
PIF_VALUE: 26
PIF_VALUE: 22
PIF_VALUE: 25
PIF_VALUE: 24
PIF_VALUE: 28
PIF_VALUE: 20
PIF_VALUE: 24
PIF_VALUE: 0
PIF_VALUE: 26
PIF_VALUE: 21

## 2024-09-04 ASSESSMENT — ENCOUNTER SYMPTOMS
SHORTNESS OF BREATH: 1
DYSPNEA ACTIVITY LEVEL: NO INTERVAL CHANGE

## 2024-09-04 ASSESSMENT — PAIN SCALES - GENERAL
PAINLEVEL_OUTOF10: 2
PAINLEVEL_OUTOF10: 0

## 2024-09-04 ASSESSMENT — COPD QUESTIONNAIRES: CAT_SEVERITY: MODERATE

## 2024-09-04 ASSESSMENT — LIFESTYLE VARIABLES: SMOKING_STATUS: 0

## 2024-09-04 NOTE — CODE DOCUMENTATION
12:34pm- During EGD/colonoscopy procedure, unable to obtain oxygen saturation reading on anesthesia machine. Per endo staff in the room (Delmy Gonzalez, RN and Charlotte Vallejo, endoscopy technician), patient appears to be turning blue. At this time, CRNA exchanging pulse ox location and device to attempt to get a pulse ox reading.  Delmy exited room to retrieve another pulse oximeter reader/vitals machine, when she came back in, patient is more blue and has lost a pulse, so code blue is called at 12:34pm.    1234- compressions started  1235- pulse present, compressions stopped  1236- patient intubated by Dr Spence, anesthesiologist  1238- lost pulse, CPR resumed   0.5 mg epi IV given  1239- asystole on crash cart monitor  1240- /72   1 mg epi IV given  1242-  1 mg epi IV given   Pulse check- pulse present  1243- /50  1244- new pads placed on patient  1247- patient to ER room 15

## 2024-09-04 NOTE — OP NOTE
Endoscopy Note    Patient: Lindsay Lucio  : 1965  Acct#:     Procedure: Esophagogastroduodenoscopy with biopsy   Rivera dilation   Colonoscopy     Date:  2024     Surgeon:  Ja Guadarrama MD    Referring Physician:  Dr. Stevens    Indications: This is a 59 y.o. year old female who presents today with iron deficiency anemia on eliquis for PE in March. Planning EGD/colonoscopy.  Has had a 2cm TVA in  and 5 small adenomas in 2017.  No overt bleeding and prior hysterectomy so no menstrual bleeding.      Anesthesia:  TIVA    Description of Procedure:  Informed consent was obtained from the patient after explanation of indications, benefits and possible risks and complications of the procedure.  The patient was then taken to the endoscopy suite, placed in the left lateral decubitus position and the above IV sedation was administrered.    The Olympus videoendoscope was placed in the patient's mouth and under direct visualization passed into the esophagus. The scope was then advanced to the 2nd portion of the duodenum without difficulty. Views were good, patient toleration was good.  Retroflexion was performed in the stomach.      Findings:  1.  The esophagus appeared normal without evidence of Roberto's esophagus or reflux esophagitis. Given intermittent dysphagia, a 54Fr rivera dilator was passed without mucosal disruption.  2.  There was mild gastritis.  Biopsies were obtained from the antrum and body of the stomach to evaluate for H. Pylori.  3.  Normal duodenum.  The villous pattern appeared normal, but given symptoms, multiple small bowel biopsies were obtained to evaluate for celiac disease.    The scope was then withdrawn back into the stomach, it was decompressed, and the scope was completely withdrawn.    A digital rectal examination was performed and revealed negative without mass, lesions or tenderness.      The Olympus adult video colonoscope was placed in the patient's rectum under  digital direction and advanced to the cecum. The cecum was identified by characteristic anatomy and ballottment.  The prep was good.  The ileocecal valve was identified and intubated.    At this point, anesthesia asked me to pull the scope out because of apnea so I rapidly pulled out the scope and the procedure was terminated.      Impression:  Mild gastritis biopsied.  Otherwise normal EGD. Given intermittent dysphagia, esophageal dilation performed with a 54Fr mar.  I took small bowel biopsies for the celiac disease.  2.  I passed colonoscope to cecum but procedure was aborted at that time because of apnea.    Recommendations:   Code ran by Dr. Spence and patient currently intubated in the Emergency Department under care of Dr. Marroquin.  Dr. Spence and I have discussed with patient's family and I have taken family to patient's bedside in the ER.      Ja Guadarrama MD  [facility]    Of note, procedures being done for acute anemia on eliquis and not iron deficiency anemia.  Jose Guadarrama MD

## 2024-09-04 NOTE — ANESTHESIA PRE PROCEDURE
Department of Anesthesiology  Preprocedure Note       Name:  Lindsay Lucio   Age:  59 y.o.  :  1965                                          MRN:  [id number]         Date:  2024      Surgeon: Surgeon(s):  Ja Guadarrama MD    Procedure: Procedure(s):  COLONOSCOPY  ESOPHAGOGASTRODUODENOSCOPY    Medications prior to admission:   Prior to Admission medications    Medication Sig Start Date End Date Taking? Authorizing Provider   OXYGEN Inhale 2 L/min into the lungs continuous   Yes Dylan Caicedo MD   atorvastatin (LIPITOR) 20 MG tablet TAKE 1 TABLET BY MOUTH DAILY 24  Yes Rosario Stevens MD   losartan (COZAAR) 50 MG tablet TAKE 1 TABLET BY MOUTH 2 TIMES A DAY 24  Yes Rosario Stevens MD   ezetimibe (ZETIA) 10 MG tablet TAKE 1 TABLET BY MOUTH DAILY 24  Yes Rosario Stevens MD   polyethylene glycol (GLYCOLAX) 17 GM/SCOOP powder Take 17 g by mouth daily 24 Yes Rosario Stevens MD   diazePAM (VALIUM) 5 MG tablet Take 1 tablet by mouth daily as needed for Anxiety for up to 30 days. Max Daily Amount: 5 mg 24 Yes Rosario Stevens MD   HYDROcodone-acetaminophen (NORCO) 5-325 MG per tablet Take 1 tablet by mouth 2 times daily for 30 days. 24 Yes Rosario Stevens MD   LANTUS SOLOSTAR 100 UNIT/ML injection pen INJECT 75 UNITS UNDER THE SKIN ONCE NIGHTLY 24  Yes Rosario Stevens MD   ranolazine (RANEXA) 500 MG extended release tablet Take 1 tablet by mouth 2 times daily 24  Yes Berhane Hines MD   carvedilol (COREG) 12.5 MG tablet TAKE 1 TABLET BY MOUTH TWICE A DAY 24  Yes Rosario Stevens MD   furosemide (LASIX) 20 MG tablet TAKE 1 TABLET BY MOUTH DAILY 24  Yes Rosario Stevens MD   insulin lispro (HUMALOG,ADMELOG) 100 UNIT/ML SOLN injection vial INJECT 10 UNITS UNDER THE SKIN THREE TIMES A DAY BEFORE MEALS 24  Yes Rosario Stevens MD   ipratropium 0.5 mg-albuterol 2.5 mg (DUONEB) 0.5-2.5 (3)

## 2024-09-04 NOTE — H&P
Pre-operative History and Physical    Patient: Lindsay Lucio  : 1965  Acct#:     HISTORY OF PRESENT ILLNESS:    The patient is a 59 y.o. female who presents with  iron deficiency anemia on eliquis for PE in March. Planning EGD/colonoscopy.  Has had a 2cm TVA in  and 5 small adenomas in .  No overt bleeding and prior hysterectomy so no menstrual bleeding.      Past Medical History:        Diagnosis Date    Acute ST elevation myocardial infarction (STEMI) involving right coronary artery (HCC) 10/06/2020    Arthritis     Back pain     CAD (coronary artery disease)     Chronic kidney disease     COPD (chronic obstructive pulmonary disease) (HCC)     Depression     Diabetes mellitus (HCC)     REHMAN (dyspnea on exertion)     Eye abnormalities     bleed in back of eyes with injections Q 8 weeks    Fatty liver     GERD (gastroesophageal reflux disease)     Hx of blood clots     ? PE 3 weeks ago ~    Hyperlipidemia     Hypertension     MRSA (methicillin resistant staph aureus) culture positive 08/15/2018    arm    On home O2     2 l/m    Oxygen deficiency     Stress incontinence     Thyroid disease     CYST ON THYROID--FOUND 20 YEARS AGO      Past Surgical History:        Procedure Laterality Date    ANKLE FRACTURE SURGERY Right 2023    RIGHT OPEN REDUCTION AND INTERNAL FIXATION LATERAL MALLEOLUS AND SYNDESMOTIC REPAIR-MICHAEL performed by Wyatt Garsia MD at [facility] ASC OR    CARDIAC CATHETERIZATION      X 2     SECTION      TIMES 3    CHOLECYSTECTOMY      COLONOSCOPY  2017      Colonoscopy with snare and biopsy    EYE SURGERY Bilateral     muscle correction and eyelid     HERNIA REPAIR      UMBILICAL AREA x 2    HYSTERECTOMY, TOTAL ABDOMINAL (CERVIX REMOVED)      PTCA  10/2020    SHEFALI to RCA x 2; SHEFALI to LAD    SHOULDER ARTHROSCOPY Right 2015    Right shoulder arthroscopy labral debridement open [place] subacromial decompression and chrondroplasty    UPPER GASTROINTESTINAL  CAPSULE BY MOUTH EVERY DAY 90 capsule 2    apixaban (ELIQUIS) 5 MG TABS tablet Take 1 tablet by mouth 2 times daily (Patient taking differently: Take 1 tablet by mouth 2 times daily To hold 2 days prior to procedure) 60 tablet 2    Continuous Glucose Sensor (FREESTYLE JACIEL 2 SENSOR) MISC USE AS DIRECTED TO TEST BLOOD SUGAR LEVELS THREE TIMES A DAY. CHANGE EVERY 14 DAYS 2 each 2    Insulin Syringe-Needle U-100 (BD INSULIN SYRINGE U/F) 31G X 5/16\" 1 ML MISC USE AS DIRECTED WITH INSULIN INJECTIONS NIGHTLY 100 each 2    INSULIN SYRINGE 1CC/29G ([employer] INS SYRINGE 1CC/29G) 29G X 1/2\" 1 ML MISC 1 each by Does not apply route 4 times daily 120 each 3    Continuous Blood Gluc  (FREESTYLE JACIEL 2 READER) ASHWIN tid 90 each 2    aspirin 81 MG chewable tablet TAKE 1 TABLET BY MOUTH EVERY DAY 30 tablet 3    Insulin Pen Needle 32G X 4 MM MISC 1 each by Does not apply route daily 100 each 3        Allergies:  Latex, Tramadol, Codeine, and Penicillins    Social History:   Social History     Socioeconomic History    Marital status:      Spouse name: Not on file    Number of children: Not on file    Years of education: Not on file    Highest education level: Not on file   Occupational History    Not on file   Tobacco Use    Smoking status: Former     Current packs/day: 0.00     Average packs/day: 1 pack/day for 30.0 years (30.0 ttl pk-yrs)     Types: Cigarettes     Start date: 1985     Quit date: 2015     Years since quittin.6     Passive exposure: Past    Smokeless tobacco: Never   Vaping Use    Vaping status: Never Used   Substance and Sexual Activity    Alcohol use: No    Drug use: No    Sexual activity: Not Currently     Partners: Male   Other Topics Concern    Not on file   Social History Narrative    Not on file     Social Determinants of Health     Financial Resource Strain: Not on file   Food Insecurity: No Food Insecurity (2024)    Hunger Vital Sign     Worried About Running Out of Food in

## 2024-09-04 NOTE — ANESTHESIA POSTPROCEDURE EVALUATION
Department of Anesthesiology  Postprocedure Note    Patient: Lindsay Lucio  MRN: [id number]  YOB: 1965  Date of evaluation: 9/4/2024    Procedure Summary       Date: 09/04/24 Room / Location: [address]    Anesthesia Start: 1216 Anesthesia Stop: 1247    Procedures:       COLONOSCOPY      ESOPHAGOGASTRODUODENOSCOPY BIOPSY and 54 Khmer mar dilation Diagnosis:       Anemia, unspecified type      (Anemia, unspecified type [D64.9])    Surgeons: Ja Guadarrama MD Responsible Provider: Tiffani Spence MD    Anesthesia Type: General ASA Status: 4            Anesthesia Type: General    Kiko Phase I: Kiko Score: 9    Kiko Phase II:      Anesthesia Post Evaluation    Patient location during evaluation: ED  Patient participation: complete - patient cannot participate  Level of consciousness: sedated and ventilated  Nausea & Vomiting: no nausea  Cardiovascular status: hemodynamically stable  Respiratory status: acceptable  Hydration status: stable  Pain management: adequate    No notable events documented.

## 2024-09-05 ENCOUNTER — APPOINTMENT (OUTPATIENT)
Dept: MRI IMAGING | Age: 59
DRG: 005 | End: 2024-09-05
Payer: COMMERCIAL

## 2024-09-05 ENCOUNTER — APPOINTMENT (OUTPATIENT)
Age: 59
DRG: 005 | End: 2024-09-05
Attending: HOSPITALIST
Payer: COMMERCIAL

## 2024-09-05 PROBLEM — D64.9 ANEMIA: Status: ACTIVE | Noted: 2024-09-05

## 2024-09-05 LAB
ANION GAP SERPL CALCULATED.3IONS-SCNC: 11 MMOL/L (ref 3–16)
BASE EXCESS BLDV CALC-SCNC: 3.9 MMOL/L
BASOPHILS # BLD: 0 K/UL (ref 0–0.2)
BASOPHILS NFR BLD: 0.2 %
BUN SERPL-MCNC: 11 MG/DL (ref 7–20)
CALCIUM SERPL-MCNC: 8.1 MG/DL (ref 8.3–10.6)
CHLORIDE SERPL-SCNC: 107 MMOL/L (ref 99–110)
CO2 BLDV-SCNC: 34 MMOL/L
CO2 SERPL-SCNC: 26 MMOL/L (ref 21–32)
COHGB MFR BLDV: 1.4 %
CREAT SERPL-MCNC: 0.7 MG/DL (ref 0.6–1.2)
DEPRECATED RDW RBC AUTO: 16.9 % (ref 12.4–15.4)
ECHO AO ASC DIAM: 3.6 CM
ECHO AO ASCENDING AORTA INDEX: 1.9 CM/M2
ECHO AO ROOT DIAM: 3.4 CM
ECHO AO ROOT INDEX: 1.8 CM/M2
ECHO AV AREA PEAK VELOCITY: 2.4 CM2
ECHO AV AREA VTI: 2.4 CM2
ECHO AV AREA/BSA PEAK VELOCITY: 1.3 CM2/M2
ECHO AV AREA/BSA VTI: 1.3 CM2/M2
ECHO AV MEAN GRADIENT: 6 MMHG
ECHO AV MEAN VELOCITY: 1.2 M/S
ECHO AV PEAK GRADIENT: 11 MMHG
ECHO AV PEAK VELOCITY: 1.7 M/S
ECHO AV VELOCITY RATIO: 0.82
ECHO AV VTI: 39.5 CM
ECHO BSA: 1.98 M2
ECHO IVC EXP: 2.2 CM
ECHO LA AREA 2C: 17.6 CM2
ECHO LA AREA 4C: 17.6 CM2
ECHO LA MAJOR AXIS: 5.2 CM
ECHO LA MINOR AXIS: 5.3 CM
ECHO LA VOL BP: 48 ML (ref 22–52)
ECHO LA VOL MOD A2C: 47 ML (ref 22–52)
ECHO LA VOL MOD A4C: 48 ML (ref 22–52)
ECHO LA VOL/BSA BIPLANE: 25 ML/M2 (ref 16–34)
ECHO LA VOLUME INDEX MOD A2C: 25 ML/M2 (ref 16–34)
ECHO LA VOLUME INDEX MOD A4C: 25 ML/M2 (ref 16–34)
ECHO LV E' LATERAL VELOCITY: 9 CM/S
ECHO LV E' SEPTAL VELOCITY: 7 CM/S
ECHO LV EF PHYSICIAN: 70 %
ECHO LV FRACTIONAL SHORTENING: 40 % (ref 28–44)
ECHO LV INTERNAL DIMENSION DIASTOLE INDEX: 2.12 CM/M2
ECHO LV INTERNAL DIMENSION DIASTOLIC: 4 CM (ref 3.9–5.3)
ECHO LV INTERNAL DIMENSION SYSTOLIC INDEX: 1.27 CM/M2
ECHO LV INTERNAL DIMENSION SYSTOLIC: 2.4 CM
ECHO LV IVSD: 1.3 CM (ref 0.6–0.9)
ECHO LV MASS 2D: 186.5 G (ref 67–162)
ECHO LV MASS INDEX 2D: 98.7 G/M2 (ref 43–95)
ECHO LV POSTERIOR WALL DIASTOLIC: 1.3 CM (ref 0.6–0.9)
ECHO LV RELATIVE WALL THICKNESS RATIO: 0.65
ECHO LVOT AREA: 2.8 CM2
ECHO LVOT AV VTI INDEX: 0.81
ECHO LVOT DIAM: 1.9 CM
ECHO LVOT MEAN GRADIENT: 4 MMHG
ECHO LVOT PEAK GRADIENT: 7 MMHG
ECHO LVOT PEAK VELOCITY: 1.4 M/S
ECHO LVOT STROKE VOLUME INDEX: 47.8 ML/M2
ECHO LVOT SV: 90.4 ML
ECHO LVOT VTI: 31.9 CM
ECHO MV A VELOCITY: 1.27 M/S
ECHO MV AREA VTI: 2.4 CM2
ECHO MV E DECELERATION TIME (DT): 279 MS
ECHO MV E VELOCITY: 1.03 M/S
ECHO MV E/A RATIO: 0.81
ECHO MV E/E' LATERAL: 11.44
ECHO MV E/E' RATIO (AVERAGED): 13.08
ECHO MV E/E' SEPTAL: 14.71
ECHO MV LVOT VTI INDEX: 1.2
ECHO MV MAX VELOCITY: 1.3 M/S
ECHO MV MEAN GRADIENT: 3 MMHG
ECHO MV MEAN VELOCITY: 0.9 M/S
ECHO MV PEAK GRADIENT: 7 MMHG
ECHO MV VTI: 38.4 CM
ECHO PV MAX VELOCITY: 1.4 M/S
ECHO PV PEAK GRADIENT: 7 MMHG
ECHO RA AREA 4C: 22.5 CM2
ECHO RA END SYSTOLIC VOLUME APICAL 4 CHAMBER INDEX BSA: 39 ML/M2
ECHO RA VOLUME: 73 ML
ECHO RV BASAL DIMENSION: 4.7 CM
ECHO RV FREE WALL PEAK S': 18 CM/S
ECHO RV LONGITUDINAL DIMENSION: 8.9 CM
ECHO RV MID DIMENSION: 3.7 CM
ECHO RV TAPSE: 2.6 CM (ref 1.7–?)
EKG ATRIAL RATE: 106 BPM
EKG DIAGNOSIS: NORMAL
EKG P AXIS: 64 DEGREES
EKG P-R INTERVAL: 162 MS
EKG Q-T INTERVAL: 380 MS
EKG QRS DURATION: 152 MS
EKG QTC CALCULATION (BAZETT): 504 MS
EKG R AXIS: 3 DEGREES
EKG T AXIS: 30 DEGREES
EKG VENTRICULAR RATE: 106 BPM
EOSINOPHIL # BLD: 0 K/UL (ref 0–0.6)
EOSINOPHIL NFR BLD: 0.1 %
GFR SERPLBLD CREATININE-BSD FMLA CKD-EPI: >90 ML/MIN/{1.73_M2}
GLUCOSE BLD-MCNC: 151 MG/DL (ref 70–99)
GLUCOSE BLD-MCNC: 187 MG/DL (ref 70–99)
GLUCOSE BLD-MCNC: 196 MG/DL (ref 70–99)
GLUCOSE BLD-MCNC: 228 MG/DL (ref 70–99)
GLUCOSE BLD-MCNC: 232 MG/DL (ref 70–99)
GLUCOSE BLD-MCNC: 286 MG/DL (ref 70–99)
GLUCOSE SERPL-MCNC: 172 MG/DL (ref 70–99)
HCO3 BLDV-SCNC: 32 MMOL/L (ref 23–29)
HCT VFR BLD AUTO: 28.8 % (ref 36–48)
HGB BLD-MCNC: 9.8 G/DL (ref 12–16)
LACTATE BLDV-SCNC: 1.5 MMOL/L (ref 0.4–2)
LYMPHOCYTES # BLD: 0.7 K/UL (ref 1–5.1)
LYMPHOCYTES NFR BLD: 7.6 %
MAGNESIUM SERPL-MCNC: 1.83 MG/DL (ref 1.8–2.4)
MCH RBC QN AUTO: 28.6 PG (ref 26–34)
MCHC RBC AUTO-ENTMCNC: 33.9 G/DL (ref 31–36)
MCV RBC AUTO: 84.3 FL (ref 80–100)
METHGB MFR BLDV: 0.4 %
MONOCYTES # BLD: 0.6 K/UL (ref 0–1.3)
MONOCYTES NFR BLD: 6.3 %
NEUTROPHILS # BLD: 7.9 K/UL (ref 1.7–7.7)
NEUTROPHILS NFR BLD: 85.8 %
O2 THERAPY: ABNORMAL
PCO2 BLDV: 59.3 MMHG (ref 40–50)
PERFORMED ON: ABNORMAL
PH BLDV: 7.34 [PH] (ref 7.35–7.45)
PHOSPHATE SERPL-MCNC: 2.2 MG/DL (ref 2.5–4.9)
PLATELET # BLD AUTO: 111 K/UL (ref 135–450)
PMV BLD AUTO: 8.8 FL (ref 5–10.5)
PO2 BLDV: <30 MMHG
POTASSIUM SERPL-SCNC: 3.6 MMOL/L (ref 3.5–5.1)
PROCALCITONIN SERPL IA-MCNC: 0.08 NG/ML (ref 0–0.15)
RBC # BLD AUTO: 3.41 M/UL (ref 4–5.2)
REPORT: NORMAL
RESP PATH DNA+RNA PNL L RESP NAA+NON-PRB: NORMAL
SAO2 % BLDV: 44 %
SODIUM SERPL-SCNC: 144 MMOL/L (ref 136–145)
WBC # BLD AUTO: 9.3 K/UL (ref 4–11)

## 2024-09-05 PROCEDURE — 80048 BASIC METABOLIC PNL TOTAL CA: CPT

## 2024-09-05 PROCEDURE — 6360000002 HC RX W HCPCS: Performed by: NURSE PRACTITIONER

## 2024-09-05 PROCEDURE — 6360000002 HC RX W HCPCS: Performed by: EMERGENCY MEDICINE

## 2024-09-05 PROCEDURE — 2700000000 HC OXYGEN THERAPY PER DAY

## 2024-09-05 PROCEDURE — 85025 COMPLETE CBC W/AUTO DIFF WBC: CPT

## 2024-09-05 PROCEDURE — 6360000002 HC RX W HCPCS: Performed by: HOSPITALIST

## 2024-09-05 PROCEDURE — 83735 ASSAY OF MAGNESIUM: CPT

## 2024-09-05 PROCEDURE — 6370000000 HC RX 637 (ALT 250 FOR IP): Performed by: INTERNAL MEDICINE

## 2024-09-05 PROCEDURE — 99291 CRITICAL CARE FIRST HOUR: CPT | Performed by: INTERNAL MEDICINE

## 2024-09-05 PROCEDURE — 84100 ASSAY OF PHOSPHORUS: CPT

## 2024-09-05 PROCEDURE — 87633 RESP VIRUS 12-25 TARGETS: CPT

## 2024-09-05 PROCEDURE — 84145 PROCALCITONIN (PCT): CPT

## 2024-09-05 PROCEDURE — 93010 ELECTROCARDIOGRAM REPORT: CPT | Performed by: STUDENT IN AN ORGANIZED HEALTH CARE EDUCATION/TRAINING PROGRAM

## 2024-09-05 PROCEDURE — 36415 COLL VENOUS BLD VENIPUNCTURE: CPT

## 2024-09-05 PROCEDURE — 6370000000 HC RX 637 (ALT 250 FOR IP): Performed by: NURSE PRACTITIONER

## 2024-09-05 PROCEDURE — 94003 VENT MGMT INPAT SUBQ DAY: CPT

## 2024-09-05 PROCEDURE — 83605 ASSAY OF LACTIC ACID: CPT

## 2024-09-05 PROCEDURE — 87205 SMEAR GRAM STAIN: CPT

## 2024-09-05 PROCEDURE — 2580000003 HC RX 258: Performed by: NURSE PRACTITIONER

## 2024-09-05 PROCEDURE — 93306 TTE W/DOPPLER COMPLETE: CPT | Performed by: INTERNAL MEDICINE

## 2024-09-05 PROCEDURE — 6370000000 HC RX 637 (ALT 250 FOR IP): Performed by: REGISTERED NURSE

## 2024-09-05 PROCEDURE — 82803 BLOOD GASES ANY COMBINATION: CPT

## 2024-09-05 PROCEDURE — 2000000000 HC ICU R&B

## 2024-09-05 PROCEDURE — 93306 TTE W/DOPPLER COMPLETE: CPT

## 2024-09-05 PROCEDURE — 94761 N-INVAS EAR/PLS OXIMETRY MLT: CPT

## 2024-09-05 PROCEDURE — 6370000000 HC RX 637 (ALT 250 FOR IP): Performed by: HOSPITALIST

## 2024-09-05 PROCEDURE — 99254 IP/OBS CNSLTJ NEW/EST MOD 60: CPT | Performed by: INTERNAL MEDICINE

## 2024-09-05 PROCEDURE — 94640 AIRWAY INHALATION TREATMENT: CPT

## 2024-09-05 PROCEDURE — 87070 CULTURE OTHR SPECIMN AEROBIC: CPT

## 2024-09-05 PROCEDURE — 2500000003 HC RX 250 WO HCPCS: Performed by: NURSE PRACTITIONER

## 2024-09-05 PROCEDURE — 2580000003 HC RX 258: Performed by: HOSPITALIST

## 2024-09-05 PROCEDURE — 70551 MRI BRAIN STEM W/O DYE: CPT

## 2024-09-05 RX ORDER — MIDAZOLAM HYDROCHLORIDE 1 MG/ML
2 INJECTION INTRAMUSCULAR; INTRAVENOUS ONCE
Status: DISCONTINUED | OUTPATIENT
Start: 2024-09-05 | End: 2024-09-07

## 2024-09-05 RX ORDER — BUDESONIDE AND FORMOTEROL FUMARATE DIHYDRATE 160; 4.5 UG/1; UG/1
2 AEROSOL RESPIRATORY (INHALATION)
Status: DISCONTINUED | OUTPATIENT
Start: 2024-09-05 | End: 2024-09-14 | Stop reason: HOSPADM

## 2024-09-05 RX ORDER — MAGNESIUM SULFATE IN WATER 40 MG/ML
2000 INJECTION, SOLUTION INTRAVENOUS ONCE
Status: COMPLETED | OUTPATIENT
Start: 2024-09-05 | End: 2024-09-05

## 2024-09-05 RX ORDER — INSULIN LISPRO 100 [IU]/ML
0-16 INJECTION, SOLUTION INTRAVENOUS; SUBCUTANEOUS EVERY 4 HOURS
Status: DISCONTINUED | OUTPATIENT
Start: 2024-09-05 | End: 2024-09-14 | Stop reason: HOSPADM

## 2024-09-05 RX ORDER — LEVETIRACETAM 500 MG/5ML
1000 INJECTION, SOLUTION, CONCENTRATE INTRAVENOUS 2 TIMES DAILY
Status: DISCONTINUED | OUTPATIENT
Start: 2024-09-05 | End: 2024-09-06

## 2024-09-05 RX ORDER — MIDAZOLAM HYDROCHLORIDE 1 MG/ML
2 INJECTION INTRAMUSCULAR; INTRAVENOUS ONCE
Status: COMPLETED | OUTPATIENT
Start: 2024-09-05 | End: 2024-09-05

## 2024-09-05 RX ORDER — ACETAMINOPHEN 500 MG
1000 TABLET ORAL ONCE
Status: COMPLETED | OUTPATIENT
Start: 2024-09-05 | End: 2024-09-05

## 2024-09-05 RX ORDER — INSULIN LISPRO 100 [IU]/ML
6 INJECTION, SOLUTION INTRAVENOUS; SUBCUTANEOUS ONCE
Status: COMPLETED | OUTPATIENT
Start: 2024-09-05 | End: 2024-09-05

## 2024-09-05 RX ADMIN — SODIUM CHLORIDE, PRESERVATIVE FREE 20 MG: 5 INJECTION INTRAVENOUS at 09:14

## 2024-09-05 RX ADMIN — ASPIRIN 81 MG: 81 TABLET, CHEWABLE ORAL at 09:14

## 2024-09-05 RX ADMIN — SODIUM CHLORIDE, PRESERVATIVE FREE 20 MG: 5 INJECTION INTRAVENOUS at 22:40

## 2024-09-05 RX ADMIN — INSULIN GLARGINE 35 UNITS: 100 INJECTION, SOLUTION SUBCUTANEOUS at 22:40

## 2024-09-05 RX ADMIN — INSULIN LISPRO 4 UNITS: 100 INJECTION, SOLUTION INTRAVENOUS; SUBCUTANEOUS at 12:24

## 2024-09-05 RX ADMIN — BUDESONIDE AND FORMOTEROL FUMARATE DIHYDRATE 2 PUFF: 160; 4.5 AEROSOL RESPIRATORY (INHALATION) at 08:53

## 2024-09-05 RX ADMIN — ATORVASTATIN CALCIUM 20 MG: 20 TABLET, FILM COATED ORAL at 09:13

## 2024-09-05 RX ADMIN — ALBUTEROL SULFATE 2.5 MG: 2.5 SOLUTION RESPIRATORY (INHALATION) at 15:46

## 2024-09-05 RX ADMIN — SODIUM CHLORIDE, PRESERVATIVE FREE 10 ML: 5 INJECTION INTRAVENOUS at 22:46

## 2024-09-05 RX ADMIN — ACETAMINOPHEN 650 MG: 650 SUPPOSITORY RECTAL at 07:25

## 2024-09-05 RX ADMIN — PROPOFOL 25 MCG/KG/MIN: 10 INJECTION, EMULSION INTRAVENOUS at 19:48

## 2024-09-05 RX ADMIN — INSULIN LISPRO 6 UNITS: 100 INJECTION, SOLUTION INTRAVENOUS; SUBCUTANEOUS at 01:40

## 2024-09-05 RX ADMIN — INSULIN LISPRO 4 UNITS: 100 INJECTION, SOLUTION INTRAVENOUS; SUBCUTANEOUS at 16:35

## 2024-09-05 RX ADMIN — ALBUTEROL SULFATE 2.5 MG: 2.5 SOLUTION RESPIRATORY (INHALATION) at 20:13

## 2024-09-05 RX ADMIN — BUDESONIDE AND FORMOTEROL FUMARATE DIHYDRATE 2 PUFF: 160; 4.5 AEROSOL RESPIRATORY (INHALATION) at 20:13

## 2024-09-05 RX ADMIN — CARVEDILOL 12.5 MG: 12.5 TABLET, FILM COATED ORAL at 09:14

## 2024-09-05 RX ADMIN — MAGNESIUM SULFATE HEPTAHYDRATE 2000 MG: 40 INJECTION, SOLUTION INTRAVENOUS at 09:49

## 2024-09-05 RX ADMIN — CARVEDILOL 12.5 MG: 12.5 TABLET, FILM COATED ORAL at 22:39

## 2024-09-05 RX ADMIN — SODIUM CHLORIDE, PRESERVATIVE FREE 10 ML: 5 INJECTION INTRAVENOUS at 09:49

## 2024-09-05 RX ADMIN — APIXABAN 5 MG: 5 TABLET, FILM COATED ORAL at 22:39

## 2024-09-05 RX ADMIN — MIDAZOLAM HYDROCHLORIDE 2 MG/HR: 1 INJECTION, SOLUTION INTRAVENOUS at 23:26

## 2024-09-05 RX ADMIN — ACETAMINOPHEN 1000 MG: 500 TABLET ORAL at 01:40

## 2024-09-05 RX ADMIN — SODIUM CHLORIDE: 9 INJECTION, SOLUTION INTRAVENOUS at 16:30

## 2024-09-05 RX ADMIN — PROPOFOL 25 MCG/KG/MIN: 10 INJECTION, EMULSION INTRAVENOUS at 07:06

## 2024-09-05 RX ADMIN — PROPOFOL 35 MCG/KG/MIN: 10 INJECTION, EMULSION INTRAVENOUS at 01:50

## 2024-09-05 RX ADMIN — PROPOFOL 25 MCG/KG/MIN: 10 INJECTION, EMULSION INTRAVENOUS at 14:42

## 2024-09-05 RX ADMIN — LEVETIRACETAM 1000 MG: 100 INJECTION INTRAVENOUS at 12:11

## 2024-09-05 RX ADMIN — ALBUTEROL SULFATE 2.5 MG: 2.5 SOLUTION RESPIRATORY (INHALATION) at 11:59

## 2024-09-05 RX ADMIN — ACETAMINOPHEN 650 MG: 650 SUPPOSITORY RECTAL at 18:23

## 2024-09-05 RX ADMIN — SODIUM PHOSPHATE, MONOBASIC, MONOHYDRATE AND SODIUM PHOSPHATE, DIBASIC, ANHYDROUS 20 MMOL: 142; 276 INJECTION, SOLUTION INTRAVENOUS at 12:06

## 2024-09-05 RX ADMIN — ALBUTEROL SULFATE 2.5 MG: 2.5 SOLUTION RESPIRATORY (INHALATION) at 07:25

## 2024-09-05 RX ADMIN — APIXABAN 5 MG: 5 TABLET, FILM COATED ORAL at 09:14

## 2024-09-05 RX ADMIN — LEVETIRACETAM 1000 MG: 100 INJECTION INTRAVENOUS at 22:39

## 2024-09-05 RX ADMIN — MIDAZOLAM 2 MG: 1 INJECTION INTRAMUSCULAR; INTRAVENOUS at 11:48

## 2024-09-05 ASSESSMENT — PULMONARY FUNCTION TESTS
PIF_VALUE: 17
PIF_VALUE: 20
PIF_VALUE: 27
PIF_VALUE: 11
PIF_VALUE: 13
PIF_VALUE: 14
PIF_VALUE: 15
PIF_VALUE: 21
PIF_VALUE: 32
PIF_VALUE: 18
PIF_VALUE: 21
PIF_VALUE: 21
PIF_VALUE: 30
PIF_VALUE: 20
PIF_VALUE: 11
PIF_VALUE: 19
PIF_VALUE: 11
PIF_VALUE: 22
PIF_VALUE: 26
PIF_VALUE: 21
PIF_VALUE: 20
PIF_VALUE: 10
PIF_VALUE: 11
PIF_VALUE: 14
PIF_VALUE: 22
PIF_VALUE: 30
PIF_VALUE: 8
PIF_VALUE: 10
PIF_VALUE: 26
PIF_VALUE: 24
PIF_VALUE: 22
PIF_VALUE: 27
PIF_VALUE: 20
PIF_VALUE: 12
PIF_VALUE: 16
PIF_VALUE: 27
PIF_VALUE: 11
PIF_VALUE: 9
PIF_VALUE: 16
PIF_VALUE: 9
PIF_VALUE: 23
PIF_VALUE: 24
PIF_VALUE: 11
PIF_VALUE: 23
PIF_VALUE: 20
PIF_VALUE: 8
PIF_VALUE: 20
PIF_VALUE: 25
PIF_VALUE: 23
PIF_VALUE: 38
PIF_VALUE: 28
PIF_VALUE: 23
PIF_VALUE: 22
PIF_VALUE: 35
PIF_VALUE: 14
PIF_VALUE: 26
PIF_VALUE: 17
PIF_VALUE: 24
PIF_VALUE: 36
PIF_VALUE: 25
PIF_VALUE: 22
PIF_VALUE: 22
PIF_VALUE: 20
PIF_VALUE: 38
PIF_VALUE: 21
PIF_VALUE: 19
PIF_VALUE: 30
PIF_VALUE: 20
PIF_VALUE: 23
PIF_VALUE: 21
PIF_VALUE: 21
PIF_VALUE: 13
PIF_VALUE: 24
PIF_VALUE: 18
PIF_VALUE: 20
PIF_VALUE: 20
PIF_VALUE: 31

## 2024-09-05 ASSESSMENT — PAIN SCALES - GENERAL
PAINLEVEL_OUTOF10: 2
PAINLEVEL_OUTOF10: 0
PAINLEVEL_OUTOF10: 2
PAINLEVEL_OUTOF10: 2
PAINLEVEL_OUTOF10: 0

## 2024-09-06 ENCOUNTER — APPOINTMENT (OUTPATIENT)
Dept: GENERAL RADIOLOGY | Age: 59
DRG: 005 | End: 2024-09-06
Payer: COMMERCIAL

## 2024-09-06 LAB
ANION GAP SERPL CALCULATED.3IONS-SCNC: 10 MMOL/L (ref 3–16)
BASE EXCESS BLDV CALC-SCNC: 2.8 MMOL/L
BASE EXCESS BLDV CALC-SCNC: 3.7 MMOL/L
BASOPHILS # BLD: 0 K/UL (ref 0–0.2)
BASOPHILS NFR BLD: 0.1 %
BUN SERPL-MCNC: 17 MG/DL (ref 7–20)
CALCIUM SERPL-MCNC: 8.5 MG/DL (ref 8.3–10.6)
CHLORIDE SERPL-SCNC: 105 MMOL/L (ref 99–110)
CO2 BLDV-SCNC: 33 MMOL/L
CO2 BLDV-SCNC: 35 MMOL/L
CO2 SERPL-SCNC: 28 MMOL/L (ref 21–32)
COHGB MFR BLDV: 1.3 %
COHGB MFR BLDV: 1.5 %
CREAT SERPL-MCNC: 0.7 MG/DL (ref 0.6–1.2)
DEPRECATED RDW RBC AUTO: 16.9 % (ref 12.4–15.4)
EOSINOPHIL # BLD: 0 K/UL (ref 0–0.6)
EOSINOPHIL NFR BLD: 0.1 %
GFR SERPLBLD CREATININE-BSD FMLA CKD-EPI: >90 ML/MIN/{1.73_M2}
GLUCOSE BLD-MCNC: 140 MG/DL (ref 70–99)
GLUCOSE BLD-MCNC: 144 MG/DL (ref 70–99)
GLUCOSE BLD-MCNC: 146 MG/DL (ref 70–99)
GLUCOSE BLD-MCNC: 153 MG/DL (ref 70–99)
GLUCOSE BLD-MCNC: 164 MG/DL (ref 70–99)
GLUCOSE BLD-MCNC: 176 MG/DL (ref 70–99)
GLUCOSE BLD-MCNC: 203 MG/DL (ref 70–99)
GLUCOSE SERPL-MCNC: 152 MG/DL (ref 70–99)
HCO3 BLDV-SCNC: 31 MMOL/L (ref 23–29)
HCO3 BLDV-SCNC: 32 MMOL/L (ref 23–29)
HCT VFR BLD AUTO: 30.9 % (ref 36–48)
HGB BLD-MCNC: 10.2 G/DL (ref 12–16)
LYMPHOCYTES # BLD: 0.8 K/UL (ref 1–5.1)
LYMPHOCYTES NFR BLD: 6.7 %
MAGNESIUM SERPL-MCNC: 2.19 MG/DL (ref 1.8–2.4)
MCH RBC QN AUTO: 28.4 PG (ref 26–34)
MCHC RBC AUTO-ENTMCNC: 32.9 G/DL (ref 31–36)
MCV RBC AUTO: 86.2 FL (ref 80–100)
METHGB MFR BLDV: 0.4 %
METHGB MFR BLDV: 0.7 %
MONOCYTES # BLD: 0.7 K/UL (ref 0–1.3)
MONOCYTES NFR BLD: 6.1 %
NEUTROPHILS # BLD: 9.9 K/UL (ref 1.7–7.7)
NEUTROPHILS NFR BLD: 87 %
O2 THERAPY: ABNORMAL
O2 THERAPY: ABNORMAL
PCO2 BLDV: 64.4 MMHG (ref 40–50)
PCO2 BLDV: 75.1 MMHG (ref 40–50)
PERFORMED ON: ABNORMAL
PH BLDV: 7.24 [PH] (ref 7.35–7.45)
PH BLDV: 7.3 [PH] (ref 7.35–7.45)
PHOSPHATE SERPL-MCNC: 3.4 MG/DL (ref 2.5–4.9)
PLATELET # BLD AUTO: 130 K/UL (ref 135–450)
PMV BLD AUTO: 9.1 FL (ref 5–10.5)
PO2 BLDV: <30 MMHG
PO2 BLDV: <30 MMHG
POTASSIUM SERPL-SCNC: 4.2 MMOL/L (ref 3.5–5.1)
PROCALCITONIN SERPL IA-MCNC: 0.07 NG/ML (ref 0–0.15)
RBC # BLD AUTO: 3.58 M/UL (ref 4–5.2)
SAO2 % BLDV: 27 %
SAO2 % BLDV: 33 %
SODIUM SERPL-SCNC: 143 MMOL/L (ref 136–145)
WBC # BLD AUTO: 11.4 K/UL (ref 4–11)

## 2024-09-06 PROCEDURE — 6360000002 HC RX W HCPCS: Performed by: EMERGENCY MEDICINE

## 2024-09-06 PROCEDURE — 6370000000 HC RX 637 (ALT 250 FOR IP): Performed by: HOSPITALIST

## 2024-09-06 PROCEDURE — 6360000002 HC RX W HCPCS: Performed by: INTERNAL MEDICINE

## 2024-09-06 PROCEDURE — 83735 ASSAY OF MAGNESIUM: CPT

## 2024-09-06 PROCEDURE — 2700000000 HC OXYGEN THERAPY PER DAY

## 2024-09-06 PROCEDURE — 2580000003 HC RX 258: Performed by: NURSE PRACTITIONER

## 2024-09-06 PROCEDURE — 94003 VENT MGMT INPAT SUBQ DAY: CPT

## 2024-09-06 PROCEDURE — 94640 AIRWAY INHALATION TREATMENT: CPT

## 2024-09-06 PROCEDURE — 85025 COMPLETE CBC W/AUTO DIFF WBC: CPT

## 2024-09-06 PROCEDURE — 94761 N-INVAS EAR/PLS OXIMETRY MLT: CPT

## 2024-09-06 PROCEDURE — 36415 COLL VENOUS BLD VENIPUNCTURE: CPT

## 2024-09-06 PROCEDURE — 99291 CRITICAL CARE FIRST HOUR: CPT | Performed by: INTERNAL MEDICINE

## 2024-09-06 PROCEDURE — 6360000002 HC RX W HCPCS: Performed by: HOSPITALIST

## 2024-09-06 PROCEDURE — 2000000000 HC ICU R&B

## 2024-09-06 PROCEDURE — 82803 BLOOD GASES ANY COMBINATION: CPT

## 2024-09-06 PROCEDURE — 2580000003 HC RX 258: Performed by: HOSPITALIST

## 2024-09-06 PROCEDURE — 6370000000 HC RX 637 (ALT 250 FOR IP): Performed by: NURSE PRACTITIONER

## 2024-09-06 PROCEDURE — 80048 BASIC METABOLIC PNL TOTAL CA: CPT

## 2024-09-06 PROCEDURE — 6360000002 HC RX W HCPCS: Performed by: NURSE PRACTITIONER

## 2024-09-06 PROCEDURE — 84145 PROCALCITONIN (PCT): CPT

## 2024-09-06 PROCEDURE — 71045 X-RAY EXAM CHEST 1 VIEW: CPT

## 2024-09-06 PROCEDURE — 95819 EEG AWAKE AND ASLEEP: CPT

## 2024-09-06 PROCEDURE — 84100 ASSAY OF PHOSPHORUS: CPT

## 2024-09-06 PROCEDURE — 2500000003 HC RX 250 WO HCPCS: Performed by: NURSE PRACTITIONER

## 2024-09-06 RX ORDER — FENTANYL CITRATE 50 UG/ML
25 INJECTION, SOLUTION INTRAMUSCULAR; INTRAVENOUS
Status: DISCONTINUED | OUTPATIENT
Start: 2024-09-06 | End: 2024-09-10

## 2024-09-06 RX ORDER — MIDAZOLAM HYDROCHLORIDE 1 MG/ML
2 INJECTION INTRAMUSCULAR; INTRAVENOUS
Status: DISCONTINUED | OUTPATIENT
Start: 2024-09-06 | End: 2024-09-10

## 2024-09-06 RX ORDER — LEVOFLOXACIN 5 MG/ML
750 INJECTION, SOLUTION INTRAVENOUS EVERY 24 HOURS
Status: DISCONTINUED | OUTPATIENT
Start: 2024-09-06 | End: 2024-09-06

## 2024-09-06 RX ORDER — LEVOFLOXACIN 5 MG/ML
500 INJECTION, SOLUTION INTRAVENOUS EVERY 24 HOURS
Status: DISCONTINUED | OUTPATIENT
Start: 2024-09-06 | End: 2024-09-08

## 2024-09-06 RX ORDER — LEVOFLOXACIN 5 MG/ML
250 INJECTION, SOLUTION INTRAVENOUS EVERY 24 HOURS
Status: DISCONTINUED | OUTPATIENT
Start: 2024-09-06 | End: 2024-09-08

## 2024-09-06 RX ORDER — LEVETIRACETAM 500 MG/5ML
1500 INJECTION, SOLUTION, CONCENTRATE INTRAVENOUS EVERY 12 HOURS
Status: DISCONTINUED | OUTPATIENT
Start: 2024-09-06 | End: 2024-09-14 | Stop reason: HOSPADM

## 2024-09-06 RX ADMIN — ASPIRIN 81 MG: 81 TABLET, CHEWABLE ORAL at 08:19

## 2024-09-06 RX ADMIN — BUDESONIDE AND FORMOTEROL FUMARATE DIHYDRATE 2 PUFF: 160; 4.5 AEROSOL RESPIRATORY (INHALATION) at 19:34

## 2024-09-06 RX ADMIN — APIXABAN 5 MG: 5 TABLET, FILM COATED ORAL at 20:46

## 2024-09-06 RX ADMIN — CARVEDILOL 12.5 MG: 12.5 TABLET, FILM COATED ORAL at 08:22

## 2024-09-06 RX ADMIN — SODIUM CHLORIDE, PRESERVATIVE FREE 20 MG: 5 INJECTION INTRAVENOUS at 08:19

## 2024-09-06 RX ADMIN — PROPOFOL 25 MCG/KG/MIN: 10 INJECTION, EMULSION INTRAVENOUS at 03:49

## 2024-09-06 RX ADMIN — ALBUTEROL SULFATE 2.5 MG: 2.5 SOLUTION RESPIRATORY (INHALATION) at 15:50

## 2024-09-06 RX ADMIN — LEVOFLOXACIN 500 MG: 5 INJECTION, SOLUTION INTRAVENOUS at 14:23

## 2024-09-06 RX ADMIN — ALBUTEROL SULFATE 2.5 MG: 2.5 SOLUTION RESPIRATORY (INHALATION) at 19:34

## 2024-09-06 RX ADMIN — FENTANYL CITRATE 25 MCG: 50 INJECTION INTRAMUSCULAR; INTRAVENOUS at 16:28

## 2024-09-06 RX ADMIN — LEVOFLOXACIN 250 MG: 250 INJECTION, SOLUTION INTRAVENOUS at 16:28

## 2024-09-06 RX ADMIN — ALBUTEROL SULFATE 2.5 MG: 2.5 SOLUTION RESPIRATORY (INHALATION) at 08:07

## 2024-09-06 RX ADMIN — BUDESONIDE AND FORMOTEROL FUMARATE DIHYDRATE 2 PUFF: 160; 4.5 AEROSOL RESPIRATORY (INHALATION) at 08:07

## 2024-09-06 RX ADMIN — LEVETIRACETAM 1500 MG: 100 INJECTION INTRAVENOUS at 20:47

## 2024-09-06 RX ADMIN — SODIUM CHLORIDE: 9 INJECTION, SOLUTION INTRAVENOUS at 12:06

## 2024-09-06 RX ADMIN — LEVETIRACETAM 1000 MG: 100 INJECTION INTRAVENOUS at 08:20

## 2024-09-06 RX ADMIN — SODIUM CHLORIDE, PRESERVATIVE FREE 10 ML: 5 INJECTION INTRAVENOUS at 20:46

## 2024-09-06 RX ADMIN — ATORVASTATIN CALCIUM 20 MG: 20 TABLET, FILM COATED ORAL at 08:22

## 2024-09-06 RX ADMIN — ALBUTEROL SULFATE 2.5 MG: 2.5 SOLUTION RESPIRATORY (INHALATION) at 11:40

## 2024-09-06 RX ADMIN — ACETAMINOPHEN 650 MG: 650 SUPPOSITORY RECTAL at 12:05

## 2024-09-06 RX ADMIN — CARVEDILOL 12.5 MG: 12.5 TABLET, FILM COATED ORAL at 20:46

## 2024-09-06 RX ADMIN — SODIUM CHLORIDE, PRESERVATIVE FREE 10 ML: 5 INJECTION INTRAVENOUS at 08:20

## 2024-09-06 RX ADMIN — INSULIN GLARGINE 35 UNITS: 100 INJECTION, SOLUTION SUBCUTANEOUS at 20:46

## 2024-09-06 RX ADMIN — APIXABAN 5 MG: 5 TABLET, FILM COATED ORAL at 08:19

## 2024-09-06 ASSESSMENT — PULMONARY FUNCTION TESTS
PIF_VALUE: 24
PIF_VALUE: 18
PIF_VALUE: 8
PIF_VALUE: 21
PIF_VALUE: 17
PIF_VALUE: 16
PIF_VALUE: 14
PIF_VALUE: 19
PIF_VALUE: 22
PIF_VALUE: 19
PIF_VALUE: 21
PIF_VALUE: 12
PIF_VALUE: 24
PIF_VALUE: 9
PIF_VALUE: 19
PIF_VALUE: 10
PIF_VALUE: 19
PIF_VALUE: 32
PIF_VALUE: 16
PIF_VALUE: 18
PIF_VALUE: 9
PIF_VALUE: 8
PIF_VALUE: 9
PIF_VALUE: 25
PIF_VALUE: 23
PIF_VALUE: 31
PIF_VALUE: 10
PIF_VALUE: 8
PIF_VALUE: 16
PIF_VALUE: 11
PIF_VALUE: 8
PIF_VALUE: 9
PIF_VALUE: 12
PIF_VALUE: 14
PIF_VALUE: 19
PIF_VALUE: 21
PIF_VALUE: 18
PIF_VALUE: 10
PIF_VALUE: 8
PIF_VALUE: 14
PIF_VALUE: 33
PIF_VALUE: 10
PIF_VALUE: 17
PIF_VALUE: 37

## 2024-09-06 ASSESSMENT — PAIN SCALES - GENERAL
PAINLEVEL_OUTOF10: 0

## 2024-09-07 LAB
ANION GAP SERPL CALCULATED.3IONS-SCNC: 14 MMOL/L (ref 3–16)
BACTERIA SPEC RESP CULT: NORMAL
BASOPHILS # BLD: 0 K/UL (ref 0–0.2)
BASOPHILS NFR BLD: 0.1 %
BUN SERPL-MCNC: 29 MG/DL (ref 7–20)
CALCIUM SERPL-MCNC: 8.5 MG/DL (ref 8.3–10.6)
CHLORIDE SERPL-SCNC: 103 MMOL/L (ref 99–110)
CO2 SERPL-SCNC: 26 MMOL/L (ref 21–32)
CREAT SERPL-MCNC: 0.7 MG/DL (ref 0.6–1.2)
DEPRECATED RDW RBC AUTO: 17 % (ref 12.4–15.4)
EOSINOPHIL # BLD: 0 K/UL (ref 0–0.6)
EOSINOPHIL NFR BLD: 0.2 %
GFR SERPLBLD CREATININE-BSD FMLA CKD-EPI: >90 ML/MIN/{1.73_M2}
GLUCOSE BLD-MCNC: 161 MG/DL (ref 70–99)
GLUCOSE BLD-MCNC: 169 MG/DL (ref 70–99)
GLUCOSE BLD-MCNC: 184 MG/DL (ref 70–99)
GLUCOSE BLD-MCNC: 193 MG/DL (ref 70–99)
GLUCOSE BLD-MCNC: 198 MG/DL (ref 70–99)
GLUCOSE SERPL-MCNC: 163 MG/DL (ref 70–99)
GRAM STN SPEC: NORMAL
HCT VFR BLD AUTO: 27.7 % (ref 36–48)
HGB BLD-MCNC: 9.3 G/DL (ref 12–16)
LYMPHOCYTES # BLD: 0.5 K/UL (ref 1–5.1)
LYMPHOCYTES NFR BLD: 6 %
MAGNESIUM SERPL-MCNC: 2.09 MG/DL (ref 1.8–2.4)
MCH RBC QN AUTO: 28.5 PG (ref 26–34)
MCHC RBC AUTO-ENTMCNC: 33.6 G/DL (ref 31–36)
MCV RBC AUTO: 84.8 FL (ref 80–100)
MONOCYTES # BLD: 0.8 K/UL (ref 0–1.3)
MONOCYTES NFR BLD: 8.7 %
NEUTROPHILS # BLD: 7.7 K/UL (ref 1.7–7.7)
NEUTROPHILS NFR BLD: 85 %
PERFORMED ON: ABNORMAL
PHOSPHATE SERPL-MCNC: 1.9 MG/DL (ref 2.5–4.9)
PLATELET # BLD AUTO: 127 K/UL (ref 135–450)
PMV BLD AUTO: 8.8 FL (ref 5–10.5)
POTASSIUM SERPL-SCNC: 3.8 MMOL/L (ref 3.5–5.1)
RBC # BLD AUTO: 3.26 M/UL (ref 4–5.2)
SODIUM SERPL-SCNC: 143 MMOL/L (ref 136–145)
WBC # BLD AUTO: 9.1 K/UL (ref 4–11)

## 2024-09-07 PROCEDURE — 94003 VENT MGMT INPAT SUBQ DAY: CPT

## 2024-09-07 PROCEDURE — 2580000003 HC RX 258: Performed by: HOSPITALIST

## 2024-09-07 PROCEDURE — 6370000000 HC RX 637 (ALT 250 FOR IP): Performed by: NURSE PRACTITIONER

## 2024-09-07 PROCEDURE — 80048 BASIC METABOLIC PNL TOTAL CA: CPT

## 2024-09-07 PROCEDURE — 2000000000 HC ICU R&B

## 2024-09-07 PROCEDURE — 6360000002 HC RX W HCPCS: Performed by: NURSE PRACTITIONER

## 2024-09-07 PROCEDURE — 2500000003 HC RX 250 WO HCPCS: Performed by: INTERNAL MEDICINE

## 2024-09-07 PROCEDURE — 36415 COLL VENOUS BLD VENIPUNCTURE: CPT

## 2024-09-07 PROCEDURE — 84100 ASSAY OF PHOSPHORUS: CPT

## 2024-09-07 PROCEDURE — 85025 COMPLETE CBC W/AUTO DIFF WBC: CPT

## 2024-09-07 PROCEDURE — 6360000002 HC RX W HCPCS: Performed by: HOSPITALIST

## 2024-09-07 PROCEDURE — 2580000003 HC RX 258: Performed by: INTERNAL MEDICINE

## 2024-09-07 PROCEDURE — 94761 N-INVAS EAR/PLS OXIMETRY MLT: CPT

## 2024-09-07 PROCEDURE — 2580000003 HC RX 258: Performed by: NURSE PRACTITIONER

## 2024-09-07 PROCEDURE — 6370000000 HC RX 637 (ALT 250 FOR IP): Performed by: HOSPITALIST

## 2024-09-07 PROCEDURE — 2700000000 HC OXYGEN THERAPY PER DAY

## 2024-09-07 PROCEDURE — 2500000003 HC RX 250 WO HCPCS: Performed by: NURSE PRACTITIONER

## 2024-09-07 PROCEDURE — 99291 CRITICAL CARE FIRST HOUR: CPT | Performed by: INTERNAL MEDICINE

## 2024-09-07 PROCEDURE — 94640 AIRWAY INHALATION TREATMENT: CPT

## 2024-09-07 PROCEDURE — 83735 ASSAY OF MAGNESIUM: CPT

## 2024-09-07 RX ADMIN — LEVETIRACETAM 1500 MG: 100 INJECTION INTRAVENOUS at 08:43

## 2024-09-07 RX ADMIN — SODIUM CHLORIDE, PRESERVATIVE FREE 10 ML: 5 INJECTION INTRAVENOUS at 08:42

## 2024-09-07 RX ADMIN — ATORVASTATIN CALCIUM 20 MG: 20 TABLET, FILM COATED ORAL at 08:42

## 2024-09-07 RX ADMIN — ALBUTEROL SULFATE 2.5 MG: 2.5 SOLUTION RESPIRATORY (INHALATION) at 19:16

## 2024-09-07 RX ADMIN — APIXABAN 5 MG: 5 TABLET, FILM COATED ORAL at 21:24

## 2024-09-07 RX ADMIN — INSULIN GLARGINE 35 UNITS: 100 INJECTION, SOLUTION SUBCUTANEOUS at 21:24

## 2024-09-07 RX ADMIN — CARVEDILOL 12.5 MG: 12.5 TABLET, FILM COATED ORAL at 08:42

## 2024-09-07 RX ADMIN — POTASSIUM PHOSPHATE, MONOBASIC POTASSIUM PHOSPHATE, DIBASIC 20 MMOL: 224; 236 INJECTION, SOLUTION, CONCENTRATE INTRAVENOUS at 08:58

## 2024-09-07 RX ADMIN — ALBUTEROL SULFATE 2.5 MG: 2.5 SOLUTION RESPIRATORY (INHALATION) at 07:52

## 2024-09-07 RX ADMIN — BUDESONIDE AND FORMOTEROL FUMARATE DIHYDRATE 2 PUFF: 160; 4.5 AEROSOL RESPIRATORY (INHALATION) at 07:52

## 2024-09-07 RX ADMIN — LEVOFLOXACIN 500 MG: 5 INJECTION, SOLUTION INTRAVENOUS at 13:58

## 2024-09-07 RX ADMIN — ASPIRIN 81 MG: 81 TABLET, CHEWABLE ORAL at 08:42

## 2024-09-07 RX ADMIN — LEVOFLOXACIN 250 MG: 250 INJECTION, SOLUTION INTRAVENOUS at 15:43

## 2024-09-07 RX ADMIN — APIXABAN 5 MG: 5 TABLET, FILM COATED ORAL at 08:42

## 2024-09-07 RX ADMIN — CARVEDILOL 12.5 MG: 12.5 TABLET, FILM COATED ORAL at 21:24

## 2024-09-07 RX ADMIN — SODIUM CHLORIDE, PRESERVATIVE FREE 20 MG: 5 INJECTION INTRAVENOUS at 08:42

## 2024-09-07 RX ADMIN — INSULIN LISPRO 4 UNITS: 100 INJECTION, SOLUTION INTRAVENOUS; SUBCUTANEOUS at 00:09

## 2024-09-07 RX ADMIN — ALBUTEROL SULFATE 2.5 MG: 2.5 SOLUTION RESPIRATORY (INHALATION) at 11:57

## 2024-09-07 RX ADMIN — LEVETIRACETAM 1500 MG: 100 INJECTION INTRAVENOUS at 21:24

## 2024-09-07 RX ADMIN — SODIUM CHLORIDE, PRESERVATIVE FREE 10 ML: 5 INJECTION INTRAVENOUS at 21:26

## 2024-09-07 RX ADMIN — BUDESONIDE AND FORMOTEROL FUMARATE DIHYDRATE 2 PUFF: 160; 4.5 AEROSOL RESPIRATORY (INHALATION) at 19:18

## 2024-09-07 RX ADMIN — ALBUTEROL SULFATE 2.5 MG: 2.5 SOLUTION RESPIRATORY (INHALATION) at 15:47

## 2024-09-07 ASSESSMENT — PULMONARY FUNCTION TESTS
PIF_VALUE: 15
PIF_VALUE: 23
PIF_VALUE: 17
PIF_VALUE: 20
PIF_VALUE: 16
PIF_VALUE: 11
PIF_VALUE: 21
PIF_VALUE: 17
PIF_VALUE: 21
PIF_VALUE: 14
PIF_VALUE: 38
PIF_VALUE: 8
PIF_VALUE: 38
PIF_VALUE: 11
PIF_VALUE: 27
PIF_VALUE: 38
PIF_VALUE: 17
PIF_VALUE: 40
PIF_VALUE: 23
PIF_VALUE: 25
PIF_VALUE: 25
PIF_VALUE: 24
PIF_VALUE: 17
PIF_VALUE: 19
PIF_VALUE: 9
PIF_VALUE: 8
PIF_VALUE: 19
PIF_VALUE: 16

## 2024-09-07 ASSESSMENT — PAIN SCALES - GENERAL
PAINLEVEL_OUTOF10: 0

## 2024-09-08 ENCOUNTER — APPOINTMENT (OUTPATIENT)
Dept: GENERAL RADIOLOGY | Age: 59
DRG: 005 | End: 2024-09-08
Payer: COMMERCIAL

## 2024-09-08 LAB
ALBUMIN SERPL-MCNC: 3.1 G/DL (ref 3.4–5)
ALP SERPL-CCNC: 77 U/L (ref 40–129)
ALT SERPL-CCNC: 10 U/L (ref 10–40)
AMMONIA PLAS-SCNC: 39 UMOL/L (ref 11–51)
ANION GAP SERPL CALCULATED.3IONS-SCNC: 10 MMOL/L (ref 3–16)
AST SERPL-CCNC: 41 U/L (ref 15–37)
BASE EXCESS BLDV CALC-SCNC: 6.5 MMOL/L
BASOPHILS # BLD: 0 K/UL (ref 0–0.2)
BASOPHILS NFR BLD: 0.7 %
BILIRUB DIRECT SERPL-MCNC: 0.4 MG/DL (ref 0–0.3)
BILIRUB INDIRECT SERPL-MCNC: 0.2 MG/DL (ref 0–1)
BILIRUB SERPL-MCNC: 0.6 MG/DL (ref 0–1)
BUN SERPL-MCNC: 37 MG/DL (ref 7–20)
CALCIUM SERPL-MCNC: 9 MG/DL (ref 8.3–10.6)
CHLORIDE SERPL-SCNC: 107 MMOL/L (ref 99–110)
CO2 BLDV-SCNC: 33 MMOL/L
CO2 SERPL-SCNC: 28 MMOL/L (ref 21–32)
COHGB MFR BLDV: 2.1 %
CREAT SERPL-MCNC: 0.7 MG/DL (ref 0.6–1.2)
DEPRECATED RDW RBC AUTO: 16.9 % (ref 12.4–15.4)
EOSINOPHIL # BLD: 0 K/UL (ref 0–0.6)
EOSINOPHIL NFR BLD: 0 %
GFR SERPLBLD CREATININE-BSD FMLA CKD-EPI: >90 ML/MIN/{1.73_M2}
GLUCOSE BLD-MCNC: 192 MG/DL (ref 70–99)
GLUCOSE BLD-MCNC: 198 MG/DL (ref 70–99)
GLUCOSE BLD-MCNC: 203 MG/DL (ref 70–99)
GLUCOSE BLD-MCNC: 209 MG/DL (ref 70–99)
GLUCOSE BLD-MCNC: 213 MG/DL (ref 70–99)
GLUCOSE BLD-MCNC: 214 MG/DL (ref 70–99)
GLUCOSE SERPL-MCNC: 197 MG/DL (ref 70–99)
HCO3 BLDV-SCNC: 32 MMOL/L (ref 23–29)
HCT VFR BLD AUTO: 26.1 % (ref 36–48)
HGB BLD-MCNC: 8.7 G/DL (ref 12–16)
LYMPHOCYTES # BLD: 0.4 K/UL (ref 1–5.1)
LYMPHOCYTES NFR BLD: 6.3 %
MAGNESIUM SERPL-MCNC: 2.45 MG/DL (ref 1.8–2.4)
MCH RBC QN AUTO: 28 PG (ref 26–34)
MCHC RBC AUTO-ENTMCNC: 33.2 G/DL (ref 31–36)
MCV RBC AUTO: 84.4 FL (ref 80–100)
METHGB MFR BLDV: 0.6 %
MONOCYTES # BLD: 0.4 K/UL (ref 0–1.3)
MONOCYTES NFR BLD: 6 %
NEUTROPHILS # BLD: 5.9 K/UL (ref 1.7–7.7)
NEUTROPHILS NFR BLD: 87 %
O2 THERAPY: ABNORMAL
PCO2 BLDV: 47 MMHG (ref 40–50)
PERFORMED ON: ABNORMAL
PH BLDV: 7.43 [PH] (ref 7.35–7.45)
PHOSPHATE SERPL-MCNC: 1.7 MG/DL (ref 2.5–4.9)
PLATELET # BLD AUTO: 139 K/UL (ref 135–450)
PMV BLD AUTO: 8.5 FL (ref 5–10.5)
PO2 BLDV: 71 MMHG
POTASSIUM SERPL-SCNC: 3.9 MMOL/L (ref 3.5–5.1)
PROT SERPL-MCNC: 6.1 G/DL (ref 6.4–8.2)
RBC # BLD AUTO: 3.09 M/UL (ref 4–5.2)
SAO2 % BLDV: 95 %
SODIUM SERPL-SCNC: 145 MMOL/L (ref 136–145)
WBC # BLD AUTO: 6.8 K/UL (ref 4–11)

## 2024-09-08 PROCEDURE — 2700000000 HC OXYGEN THERAPY PER DAY

## 2024-09-08 PROCEDURE — 6360000002 HC RX W HCPCS: Performed by: HOSPITALIST

## 2024-09-08 PROCEDURE — 83735 ASSAY OF MAGNESIUM: CPT

## 2024-09-08 PROCEDURE — 80048 BASIC METABOLIC PNL TOTAL CA: CPT

## 2024-09-08 PROCEDURE — 2500000003 HC RX 250 WO HCPCS: Performed by: HOSPITALIST

## 2024-09-08 PROCEDURE — 71045 X-RAY EXAM CHEST 1 VIEW: CPT

## 2024-09-08 PROCEDURE — 84100 ASSAY OF PHOSPHORUS: CPT

## 2024-09-08 PROCEDURE — 80076 HEPATIC FUNCTION PANEL: CPT

## 2024-09-08 PROCEDURE — 2580000003 HC RX 258: Performed by: INTERNAL MEDICINE

## 2024-09-08 PROCEDURE — 94003 VENT MGMT INPAT SUBQ DAY: CPT

## 2024-09-08 PROCEDURE — 85025 COMPLETE CBC W/AUTO DIFF WBC: CPT

## 2024-09-08 PROCEDURE — 6370000000 HC RX 637 (ALT 250 FOR IP): Performed by: HOSPITALIST

## 2024-09-08 PROCEDURE — 6360000002 HC RX W HCPCS: Performed by: NURSE PRACTITIONER

## 2024-09-08 PROCEDURE — 82140 ASSAY OF AMMONIA: CPT

## 2024-09-08 PROCEDURE — 82803 BLOOD GASES ANY COMBINATION: CPT

## 2024-09-08 PROCEDURE — 36415 COLL VENOUS BLD VENIPUNCTURE: CPT

## 2024-09-08 PROCEDURE — 99291 CRITICAL CARE FIRST HOUR: CPT | Performed by: INTERNAL MEDICINE

## 2024-09-08 PROCEDURE — APPNB15 APP NON BILLABLE TIME 0-15 MINS: Performed by: NURSE PRACTITIONER

## 2024-09-08 PROCEDURE — 6370000000 HC RX 637 (ALT 250 FOR IP): Performed by: NURSE PRACTITIONER

## 2024-09-08 PROCEDURE — 2500000003 HC RX 250 WO HCPCS: Performed by: INTERNAL MEDICINE

## 2024-09-08 PROCEDURE — 94761 N-INVAS EAR/PLS OXIMETRY MLT: CPT

## 2024-09-08 PROCEDURE — 2000000000 HC ICU R&B

## 2024-09-08 PROCEDURE — 94640 AIRWAY INHALATION TREATMENT: CPT

## 2024-09-08 PROCEDURE — 6360000002 HC RX W HCPCS: Performed by: INTERNAL MEDICINE

## 2024-09-08 PROCEDURE — 2580000003 HC RX 258: Performed by: HOSPITALIST

## 2024-09-08 RX ORDER — SENNA LEAF EXTRACT 176MG/5ML
5 SYRUP ORAL NIGHTLY
Status: DISCONTINUED | OUTPATIENT
Start: 2024-09-08 | End: 2024-09-14 | Stop reason: HOSPADM

## 2024-09-08 RX ORDER — HYDRALAZINE HYDROCHLORIDE 20 MG/ML
10 INJECTION INTRAMUSCULAR; INTRAVENOUS EVERY 6 HOURS PRN
Status: DISCONTINUED | OUTPATIENT
Start: 2024-09-08 | End: 2024-09-14 | Stop reason: HOSPADM

## 2024-09-08 RX ORDER — LEVOFLOXACIN 5 MG/ML
500 INJECTION, SOLUTION INTRAVENOUS EVERY 24 HOURS
Status: COMPLETED | OUTPATIENT
Start: 2024-09-08 | End: 2024-09-08

## 2024-09-08 RX ORDER — LEVOFLOXACIN 5 MG/ML
250 INJECTION, SOLUTION INTRAVENOUS EVERY 24 HOURS
Status: COMPLETED | OUTPATIENT
Start: 2024-09-08 | End: 2024-09-08

## 2024-09-08 RX ORDER — POLYETHYLENE GLYCOL 3350 17 G/17G
17 POWDER, FOR SOLUTION ORAL DAILY
Status: DISCONTINUED | OUTPATIENT
Start: 2024-09-08 | End: 2024-09-14 | Stop reason: HOSPADM

## 2024-09-08 RX ORDER — FUROSEMIDE 10 MG/ML
40 INJECTION INTRAMUSCULAR; INTRAVENOUS ONCE
Status: COMPLETED | OUTPATIENT
Start: 2024-09-08 | End: 2024-09-08

## 2024-09-08 RX ORDER — FUROSEMIDE 10 MG/ML
INJECTION INTRAMUSCULAR; INTRAVENOUS
Status: DISPENSED
Start: 2024-09-08 | End: 2024-09-09

## 2024-09-08 RX ADMIN — LEVOFLOXACIN 500 MG: 5 INJECTION, SOLUTION INTRAVENOUS at 13:55

## 2024-09-08 RX ADMIN — ALBUTEROL SULFATE 2.5 MG: 2.5 SOLUTION RESPIRATORY (INHALATION) at 19:28

## 2024-09-08 RX ADMIN — LEVETIRACETAM 1500 MG: 100 INJECTION INTRAVENOUS at 21:38

## 2024-09-08 RX ADMIN — HYDRALAZINE HYDROCHLORIDE 10 MG: 20 INJECTION INTRAMUSCULAR; INTRAVENOUS at 18:07

## 2024-09-08 RX ADMIN — ALBUTEROL SULFATE 2.5 MG: 2.5 SOLUTION RESPIRATORY (INHALATION) at 16:17

## 2024-09-08 RX ADMIN — ALBUTEROL SULFATE 2.5 MG: 2.5 SOLUTION RESPIRATORY (INHALATION) at 11:49

## 2024-09-08 RX ADMIN — APIXABAN 5 MG: 5 TABLET, FILM COATED ORAL at 21:37

## 2024-09-08 RX ADMIN — CARVEDILOL 12.5 MG: 12.5 TABLET, FILM COATED ORAL at 21:37

## 2024-09-08 RX ADMIN — SODIUM CHLORIDE, PRESERVATIVE FREE 20 MG: 5 INJECTION INTRAVENOUS at 07:54

## 2024-09-08 RX ADMIN — ALBUTEROL SULFATE 2.5 MG: 2.5 SOLUTION RESPIRATORY (INHALATION) at 07:51

## 2024-09-08 RX ADMIN — APIXABAN 5 MG: 5 TABLET, FILM COATED ORAL at 07:54

## 2024-09-08 RX ADMIN — INSULIN LISPRO 4 UNITS: 100 INJECTION, SOLUTION INTRAVENOUS; SUBCUTANEOUS at 12:19

## 2024-09-08 RX ADMIN — INSULIN LISPRO 4 UNITS: 100 INJECTION, SOLUTION INTRAVENOUS; SUBCUTANEOUS at 04:13

## 2024-09-08 RX ADMIN — SODIUM CHLORIDE, PRESERVATIVE FREE 10 ML: 5 INJECTION INTRAVENOUS at 07:55

## 2024-09-08 RX ADMIN — INSULIN LISPRO 4 UNITS: 100 INJECTION, SOLUTION INTRAVENOUS; SUBCUTANEOUS at 01:05

## 2024-09-08 RX ADMIN — ATORVASTATIN CALCIUM 20 MG: 20 TABLET, FILM COATED ORAL at 07:54

## 2024-09-08 RX ADMIN — ASPIRIN 81 MG: 81 TABLET, CHEWABLE ORAL at 07:54

## 2024-09-08 RX ADMIN — CARVEDILOL 12.5 MG: 12.5 TABLET, FILM COATED ORAL at 07:54

## 2024-09-08 RX ADMIN — FUROSEMIDE 40 MG: 10 INJECTION, SOLUTION INTRAMUSCULAR; INTRAVENOUS at 14:07

## 2024-09-08 RX ADMIN — POLYETHYLENE GLYCOL 3350 17 G: 17 POWDER, FOR SOLUTION ORAL at 08:49

## 2024-09-08 RX ADMIN — SODIUM CHLORIDE, PRESERVATIVE FREE 10 ML: 5 INJECTION INTRAVENOUS at 21:38

## 2024-09-08 RX ADMIN — LEVETIRACETAM 1500 MG: 100 INJECTION INTRAVENOUS at 08:13

## 2024-09-08 RX ADMIN — BUDESONIDE AND FORMOTEROL FUMARATE DIHYDRATE 2 PUFF: 160; 4.5 AEROSOL RESPIRATORY (INHALATION) at 19:28

## 2024-09-08 RX ADMIN — BUDESONIDE AND FORMOTEROL FUMARATE DIHYDRATE 2 PUFF: 160; 4.5 AEROSOL RESPIRATORY (INHALATION) at 08:08

## 2024-09-08 RX ADMIN — POTASSIUM PHOSPHATE, MONOBASIC POTASSIUM PHOSPHATE, DIBASIC 30 MMOL: 224; 236 INJECTION, SOLUTION, CONCENTRATE INTRAVENOUS at 15:00

## 2024-09-08 RX ADMIN — DOCUSATE SODIUM 100 MG: 50 LIQUID ORAL at 08:49

## 2024-09-08 RX ADMIN — SODIUM CHLORIDE, PRESERVATIVE FREE 20 MG: 5 INJECTION INTRAVENOUS at 21:37

## 2024-09-08 RX ADMIN — INSULIN LISPRO 4 UNITS: 100 INJECTION, SOLUTION INTRAVENOUS; SUBCUTANEOUS at 15:52

## 2024-09-08 RX ADMIN — LEVOFLOXACIN 250 MG: 250 INJECTION, SOLUTION INTRAVENOUS at 15:01

## 2024-09-08 RX ADMIN — Medication 5 ML: at 21:39

## 2024-09-08 RX ADMIN — INSULIN GLARGINE 35 UNITS: 100 INJECTION, SOLUTION SUBCUTANEOUS at 21:37

## 2024-09-08 ASSESSMENT — PULMONARY FUNCTION TESTS
PIF_VALUE: 22
PIF_VALUE: 17
PIF_VALUE: 22
PIF_VALUE: 11
PIF_VALUE: 24
PIF_VALUE: 33
PIF_VALUE: 14
PIF_VALUE: 21
PIF_VALUE: 16
PIF_VALUE: 11
PIF_VALUE: 14
PIF_VALUE: 21
PIF_VALUE: 17
PIF_VALUE: 21
PIF_VALUE: 21
PIF_VALUE: 23
PIF_VALUE: 26
PIF_VALUE: 14
PIF_VALUE: 19
PIF_VALUE: 21
PIF_VALUE: 21
PIF_VALUE: 22
PIF_VALUE: 12
PIF_VALUE: 17
PIF_VALUE: 11
PIF_VALUE: 14
PIF_VALUE: 25
PIF_VALUE: 17
PIF_VALUE: 14
PIF_VALUE: 23
PIF_VALUE: 21
PIF_VALUE: 19
PIF_VALUE: 16
PIF_VALUE: 21
PIF_VALUE: 23

## 2024-09-08 ASSESSMENT — PAIN SCALES - GENERAL
PAINLEVEL_OUTOF10: 0

## 2024-09-09 ENCOUNTER — APPOINTMENT (OUTPATIENT)
Dept: MRI IMAGING | Age: 59
DRG: 005 | End: 2024-09-09
Payer: COMMERCIAL

## 2024-09-09 LAB
ANION GAP SERPL CALCULATED.3IONS-SCNC: 10 MMOL/L (ref 3–16)
BASOPHILS # BLD: 0 K/UL (ref 0–0.2)
BASOPHILS NFR BLD: 0.1 %
BUN SERPL-MCNC: 36 MG/DL (ref 7–20)
CALCIUM SERPL-MCNC: 9.2 MG/DL (ref 8.3–10.6)
CHLORIDE SERPL-SCNC: 108 MMOL/L (ref 99–110)
CO2 SERPL-SCNC: 30 MMOL/L (ref 21–32)
CREAT SERPL-MCNC: 0.7 MG/DL (ref 0.6–1.2)
DEPRECATED RDW RBC AUTO: 16.5 % (ref 12.4–15.4)
EOSINOPHIL # BLD: 0 K/UL (ref 0–0.6)
EOSINOPHIL NFR BLD: 0.1 %
GFR SERPLBLD CREATININE-BSD FMLA CKD-EPI: >90 ML/MIN/{1.73_M2}
GLUCOSE BLD-MCNC: 180 MG/DL (ref 70–99)
GLUCOSE BLD-MCNC: 192 MG/DL (ref 70–99)
GLUCOSE BLD-MCNC: 200 MG/DL (ref 70–99)
GLUCOSE BLD-MCNC: 202 MG/DL (ref 70–99)
GLUCOSE BLD-MCNC: 214 MG/DL (ref 70–99)
GLUCOSE BLD-MCNC: 216 MG/DL (ref 70–99)
GLUCOSE SERPL-MCNC: 203 MG/DL (ref 70–99)
HCT VFR BLD AUTO: 25.8 % (ref 36–48)
HGB BLD-MCNC: 8.7 G/DL (ref 12–16)
LYMPHOCYTES # BLD: 0.6 K/UL (ref 1–5.1)
LYMPHOCYTES NFR BLD: 9.5 %
MAGNESIUM SERPL-MCNC: 2.59 MG/DL (ref 1.8–2.4)
MCH RBC QN AUTO: 28.2 PG (ref 26–34)
MCHC RBC AUTO-ENTMCNC: 33.6 G/DL (ref 31–36)
MCV RBC AUTO: 83.9 FL (ref 80–100)
MONOCYTES # BLD: 0.6 K/UL (ref 0–1.3)
MONOCYTES NFR BLD: 10.1 %
NEUTROPHILS # BLD: 4.7 K/UL (ref 1.7–7.7)
NEUTROPHILS NFR BLD: 80.2 %
PERFORMED ON: ABNORMAL
PHOSPHATE SERPL-MCNC: 2.8 MG/DL (ref 2.5–4.9)
PLATELET # BLD AUTO: 137 K/UL (ref 135–450)
PMV BLD AUTO: 8.3 FL (ref 5–10.5)
POTASSIUM SERPL-SCNC: 3.9 MMOL/L (ref 3.5–5.1)
PROCALCITONIN SERPL IA-MCNC: 0.09 NG/ML (ref 0–0.15)
RBC # BLD AUTO: 3.07 M/UL (ref 4–5.2)
SODIUM SERPL-SCNC: 148 MMOL/L (ref 136–145)
WBC # BLD AUTO: 5.9 K/UL (ref 4–11)

## 2024-09-09 PROCEDURE — 85025 COMPLETE CBC W/AUTO DIFF WBC: CPT

## 2024-09-09 PROCEDURE — 2500000003 HC RX 250 WO HCPCS: Performed by: HOSPITALIST

## 2024-09-09 PROCEDURE — 83735 ASSAY OF MAGNESIUM: CPT

## 2024-09-09 PROCEDURE — 99291 CRITICAL CARE FIRST HOUR: CPT | Performed by: INTERNAL MEDICINE

## 2024-09-09 PROCEDURE — 36415 COLL VENOUS BLD VENIPUNCTURE: CPT

## 2024-09-09 PROCEDURE — 2700000000 HC OXYGEN THERAPY PER DAY

## 2024-09-09 PROCEDURE — 6370000000 HC RX 637 (ALT 250 FOR IP): Performed by: HOSPITALIST

## 2024-09-09 PROCEDURE — 94640 AIRWAY INHALATION TREATMENT: CPT

## 2024-09-09 PROCEDURE — 94761 N-INVAS EAR/PLS OXIMETRY MLT: CPT

## 2024-09-09 PROCEDURE — 80048 BASIC METABOLIC PNL TOTAL CA: CPT

## 2024-09-09 PROCEDURE — 84100 ASSAY OF PHOSPHORUS: CPT

## 2024-09-09 PROCEDURE — 84145 PROCALCITONIN (PCT): CPT

## 2024-09-09 PROCEDURE — 94003 VENT MGMT INPAT SUBQ DAY: CPT

## 2024-09-09 PROCEDURE — 6370000000 HC RX 637 (ALT 250 FOR IP): Performed by: NURSE PRACTITIONER

## 2024-09-09 PROCEDURE — 2000000000 HC ICU R&B

## 2024-09-09 PROCEDURE — 70551 MRI BRAIN STEM W/O DYE: CPT

## 2024-09-09 PROCEDURE — 6360000002 HC RX W HCPCS: Performed by: NURSE PRACTITIONER

## 2024-09-09 PROCEDURE — 6360000002 HC RX W HCPCS: Performed by: HOSPITALIST

## 2024-09-09 PROCEDURE — 2580000003 HC RX 258: Performed by: HOSPITALIST

## 2024-09-09 RX ADMIN — SODIUM CHLORIDE, PRESERVATIVE FREE 20 MG: 5 INJECTION INTRAVENOUS at 21:10

## 2024-09-09 RX ADMIN — LEVETIRACETAM 1500 MG: 100 INJECTION INTRAVENOUS at 21:10

## 2024-09-09 RX ADMIN — INSULIN LISPRO 4 UNITS: 100 INJECTION, SOLUTION INTRAVENOUS; SUBCUTANEOUS at 08:14

## 2024-09-09 RX ADMIN — Medication 5 ML: at 21:15

## 2024-09-09 RX ADMIN — INSULIN LISPRO 4 UNITS: 100 INJECTION, SOLUTION INTRAVENOUS; SUBCUTANEOUS at 11:38

## 2024-09-09 RX ADMIN — ALBUTEROL SULFATE 2.5 MG: 2.5 SOLUTION RESPIRATORY (INHALATION) at 20:08

## 2024-09-09 RX ADMIN — POLYETHYLENE GLYCOL 3350 17 G: 17 POWDER, FOR SOLUTION ORAL at 07:49

## 2024-09-09 RX ADMIN — CARVEDILOL 12.5 MG: 12.5 TABLET, FILM COATED ORAL at 21:10

## 2024-09-09 RX ADMIN — DOCUSATE SODIUM 100 MG: 50 LIQUID ORAL at 07:49

## 2024-09-09 RX ADMIN — INSULIN GLARGINE 35 UNITS: 100 INJECTION, SOLUTION SUBCUTANEOUS at 21:14

## 2024-09-09 RX ADMIN — SODIUM CHLORIDE, PRESERVATIVE FREE 20 MG: 5 INJECTION INTRAVENOUS at 07:49

## 2024-09-09 RX ADMIN — HYDRALAZINE HYDROCHLORIDE 10 MG: 20 INJECTION INTRAMUSCULAR; INTRAVENOUS at 21:55

## 2024-09-09 RX ADMIN — SODIUM CHLORIDE, PRESERVATIVE FREE 10 ML: 5 INJECTION INTRAVENOUS at 07:49

## 2024-09-09 RX ADMIN — SODIUM CHLORIDE, PRESERVATIVE FREE 10 ML: 5 INJECTION INTRAVENOUS at 21:15

## 2024-09-09 RX ADMIN — CARVEDILOL 12.5 MG: 12.5 TABLET, FILM COATED ORAL at 07:49

## 2024-09-09 RX ADMIN — BUDESONIDE AND FORMOTEROL FUMARATE DIHYDRATE 2 PUFF: 160; 4.5 AEROSOL RESPIRATORY (INHALATION) at 20:09

## 2024-09-09 RX ADMIN — LEVETIRACETAM 1500 MG: 100 INJECTION INTRAVENOUS at 08:06

## 2024-09-09 RX ADMIN — APIXABAN 5 MG: 5 TABLET, FILM COATED ORAL at 07:49

## 2024-09-09 RX ADMIN — ALBUTEROL SULFATE 2.5 MG: 2.5 SOLUTION RESPIRATORY (INHALATION) at 08:06

## 2024-09-09 RX ADMIN — HYDRALAZINE HYDROCHLORIDE 10 MG: 20 INJECTION INTRAMUSCULAR; INTRAVENOUS at 15:12

## 2024-09-09 RX ADMIN — ATORVASTATIN CALCIUM 20 MG: 20 TABLET, FILM COATED ORAL at 07:49

## 2024-09-09 RX ADMIN — ALBUTEROL SULFATE 2.5 MG: 2.5 SOLUTION RESPIRATORY (INHALATION) at 11:37

## 2024-09-09 RX ADMIN — INSULIN LISPRO 4 UNITS: 100 INJECTION, SOLUTION INTRAVENOUS; SUBCUTANEOUS at 00:05

## 2024-09-09 RX ADMIN — ASPIRIN 81 MG: 81 TABLET, CHEWABLE ORAL at 07:49

## 2024-09-09 RX ADMIN — INSULIN LISPRO 4 UNITS: 100 INJECTION, SOLUTION INTRAVENOUS; SUBCUTANEOUS at 21:14

## 2024-09-09 RX ADMIN — BUDESONIDE AND FORMOTEROL FUMARATE DIHYDRATE 2 PUFF: 160; 4.5 AEROSOL RESPIRATORY (INHALATION) at 08:06

## 2024-09-09 RX ADMIN — ALBUTEROL SULFATE 2.5 MG: 2.5 SOLUTION RESPIRATORY (INHALATION) at 16:06

## 2024-09-09 RX ADMIN — APIXABAN 5 MG: 5 TABLET, FILM COATED ORAL at 21:10

## 2024-09-09 ASSESSMENT — PULMONARY FUNCTION TESTS
PIF_VALUE: 30
PIF_VALUE: 24
PIF_VALUE: 24
PIF_VALUE: 30
PIF_VALUE: 26
PIF_VALUE: 27
PIF_VALUE: 25
PIF_VALUE: 20
PIF_VALUE: 23
PIF_VALUE: 25
PIF_VALUE: 28
PIF_VALUE: 23
PIF_VALUE: 20
PIF_VALUE: 25
PIF_VALUE: 21
PIF_VALUE: 20
PIF_VALUE: 24
PIF_VALUE: 27
PIF_VALUE: 24
PIF_VALUE: 24
PIF_VALUE: 25
PIF_VALUE: 24
PIF_VALUE: 40
PIF_VALUE: 23
PIF_VALUE: 23
PIF_VALUE: 24
PIF_VALUE: 20
PIF_VALUE: 27
PIF_VALUE: 24
PIF_VALUE: 22
PIF_VALUE: 28

## 2024-09-09 ASSESSMENT — PAIN SCALES - GENERAL
PAINLEVEL_OUTOF10: 0

## 2024-09-10 LAB
ANION GAP SERPL CALCULATED.3IONS-SCNC: 9 MMOL/L (ref 3–16)
BASOPHILS # BLD: 0 K/UL (ref 0–0.2)
BASOPHILS NFR BLD: 0.2 %
BUN SERPL-MCNC: 34 MG/DL (ref 7–20)
CALCIUM SERPL-MCNC: 9.3 MG/DL (ref 8.3–10.6)
CHLORIDE SERPL-SCNC: 108 MMOL/L (ref 99–110)
CO2 SERPL-SCNC: 31 MMOL/L (ref 21–32)
CREAT SERPL-MCNC: 0.7 MG/DL (ref 0.6–1.2)
DEPRECATED RDW RBC AUTO: 17.3 % (ref 12.4–15.4)
EOSINOPHIL # BLD: 0 K/UL (ref 0–0.6)
EOSINOPHIL NFR BLD: 0.5 %
GFR SERPLBLD CREATININE-BSD FMLA CKD-EPI: >90 ML/MIN/{1.73_M2}
GLUCOSE BLD-MCNC: 185 MG/DL (ref 70–99)
GLUCOSE BLD-MCNC: 238 MG/DL (ref 70–99)
GLUCOSE BLD-MCNC: 244 MG/DL (ref 70–99)
GLUCOSE BLD-MCNC: 252 MG/DL (ref 70–99)
GLUCOSE BLD-MCNC: 263 MG/DL (ref 70–99)
GLUCOSE BLD-MCNC: 270 MG/DL (ref 70–99)
GLUCOSE SERPL-MCNC: 235 MG/DL (ref 70–99)
HCT VFR BLD AUTO: 27.1 % (ref 36–48)
HGB BLD-MCNC: 9.1 G/DL (ref 12–16)
LYMPHOCYTES # BLD: 0.6 K/UL (ref 1–5.1)
LYMPHOCYTES NFR BLD: 7.8 %
MAGNESIUM SERPL-MCNC: 2.61 MG/DL (ref 1.8–2.4)
MCH RBC QN AUTO: 28.6 PG (ref 26–34)
MCHC RBC AUTO-ENTMCNC: 33.7 G/DL (ref 31–36)
MCV RBC AUTO: 85 FL (ref 80–100)
MONOCYTES # BLD: 0.7 K/UL (ref 0–1.3)
MONOCYTES NFR BLD: 8.6 %
NEUTROPHILS # BLD: 6.8 K/UL (ref 1.7–7.7)
NEUTROPHILS NFR BLD: 82.9 %
PERFORMED ON: ABNORMAL
PHOSPHATE SERPL-MCNC: 2.8 MG/DL (ref 2.5–4.9)
PLATELET # BLD AUTO: 163 K/UL (ref 135–450)
PMV BLD AUTO: 8.9 FL (ref 5–10.5)
POTASSIUM SERPL-SCNC: 4 MMOL/L (ref 3.5–5.1)
RBC # BLD AUTO: 3.19 M/UL (ref 4–5.2)
REPORT: NORMAL
RESP PATH DNA+RNA PNL L RESP NAA+NON-PRB: NORMAL
SODIUM SERPL-SCNC: 148 MMOL/L (ref 136–145)
WBC # BLD AUTO: 8.2 K/UL (ref 4–11)

## 2024-09-10 PROCEDURE — 84100 ASSAY OF PHOSPHORUS: CPT

## 2024-09-10 PROCEDURE — 6360000002 HC RX W HCPCS: Performed by: NURSE PRACTITIONER

## 2024-09-10 PROCEDURE — 94003 VENT MGMT INPAT SUBQ DAY: CPT

## 2024-09-10 PROCEDURE — 2700000000 HC OXYGEN THERAPY PER DAY

## 2024-09-10 PROCEDURE — 89220 SPUTUM SPECIMEN COLLECTION: CPT

## 2024-09-10 PROCEDURE — 2000000000 HC ICU R&B

## 2024-09-10 PROCEDURE — 6370000000 HC RX 637 (ALT 250 FOR IP): Performed by: HOSPITALIST

## 2024-09-10 PROCEDURE — 80048 BASIC METABOLIC PNL TOTAL CA: CPT

## 2024-09-10 PROCEDURE — 99291 CRITICAL CARE FIRST HOUR: CPT | Performed by: INTERNAL MEDICINE

## 2024-09-10 PROCEDURE — 87205 SMEAR GRAM STAIN: CPT

## 2024-09-10 PROCEDURE — 2580000003 HC RX 258: Performed by: HOSPITALIST

## 2024-09-10 PROCEDURE — 87070 CULTURE OTHR SPECIMN AEROBIC: CPT

## 2024-09-10 PROCEDURE — 85025 COMPLETE CBC W/AUTO DIFF WBC: CPT

## 2024-09-10 PROCEDURE — 94640 AIRWAY INHALATION TREATMENT: CPT

## 2024-09-10 PROCEDURE — 2500000003 HC RX 250 WO HCPCS: Performed by: HOSPITALIST

## 2024-09-10 PROCEDURE — 6370000000 HC RX 637 (ALT 250 FOR IP): Performed by: NURSE PRACTITIONER

## 2024-09-10 PROCEDURE — 6360000002 HC RX W HCPCS: Performed by: HOSPITALIST

## 2024-09-10 PROCEDURE — 83735 ASSAY OF MAGNESIUM: CPT

## 2024-09-10 PROCEDURE — 36415 COLL VENOUS BLD VENIPUNCTURE: CPT

## 2024-09-10 PROCEDURE — 94761 N-INVAS EAR/PLS OXIMETRY MLT: CPT

## 2024-09-10 PROCEDURE — 87633 RESP VIRUS 12-25 TARGETS: CPT

## 2024-09-10 RX ADMIN — INSULIN LISPRO 4 UNITS: 100 INJECTION, SOLUTION INTRAVENOUS; SUBCUTANEOUS at 11:26

## 2024-09-10 RX ADMIN — LEVETIRACETAM 1500 MG: 100 INJECTION INTRAVENOUS at 08:34

## 2024-09-10 RX ADMIN — ALBUTEROL SULFATE 2.5 MG: 2.5 SOLUTION RESPIRATORY (INHALATION) at 08:01

## 2024-09-10 RX ADMIN — SODIUM CHLORIDE, PRESERVATIVE FREE 10 ML: 5 INJECTION INTRAVENOUS at 07:51

## 2024-09-10 RX ADMIN — INSULIN LISPRO 8 UNITS: 100 INJECTION, SOLUTION INTRAVENOUS; SUBCUTANEOUS at 04:18

## 2024-09-10 RX ADMIN — ASPIRIN 81 MG: 81 TABLET, CHEWABLE ORAL at 07:49

## 2024-09-10 RX ADMIN — ACETAMINOPHEN 325MG 650 MG: 325 TABLET ORAL at 08:27

## 2024-09-10 RX ADMIN — LEVETIRACETAM 1500 MG: 100 INJECTION INTRAVENOUS at 20:31

## 2024-09-10 RX ADMIN — ALBUTEROL SULFATE 2.5 MG: 2.5 SOLUTION RESPIRATORY (INHALATION) at 15:53

## 2024-09-10 RX ADMIN — CARVEDILOL 12.5 MG: 12.5 TABLET, FILM COATED ORAL at 07:49

## 2024-09-10 RX ADMIN — ALBUTEROL SULFATE 2.5 MG: 2.5 SOLUTION RESPIRATORY (INHALATION) at 12:16

## 2024-09-10 RX ADMIN — INSULIN GLARGINE 35 UNITS: 100 INJECTION, SOLUTION SUBCUTANEOUS at 20:30

## 2024-09-10 RX ADMIN — ATORVASTATIN CALCIUM 20 MG: 20 TABLET, FILM COATED ORAL at 07:49

## 2024-09-10 RX ADMIN — SODIUM CHLORIDE, PRESERVATIVE FREE 10 ML: 5 INJECTION INTRAVENOUS at 20:44

## 2024-09-10 RX ADMIN — ACETAMINOPHEN 325MG 650 MG: 325 TABLET ORAL at 17:58

## 2024-09-10 RX ADMIN — ALBUTEROL SULFATE 2.5 MG: 2.5 SOLUTION RESPIRATORY (INHALATION) at 20:00

## 2024-09-10 RX ADMIN — APIXABAN 5 MG: 5 TABLET, FILM COATED ORAL at 08:30

## 2024-09-10 RX ADMIN — SODIUM CHLORIDE, PRESERVATIVE FREE 20 MG: 5 INJECTION INTRAVENOUS at 20:31

## 2024-09-10 RX ADMIN — INSULIN LISPRO 4 UNITS: 100 INJECTION, SOLUTION INTRAVENOUS; SUBCUTANEOUS at 00:14

## 2024-09-10 RX ADMIN — INSULIN LISPRO 8 UNITS: 100 INJECTION, SOLUTION INTRAVENOUS; SUBCUTANEOUS at 20:54

## 2024-09-10 RX ADMIN — POLYETHYLENE GLYCOL 3350 17 G: 17 POWDER, FOR SOLUTION ORAL at 07:50

## 2024-09-10 RX ADMIN — CARVEDILOL 12.5 MG: 12.5 TABLET, FILM COATED ORAL at 20:32

## 2024-09-10 RX ADMIN — INSULIN LISPRO 8 UNITS: 100 INJECTION, SOLUTION INTRAVENOUS; SUBCUTANEOUS at 15:36

## 2024-09-10 RX ADMIN — BUDESONIDE AND FORMOTEROL FUMARATE DIHYDRATE 2 PUFF: 160; 4.5 AEROSOL RESPIRATORY (INHALATION) at 20:00

## 2024-09-10 RX ADMIN — SODIUM CHLORIDE, PRESERVATIVE FREE 20 MG: 5 INJECTION INTRAVENOUS at 07:50

## 2024-09-10 RX ADMIN — BUDESONIDE AND FORMOTEROL FUMARATE DIHYDRATE 2 PUFF: 160; 4.5 AEROSOL RESPIRATORY (INHALATION) at 08:01

## 2024-09-10 RX ADMIN — DOCUSATE SODIUM 100 MG: 50 LIQUID ORAL at 07:51

## 2024-09-10 ASSESSMENT — PULMONARY FUNCTION TESTS
PIF_VALUE: 21
PIF_VALUE: 21
PIF_VALUE: 24
PIF_VALUE: 20
PIF_VALUE: 24
PIF_VALUE: 23
PIF_VALUE: 24
PIF_VALUE: 21
PIF_VALUE: 20
PIF_VALUE: 24
PIF_VALUE: 21
PIF_VALUE: 20
PIF_VALUE: 24
PIF_VALUE: 21
PIF_VALUE: 15
PIF_VALUE: 20
PIF_VALUE: 21
PIF_VALUE: 24
PIF_VALUE: 20
PIF_VALUE: 20
PIF_VALUE: 21
PIF_VALUE: 21
PIF_VALUE: 23
PIF_VALUE: 21
PIF_VALUE: 20
PIF_VALUE: 24
PIF_VALUE: 21
PIF_VALUE: 23

## 2024-09-10 ASSESSMENT — PAIN SCALES - GENERAL
PAINLEVEL_OUTOF10: 0

## 2024-09-11 LAB
ANION GAP SERPL CALCULATED.3IONS-SCNC: 10 MMOL/L (ref 3–16)
BASOPHILS # BLD: 0 K/UL (ref 0–0.2)
BASOPHILS NFR BLD: 0.1 %
BUN SERPL-MCNC: 36 MG/DL (ref 7–20)
CALCIUM SERPL-MCNC: 9 MG/DL (ref 8.3–10.6)
CHLORIDE SERPL-SCNC: 108 MMOL/L (ref 99–110)
CO2 SERPL-SCNC: 30 MMOL/L (ref 21–32)
CREAT SERPL-MCNC: 0.7 MG/DL (ref 0.6–1.2)
DEPRECATED RDW RBC AUTO: 16.9 % (ref 12.4–15.4)
EOSINOPHIL # BLD: 0.1 K/UL (ref 0–0.6)
EOSINOPHIL NFR BLD: 0.5 %
GFR SERPLBLD CREATININE-BSD FMLA CKD-EPI: >90 ML/MIN/{1.73_M2}
GLUCOSE BLD-MCNC: 230 MG/DL (ref 70–99)
GLUCOSE BLD-MCNC: 249 MG/DL (ref 70–99)
GLUCOSE BLD-MCNC: 249 MG/DL (ref 70–99)
GLUCOSE BLD-MCNC: 264 MG/DL (ref 70–99)
GLUCOSE BLD-MCNC: 298 MG/DL (ref 70–99)
GLUCOSE BLD-MCNC: 305 MG/DL (ref 70–99)
GLUCOSE BLD-MCNC: 309 MG/DL (ref 70–99)
GLUCOSE SERPL-MCNC: 231 MG/DL (ref 70–99)
HCT VFR BLD AUTO: 26.8 % (ref 36–48)
HGB BLD-MCNC: 9 G/DL (ref 12–16)
LYMPHOCYTES # BLD: 0.9 K/UL (ref 1–5.1)
LYMPHOCYTES NFR BLD: 9.3 %
MAGNESIUM SERPL-MCNC: 2.39 MG/DL (ref 1.8–2.4)
MCH RBC QN AUTO: 28.4 PG (ref 26–34)
MCHC RBC AUTO-ENTMCNC: 33.7 G/DL (ref 31–36)
MCV RBC AUTO: 84.1 FL (ref 80–100)
MONOCYTES # BLD: 0.9 K/UL (ref 0–1.3)
MONOCYTES NFR BLD: 9.5 %
NEUTROPHILS # BLD: 8 K/UL (ref 1.7–7.7)
NEUTROPHILS NFR BLD: 80.6 %
PERFORMED ON: ABNORMAL
PHOSPHATE SERPL-MCNC: 3.1 MG/DL (ref 2.5–4.9)
PLATELET # BLD AUTO: 158 K/UL (ref 135–450)
PMV BLD AUTO: 8.9 FL (ref 5–10.5)
POTASSIUM SERPL-SCNC: 4 MMOL/L (ref 3.5–5.1)
PROCALCITONIN SERPL IA-MCNC: 0.05 NG/ML (ref 0–0.15)
RBC # BLD AUTO: 3.18 M/UL (ref 4–5.2)
SODIUM SERPL-SCNC: 148 MMOL/L (ref 136–145)
WBC # BLD AUTO: 9.9 K/UL (ref 4–11)

## 2024-09-11 PROCEDURE — 84145 PROCALCITONIN (PCT): CPT

## 2024-09-11 PROCEDURE — 6360000002 HC RX W HCPCS: Performed by: HOSPITALIST

## 2024-09-11 PROCEDURE — 99223 1ST HOSP IP/OBS HIGH 75: CPT | Performed by: STUDENT IN AN ORGANIZED HEALTH CARE EDUCATION/TRAINING PROGRAM

## 2024-09-11 PROCEDURE — 6360000002 HC RX W HCPCS: Performed by: NURSE PRACTITIONER

## 2024-09-11 PROCEDURE — 94761 N-INVAS EAR/PLS OXIMETRY MLT: CPT

## 2024-09-11 PROCEDURE — 6370000000 HC RX 637 (ALT 250 FOR IP): Performed by: NURSE PRACTITIONER

## 2024-09-11 PROCEDURE — 2000000000 HC ICU R&B

## 2024-09-11 PROCEDURE — 94640 AIRWAY INHALATION TREATMENT: CPT

## 2024-09-11 PROCEDURE — 83735 ASSAY OF MAGNESIUM: CPT

## 2024-09-11 PROCEDURE — 2500000003 HC RX 250 WO HCPCS: Performed by: HOSPITALIST

## 2024-09-11 PROCEDURE — 85025 COMPLETE CBC W/AUTO DIFF WBC: CPT

## 2024-09-11 PROCEDURE — 36415 COLL VENOUS BLD VENIPUNCTURE: CPT

## 2024-09-11 PROCEDURE — 2580000003 HC RX 258: Performed by: HOSPITALIST

## 2024-09-11 PROCEDURE — 84100 ASSAY OF PHOSPHORUS: CPT

## 2024-09-11 PROCEDURE — 99291 CRITICAL CARE FIRST HOUR: CPT | Performed by: INTERNAL MEDICINE

## 2024-09-11 PROCEDURE — 94003 VENT MGMT INPAT SUBQ DAY: CPT

## 2024-09-11 PROCEDURE — 6370000000 HC RX 637 (ALT 250 FOR IP): Performed by: HOSPITALIST

## 2024-09-11 PROCEDURE — 80048 BASIC METABOLIC PNL TOTAL CA: CPT

## 2024-09-11 PROCEDURE — 2700000000 HC OXYGEN THERAPY PER DAY

## 2024-09-11 RX ADMIN — ALBUTEROL SULFATE 2.5 MG: 2.5 SOLUTION RESPIRATORY (INHALATION) at 08:13

## 2024-09-11 RX ADMIN — ALBUTEROL SULFATE 2.5 MG: 2.5 SOLUTION RESPIRATORY (INHALATION) at 12:12

## 2024-09-11 RX ADMIN — INSULIN LISPRO 4 UNITS: 100 INJECTION, SOLUTION INTRAVENOUS; SUBCUTANEOUS at 00:13

## 2024-09-11 RX ADMIN — Medication 5 ML: at 20:25

## 2024-09-11 RX ADMIN — ALBUTEROL SULFATE 2.5 MG: 2.5 SOLUTION RESPIRATORY (INHALATION) at 19:14

## 2024-09-11 RX ADMIN — IPRATROPIUM BROMIDE 0.5 MG: 0.5 SOLUTION RESPIRATORY (INHALATION) at 08:13

## 2024-09-11 RX ADMIN — SODIUM CHLORIDE, PRESERVATIVE FREE 20 MG: 5 INJECTION INTRAVENOUS at 08:49

## 2024-09-11 RX ADMIN — SODIUM CHLORIDE, PRESERVATIVE FREE 20 MG: 5 INJECTION INTRAVENOUS at 20:19

## 2024-09-11 RX ADMIN — INSULIN LISPRO 12 UNITS: 100 INJECTION, SOLUTION INTRAVENOUS; SUBCUTANEOUS at 21:21

## 2024-09-11 RX ADMIN — SODIUM CHLORIDE, PRESERVATIVE FREE 10 ML: 5 INJECTION INTRAVENOUS at 07:57

## 2024-09-11 RX ADMIN — LEVETIRACETAM 1500 MG: 100 INJECTION INTRAVENOUS at 20:18

## 2024-09-11 RX ADMIN — SODIUM CHLORIDE, PRESERVATIVE FREE 10 ML: 5 INJECTION INTRAVENOUS at 20:19

## 2024-09-11 RX ADMIN — ATORVASTATIN CALCIUM 20 MG: 20 TABLET, FILM COATED ORAL at 08:20

## 2024-09-11 RX ADMIN — LEVETIRACETAM 1500 MG: 100 INJECTION INTRAVENOUS at 08:56

## 2024-09-11 RX ADMIN — INSULIN LISPRO 8 UNITS: 100 INJECTION, SOLUTION INTRAVENOUS; SUBCUTANEOUS at 12:27

## 2024-09-11 RX ADMIN — HYDRALAZINE HYDROCHLORIDE 10 MG: 20 INJECTION INTRAMUSCULAR; INTRAVENOUS at 05:04

## 2024-09-11 RX ADMIN — ALBUTEROL SULFATE 2.5 MG: 2.5 SOLUTION RESPIRATORY (INHALATION) at 16:08

## 2024-09-11 RX ADMIN — ACETAMINOPHEN 325MG 650 MG: 325 TABLET ORAL at 08:41

## 2024-09-11 RX ADMIN — INSULIN LISPRO 4 UNITS: 100 INJECTION, SOLUTION INTRAVENOUS; SUBCUTANEOUS at 08:19

## 2024-09-11 RX ADMIN — HYDRALAZINE HYDROCHLORIDE 10 MG: 20 INJECTION INTRAMUSCULAR; INTRAVENOUS at 18:25

## 2024-09-11 RX ADMIN — INSULIN LISPRO 4 UNITS: 100 INJECTION, SOLUTION INTRAVENOUS; SUBCUTANEOUS at 04:06

## 2024-09-11 RX ADMIN — BUDESONIDE AND FORMOTEROL FUMARATE DIHYDRATE 2 PUFF: 160; 4.5 AEROSOL RESPIRATORY (INHALATION) at 19:14

## 2024-09-11 RX ADMIN — CARVEDILOL 12.5 MG: 12.5 TABLET, FILM COATED ORAL at 20:18

## 2024-09-11 RX ADMIN — INSULIN LISPRO 8 UNITS: 100 INJECTION, SOLUTION INTRAVENOUS; SUBCUTANEOUS at 16:54

## 2024-09-11 RX ADMIN — INSULIN GLARGINE 35 UNITS: 100 INJECTION, SOLUTION SUBCUTANEOUS at 20:18

## 2024-09-11 RX ADMIN — BUDESONIDE AND FORMOTEROL FUMARATE DIHYDRATE 2 PUFF: 160; 4.5 AEROSOL RESPIRATORY (INHALATION) at 08:14

## 2024-09-11 RX ADMIN — CARVEDILOL 12.5 MG: 12.5 TABLET, FILM COATED ORAL at 08:20

## 2024-09-11 ASSESSMENT — PULMONARY FUNCTION TESTS
PIF_VALUE: 21
PIF_VALUE: 22
PIF_VALUE: 20
PIF_VALUE: 20
PIF_VALUE: 21
PIF_VALUE: 21
PIF_VALUE: 20
PIF_VALUE: 23
PIF_VALUE: 23
PIF_VALUE: 21
PIF_VALUE: 20
PIF_VALUE: 21
PIF_VALUE: 20
PIF_VALUE: 21
PIF_VALUE: 20
PIF_VALUE: 21
PIF_VALUE: 20
PIF_VALUE: 21
PIF_VALUE: 20

## 2024-09-11 ASSESSMENT — PAIN SCALES - GENERAL
PAINLEVEL_OUTOF10: 0

## 2024-09-12 ENCOUNTER — ANESTHESIA EVENT (OUTPATIENT)
Dept: OPERATING ROOM | Age: 59
End: 2024-09-12
Payer: COMMERCIAL

## 2024-09-12 LAB
ANION GAP SERPL CALCULATED.3IONS-SCNC: 12 MMOL/L (ref 3–16)
BASOPHILS # BLD: 0.1 K/UL (ref 0–0.2)
BASOPHILS NFR BLD: 0.4 %
BUN SERPL-MCNC: 30 MG/DL (ref 7–20)
CALCIUM SERPL-MCNC: 8.9 MG/DL (ref 8.3–10.6)
CHLORIDE SERPL-SCNC: 107 MMOL/L (ref 99–110)
CO2 SERPL-SCNC: 28 MMOL/L (ref 21–32)
CREAT SERPL-MCNC: 0.7 MG/DL (ref 0.6–1.2)
DEPRECATED RDW RBC AUTO: 16.9 % (ref 12.4–15.4)
EOSINOPHIL # BLD: 0.1 K/UL (ref 0–0.6)
EOSINOPHIL NFR BLD: 1.2 %
GFR SERPLBLD CREATININE-BSD FMLA CKD-EPI: >90 ML/MIN/{1.73_M2}
GLUCOSE BLD-MCNC: 159 MG/DL (ref 70–99)
GLUCOSE BLD-MCNC: 180 MG/DL (ref 70–99)
GLUCOSE BLD-MCNC: 183 MG/DL (ref 70–99)
GLUCOSE BLD-MCNC: 200 MG/DL (ref 70–99)
GLUCOSE BLD-MCNC: 202 MG/DL (ref 70–99)
GLUCOSE BLD-MCNC: 204 MG/DL (ref 70–99)
GLUCOSE BLD-MCNC: 263 MG/DL (ref 70–99)
GLUCOSE SERPL-MCNC: 157 MG/DL (ref 70–99)
HCT VFR BLD AUTO: 28.4 % (ref 36–48)
HGB BLD-MCNC: 9.1 G/DL (ref 12–16)
LYMPHOCYTES # BLD: 0.9 K/UL (ref 1–5.1)
LYMPHOCYTES NFR BLD: 7.7 %
MAGNESIUM SERPL-MCNC: 2.51 MG/DL (ref 1.8–2.4)
MCH RBC QN AUTO: 27.3 PG (ref 26–34)
MCHC RBC AUTO-ENTMCNC: 32.1 G/DL (ref 31–36)
MCV RBC AUTO: 85 FL (ref 80–100)
MONOCYTES # BLD: 1.2 K/UL (ref 0–1.3)
MONOCYTES NFR BLD: 10.1 %
NEUTROPHILS # BLD: 9.4 K/UL (ref 1.7–7.7)
NEUTROPHILS NFR BLD: 80.6 %
PERFORMED ON: ABNORMAL
PHOSPHATE SERPL-MCNC: 3.1 MG/DL (ref 2.5–4.9)
PLATELET # BLD AUTO: 173 K/UL (ref 135–450)
PMV BLD AUTO: 9 FL (ref 5–10.5)
POTASSIUM SERPL-SCNC: 4.1 MMOL/L (ref 3.5–5.1)
RBC # BLD AUTO: 3.34 M/UL (ref 4–5.2)
SODIUM SERPL-SCNC: 147 MMOL/L (ref 136–145)
WBC # BLD AUTO: 11.7 K/UL (ref 4–11)

## 2024-09-12 PROCEDURE — 3E0G76Z INTRODUCTION OF NUTRITIONAL SUBSTANCE INTO UPPER GI, VIA NATURAL OR ARTIFICIAL OPENING: ICD-10-PCS | Performed by: INTERNAL MEDICINE

## 2024-09-12 PROCEDURE — 84100 ASSAY OF PHOSPHORUS: CPT

## 2024-09-12 PROCEDURE — 2500000003 HC RX 250 WO HCPCS: Performed by: INTERNAL MEDICINE

## 2024-09-12 PROCEDURE — 3609012900 HC EGD FOREIGN BODY REMOVAL: Performed by: INTERNAL MEDICINE

## 2024-09-12 PROCEDURE — 36415 COLL VENOUS BLD VENIPUNCTURE: CPT

## 2024-09-12 PROCEDURE — 99291 CRITICAL CARE FIRST HOUR: CPT | Performed by: INTERNAL MEDICINE

## 2024-09-12 PROCEDURE — 6370000000 HC RX 637 (ALT 250 FOR IP): Performed by: NURSE PRACTITIONER

## 2024-09-12 PROCEDURE — 83735 ASSAY OF MAGNESIUM: CPT

## 2024-09-12 PROCEDURE — 6360000002 HC RX W HCPCS: Performed by: INTERNAL MEDICINE

## 2024-09-12 PROCEDURE — 85025 COMPLETE CBC W/AUTO DIFF WBC: CPT

## 2024-09-12 PROCEDURE — 2580000003 HC RX 258: Performed by: INTERNAL MEDICINE

## 2024-09-12 PROCEDURE — 99153 MOD SED SAME PHYS/QHP EA: CPT | Performed by: INTERNAL MEDICINE

## 2024-09-12 PROCEDURE — 2700000000 HC OXYGEN THERAPY PER DAY

## 2024-09-12 PROCEDURE — 80048 BASIC METABOLIC PNL TOTAL CA: CPT

## 2024-09-12 PROCEDURE — 94640 AIRWAY INHALATION TREATMENT: CPT

## 2024-09-12 PROCEDURE — 6360000002 HC RX W HCPCS: Performed by: HOSPITALIST

## 2024-09-12 PROCEDURE — 99152 MOD SED SAME PHYS/QHP 5/>YRS: CPT | Performed by: INTERNAL MEDICINE

## 2024-09-12 PROCEDURE — 2709999900 HC NON-CHARGEABLE SUPPLY: Performed by: INTERNAL MEDICINE

## 2024-09-12 PROCEDURE — 2000000000 HC ICU R&B

## 2024-09-12 PROCEDURE — 0DH63UZ INSERTION OF FEEDING DEVICE INTO STOMACH, PERCUTANEOUS APPROACH: ICD-10-PCS | Performed by: INTERNAL MEDICINE

## 2024-09-12 PROCEDURE — 6360000002 HC RX W HCPCS: Performed by: NURSE PRACTITIONER

## 2024-09-12 PROCEDURE — 2500000003 HC RX 250 WO HCPCS: Performed by: HOSPITALIST

## 2024-09-12 PROCEDURE — 94003 VENT MGMT INPAT SUBQ DAY: CPT

## 2024-09-12 PROCEDURE — 2580000003 HC RX 258: Performed by: HOSPITALIST

## 2024-09-12 PROCEDURE — 43762 RPLC GTUBE NO REVJ TRC: CPT

## 2024-09-12 PROCEDURE — 6370000000 HC RX 637 (ALT 250 FOR IP): Performed by: HOSPITALIST

## 2024-09-12 PROCEDURE — 94761 N-INVAS EAR/PLS OXIMETRY MLT: CPT

## 2024-09-12 PROCEDURE — 99232 SBSQ HOSP IP/OBS MODERATE 35: CPT | Performed by: STUDENT IN AN ORGANIZED HEALTH CARE EDUCATION/TRAINING PROGRAM

## 2024-09-12 PROCEDURE — 3609013300 HC EGD TUBE PLACEMENT: Performed by: INTERNAL MEDICINE

## 2024-09-12 RX ORDER — FENTANYL CITRATE 50 UG/ML
INJECTION, SOLUTION INTRAMUSCULAR; INTRAVENOUS PRN
Status: DISCONTINUED | OUTPATIENT
Start: 2024-09-12 | End: 2024-09-12 | Stop reason: ALTCHOICE

## 2024-09-12 RX ORDER — MIDAZOLAM HYDROCHLORIDE 1 MG/ML
INJECTION INTRAMUSCULAR; INTRAVENOUS PRN
Status: DISCONTINUED | OUTPATIENT
Start: 2024-09-12 | End: 2024-09-12 | Stop reason: ALTCHOICE

## 2024-09-12 RX ORDER — LIDOCAINE HYDROCHLORIDE 10 MG/ML
INJECTION, SOLUTION EPIDURAL; INFILTRATION; INTRACAUDAL; PERINEURAL PRN
Status: DISCONTINUED | OUTPATIENT
Start: 2024-09-12 | End: 2024-09-12 | Stop reason: ALTCHOICE

## 2024-09-12 RX ADMIN — CEFAZOLIN 2000 MG: 2 INJECTION, POWDER, FOR SOLUTION INTRAVENOUS at 11:07

## 2024-09-12 RX ADMIN — SODIUM CHLORIDE, PRESERVATIVE FREE 10 ML: 5 INJECTION INTRAVENOUS at 21:07

## 2024-09-12 RX ADMIN — BUDESONIDE AND FORMOTEROL FUMARATE DIHYDRATE 2 PUFF: 160; 4.5 AEROSOL RESPIRATORY (INHALATION) at 19:36

## 2024-09-12 RX ADMIN — INSULIN LISPRO 8 UNITS: 100 INJECTION, SOLUTION INTRAVENOUS; SUBCUTANEOUS at 00:25

## 2024-09-12 RX ADMIN — INSULIN LISPRO 4 UNITS: 100 INJECTION, SOLUTION INTRAVENOUS; SUBCUTANEOUS at 11:14

## 2024-09-12 RX ADMIN — SODIUM CHLORIDE, PRESERVATIVE FREE 20 MG: 5 INJECTION INTRAVENOUS at 08:21

## 2024-09-12 RX ADMIN — ATORVASTATIN CALCIUM 20 MG: 20 TABLET, FILM COATED ORAL at 08:18

## 2024-09-12 RX ADMIN — CARVEDILOL 12.5 MG: 12.5 TABLET, FILM COATED ORAL at 21:05

## 2024-09-12 RX ADMIN — ALBUTEROL SULFATE 2.5 MG: 2.5 SOLUTION RESPIRATORY (INHALATION) at 11:48

## 2024-09-12 RX ADMIN — SODIUM CHLORIDE: 9 INJECTION, SOLUTION INTRAVENOUS at 11:05

## 2024-09-12 RX ADMIN — INSULIN LISPRO 4 UNITS: 100 INJECTION, SOLUTION INTRAVENOUS; SUBCUTANEOUS at 23:53

## 2024-09-12 RX ADMIN — Medication 5 ML: at 21:00

## 2024-09-12 RX ADMIN — LEVETIRACETAM 1500 MG: 100 INJECTION INTRAVENOUS at 08:30

## 2024-09-12 RX ADMIN — ALBUTEROL SULFATE 2.5 MG: 2.5 SOLUTION RESPIRATORY (INHALATION) at 16:02

## 2024-09-12 RX ADMIN — INSULIN LISPRO 4 UNITS: 100 INJECTION, SOLUTION INTRAVENOUS; SUBCUTANEOUS at 15:18

## 2024-09-12 RX ADMIN — LEVETIRACETAM 1500 MG: 100 INJECTION INTRAVENOUS at 21:04

## 2024-09-12 RX ADMIN — INSULIN GLARGINE 35 UNITS: 100 INJECTION, SOLUTION SUBCUTANEOUS at 21:06

## 2024-09-12 RX ADMIN — ALBUTEROL SULFATE 2.5 MG: 2.5 SOLUTION RESPIRATORY (INHALATION) at 19:36

## 2024-09-12 RX ADMIN — ALBUTEROL SULFATE 2.5 MG: 2.5 SOLUTION RESPIRATORY (INHALATION) at 08:27

## 2024-09-12 RX ADMIN — BUDESONIDE AND FORMOTEROL FUMARATE DIHYDRATE 2 PUFF: 160; 4.5 AEROSOL RESPIRATORY (INHALATION) at 08:27

## 2024-09-12 RX ADMIN — SODIUM CHLORIDE, PRESERVATIVE FREE 20 MG: 5 INJECTION INTRAVENOUS at 21:05

## 2024-09-12 RX ADMIN — CARVEDILOL 12.5 MG: 12.5 TABLET, FILM COATED ORAL at 08:18

## 2024-09-12 RX ADMIN — SODIUM CHLORIDE, PRESERVATIVE FREE 10 ML: 5 INJECTION INTRAVENOUS at 08:23

## 2024-09-12 ASSESSMENT — PAIN SCALES - GENERAL
PAINLEVEL_OUTOF10: 0
PAINLEVEL_OUTOF10: 1

## 2024-09-12 ASSESSMENT — PULMONARY FUNCTION TESTS
PIF_VALUE: 16
PIF_VALUE: 20
PIF_VALUE: 18
PIF_VALUE: 16
PIF_VALUE: 21
PIF_VALUE: 15
PIF_VALUE: 21
PIF_VALUE: 16
PIF_VALUE: 21
PIF_VALUE: 16
PIF_VALUE: 16
PIF_VALUE: 21
PIF_VALUE: 16
PIF_VALUE: 16
PIF_VALUE: 21
PIF_VALUE: 17
PIF_VALUE: 20
PIF_VALUE: 20
PIF_VALUE: 16
PIF_VALUE: 20
PIF_VALUE: 17
PIF_VALUE: 16
PIF_VALUE: 16
PIF_VALUE: 18
PIF_VALUE: 16
PIF_VALUE: 21
PIF_VALUE: 16
PIF_VALUE: 21
PIF_VALUE: 21
PIF_VALUE: 18
PIF_VALUE: 21
PIF_VALUE: 17
PIF_VALUE: 21
PIF_VALUE: 21
PIF_VALUE: 16
PIF_VALUE: 21
PIF_VALUE: 21
PIF_VALUE: 16
PIF_VALUE: 21
PIF_VALUE: 16
PIF_VALUE: 20

## 2024-09-12 ASSESSMENT — PAIN - FUNCTIONAL ASSESSMENT
PAIN_FUNCTIONAL_ASSESSMENT: ADULT NONVERBAL PAIN SCALE (NPVS)
PAIN_FUNCTIONAL_ASSESSMENT: ADULT NONVERBAL PAIN SCALE (NPVS)

## 2024-09-12 ASSESSMENT — PAIN SCALES - WONG BAKER: WONGBAKER_NUMERICALRESPONSE: NO HURT

## 2024-09-13 ENCOUNTER — ANESTHESIA (OUTPATIENT)
Dept: OPERATING ROOM | Age: 59
End: 2024-09-13
Payer: COMMERCIAL

## 2024-09-13 LAB
ANION GAP SERPL CALCULATED.3IONS-SCNC: 10 MMOL/L (ref 3–16)
BACTERIA SPEC RESP CULT: NORMAL
BASOPHILS # BLD: 0 K/UL (ref 0–0.2)
BASOPHILS NFR BLD: 0.4 %
BUN SERPL-MCNC: 27 MG/DL (ref 7–20)
CALCIUM SERPL-MCNC: 8.9 MG/DL (ref 8.3–10.6)
CHLORIDE SERPL-SCNC: 108 MMOL/L (ref 99–110)
CO2 SERPL-SCNC: 29 MMOL/L (ref 21–32)
CREAT SERPL-MCNC: 0.7 MG/DL (ref 0.6–1.2)
DEPRECATED RDW RBC AUTO: 16.8 % (ref 12.4–15.4)
EKG ATRIAL RATE: 68 BPM
EKG DIAGNOSIS: NORMAL
EKG P AXIS: 57 DEGREES
EKG P-R INTERVAL: 180 MS
EKG Q-T INTERVAL: 440 MS
EKG QRS DURATION: 128 MS
EKG QTC CALCULATION (BAZETT): 467 MS
EKG R AXIS: 20 DEGREES
EKG T AXIS: -1 DEGREES
EKG VENTRICULAR RATE: 68 BPM
EOSINOPHIL # BLD: 0.1 K/UL (ref 0–0.6)
EOSINOPHIL NFR BLD: 1.1 %
GFR SERPLBLD CREATININE-BSD FMLA CKD-EPI: >90 ML/MIN/{1.73_M2}
GLUCOSE BLD-MCNC: 172 MG/DL (ref 70–99)
GLUCOSE BLD-MCNC: 177 MG/DL (ref 70–99)
GLUCOSE BLD-MCNC: 186 MG/DL (ref 70–99)
GLUCOSE BLD-MCNC: 193 MG/DL (ref 70–99)
GLUCOSE BLD-MCNC: 200 MG/DL (ref 70–99)
GLUCOSE BLD-MCNC: 223 MG/DL (ref 70–99)
GLUCOSE SERPL-MCNC: 175 MG/DL (ref 70–99)
GRAM STN SPEC: NORMAL
HCT VFR BLD AUTO: 26.1 % (ref 36–48)
HGB BLD-MCNC: 8.5 G/DL (ref 12–16)
LYMPHOCYTES # BLD: 0.9 K/UL (ref 1–5.1)
LYMPHOCYTES NFR BLD: 8.6 %
MAGNESIUM SERPL-MCNC: 2.54 MG/DL (ref 1.8–2.4)
MCH RBC QN AUTO: 27.5 PG (ref 26–34)
MCHC RBC AUTO-ENTMCNC: 32.6 G/DL (ref 31–36)
MCV RBC AUTO: 84.3 FL (ref 80–100)
MONOCYTES # BLD: 0.9 K/UL (ref 0–1.3)
MONOCYTES NFR BLD: 7.7 %
NEUTROPHILS # BLD: 9 K/UL (ref 1.7–7.7)
NEUTROPHILS NFR BLD: 82.2 %
PERFORMED ON: ABNORMAL
PHOSPHATE SERPL-MCNC: 3.6 MG/DL (ref 2.5–4.9)
PLATELET # BLD AUTO: 164 K/UL (ref 135–450)
PMV BLD AUTO: 8.8 FL (ref 5–10.5)
POTASSIUM SERPL-SCNC: 4.1 MMOL/L (ref 3.5–5.1)
RBC # BLD AUTO: 3.09 M/UL (ref 4–5.2)
SODIUM SERPL-SCNC: 147 MMOL/L (ref 136–145)
WBC # BLD AUTO: 11 K/UL (ref 4–11)

## 2024-09-13 PROCEDURE — 2500000003 HC RX 250 WO HCPCS

## 2024-09-13 PROCEDURE — 6360000002 HC RX W HCPCS

## 2024-09-13 PROCEDURE — 6360000002 HC RX W HCPCS: Performed by: NURSE PRACTITIONER

## 2024-09-13 PROCEDURE — 2580000003 HC RX 258: Performed by: HOSPITALIST

## 2024-09-13 PROCEDURE — 93010 ELECTROCARDIOGRAM REPORT: CPT | Performed by: INTERNAL MEDICINE

## 2024-09-13 PROCEDURE — 2720000010 HC SURG SUPPLY STERILE: Performed by: STUDENT IN AN ORGANIZED HEALTH CARE EDUCATION/TRAINING PROGRAM

## 2024-09-13 PROCEDURE — 93005 ELECTROCARDIOGRAM TRACING: CPT | Performed by: INTERNAL MEDICINE

## 2024-09-13 PROCEDURE — A4217 STERILE WATER/SALINE, 500 ML: HCPCS | Performed by: STUDENT IN AN ORGANIZED HEALTH CARE EDUCATION/TRAINING PROGRAM

## 2024-09-13 PROCEDURE — 80048 BASIC METABOLIC PNL TOTAL CA: CPT

## 2024-09-13 PROCEDURE — 2700000000 HC OXYGEN THERAPY PER DAY

## 2024-09-13 PROCEDURE — 6370000000 HC RX 637 (ALT 250 FOR IP): Performed by: HOSPITALIST

## 2024-09-13 PROCEDURE — 3700000000 HC ANESTHESIA ATTENDED CARE: Performed by: STUDENT IN AN ORGANIZED HEALTH CARE EDUCATION/TRAINING PROGRAM

## 2024-09-13 PROCEDURE — 2000000000 HC ICU R&B

## 2024-09-13 PROCEDURE — 0B110F4 BYPASS TRACHEA TO CUTANEOUS WITH TRACHEOSTOMY DEVICE, OPEN APPROACH: ICD-10-PCS | Performed by: STUDENT IN AN ORGANIZED HEALTH CARE EDUCATION/TRAINING PROGRAM

## 2024-09-13 PROCEDURE — 94640 AIRWAY INHALATION TREATMENT: CPT

## 2024-09-13 PROCEDURE — 36415 COLL VENOUS BLD VENIPUNCTURE: CPT

## 2024-09-13 PROCEDURE — 2580000003 HC RX 258: Performed by: STUDENT IN AN ORGANIZED HEALTH CARE EDUCATION/TRAINING PROGRAM

## 2024-09-13 PROCEDURE — 84100 ASSAY OF PHOSPHORUS: CPT

## 2024-09-13 PROCEDURE — 94003 VENT MGMT INPAT SUBQ DAY: CPT

## 2024-09-13 PROCEDURE — 2709999900 HC NON-CHARGEABLE SUPPLY: Performed by: STUDENT IN AN ORGANIZED HEALTH CARE EDUCATION/TRAINING PROGRAM

## 2024-09-13 PROCEDURE — 3700000001 HC ADD 15 MINUTES (ANESTHESIA): Performed by: STUDENT IN AN ORGANIZED HEALTH CARE EDUCATION/TRAINING PROGRAM

## 2024-09-13 PROCEDURE — 3600000002 HC SURGERY LEVEL 2 BASE: Performed by: STUDENT IN AN ORGANIZED HEALTH CARE EDUCATION/TRAINING PROGRAM

## 2024-09-13 PROCEDURE — 83735 ASSAY OF MAGNESIUM: CPT

## 2024-09-13 PROCEDURE — 2580000003 HC RX 258

## 2024-09-13 PROCEDURE — 6360000002 HC RX W HCPCS: Performed by: HOSPITALIST

## 2024-09-13 PROCEDURE — 94761 N-INVAS EAR/PLS OXIMETRY MLT: CPT

## 2024-09-13 PROCEDURE — 2500000003 HC RX 250 WO HCPCS: Performed by: HOSPITALIST

## 2024-09-13 PROCEDURE — 99291 CRITICAL CARE FIRST HOUR: CPT | Performed by: INTERNAL MEDICINE

## 2024-09-13 PROCEDURE — 6370000000 HC RX 637 (ALT 250 FOR IP): Performed by: NURSE PRACTITIONER

## 2024-09-13 PROCEDURE — 85025 COMPLETE CBC W/AUTO DIFF WBC: CPT

## 2024-09-13 PROCEDURE — 31600 PLANNED TRACHEOSTOMY: CPT | Performed by: STUDENT IN AN ORGANIZED HEALTH CARE EDUCATION/TRAINING PROGRAM

## 2024-09-13 PROCEDURE — 3600000012 HC SURGERY LEVEL 2 ADDTL 15MIN: Performed by: STUDENT IN AN ORGANIZED HEALTH CARE EDUCATION/TRAINING PROGRAM

## 2024-09-13 RX ORDER — MAGNESIUM HYDROXIDE 1200 MG/15ML
LIQUID ORAL CONTINUOUS PRN
Status: COMPLETED | OUTPATIENT
Start: 2024-09-13 | End: 2024-09-13

## 2024-09-13 RX ORDER — PHENYLEPHRINE HCL IN 0.9% NACL 1 MG/10 ML
SYRINGE (ML) INTRAVENOUS
Status: DISCONTINUED | OUTPATIENT
Start: 2024-09-13 | End: 2024-09-13 | Stop reason: SDUPTHER

## 2024-09-13 RX ORDER — SODIUM CHLORIDE 9 MG/ML
INJECTION, SOLUTION INTRAVENOUS
Status: DISCONTINUED | OUTPATIENT
Start: 2024-09-13 | End: 2024-09-13 | Stop reason: SDUPTHER

## 2024-09-13 RX ORDER — FENTANYL CITRATE 50 UG/ML
INJECTION, SOLUTION INTRAMUSCULAR; INTRAVENOUS
Status: DISCONTINUED | OUTPATIENT
Start: 2024-09-13 | End: 2024-09-13 | Stop reason: SDUPTHER

## 2024-09-13 RX ORDER — ROCURONIUM BROMIDE 10 MG/ML
INJECTION, SOLUTION INTRAVENOUS
Status: DISCONTINUED | OUTPATIENT
Start: 2024-09-13 | End: 2024-09-13 | Stop reason: SDUPTHER

## 2024-09-13 RX ADMIN — FENTANYL CITRATE 50 MCG: 50 INJECTION INTRAMUSCULAR; INTRAVENOUS at 12:09

## 2024-09-13 RX ADMIN — ALBUTEROL SULFATE 2.5 MG: 2.5 SOLUTION RESPIRATORY (INHALATION) at 11:39

## 2024-09-13 RX ADMIN — ROCURONIUM BROMIDE 50 MG: 10 SOLUTION INTRAVENOUS at 12:09

## 2024-09-13 RX ADMIN — ROCURONIUM BROMIDE 50 MG: 10 SOLUTION INTRAVENOUS at 12:55

## 2024-09-13 RX ADMIN — SODIUM CHLORIDE, PRESERVATIVE FREE 20 MG: 5 INJECTION INTRAVENOUS at 20:26

## 2024-09-13 RX ADMIN — BUDESONIDE AND FORMOTEROL FUMARATE DIHYDRATE 2 PUFF: 160; 4.5 AEROSOL RESPIRATORY (INHALATION) at 08:10

## 2024-09-13 RX ADMIN — CARVEDILOL 12.5 MG: 12.5 TABLET, FILM COATED ORAL at 20:28

## 2024-09-13 RX ADMIN — ALBUTEROL SULFATE 2.5 MG: 2.5 SOLUTION RESPIRATORY (INHALATION) at 20:08

## 2024-09-13 RX ADMIN — LEVETIRACETAM 1500 MG: 100 INJECTION INTRAVENOUS at 20:27

## 2024-09-13 RX ADMIN — CARVEDILOL 12.5 MG: 12.5 TABLET, FILM COATED ORAL at 08:13

## 2024-09-13 RX ADMIN — SODIUM CHLORIDE: 9 INJECTION, SOLUTION INTRAVENOUS at 11:58

## 2024-09-13 RX ADMIN — Medication 100 MCG: at 12:14

## 2024-09-13 RX ADMIN — BUDESONIDE AND FORMOTEROL FUMARATE DIHYDRATE 2 PUFF: 160; 4.5 AEROSOL RESPIRATORY (INHALATION) at 20:07

## 2024-09-13 RX ADMIN — INSULIN GLARGINE 35 UNITS: 100 INJECTION, SOLUTION SUBCUTANEOUS at 20:26

## 2024-09-13 RX ADMIN — ALBUTEROL SULFATE 2.5 MG: 2.5 SOLUTION RESPIRATORY (INHALATION) at 08:10

## 2024-09-13 RX ADMIN — FENTANYL CITRATE 50 MCG: 50 INJECTION INTRAMUSCULAR; INTRAVENOUS at 13:07

## 2024-09-13 RX ADMIN — ALBUTEROL SULFATE 2.5 MG: 2.5 SOLUTION RESPIRATORY (INHALATION) at 16:13

## 2024-09-13 RX ADMIN — LEVETIRACETAM 1500 MG: 100 INJECTION INTRAVENOUS at 08:30

## 2024-09-13 RX ADMIN — INSULIN LISPRO 4 UNITS: 100 INJECTION, SOLUTION INTRAVENOUS; SUBCUTANEOUS at 15:38

## 2024-09-13 RX ADMIN — ATORVASTATIN CALCIUM 20 MG: 20 TABLET, FILM COATED ORAL at 08:13

## 2024-09-13 RX ADMIN — Medication 100 MCG: at 12:19

## 2024-09-13 RX ADMIN — SODIUM CHLORIDE, PRESERVATIVE FREE 10 ML: 5 INJECTION INTRAVENOUS at 08:20

## 2024-09-13 RX ADMIN — Medication 100 MCG: at 12:11

## 2024-09-13 RX ADMIN — SODIUM CHLORIDE, PRESERVATIVE FREE 20 MG: 5 INJECTION INTRAVENOUS at 08:19

## 2024-09-13 RX ADMIN — SODIUM CHLORIDE, PRESERVATIVE FREE 10 ML: 5 INJECTION INTRAVENOUS at 20:28

## 2024-09-13 ASSESSMENT — PAIN SCALES - GENERAL
PAINLEVEL_OUTOF10: 0

## 2024-09-13 ASSESSMENT — PAIN SCALES - WONG BAKER

## 2024-09-13 ASSESSMENT — PULMONARY FUNCTION TESTS
PIF_VALUE: 20
PIF_VALUE: 24
PIF_VALUE: 18
PIF_VALUE: 24
PIF_VALUE: 29
PIF_VALUE: 25
PIF_VALUE: 24
PIF_VALUE: 18
PIF_VALUE: 17
PIF_VALUE: 18
PIF_VALUE: 21
PIF_VALUE: 25
PIF_VALUE: 18
PIF_VALUE: 18
PIF_VALUE: 22
PIF_VALUE: 25
PIF_VALUE: 27
PIF_VALUE: 18
PIF_VALUE: 21
PIF_VALUE: 22
PIF_VALUE: 21
PIF_VALUE: 23
PIF_VALUE: 22
PIF_VALUE: 25
PIF_VALUE: 22
PIF_VALUE: 19
PIF_VALUE: 18
PIF_VALUE: 18
PIF_VALUE: 26
PIF_VALUE: 18
PIF_VALUE: 18
PIF_VALUE: 22
PIF_VALUE: 23
PIF_VALUE: 18
PIF_VALUE: 24
PIF_VALUE: 18
PIF_VALUE: 24
PIF_VALUE: 18
PIF_VALUE: 18
PIF_VALUE: 22

## 2024-09-13 ASSESSMENT — COPD QUESTIONNAIRES: CAT_SEVERITY: MODERATE

## 2024-09-13 ASSESSMENT — LIFESTYLE VARIABLES: SMOKING_STATUS: 0

## 2024-09-13 ASSESSMENT — ENCOUNTER SYMPTOMS: DYSPNEA ACTIVITY LEVEL: NO INTERVAL CHANGE

## 2024-09-14 VITALS
OXYGEN SATURATION: 95 % | TEMPERATURE: 99.8 F | SYSTOLIC BLOOD PRESSURE: 155 MMHG | HEART RATE: 69 BPM | RESPIRATION RATE: 22 BRPM | HEIGHT: 60 IN | DIASTOLIC BLOOD PRESSURE: 64 MMHG | BODY MASS INDEX: 38.31 KG/M2 | WEIGHT: 195.11 LBS

## 2024-09-14 LAB
ANION GAP SERPL CALCULATED.3IONS-SCNC: 9 MMOL/L (ref 3–16)
BASOPHILS # BLD: 0 K/UL (ref 0–0.2)
BASOPHILS NFR BLD: 0.4 %
BUN SERPL-MCNC: 31 MG/DL (ref 7–20)
CALCIUM SERPL-MCNC: 8.7 MG/DL (ref 8.3–10.6)
CHLORIDE SERPL-SCNC: 111 MMOL/L (ref 99–110)
CO2 SERPL-SCNC: 29 MMOL/L (ref 21–32)
CREAT SERPL-MCNC: 0.8 MG/DL (ref 0.6–1.2)
DEPRECATED RDW RBC AUTO: 16.4 % (ref 12.4–15.4)
EOSINOPHIL # BLD: 0.2 K/UL (ref 0–0.6)
EOSINOPHIL NFR BLD: 1.5 %
GFR SERPLBLD CREATININE-BSD FMLA CKD-EPI: 84 ML/MIN/{1.73_M2}
GLUCOSE BLD-MCNC: 221 MG/DL (ref 70–99)
GLUCOSE BLD-MCNC: 248 MG/DL (ref 70–99)
GLUCOSE BLD-MCNC: 252 MG/DL (ref 70–99)
GLUCOSE BLD-MCNC: 253 MG/DL (ref 70–99)
GLUCOSE SERPL-MCNC: 203 MG/DL (ref 70–99)
HCT VFR BLD AUTO: 25 % (ref 36–48)
HGB BLD-MCNC: 8.3 G/DL (ref 12–16)
LYMPHOCYTES # BLD: 1 K/UL (ref 1–5.1)
LYMPHOCYTES NFR BLD: 9.6 %
MAGNESIUM SERPL-MCNC: 2.66 MG/DL (ref 1.8–2.4)
MCH RBC QN AUTO: 27.8 PG (ref 26–34)
MCHC RBC AUTO-ENTMCNC: 33 G/DL (ref 31–36)
MCV RBC AUTO: 84.4 FL (ref 80–100)
MONOCYTES # BLD: 0.8 K/UL (ref 0–1.3)
MONOCYTES NFR BLD: 7.8 %
NEUTROPHILS # BLD: 8.3 K/UL (ref 1.7–7.7)
NEUTROPHILS NFR BLD: 80.7 %
PERFORMED ON: ABNORMAL
PHOSPHATE SERPL-MCNC: 3.7 MG/DL (ref 2.5–4.9)
PLATELET # BLD AUTO: 164 K/UL (ref 135–450)
PMV BLD AUTO: 8.8 FL (ref 5–10.5)
POTASSIUM SERPL-SCNC: 4.2 MMOL/L (ref 3.5–5.1)
RBC # BLD AUTO: 2.96 M/UL (ref 4–5.2)
SODIUM SERPL-SCNC: 149 MMOL/L (ref 136–145)
WBC # BLD AUTO: 10.2 K/UL (ref 4–11)

## 2024-09-14 PROCEDURE — 85025 COMPLETE CBC W/AUTO DIFF WBC: CPT

## 2024-09-14 PROCEDURE — 36415 COLL VENOUS BLD VENIPUNCTURE: CPT

## 2024-09-14 PROCEDURE — 2700000000 HC OXYGEN THERAPY PER DAY

## 2024-09-14 PROCEDURE — 83735 ASSAY OF MAGNESIUM: CPT

## 2024-09-14 PROCEDURE — 6360000002 HC RX W HCPCS: Performed by: HOSPITALIST

## 2024-09-14 PROCEDURE — 80048 BASIC METABOLIC PNL TOTAL CA: CPT

## 2024-09-14 PROCEDURE — 94003 VENT MGMT INPAT SUBQ DAY: CPT

## 2024-09-14 PROCEDURE — 84100 ASSAY OF PHOSPHORUS: CPT

## 2024-09-14 PROCEDURE — 6360000002 HC RX W HCPCS: Performed by: NURSE PRACTITIONER

## 2024-09-14 PROCEDURE — 2580000003 HC RX 258: Performed by: HOSPITALIST

## 2024-09-14 PROCEDURE — 6370000000 HC RX 637 (ALT 250 FOR IP): Performed by: NURSE PRACTITIONER

## 2024-09-14 PROCEDURE — 94640 AIRWAY INHALATION TREATMENT: CPT

## 2024-09-14 PROCEDURE — 2500000003 HC RX 250 WO HCPCS: Performed by: HOSPITALIST

## 2024-09-14 PROCEDURE — 6370000000 HC RX 637 (ALT 250 FOR IP): Performed by: INTERNAL MEDICINE

## 2024-09-14 PROCEDURE — 6370000000 HC RX 637 (ALT 250 FOR IP): Performed by: HOSPITALIST

## 2024-09-14 PROCEDURE — 94761 N-INVAS EAR/PLS OXIMETRY MLT: CPT

## 2024-09-14 PROCEDURE — 99291 CRITICAL CARE FIRST HOUR: CPT | Performed by: INTERNAL MEDICINE

## 2024-09-14 RX ORDER — BUDESONIDE AND FORMOTEROL FUMARATE DIHYDRATE 160; 4.5 UG/1; UG/1
2 AEROSOL RESPIRATORY (INHALATION)
DISCHARGE
Start: 2024-09-14

## 2024-09-14 RX ORDER — INSULIN GLARGINE 100 [IU]/ML
40 INJECTION, SOLUTION SUBCUTANEOUS NIGHTLY
DISCHARGE
Start: 2024-09-14

## 2024-09-14 RX ORDER — ASPIRIN 81 MG/1
81 TABLET, CHEWABLE ORAL ONCE
Status: COMPLETED | OUTPATIENT
Start: 2024-09-14 | End: 2024-09-14

## 2024-09-14 RX ORDER — ALBUTEROL SULFATE 0.83 MG/ML
2.5 SOLUTION RESPIRATORY (INHALATION)
DISCHARGE
Start: 2024-09-14

## 2024-09-14 RX ORDER — INSULIN GLARGINE 100 [IU]/ML
40 INJECTION, SOLUTION SUBCUTANEOUS NIGHTLY
Status: DISCONTINUED | OUTPATIENT
Start: 2024-09-14 | End: 2024-09-14 | Stop reason: HOSPADM

## 2024-09-14 RX ORDER — LEVETIRACETAM 500 MG/5ML
1500 INJECTION, SOLUTION, CONCENTRATE INTRAVENOUS EVERY 12 HOURS
DISCHARGE
Start: 2024-09-14

## 2024-09-14 RX ORDER — INSULIN LISPRO 100 [IU]/ML
0-16 INJECTION, SOLUTION INTRAVENOUS; SUBCUTANEOUS EVERY 4 HOURS
DISCHARGE
Start: 2024-09-14

## 2024-09-14 RX ADMIN — INSULIN LISPRO 4 UNITS: 100 INJECTION, SOLUTION INTRAVENOUS; SUBCUTANEOUS at 04:23

## 2024-09-14 RX ADMIN — ASPIRIN 81 MG: 81 TABLET, CHEWABLE ORAL at 10:04

## 2024-09-14 RX ADMIN — SODIUM CHLORIDE, PRESERVATIVE FREE 10 ML: 5 INJECTION INTRAVENOUS at 09:45

## 2024-09-14 RX ADMIN — ALBUTEROL SULFATE 2.5 MG: 2.5 SOLUTION RESPIRATORY (INHALATION) at 15:21

## 2024-09-14 RX ADMIN — ALBUTEROL SULFATE 2.5 MG: 2.5 SOLUTION RESPIRATORY (INHALATION) at 08:10

## 2024-09-14 RX ADMIN — ATORVASTATIN CALCIUM 20 MG: 20 TABLET, FILM COATED ORAL at 10:01

## 2024-09-14 RX ADMIN — LEVETIRACETAM 1500 MG: 100 INJECTION INTRAVENOUS at 09:43

## 2024-09-14 RX ADMIN — INSULIN LISPRO 8 UNITS: 100 INJECTION, SOLUTION INTRAVENOUS; SUBCUTANEOUS at 17:24

## 2024-09-14 RX ADMIN — SODIUM CHLORIDE, PRESERVATIVE FREE 20 MG: 5 INJECTION INTRAVENOUS at 09:45

## 2024-09-14 RX ADMIN — INSULIN LISPRO 4 UNITS: 100 INJECTION, SOLUTION INTRAVENOUS; SUBCUTANEOUS at 00:02

## 2024-09-14 RX ADMIN — DOCUSATE SODIUM 100 MG: 50 LIQUID ORAL at 10:01

## 2024-09-14 RX ADMIN — INSULIN LISPRO 4 UNITS: 100 INJECTION, SOLUTION INTRAVENOUS; SUBCUTANEOUS at 09:31

## 2024-09-14 RX ADMIN — ALBUTEROL SULFATE 2.5 MG: 2.5 SOLUTION RESPIRATORY (INHALATION) at 12:01

## 2024-09-14 RX ADMIN — CARVEDILOL 12.5 MG: 12.5 TABLET, FILM COATED ORAL at 10:05

## 2024-09-14 RX ADMIN — INSULIN LISPRO 8 UNITS: 100 INJECTION, SOLUTION INTRAVENOUS; SUBCUTANEOUS at 12:44

## 2024-09-14 RX ADMIN — POLYETHYLENE GLYCOL 3350 17 G: 17 POWDER, FOR SOLUTION ORAL at 10:01

## 2024-09-14 RX ADMIN — BUDESONIDE AND FORMOTEROL FUMARATE DIHYDRATE 2 PUFF: 160; 4.5 AEROSOL RESPIRATORY (INHALATION) at 08:10

## 2024-09-14 RX ADMIN — APIXABAN 5 MG: 5 TABLET, FILM COATED ORAL at 10:04

## 2024-09-14 ASSESSMENT — PAIN SCALES - GENERAL
PAINLEVEL_OUTOF10: 0

## 2024-09-14 ASSESSMENT — PULMONARY FUNCTION TESTS
PIF_VALUE: 19
PIF_VALUE: 20
PIF_VALUE: 22
PIF_VALUE: 23
PIF_VALUE: 19
PIF_VALUE: 20
PIF_VALUE: 19
PIF_VALUE: 22
PIF_VALUE: 25
PIF_VALUE: 18
PIF_VALUE: 26
PIF_VALUE: 20
PIF_VALUE: 21
PIF_VALUE: 20
PIF_VALUE: 19
PIF_VALUE: 20
PIF_VALUE: 19
PIF_VALUE: 22
PIF_VALUE: 21
PIF_VALUE: 20

## 2024-09-14 ASSESSMENT — PAIN SCALES - WONG BAKER
WONGBAKER_NUMERICALRESPONSE: NO HURT

## 2025-07-11 ENCOUNTER — APPOINTMENT (OUTPATIENT)
Dept: GENERAL RADIOLOGY | Age: 60
DRG: 710 | End: 2025-07-11
Payer: COMMERCIAL

## 2025-07-11 ENCOUNTER — APPOINTMENT (OUTPATIENT)
Dept: CT IMAGING | Age: 60
DRG: 710 | End: 2025-07-11
Payer: COMMERCIAL

## 2025-07-11 ENCOUNTER — HOSPITAL ENCOUNTER (INPATIENT)
Age: 60
LOS: 9 days | Discharge: NURSING, REHAB OR LONG-TERM CARE | DRG: 710 | End: 2025-07-21
Attending: STUDENT IN AN ORGANIZED HEALTH CARE EDUCATION/TRAINING PROGRAM | Admitting: INTERNAL MEDICINE
Payer: COMMERCIAL

## 2025-07-11 DIAGNOSIS — Z99.11 VENTILATOR DEPENDENT (HCC): ICD-10-CM

## 2025-07-11 DIAGNOSIS — A41.9 SEPTICEMIA (HCC): Primary | ICD-10-CM

## 2025-07-11 DIAGNOSIS — E87.5 HYPERKALEMIA: ICD-10-CM

## 2025-07-11 DIAGNOSIS — J18.9 PNEUMONIA OF LEFT LOWER LOBE DUE TO INFECTIOUS ORGANISM: ICD-10-CM

## 2025-07-11 DIAGNOSIS — L89.159 PRESSURE INJURY OF SKIN OF SACRAL REGION, UNSPECIFIED INJURY STAGE: ICD-10-CM

## 2025-07-11 DIAGNOSIS — I21.4 NSTEMI (NON-ST ELEVATED MYOCARDIAL INFARCTION) (HCC): ICD-10-CM

## 2025-07-11 LAB
BASE EXCESS BLDV CALC-SCNC: 9.3 MMOL/L (ref -3–3)
CO2 BLDV-SCNC: 79 MMOL/L
COHGB MFR BLDV: 3.3 % (ref 0–1.5)
HCO3 BLDV-SCNC: 33.8 MMOL/L (ref 23–29)
LACTATE BLDV-SCNC: 1.5 MMOL/L (ref 0.4–1.9)
METHGB MFR BLDV: 0.2 %
O2 CT VFR BLDV CALC: 12 VOL %
O2 THERAPY: ABNORMAL
PCO2 BLDV: 46 MMHG (ref 40–50)
PH BLDV: 7.47 [PH] (ref 7.35–7.45)
PO2 BLDV: 117 MMHG (ref 25–40)
SAO2 % BLDV: 100 %

## 2025-07-11 PROCEDURE — 87040 BLOOD CULTURE FOR BACTERIA: CPT

## 2025-07-11 PROCEDURE — 85025 COMPLETE CBC W/AUTO DIFF WBC: CPT

## 2025-07-11 PROCEDURE — 83605 ASSAY OF LACTIC ACID: CPT

## 2025-07-11 PROCEDURE — 94761 N-INVAS EAR/PLS OXIMETRY MLT: CPT

## 2025-07-11 PROCEDURE — 96366 THER/PROPH/DIAG IV INF ADDON: CPT

## 2025-07-11 PROCEDURE — 85730 THROMBOPLASTIN TIME PARTIAL: CPT

## 2025-07-11 PROCEDURE — 96367 TX/PROPH/DG ADDL SEQ IV INF: CPT

## 2025-07-11 PROCEDURE — 83690 ASSAY OF LIPASE: CPT

## 2025-07-11 PROCEDURE — 36415 COLL VENOUS BLD VENIPUNCTURE: CPT

## 2025-07-11 PROCEDURE — 84145 PROCALCITONIN (PCT): CPT

## 2025-07-11 PROCEDURE — 2580000003 HC RX 258: Performed by: PHYSICIAN ASSISTANT

## 2025-07-11 PROCEDURE — 5A1955Z RESPIRATORY VENTILATION, GREATER THAN 96 CONSECUTIVE HOURS: ICD-10-PCS | Performed by: STUDENT IN AN ORGANIZED HEALTH CARE EDUCATION/TRAINING PROGRAM

## 2025-07-11 PROCEDURE — 96368 THER/DIAG CONCURRENT INF: CPT

## 2025-07-11 PROCEDURE — 84484 ASSAY OF TROPONIN QUANT: CPT

## 2025-07-11 PROCEDURE — 99285 EMERGENCY DEPT VISIT HI MDM: CPT

## 2025-07-11 PROCEDURE — 82803 BLOOD GASES ANY COMBINATION: CPT

## 2025-07-11 PROCEDURE — 83880 ASSAY OF NATRIURETIC PEPTIDE: CPT

## 2025-07-11 PROCEDURE — 85610 PROTHROMBIN TIME: CPT

## 2025-07-11 PROCEDURE — 2700000000 HC OXYGEN THERAPY PER DAY

## 2025-07-11 PROCEDURE — 81001 URINALYSIS AUTO W/SCOPE: CPT

## 2025-07-11 PROCEDURE — 80053 COMPREHEN METABOLIC PANEL: CPT

## 2025-07-11 PROCEDURE — 85520 HEPARIN ASSAY: CPT

## 2025-07-11 PROCEDURE — 71045 X-RAY EXAM CHEST 1 VIEW: CPT

## 2025-07-11 PROCEDURE — 94002 VENT MGMT INPAT INIT DAY: CPT

## 2025-07-11 RX ORDER — METOCLOPRAMIDE HYDROCHLORIDE 5 MG/5ML
10 SOLUTION ORAL 3 TIMES DAILY
Status: ON HOLD | COMMUNITY
End: 2025-07-27

## 2025-07-11 RX ORDER — LACTULOSE 10 G/15ML
20 SOLUTION ORAL 2 TIMES DAILY
COMMUNITY

## 2025-07-11 RX ORDER — ONDANSETRON 2 MG/ML
4 INJECTION INTRAMUSCULAR; INTRAVENOUS EVERY 6 HOURS PRN
Status: DISCONTINUED | OUTPATIENT
Start: 2025-07-11 | End: 2025-07-21 | Stop reason: HOSPADM

## 2025-07-11 RX ORDER — 0.9 % SODIUM CHLORIDE 0.9 %
30 INTRAVENOUS SOLUTION INTRAVENOUS ONCE
Status: COMPLETED | OUTPATIENT
Start: 2025-07-11 | End: 2025-07-12

## 2025-07-11 RX ORDER — CARVEDILOL 12.5 MG/1
12.5 TABLET ORAL 2 TIMES DAILY WITH MEALS
Status: ON HOLD | COMMUNITY
End: 2025-07-20 | Stop reason: HOSPADM

## 2025-07-11 RX ORDER — HEPARIN SODIUM 5000 [USP'U]/ML
5000 INJECTION, SOLUTION INTRAVENOUS; SUBCUTANEOUS 2 TIMES DAILY
Status: ON HOLD | COMMUNITY
End: 2025-07-20 | Stop reason: HOSPADM

## 2025-07-11 RX ORDER — MECLIZINE HCL 12.5 MG 12.5 MG/1
12.5 TABLET ORAL EVERY 6 HOURS PRN
COMMUNITY

## 2025-07-11 RX ORDER — ACETAMINOPHEN 325 MG/1
650 TABLET ORAL EVERY 6 HOURS PRN
COMMUNITY

## 2025-07-11 RX ORDER — GLYCOPYRROLATE 2 MG/1
2 TABLET ORAL 3 TIMES DAILY
COMMUNITY

## 2025-07-11 RX ORDER — IOPAMIDOL 755 MG/ML
75 INJECTION, SOLUTION INTRAVASCULAR
Status: COMPLETED | OUTPATIENT
Start: 2025-07-11 | End: 2025-07-12

## 2025-07-11 RX ORDER — BISACODYL 10 MG
10 SUPPOSITORY, RECTAL RECTAL DAILY PRN
COMMUNITY

## 2025-07-11 RX ORDER — DOCUSATE SODIUM 100 MG/1
100 CAPSULE, LIQUID FILLED ORAL DAILY PRN
Status: ON HOLD | COMMUNITY
End: 2025-07-14 | Stop reason: CLARIF

## 2025-07-11 RX ADMIN — SODIUM CHLORIDE 1365 ML: 0.9 INJECTION, SOLUTION INTRAVENOUS at 23:32

## 2025-07-11 ASSESSMENT — PAIN - FUNCTIONAL ASSESSMENT: PAIN_FUNCTIONAL_ASSESSMENT: WONG-BAKER FACES

## 2025-07-11 ASSESSMENT — PULMONARY FUNCTION TESTS: PIF_VALUE: 27

## 2025-07-11 ASSESSMENT — PAIN SCALES - WONG BAKER: WONGBAKER_NUMERICALRESPONSE: HURTS WHOLE LOT

## 2025-07-12 ENCOUNTER — APPOINTMENT (OUTPATIENT)
Dept: CT IMAGING | Age: 60
DRG: 710 | End: 2025-07-12
Payer: COMMERCIAL

## 2025-07-12 ENCOUNTER — APPOINTMENT (OUTPATIENT)
Age: 60
DRG: 710 | End: 2025-07-12
Payer: COMMERCIAL

## 2025-07-12 PROBLEM — A41.9 SEPSIS (HCC): Status: ACTIVE | Noted: 2025-07-12

## 2025-07-12 LAB
ALBUMIN SERPL-MCNC: 2.8 G/DL (ref 3.4–5)
ALBUMIN SERPL-MCNC: 3.2 G/DL (ref 3.4–5)
ALBUMIN/GLOB SERPL: 0.6 {RATIO} (ref 1.1–2.2)
ALBUMIN/GLOB SERPL: 0.6 {RATIO} (ref 1.1–2.2)
ALP SERPL-CCNC: 103 U/L (ref 40–129)
ALP SERPL-CCNC: 127 U/L (ref 40–129)
ALT SERPL-CCNC: 10 U/L (ref 10–40)
ALT SERPL-CCNC: 14 U/L (ref 10–40)
ANION GAP SERPL CALCULATED.3IONS-SCNC: 12 MMOL/L (ref 3–16)
ANION GAP SERPL CALCULATED.3IONS-SCNC: 16 MMOL/L (ref 3–16)
ANISOCYTOSIS BLD QL SMEAR: ABNORMAL
ANTI-XA UNFRAC HEPARIN: 0.1 IU/ML (ref 0.3–0.7)
ANTI-XA UNFRAC HEPARIN: 0.31 IU/ML (ref 0.3–0.7)
ANTI-XA UNFRAC HEPARIN: 0.37 IU/ML (ref 0.3–0.7)
ANTI-XA UNFRAC HEPARIN: <0.1 IU/ML (ref 0.3–0.7)
APTT BLD: 31.8 SEC (ref 22.8–35.8)
APTT BLD: 43.1 SEC (ref 22.8–35.8)
APTT BLD: 70.2 SEC (ref 22.8–35.8)
APTT BLD: 93 SEC (ref 22.8–35.8)
AST SERPL-CCNC: 14 U/L (ref 15–37)
AST SERPL-CCNC: 18 U/L (ref 15–37)
BACTERIA URNS QL MICRO: ABNORMAL /HPF
BASOPHILS # BLD: 0 K/UL (ref 0–0.2)
BASOPHILS # BLD: 0.1 K/UL (ref 0–0.2)
BASOPHILS NFR BLD: 0.3 %
BASOPHILS NFR BLD: 1 %
BILIRUB SERPL-MCNC: 0.4 MG/DL (ref 0–1)
BILIRUB SERPL-MCNC: 0.8 MG/DL (ref 0–1)
BILIRUB UR QL STRIP.AUTO: NEGATIVE
BUN SERPL-MCNC: 45 MG/DL (ref 7–20)
BUN SERPL-MCNC: 48 MG/DL (ref 7–20)
CALCIUM SERPL-MCNC: 9.5 MG/DL (ref 8.3–10.6)
CALCIUM SERPL-MCNC: 9.9 MG/DL (ref 8.3–10.6)
CHLORIDE SERPL-SCNC: 95 MMOL/L (ref 99–110)
CHLORIDE SERPL-SCNC: 97 MMOL/L (ref 99–110)
CLARITY UR: ABNORMAL
CO2 SERPL-SCNC: 27 MMOL/L (ref 21–32)
CO2 SERPL-SCNC: 29 MMOL/L (ref 21–32)
COLOR UR: YELLOW
CREAT SERPL-MCNC: 1.4 MG/DL (ref 0.6–1.2)
CREAT SERPL-MCNC: 1.4 MG/DL (ref 0.6–1.2)
CRYSTALS URNS MICRO: ABNORMAL /HPF
DEPRECATED RDW RBC AUTO: 17.3 % (ref 12.4–15.4)
DEPRECATED RDW RBC AUTO: 17.4 % (ref 12.4–15.4)
ECHO AO ASC DIAM: 3.1 CM
ECHO AO ASCENDING AORTA INDEX: 1.68 CM/M2
ECHO AO ROOT DIAM: 2.5 CM
ECHO AO ROOT INDEX: 1.35 CM/M2
ECHO AV AREA PEAK VELOCITY: 2 CM2
ECHO AV AREA VTI: 2.1 CM2
ECHO AV AREA/BSA PEAK VELOCITY: 1.1 CM2/M2
ECHO AV AREA/BSA VTI: 1.1 CM2/M2
ECHO AV MEAN GRADIENT: 5 MMHG
ECHO AV MEAN VELOCITY: 1.1 M/S
ECHO AV PEAK GRADIENT: 9 MMHG
ECHO AV PEAK VELOCITY: 1.5 M/S
ECHO AV VELOCITY RATIO: 0.8
ECHO AV VTI: 31.2 CM
ECHO BSA: 1.93 M2
ECHO EST RA PRESSURE: 3 MMHG
ECHO IVC PROX: 1.7 CM
ECHO LA AREA 2C: 12.7 CM2
ECHO LA AREA 4C: 9.3 CM2
ECHO LA MAJOR AXIS: 4.2 CM
ECHO LA MINOR AXIS: 4.1 CM
ECHO LA VOL BP: 24 ML (ref 22–52)
ECHO LA VOL MOD A2C: 32 ML (ref 22–52)
ECHO LA VOL MOD A4C: 18 ML (ref 22–52)
ECHO LA VOL/BSA BIPLANE: 13 ML/M2 (ref 16–34)
ECHO LA VOLUME INDEX MOD A2C: 17 ML/M2 (ref 16–34)
ECHO LA VOLUME INDEX MOD A4C: 10 ML/M2 (ref 16–34)
ECHO LV EDV A2C: 40 ML
ECHO LV EDV A4C: 63 ML
ECHO LV EDV INDEX A4C: 34 ML/M2
ECHO LV EDV NDEX A2C: 22 ML/M2
ECHO LV EF PHYSICIAN: 60 %
ECHO LV EJECTION FRACTION A2C: 57 %
ECHO LV EJECTION FRACTION A4C: 63 %
ECHO LV EJECTION FRACTION BIPLANE: 60 % (ref 55–100)
ECHO LV ESV A2C: 17 ML
ECHO LV ESV A4C: 23 ML
ECHO LV ESV INDEX A2C: 9 ML/M2
ECHO LV ESV INDEX A4C: 12 ML/M2
ECHO LV FRACTIONAL SHORTENING: 45 % (ref 28–44)
ECHO LV INTERNAL DIMENSION DIASTOLE INDEX: 2.16 CM/M2
ECHO LV INTERNAL DIMENSION DIASTOLIC: 4 CM (ref 3.9–5.3)
ECHO LV INTERNAL DIMENSION SYSTOLIC INDEX: 1.19 CM/M2
ECHO LV INTERNAL DIMENSION SYSTOLIC: 2.2 CM
ECHO LV IVSD: 1.1 CM (ref 0.6–0.9)
ECHO LV MASS 2D: 145.6 G (ref 67–162)
ECHO LV MASS INDEX 2D: 78.7 G/M2 (ref 43–95)
ECHO LV POSTERIOR WALL DIASTOLIC: 1.1 CM (ref 0.6–0.9)
ECHO LV RELATIVE WALL THICKNESS RATIO: 0.55
ECHO LVOT AREA: 2.5 CM2
ECHO LVOT AV VTI INDEX: 0.81
ECHO LVOT DIAM: 1.8 CM
ECHO LVOT MEAN GRADIENT: 2 MMHG
ECHO LVOT PEAK GRADIENT: 5 MMHG
ECHO LVOT PEAK VELOCITY: 1.2 M/S
ECHO LVOT STROKE VOLUME INDEX: 34.9 ML/M2
ECHO LVOT SV: 64.6 ML
ECHO LVOT VTI: 25.4 CM
ECHO MV A VELOCITY: 1.04 M/S
ECHO MV AREA VTI: 2.1 CM2
ECHO MV E VELOCITY: 0.79 M/S
ECHO MV E/A RATIO: 0.76
ECHO MV LVOT VTI INDEX: 1.24
ECHO MV MAX VELOCITY: 1.3 M/S
ECHO MV MEAN GRADIENT: 3 MMHG
ECHO MV MEAN VELOCITY: 0.8 M/S
ECHO MV PEAK GRADIENT: 7 MMHG
ECHO MV VTI: 31.4 CM
ECHO PULMONARY ARTERY END DIASTOLIC PRESSURE: 11 MMHG
ECHO PV MAX VELOCITY: 1.4 M/S
ECHO PV PEAK GRADIENT: 8 MMHG
ECHO PV REGURGITANT MAX VELOCITY: 1.6 M/S
ECHO RA AREA 4C: 11.5 CM2
ECHO RA END SYSTOLIC VOLUME APICAL 4 CHAMBER INDEX BSA: 14 ML/M2
ECHO RA VOLUME: 25 ML
ECHO RV BASAL DIMENSION: 3.5 CM
ECHO RV FREE WALL PEAK S': 11.5 CM/S
ECHO RV LONGITUDINAL DIMENSION: 7.6 CM
ECHO RV MID DIMENSION: 2.3 CM
ECHO RV TAPSE: 2.5 CM (ref 1.7–?)
EKG ATRIAL RATE: 96 BPM
EKG DIAGNOSIS: NORMAL
EKG P AXIS: 45 DEGREES
EKG P-R INTERVAL: 146 MS
EKG Q-T INTERVAL: 388 MS
EKG QRS DURATION: 102 MS
EKG QTC CALCULATION (BAZETT): 490 MS
EKG R AXIS: -32 DEGREES
EKG T AXIS: 36 DEGREES
EKG VENTRICULAR RATE: 96 BPM
EOSINOPHIL # BLD: 0 K/UL (ref 0–0.6)
EOSINOPHIL # BLD: 0 K/UL (ref 0–0.6)
EOSINOPHIL NFR BLD: 0 %
EOSINOPHIL NFR BLD: 0.5 %
EPI CELLS #/AREA URNS HPF: ABNORMAL /HPF (ref 0–5)
GFR SERPLBLD CREATININE-BSD FMLA CKD-EPI: 43 ML/MIN/{1.73_M2}
GFR SERPLBLD CREATININE-BSD FMLA CKD-EPI: 43 ML/MIN/{1.73_M2}
GLUCOSE BLD-MCNC: 159 MG/DL (ref 70–99)
GLUCOSE BLD-MCNC: 208 MG/DL (ref 70–99)
GLUCOSE BLD-MCNC: 231 MG/DL (ref 70–99)
GLUCOSE BLD-MCNC: 271 MG/DL (ref 70–99)
GLUCOSE BLD-MCNC: 333 MG/DL (ref 70–99)
GLUCOSE SERPL-MCNC: 273 MG/DL (ref 70–99)
GLUCOSE SERPL-MCNC: 309 MG/DL (ref 70–99)
GLUCOSE UR STRIP.AUTO-MCNC: NEGATIVE MG/DL
HCT VFR BLD AUTO: 22.3 % (ref 36–48)
HCT VFR BLD AUTO: 25.2 % (ref 36–48)
HGB BLD-MCNC: 7.6 G/DL (ref 12–16)
HGB BLD-MCNC: 8.7 G/DL (ref 12–16)
HGB UR QL STRIP.AUTO: ABNORMAL
INR PPP: 0.99 (ref 0.86–1.14)
KETONES UR STRIP.AUTO-MCNC: ABNORMAL MG/DL
LACTATE BLDV-SCNC: 1.8 MMOL/L (ref 0.4–2)
LACTATE BLDV-SCNC: 2.2 MMOL/L (ref 0.4–2)
LACTATE BLDV-SCNC: 2.3 MMOL/L (ref 0.4–1.9)
LEUKOCYTE ESTERASE UR QL STRIP.AUTO: ABNORMAL
LIPASE SERPL-CCNC: 17 U/L (ref 13–60)
LYMPHOCYTES # BLD: 0.4 K/UL (ref 1–5.1)
LYMPHOCYTES # BLD: 1 K/UL (ref 1–5.1)
LYMPHOCYTES NFR BLD: 12.8 %
LYMPHOCYTES NFR BLD: 4 %
MCH RBC QN AUTO: 32.1 PG (ref 26–34)
MCH RBC QN AUTO: 32.3 PG (ref 26–34)
MCHC RBC AUTO-ENTMCNC: 34.1 G/DL (ref 31–36)
MCHC RBC AUTO-ENTMCNC: 34.4 G/DL (ref 31–36)
MCV RBC AUTO: 93.8 FL (ref 80–100)
MCV RBC AUTO: 94 FL (ref 80–100)
MONOCYTES # BLD: 0.4 K/UL (ref 0–1.3)
MONOCYTES # BLD: 0.9 K/UL (ref 0–1.3)
MONOCYTES NFR BLD: 11.4 %
MONOCYTES NFR BLD: 4 %
NEUTROPHILS # BLD: 6 K/UL (ref 1.7–7.7)
NEUTROPHILS # BLD: 9.8 K/UL (ref 1.7–7.7)
NEUTROPHILS NFR BLD: 75 %
NEUTROPHILS NFR BLD: 91 %
NITRITE UR QL STRIP.AUTO: NEGATIVE
NT-PROBNP SERPL-MCNC: 404 PG/ML (ref 0–124)
PERFORMED ON: ABNORMAL
PH UR STRIP.AUTO: >=9 [PH] (ref 5–8)
PLATELET # BLD AUTO: 163 K/UL (ref 135–450)
PLATELET # BLD AUTO: 210 K/UL (ref 135–450)
PLATELET BLD QL SMEAR: ADEQUATE
PMV BLD AUTO: 7.8 FL (ref 5–10.5)
PMV BLD AUTO: 8 FL (ref 5–10.5)
POTASSIUM SERPL-SCNC: 5.5 MMOL/L (ref 3.5–5.1)
POTASSIUM SERPL-SCNC: 5.8 MMOL/L (ref 3.5–5.1)
POTASSIUM SERPL-SCNC: 6 MMOL/L (ref 3.5–5.1)
PROCALCITONIN SERPL IA-MCNC: 0.28 NG/ML (ref 0–0.15)
PROCALCITONIN SERPL IA-MCNC: 0.32 NG/ML (ref 0–0.15)
PROT SERPL-MCNC: 7.7 G/DL (ref 6.4–8.2)
PROT SERPL-MCNC: 8.9 G/DL (ref 6.4–8.2)
PROT UR STRIP.AUTO-MCNC: 300 MG/DL
PROTHROMBIN TIME: 13.4 SEC (ref 12.1–14.9)
RBC # BLD AUTO: 2.37 M/UL (ref 4–5.2)
RBC # BLD AUTO: 2.69 M/UL (ref 4–5.2)
RBC #/AREA URNS HPF: ABNORMAL /HPF (ref 0–4)
SLIDE REVIEW: ABNORMAL
SODIUM SERPL-SCNC: 138 MMOL/L (ref 136–145)
SODIUM SERPL-SCNC: 138 MMOL/L (ref 136–145)
SP GR UR STRIP.AUTO: 1.01 (ref 1–1.03)
TROPONIN, HIGH SENSITIVITY: 186 NG/L (ref 0–14)
TROPONIN, HIGH SENSITIVITY: 200 NG/L (ref 0–14)
UA COMPLETE W REFLEX CULTURE PNL UR: ABNORMAL
UA DIPSTICK W REFLEX MICRO PNL UR: YES
URN SPEC COLLECT METH UR: ABNORMAL
UROBILINOGEN UR STRIP-ACNC: 1 E.U./DL
WBC # BLD AUTO: 10.8 K/UL (ref 4–11)
WBC # BLD AUTO: 8 K/UL (ref 4–11)
WBC #/AREA URNS HPF: ABNORMAL /HPF (ref 0–5)
YEAST URNS QL MICRO: PRESENT /HPF

## 2025-07-12 PROCEDURE — 11043 DBRDMT MUSC&/FSCA 1ST 20/<: CPT | Performed by: SURGERY

## 2025-07-12 PROCEDURE — 6370000000 HC RX 637 (ALT 250 FOR IP)

## 2025-07-12 PROCEDURE — 0KBN0ZZ EXCISION OF RIGHT HIP MUSCLE, OPEN APPROACH: ICD-10-PCS | Performed by: SURGERY

## 2025-07-12 PROCEDURE — 2580000003 HC RX 258

## 2025-07-12 PROCEDURE — 94761 N-INVAS EAR/PLS OXIMETRY MLT: CPT

## 2025-07-12 PROCEDURE — 85520 HEPARIN ASSAY: CPT

## 2025-07-12 PROCEDURE — 6360000002 HC RX W HCPCS: Performed by: STUDENT IN AN ORGANIZED HEALTH CARE EDUCATION/TRAINING PROGRAM

## 2025-07-12 PROCEDURE — 6370000000 HC RX 637 (ALT 250 FOR IP): Performed by: STUDENT IN AN ORGANIZED HEALTH CARE EDUCATION/TRAINING PROGRAM

## 2025-07-12 PROCEDURE — 6360000004 HC RX CONTRAST MEDICATION: Performed by: PHYSICIAN ASSISTANT

## 2025-07-12 PROCEDURE — 2580000003 HC RX 258: Performed by: STUDENT IN AN ORGANIZED HEALTH CARE EDUCATION/TRAINING PROGRAM

## 2025-07-12 PROCEDURE — 2500000003 HC RX 250 WO HCPCS

## 2025-07-12 PROCEDURE — 6370000000 HC RX 637 (ALT 250 FOR IP): Performed by: INTERNAL MEDICINE

## 2025-07-12 PROCEDURE — 94003 VENT MGMT INPAT SUBQ DAY: CPT

## 2025-07-12 PROCEDURE — 83605 ASSAY OF LACTIC ACID: CPT

## 2025-07-12 PROCEDURE — 11046 DBRDMT MUSC&/FSCA EA ADDL: CPT | Performed by: SURGERY

## 2025-07-12 PROCEDURE — 70450 CT HEAD/BRAIN W/O DYE: CPT

## 2025-07-12 PROCEDURE — 99222 1ST HOSP IP/OBS MODERATE 55: CPT | Performed by: STUDENT IN AN ORGANIZED HEALTH CARE EDUCATION/TRAINING PROGRAM

## 2025-07-12 PROCEDURE — 36415 COLL VENOUS BLD VENIPUNCTURE: CPT

## 2025-07-12 PROCEDURE — 94644 CONT INHLJ TX 1ST HOUR: CPT

## 2025-07-12 PROCEDURE — 2000000000 HC ICU R&B

## 2025-07-12 PROCEDURE — 6360000002 HC RX W HCPCS

## 2025-07-12 PROCEDURE — 0KBP0ZZ EXCISION OF LEFT HIP MUSCLE, OPEN APPROACH: ICD-10-PCS | Performed by: SURGERY

## 2025-07-12 PROCEDURE — 93306 TTE W/DOPPLER COMPLETE: CPT | Performed by: INTERNAL MEDICINE

## 2025-07-12 PROCEDURE — 93005 ELECTROCARDIOGRAM TRACING: CPT | Performed by: STUDENT IN AN ORGANIZED HEALTH CARE EDUCATION/TRAINING PROGRAM

## 2025-07-12 PROCEDURE — 84145 PROCALCITONIN (PCT): CPT

## 2025-07-12 PROCEDURE — 6370000000 HC RX 637 (ALT 250 FOR IP): Performed by: HOSPITALIST

## 2025-07-12 PROCEDURE — 93306 TTE W/DOPPLER COMPLETE: CPT

## 2025-07-12 PROCEDURE — 6370000000 HC RX 637 (ALT 250 FOR IP): Performed by: SURGERY

## 2025-07-12 PROCEDURE — 87040 BLOOD CULTURE FOR BACTERIA: CPT

## 2025-07-12 PROCEDURE — 93010 ELECTROCARDIOGRAM REPORT: CPT | Performed by: INTERNAL MEDICINE

## 2025-07-12 PROCEDURE — 80053 COMPREHEN METABOLIC PANEL: CPT

## 2025-07-12 PROCEDURE — 85730 THROMBOPLASTIN TIME PARTIAL: CPT

## 2025-07-12 PROCEDURE — 85025 COMPLETE CBC W/AUTO DIFF WBC: CPT

## 2025-07-12 PROCEDURE — 84132 ASSAY OF SERUM POTASSIUM: CPT

## 2025-07-12 PROCEDURE — 84484 ASSAY OF TROPONIN QUANT: CPT

## 2025-07-12 PROCEDURE — 2700000000 HC OXYGEN THERAPY PER DAY

## 2025-07-12 PROCEDURE — 94640 AIRWAY INHALATION TREATMENT: CPT

## 2025-07-12 PROCEDURE — 96375 TX/PRO/DX INJ NEW DRUG ADDON: CPT

## 2025-07-12 PROCEDURE — 74177 CT ABD & PELVIS W/CONTRAST: CPT

## 2025-07-12 PROCEDURE — 96365 THER/PROPH/DIAG IV INF INIT: CPT

## 2025-07-12 RX ORDER — LIDOCAINE HYDROCHLORIDE 40 MG/ML
SOLUTION TOPICAL ONCE
Status: COMPLETED | OUTPATIENT
Start: 2025-07-12 | End: 2025-07-12

## 2025-07-12 RX ORDER — ACETAMINOPHEN 650 MG/1
650 SUPPOSITORY RECTAL EVERY 6 HOURS PRN
Status: DISCONTINUED | OUTPATIENT
Start: 2025-07-12 | End: 2025-07-21 | Stop reason: HOSPADM

## 2025-07-12 RX ORDER — HEPARIN SODIUM 1000 [USP'U]/ML
4000 INJECTION, SOLUTION INTRAVENOUS; SUBCUTANEOUS ONCE
Status: DISCONTINUED | OUTPATIENT
Start: 2025-07-12 | End: 2025-07-12

## 2025-07-12 RX ORDER — LACTOBACILLUS RHAMNOSUS GG 10B CELL
1 CAPSULE ORAL 2 TIMES DAILY WITH MEALS
Status: DISCONTINUED | OUTPATIENT
Start: 2025-07-12 | End: 2025-07-21 | Stop reason: HOSPADM

## 2025-07-12 RX ORDER — SODIUM CHLORIDE 9 MG/ML
INJECTION, SOLUTION INTRAVENOUS PRN
Status: DISCONTINUED | OUTPATIENT
Start: 2025-07-12 | End: 2025-07-21 | Stop reason: HOSPADM

## 2025-07-12 RX ORDER — POTASSIUM CHLORIDE 7.45 MG/ML
10 INJECTION INTRAVENOUS PRN
Status: DISCONTINUED | OUTPATIENT
Start: 2025-07-12 | End: 2025-07-21 | Stop reason: HOSPADM

## 2025-07-12 RX ORDER — IPRATROPIUM BROMIDE AND ALBUTEROL SULFATE 2.5; .5 MG/3ML; MG/3ML
1 SOLUTION RESPIRATORY (INHALATION)
Status: DISCONTINUED | OUTPATIENT
Start: 2025-07-12 | End: 2025-07-15

## 2025-07-12 RX ORDER — METOCLOPRAMIDE HYDROCHLORIDE 5 MG/5ML
5 SOLUTION ORAL 3 TIMES DAILY
Status: CANCELLED | OUTPATIENT
Start: 2025-07-12

## 2025-07-12 RX ORDER — ACETAMINOPHEN 325 MG/1
650 TABLET ORAL EVERY 6 HOURS PRN
Status: DISCONTINUED | OUTPATIENT
Start: 2025-07-12 | End: 2025-07-21 | Stop reason: HOSPADM

## 2025-07-12 RX ORDER — CARVEDILOL 6.25 MG/1
6.25 TABLET ORAL 2 TIMES DAILY WITH MEALS
Status: DISCONTINUED | OUTPATIENT
Start: 2025-07-12 | End: 2025-07-21 | Stop reason: HOSPADM

## 2025-07-12 RX ORDER — BUDESONIDE AND FORMOTEROL FUMARATE DIHYDRATE 160; 4.5 UG/1; UG/1
2 AEROSOL RESPIRATORY (INHALATION)
Status: DISCONTINUED | OUTPATIENT
Start: 2025-07-12 | End: 2025-07-21 | Stop reason: HOSPADM

## 2025-07-12 RX ORDER — CALCIUM GLUCONATE 20 MG/ML
1000 INJECTION, SOLUTION INTRAVENOUS ONCE
Status: COMPLETED | OUTPATIENT
Start: 2025-07-12 | End: 2025-07-12

## 2025-07-12 RX ORDER — SODIUM CHLORIDE, SODIUM LACTATE, POTASSIUM CHLORIDE, AND CALCIUM CHLORIDE .6; .31; .03; .02 G/100ML; G/100ML; G/100ML; G/100ML
1000 INJECTION, SOLUTION INTRAVENOUS ONCE
Status: COMPLETED | OUTPATIENT
Start: 2025-07-12 | End: 2025-07-12

## 2025-07-12 RX ORDER — ALBUTEROL SULFATE 0.83 MG/ML
10 SOLUTION RESPIRATORY (INHALATION) ONCE
Status: COMPLETED | OUTPATIENT
Start: 2025-07-12 | End: 2025-07-12

## 2025-07-12 RX ORDER — DOCUSATE SODIUM 100 MG/1
100 CAPSULE, LIQUID FILLED ORAL DAILY PRN
Status: CANCELLED | OUTPATIENT
Start: 2025-07-12

## 2025-07-12 RX ORDER — SODIUM CHLORIDE 0.9 % (FLUSH) 0.9 %
5-40 SYRINGE (ML) INJECTION EVERY 12 HOURS SCHEDULED
Status: DISCONTINUED | OUTPATIENT
Start: 2025-07-12 | End: 2025-07-21 | Stop reason: HOSPADM

## 2025-07-12 RX ORDER — HEPARIN SODIUM 10000 [USP'U]/100ML
5-30 INJECTION, SOLUTION INTRAVENOUS CONTINUOUS
Status: DISCONTINUED | OUTPATIENT
Start: 2025-07-12 | End: 2025-07-12

## 2025-07-12 RX ORDER — SODIUM CHLORIDE, SODIUM LACTATE, POTASSIUM CHLORIDE, CALCIUM CHLORIDE 600; 310; 30; 20 MG/100ML; MG/100ML; MG/100ML; MG/100ML
INJECTION, SOLUTION INTRAVENOUS CONTINUOUS
Status: ACTIVE | OUTPATIENT
Start: 2025-07-12 | End: 2025-07-13

## 2025-07-12 RX ORDER — PANTOPRAZOLE SODIUM 40 MG/10ML
40 INJECTION, POWDER, LYOPHILIZED, FOR SOLUTION INTRAVENOUS DAILY
Status: DISCONTINUED | OUTPATIENT
Start: 2025-07-12 | End: 2025-07-21 | Stop reason: HOSPADM

## 2025-07-12 RX ORDER — HEPARIN SODIUM 1000 [USP'U]/ML
2000 INJECTION, SOLUTION INTRAVENOUS; SUBCUTANEOUS PRN
Status: DISCONTINUED | OUTPATIENT
Start: 2025-07-12 | End: 2025-07-12

## 2025-07-12 RX ORDER — DEXTROSE MONOHYDRATE 100 MG/ML
INJECTION, SOLUTION INTRAVENOUS CONTINUOUS PRN
Status: DISCONTINUED | OUTPATIENT
Start: 2025-07-12 | End: 2025-07-21 | Stop reason: HOSPADM

## 2025-07-12 RX ORDER — GLYCOPYRROLATE 1 MG/1
2 TABLET ORAL 3 TIMES DAILY
Status: CANCELLED | OUTPATIENT
Start: 2025-07-12

## 2025-07-12 RX ORDER — HEPARIN SODIUM 1000 [USP'U]/ML
4000 INJECTION, SOLUTION INTRAVENOUS; SUBCUTANEOUS ONCE
Status: COMPLETED | OUTPATIENT
Start: 2025-07-12 | End: 2025-07-12

## 2025-07-12 RX ORDER — HEPARIN SODIUM 1000 [USP'U]/ML
4000 INJECTION, SOLUTION INTRAVENOUS; SUBCUTANEOUS PRN
Status: DISCONTINUED | OUTPATIENT
Start: 2025-07-12 | End: 2025-07-12

## 2025-07-12 RX ORDER — CARVEDILOL 6.25 MG/1
12.5 TABLET ORAL 2 TIMES DAILY WITH MEALS
Status: DISCONTINUED | OUTPATIENT
Start: 2025-07-12 | End: 2025-07-12

## 2025-07-12 RX ORDER — SODIUM CHLORIDE 0.9 % (FLUSH) 0.9 %
5-40 SYRINGE (ML) INJECTION PRN
Status: DISCONTINUED | OUTPATIENT
Start: 2025-07-12 | End: 2025-07-21 | Stop reason: HOSPADM

## 2025-07-12 RX ORDER — METRONIDAZOLE 500 MG/100ML
500 INJECTION, SOLUTION INTRAVENOUS EVERY 8 HOURS
Status: DISCONTINUED | OUTPATIENT
Start: 2025-07-12 | End: 2025-07-13

## 2025-07-12 RX ORDER — INSULIN GLARGINE 100 [IU]/ML
22 INJECTION, SOLUTION SUBCUTANEOUS NIGHTLY
Status: DISCONTINUED | OUTPATIENT
Start: 2025-07-12 | End: 2025-07-14

## 2025-07-12 RX ORDER — BISACODYL 10 MG
10 SUPPOSITORY, RECTAL RECTAL DAILY PRN
Status: CANCELLED | OUTPATIENT
Start: 2025-07-12

## 2025-07-12 RX ORDER — INSULIN LISPRO 100 [IU]/ML
0-4 INJECTION, SOLUTION INTRAVENOUS; SUBCUTANEOUS
Status: DISCONTINUED | OUTPATIENT
Start: 2025-07-12 | End: 2025-07-18

## 2025-07-12 RX ORDER — MAGNESIUM SULFATE IN WATER 40 MG/ML
2000 INJECTION, SOLUTION INTRAVENOUS PRN
Status: DISCONTINUED | OUTPATIENT
Start: 2025-07-12 | End: 2025-07-21 | Stop reason: HOSPADM

## 2025-07-12 RX ORDER — INSULIN GLARGINE 100 [IU]/ML
40 INJECTION, SOLUTION SUBCUTANEOUS NIGHTLY
Status: DISCONTINUED | OUTPATIENT
Start: 2025-07-12 | End: 2025-07-12

## 2025-07-12 RX ORDER — GLUCAGON 1 MG/ML
1 KIT INJECTION PRN
Status: DISCONTINUED | OUTPATIENT
Start: 2025-07-12 | End: 2025-07-21 | Stop reason: HOSPADM

## 2025-07-12 RX ORDER — LEVETIRACETAM 500 MG/5ML
1500 INJECTION, SOLUTION, CONCENTRATE INTRAVENOUS EVERY 12 HOURS
Status: DISCONTINUED | OUTPATIENT
Start: 2025-07-12 | End: 2025-07-12

## 2025-07-12 RX ORDER — FUROSEMIDE 10 MG/ML
40 INJECTION INTRAMUSCULAR; INTRAVENOUS ONCE
Status: COMPLETED | OUTPATIENT
Start: 2025-07-12 | End: 2025-07-12

## 2025-07-12 RX ORDER — POLYETHYLENE GLYCOL 3350 17 G/17G
17 POWDER, FOR SOLUTION ORAL DAILY PRN
Status: DISCONTINUED | OUTPATIENT
Start: 2025-07-12 | End: 2025-07-21 | Stop reason: HOSPADM

## 2025-07-12 RX ORDER — ASPIRIN 81 MG/1
81 TABLET, CHEWABLE ORAL DAILY
Status: DISCONTINUED | OUTPATIENT
Start: 2025-07-12 | End: 2025-07-21 | Stop reason: HOSPADM

## 2025-07-12 RX ORDER — ATORVASTATIN CALCIUM 20 MG/1
20 TABLET, FILM COATED ORAL DAILY
Status: DISCONTINUED | OUTPATIENT
Start: 2025-07-12 | End: 2025-07-21 | Stop reason: HOSPADM

## 2025-07-12 RX ORDER — POTASSIUM CHLORIDE 1500 MG/1
40 TABLET, EXTENDED RELEASE ORAL PRN
Status: DISCONTINUED | OUTPATIENT
Start: 2025-07-12 | End: 2025-07-21 | Stop reason: HOSPADM

## 2025-07-12 RX ORDER — ENOXAPARIN SODIUM 100 MG/ML
40 INJECTION SUBCUTANEOUS DAILY
Status: DISCONTINUED | OUTPATIENT
Start: 2025-07-13 | End: 2025-07-13

## 2025-07-12 RX ADMIN — Medication 10 ML: at 09:26

## 2025-07-12 RX ADMIN — FUROSEMIDE 40 MG: 10 INJECTION, SOLUTION INTRAMUSCULAR; INTRAVENOUS at 01:22

## 2025-07-12 RX ADMIN — Medication 1 CAPSULE: at 17:17

## 2025-07-12 RX ADMIN — INSULIN LISPRO 4 UNITS: 100 INJECTION, SOLUTION INTRAVENOUS; SUBCUTANEOUS at 18:14

## 2025-07-12 RX ADMIN — PANTOPRAZOLE SODIUM 40 MG: 40 INJECTION, POWDER, FOR SOLUTION INTRAVENOUS at 09:26

## 2025-07-12 RX ADMIN — SODIUM CHLORIDE, SODIUM LACTATE, POTASSIUM CHLORIDE, AND CALCIUM CHLORIDE 1000 ML: .6; .31; .03; .02 INJECTION, SOLUTION INTRAVENOUS at 05:57

## 2025-07-12 RX ADMIN — ATORVASTATIN CALCIUM 20 MG: 20 TABLET, FILM COATED ORAL at 09:26

## 2025-07-12 RX ADMIN — HEPARIN SODIUM 4000 UNITS: 1000 INJECTION, SOLUTION INTRAVENOUS; SUBCUTANEOUS at 01:54

## 2025-07-12 RX ADMIN — HEPARIN SODIUM 2000 UNITS: 1000 INJECTION, SOLUTION INTRAVENOUS; SUBCUTANEOUS at 20:06

## 2025-07-12 RX ADMIN — Medication 10 ML: at 09:27

## 2025-07-12 RX ADMIN — HEPARIN SODIUM AND DEXTROSE 11 UNITS/KG/HR: 10000; 5 INJECTION INTRAVENOUS at 01:54

## 2025-07-12 RX ADMIN — CARVEDILOL 6.25 MG: 6.25 TABLET, FILM COATED ORAL at 18:13

## 2025-07-12 RX ADMIN — INSULIN GLARGINE 22 UNITS: 100 INJECTION, SOLUTION SUBCUTANEOUS at 20:56

## 2025-07-12 RX ADMIN — CARVEDILOL 12.5 MG: 6.25 TABLET, FILM COATED ORAL at 09:26

## 2025-07-12 RX ADMIN — IOPAMIDOL 75 ML: 755 INJECTION, SOLUTION INTRAVENOUS at 01:10

## 2025-07-12 RX ADMIN — HEPARIN SODIUM 4000 UNITS: 1000 INJECTION, SOLUTION INTRAVENOUS; SUBCUTANEOUS at 07:23

## 2025-07-12 RX ADMIN — ASPIRIN 81 MG: 81 TABLET, CHEWABLE ORAL at 09:26

## 2025-07-12 RX ADMIN — LIDOCAINE HYDROCHLORIDE: 40 SOLUTION ORAL at 17:18

## 2025-07-12 RX ADMIN — SODIUM CHLORIDE, SODIUM LACTATE, POTASSIUM CHLORIDE, AND CALCIUM CHLORIDE: .6; .31; .03; .02 INJECTION, SOLUTION INTRAVENOUS at 04:57

## 2025-07-12 RX ADMIN — METRONIDAZOLE 500 MG: 500 INJECTION, SOLUTION INTRAVENOUS at 17:16

## 2025-07-12 RX ADMIN — CALCIUM GLUCONATE 1000 MG: 20 INJECTION, SOLUTION INTRAVENOUS at 01:19

## 2025-07-12 RX ADMIN — Medication 2 PUFF: at 20:30

## 2025-07-12 RX ADMIN — Medication 1 CAPSULE: at 09:48

## 2025-07-12 RX ADMIN — INSULIN HUMAN 10 UNITS: 100 INJECTION, SOLUTION PARENTERAL at 01:31

## 2025-07-12 RX ADMIN — VANCOMYCIN HYDROCHLORIDE 1000 MG: 1 INJECTION, POWDER, LYOPHILIZED, FOR SOLUTION INTRAVENOUS at 02:27

## 2025-07-12 RX ADMIN — ALBUTEROL SULFATE 10 MG: 2.5 SOLUTION RESPIRATORY (INHALATION) at 01:16

## 2025-07-12 RX ADMIN — CEFEPIME 2000 MG: 2 INJECTION, POWDER, FOR SOLUTION INTRAVENOUS at 01:53

## 2025-07-12 RX ADMIN — IPRATROPIUM BROMIDE AND ALBUTEROL SULFATE 1 DOSE: .5; 3 SOLUTION RESPIRATORY (INHALATION) at 09:41

## 2025-07-12 RX ADMIN — INSULIN LISPRO 1 UNITS: 100 INJECTION, SOLUTION INTRAVENOUS; SUBCUTANEOUS at 10:48

## 2025-07-12 RX ADMIN — SODIUM ZIRCONIUM CYCLOSILICATE 10 G: 5 POWDER, FOR SUSPENSION ORAL at 09:26

## 2025-07-12 RX ADMIN — METRONIDAZOLE 500 MG: 500 INJECTION, SOLUTION INTRAVENOUS at 22:27

## 2025-07-12 RX ADMIN — IPRATROPIUM BROMIDE AND ALBUTEROL SULFATE 1 DOSE: .5; 3 SOLUTION RESPIRATORY (INHALATION) at 20:30

## 2025-07-12 RX ADMIN — Medication 2 PUFF: at 09:41

## 2025-07-12 RX ADMIN — DEXTROSE MONOHYDRATE 250 ML: 100 INJECTION, SOLUTION INTRAVENOUS at 01:20

## 2025-07-12 RX ADMIN — METRONIDAZOLE 500 MG: 500 INJECTION, SOLUTION INTRAVENOUS at 06:28

## 2025-07-12 ASSESSMENT — PULMONARY FUNCTION TESTS
PIF_VALUE: 13
PIF_VALUE: 12
PIF_VALUE: 16
PIF_VALUE: 21
PIF_VALUE: 10
PIF_VALUE: 14
PIF_VALUE: 13

## 2025-07-12 ASSESSMENT — PAIN SCALES - WONG BAKER
WONGBAKER_NUMERICALRESPONSE: NO HURT

## 2025-07-12 NOTE — H&P
Internal Medicine Resident  History & Physical        Name:  Lindsay Lucio  /Age/Sex: 1965  (60 y.o. female)  MRN & CSN:  [id number] & [id number]     PCP: Rosario Stevens MD    Date of Admission: 2025    Date of Service: Patient seen/examined on 2025    Patient Status:  Inpatient - Patient will most likely require more than 48 hours of treatment and requires intensive medical treatment/monitoring     Chief Complaint:    Chief Complaint   Patient presents with    Vomiting     Pt. From [facility] and arrived by [facility]. Pt. Has been vomiting bile and large amount of bile draining from J tube.          History Of Present Illness:     60 y.o. female with PMHx of anoxic brain injury, T2DM, CAD, and COPD who presented to [facility] with complaints of vomiting. Patient has a tracheostomy tube and due to her anoxic brain injury only opens eyes to respond to voice sometimes and is not able to follow commands or talk. Patient's daughter is present in the room and provides history. States that the anoxic brain injury occurred in September of last year after being put under sedation for an EGD/Colonoscopy. States that the patient was admitted to [facility] a month ago due to the patient's GJ tube getting pulled out and it was replaced. She was there for a few weeks and then went to a nursing facility in the hospital and was released earlier today back to an outpatient nursing facility. States that the patient was being fed through her GJ tube by the aids at the nursing facility and they laid her flat without pausing her tube feeding when she started vomiting. Patient's daughter states that this is what brought them to the hospital. Patient's daughter states that she will sometimes find her mother sitting in piles of feces at the previous nursing facility. She states that her mothers wound Vac that had been placed on her sacral ulceration had a lot of drainage out of it after being  taken off earlier today. States that she just got off of antibiotics but does not know why her mom was on them in the first place. States that her mother has significant cardiac history, with a \"widomaker\" blockage in  or . Patient's daughter is adamant that she does not wish palliative care to enter the room and if her mother has to have surgical procedures, she does not want her on propofol.        Past Medical History:          Diagnosis Date    Acute respiratory failure with hypoxemia ([facility])     Acute ST elevation myocardial infarction (STEMI) involving right coronary artery ([facility]) 10/06/2020    Arthritis     Back pain     CAD (coronary artery disease)     CHF (congestive heart failure) (HCC)     Chronic kidney disease     COPD (chronic obstructive pulmonary disease) (HCC)     Depression     Diabetes mellitus (HCC)     REHMAN (dyspnea on exertion)     Eye abnormalities     bleed in back of eyes with injections Q 8 weeks    Fatty liver     GERD (gastroesophageal reflux disease)     Hx of blood clots     ? PE 3 weeks ago ~    Hyperlipidemia     Hypertension     MRSA (methicillin resistant staph aureus) culture positive 08/15/2018    arm    On home O2     2 l/m    Oxygen deficiency     PVD (peripheral vascular disease)     Stress incontinence     Thyroid disease     CYST ON THYROID--FOUND 20 YEARS AGO    Ventilator dependence ([facility])        Past Surgical History:          Procedure Laterality Date    ANKLE FRACTURE SURGERY Right 2023    RIGHT OPEN REDUCTION AND INTERNAL FIXATION LATERAL MALLEOLUS AND SYNDESMOTIC REPAIR-MICHAEL performed by Wyatt Garsia MD at [facility] ASC OR    CARDIAC CATHETERIZATION      X 2     SECTION      TIMES 3    CHOLECYSTECTOMY      COLONOSCOPY  2017      Colonoscopy with snare and biopsy    COLONOSCOPY N/A 2024    COLONOSCOPY performed by Ja Guadarrama MD at [facility] ENDOSCOPY    ESOPHAGEAL DILATATION  2024    ESOPHAGEAL DILATION JOSE performed by

## 2025-07-12 NOTE — RT PROTOCOL NOTE
RT Nebulizer Bronchodilator Protocol Note    There is a bronchodilator order in the chart from a provider indicating to follow the RT Bronchodilator Protocol and there is an “Initiate RT Bronchodilator Protocol” order as well (see protocol at bottom of note).    CXR Findings:  XR CHEST PORTABLE  Result Date: 7/11/2025  Lungs are clear       The findings from the last RT Protocol Assessment were as follows:  Smoking: Chronic pulmonary disease  Respiratory Pattern: Regular pattern and RR 12-20 bpm  Breath Sounds: Slightly diminished and/or crackles  Cough: Strong, productive  Indication for Bronchodilator Therapy:    Bronchodilator Assessment Score: 5    Aerosolized bronchodilator medication orders have been revised according to the RT Nebulizer Bronchodilator Protocol below.    Respiratory Therapist to perform RT Therapy Protocol Assessment initially then follow the protocol.  Repeat RT Therapy Protocol Assessment PRN for score 0-3 or on second treatment, BID, and PRN for scores above 3.    No Indications - adjust the frequency to every 6 hours PRN wheezing or bronchospasm, if no treatments needed after 48 hours then discontinue using Per Protocol order mode.     If indication present, adjust the RT bronchodilator orders based on the Bronchodilator Assessment Score as indicated below.  If a patient is on this medication at home then do not decrease Frequency below that used at home.    0-3 - enter or revise RT bronchodilator order(s) to equivalent RT Bronchodilator order with Frequency of every 4 hours PRN for wheezing or increased work of breathing using Per Protocol order mode.       4-6 - enter or revise RT Bronchodilator order(s) to two equivalent RT bronchodilator orders with one order with BID Frequency and one order with Frequency of every 4 hours PRN wheezing or increased work of breathing using Per Protocol order mode.         7-10 - enter or revise RT Bronchodilator order(s) to two equivalent RT

## 2025-07-12 NOTE — ED NOTES
Patient Name: Lindsay Lucio  : 1965 60 y.o.  MRN: [id number]  ED Room #: ED-0001/01     Chief complaint:   Chief Complaint   Patient presents with    Vomiting     Pt. From [facility] and arrived by [facility]. Pt. Has been vomiting bile and large amount of bile draining from J tube.      Hospital Problem/Diagnosis:   Hospital Problems           Last Modified POA    * (Principal) Sepsis ([facility]) 2025 Yes    History of acute myocardial infarction 2025 Yes    Mixed hyperlipidemia 2025 Yes    Uncontrolled type 2 diabetes mellitus with hyperglycemia ([facility]) 2025 Yes    Stage 2 moderate COPD by GOLD classification ([facility]) 2020 Yes    Community acquired pneumonia 2025 Yes         O2 Flow Rate:O2 Device: Ventilator   (if applicable)  Cardiac Rhythm:   (if applicable)  Active LDA's:   Peripheral IV 25 Left;Posterior Hand (Active)      Gastrostomy/Enterostomy/Jejunostomy Tube PEG-Jejunostomy LUQ 1 20 fr (Active)      PICC Left Brachial (Active)        How does patient ambulate? Bed Bound    2. How does patient take pills? Other    3. Is patient alert? Drowsy, but responds to voice    4. Is patient oriented?     5.   Patient arrived from:  nursing home  Facility Name: ___________________________________________    6. If patient is disoriented or from a Skill Nursing Facility has family been notified of admission? No    7. Patient belongings? Belongings:      Disposition of belongings? No Belongings     8. Any specific patient or family belongings/needs/dynamics?   a.     9. Miscellaneous comments/pending orders?  a. Heparin at 11 units/kg/hr     If there are any additional questions please reach out to the Emergency Department.

## 2025-07-12 NOTE — CONSULTS
Nutrition Note    RECOMMENDATIONS  PO Diet: NPO  Weight: monitor daily wt  Nutrition Support:   On hold until medical plan evaluated  Consult RD for tube feed order and manage when EN via j-tube whenappropriate     ASSESSMENT   Consult received for tube feed order and manage. Pt with trach r/t anoxic brain injury and has PEG/J for feeds. Pt receives feeding through j-tube. Pt admitted with vomiting from [facility]. Pt was recently discharged from [facility] on Glucerna 1.5 at 55 mL/hr via j-tube and tolerating. Water flush 30 mL q 1 hr for j-tube maintenance. Also noted in EHR pt was 77.6 kg / 171 lb on 7/11.  Admission wt to [facility] is 88.5 kg / 195 lb.  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 Receiving IV fluids- LR. Plan to hold enteral nutrition until medical plan determined.         Malnutrition Status: No malnutrition         NUTRITION DIAGNOSIS   Increased nutrient needs related to increase demand for energy/nutrients as evidenced by wounds    Goals: Initiate nutrition support, within 2 days     NUTRITION RELATED FINDINGS  Objective: Na+ 138, K+ 5.5, glucose 273; nonverbal at baseline  Wounds: Pressure Injury, Stage IV (sacrum)    CURRENT NUTRITION THERAPIES  Diet NPO         ANTHROPOMETRICS  Current Height: 152.4 cm (5')  Current Weight - Scale: 88.5 kg (195 lb)        COMPARATIVE STANDARDS  Total Energy Requirements (kcals/day): 1545     Protein (g):  68 - 90       Fluid (mL/day):  1545    EDUCATION  Education/Counseling not indicated     The patient will be monitored per nutrition standards of care. Consult dietitian if additional nutrition interventions are needed prior to RD reassessment.     CHERYL RODRIGUES RD, LD    Weekend Contact: [phone] leave name and callback number

## 2025-07-12 NOTE — PROGRESS NOTES
Clinical Pharmacy Note: Pharmacy to Dose Vancomycin    Lindsay Lucio is a 60 y.o. female started on Vancomycin for bone and joint infection; consult received from NATALYA Rivera  to manage therapy. Also receiving the following antibiotics: metronidazole.    Goal AUC: 400-600 mg/L*hr    Assessment/Plan:  A 1000 mg loading dose was given on 7/12 at 0230  Initiate vancomycin 1000 mg IV every 24 hours. Bayesian modeling predicts an AUC of 458 mg/L*hr and a trough of 16.3 mcg/mL at steady state concentration.  A vancomycin random level has been ordered for 7/12 at 1200  Changes in regimen will be determined based on culture results, renal function, and clinical response.  Pharmacy will continue to monitor and adjust regimen as necessary.    Allergies:  Latex, Tramadol, Codeine, and Penicillins     Recent Labs     07/11/25  2321   CREATININE 1.4*       Recent Labs     07/11/25  2320   WBC 10.8       Ht Readings from Last 1 Encounters:   07/11/25 1.524 m (5')        Wt Readings from Last 1 Encounters:   07/11/25 88.5 kg (195 lb 1.7 oz)         Estimated Creatinine Clearance: 42 mL/min (A) (based on SCr of 1.4 mg/dL (H)).      Thank you for the consult,    Saul Lorenzo, MichaelD

## 2025-07-12 NOTE — ED PROVIDER NOTES
EMERGENCY DEPARTMENT PROVIDER NOTE         PATIENT IDENTIFICATION     Name:   Lindsay Lucio  MRN:   [id number]  YOB: 1965  Date of Evaluation:   2025  Provider:   DARINEL Butler; Frank Nur DO  PCP:   Rosario Stevens MD        CHIEF COMPLAINT     Vomiting (Pt. From [facility] and arrived by [place] squad. Pt. Has been vomiting bile and large amount of bile draining from J tube. )        HISTORY OF PRESENT ILLNESS     I independently interviewed patient and/or caretaker(s).  See Advanced Practice Provider (VENUS) note for full HPI.  In summary, Lindsay Lucio  is a(n) 60 y.o. female who presents with vomiting bilious fluid.  Has J-tube in place that has required replacement before.  History difficult to obtain.    Patient's family members are at bedside quite some time and patient's admission.  Obtain further history.  States that patient was recently discharged from skilled nursing facility and sent back to nursing home.  Has had bilious vomiting throughout the day.  Believe there is some kind of GJ tube dysfunction.  States that patient suffered an anoxic brain injury in 2024.  Has been in this condition ever since.  States that she is currently at baseline mentation.        PHYSICAL EXAM     I reviewed physical exam performed and documented by [facility]ENUS.  I performed an independent physical examination with findings as follows:  Chronically ill-appearing female who is trach dependent.  GJ tube appears in place.  Abdomen is distended and hard to the touch.  Vomiting bilious fluid throughout duration of examination.  Has coarse breath sounds diffusely.        EKG INTERPRETATION     TIME:   0020  RATE:   96 bpm  DE INTERVAL:   146 ms  QRS DURATION:   102 ms  QT/QTc:   388/490 ms  RHYTHM:   Normal sinus  AXIS:   Left axis deviation  ABNORMALITIES  No STEMI  Low voltage EKG    PRIOR EK24- current EKG without significant changes when compared to  - the patient is NOT to be included for SEP-1 Core Measure due to: May have criteria for sepsis, but does not meet criteria for severe sepsis or septic shock    During the patient's ED course, the patient was given:  Medications   ondansetron (ZOFRAN) injection 4 mg (has no administration in time range)   dextrose bolus 10% 125 mL (has no administration in time range)     Or   dextrose bolus 10% 250 mL (has no administration in time range)   glucagon injection 1 mg (has no administration in time range)   dextrose 10 % infusion (has no administration in time range)   sodium zirconium cyclosilicate (LOKELMA) oral suspension 10 g (has no administration in time range)   heparin (porcine) injection 4,000 Units (has no administration in time range)   heparin (porcine) injection 2,000 Units (has no administration in time range)   heparin 25,000 units in dextrose 5% 250 mL (premix) infusion (11 Units/kg/hr × 88.5 kg IntraVENous New Bag 7/12/25 0154)   ceFEPIme (MAXIPIME) 2,000 mg in sodium chloride 0.9 % 100 mL IVPB (addEASE) (2,000 mg IntraVENous New Bag 7/12/25 0153)   vancomycin (VANCOCIN) 1,000 mg in sodium chloride 0.9 % 250 mL IVPB ([id number]) (has no administration in time range)   sodium chloride 0.9 % bolus 1,365 mL (0 mLs IntraVENous Stopped 7/12/25 0038)   iopamidol (ISOVUE-370) 76 % injection 75 mL (75 mLs IntraVENous Given 7/12/25 0110)   calcium gluconate 1,000 mg in sodium chloride 50 mL (0 mg IntraVENous Stopped 7/12/25 0131)   insulin regular (HumuLIN R;NovoLIN R) injection 10 Units (10 Units IntraVENous Given 7/12/25 0131)     And   dextrose bolus 10% 250 mL (0 mLs IntraVENous Stopped 7/12/25 0140)   albuterol (PROVENTIL) (2.5 MG/3ML) 0.083% nebulizer solution 10 mg (10 mg Nebulization Given 7/12/25 0116)   furosemide (LASIX) injection 40 mg (40 mg IntraVENous Given 7/12/25 0122)   heparin (porcine) injection 4,000 Units (4,000 Units IntraVENous Given 7/12/25 0154)     Social determinants of health:

## 2025-07-12 NOTE — PROGRESS NOTES
07/12/25 0445   RT Protocol   History Pulmonary Disease 2   Respiratory pattern 0   Breath sounds 2   Cough 1   Bronchodilator Assessment Score 5

## 2025-07-12 NOTE — CONSULTS
Bedside Debridement Procedure Note    Procedure: Sharp excisional debridement of epidermis, dermis, subcutaneous tissue, and muscle/fascia on sacral decubitus ulcer stage 4    Anesthesia: Topical lidocaine    Procedure Details   Using curette the wound was sharply debrided    down through and including the removal of epidermis, dermis, subcutaneous tissue, and muscle/fascia  Coccyx/sacrum is exposed  Devitalized Tissue Debrided: fibrin, biofilm, and slough    Pre Debridement Measurements: 6x5.5cm    Post Debridement Measurements: same    Total Surface Area Debrided: 33 sq cm     Bleeding: Minimal    Hemostasis Achieved: Pressure    Pre Procedural Pain:  2/10     Post Procedural Pain: 2/10     Response to treatment: Well tolerated by patient    Assessment:  Patient Active Problem List   Diagnosis    Labral tear of shoulder    Acromioclavicular joint arthritis    Rotator cuff tendonitis    S/P shoulder surgery    History of acute myocardial infarction    Mixed hyperlipidemia    Uncontrolled type 2 diabetes mellitus with hyperglycemia ([facility])    Shortness of breath    Stage 2 moderate COPD by GOLD classification ([facility])    Personal history of tobacco use    Unstable angina ([facility])    PAD (peripheral artery disease)    Chest pain    Abnormal cardiovascular stress test    Acute respiratory failure ([facility])    Acute pulmonary embolism ([facility])    Acute exacerbation of chronic obstructive pulmonary disease (COPD) ([facility])    Community acquired pneumonia    Hyperglycemia    Bronchopneumonia    Hypoxia    Pleural effusion, bilateral    Atherosclerosis of native coronary artery of native heart without angina pectoris    Positive blood culture    Essential hypertension    Class 2 obesity due to excess calories with body mass index (BMI) of 39.0 to 39.9 in adult    Diabetes education, encounter for    Weight loss counseling, encounter for    Closed displaced fracture of lateral malleolus of right fibula    Sprain of anterior

## 2025-07-12 NOTE — CONSULTS
[facility]   Nephrology progress Note  [phone]  [phone]   [email or web address]         Reason for Consult:  GABY, hyperkalemia    HISTORY OF PRESENT ILLNESS:      The patient is a 60 y.o. female with significant past medical history of chronic respiratory failure, diabetes, hypertension, hyperlipidemia, recent GABY who presents with vomiting. Patient with tracheostomy and anoxic brain injury. Was recently at [facility] with GABY, which recovered. On admission, noted to have mild GABY with hyperkalemia. Therefore nephrology is consulted.     Past Medical History:    Unable to obtain    Past Surgical History:    Unable to obtain    Current Medications:    No current facility-administered medications on file prior to encounter.     Current Outpatient Medications on File Prior to Encounter   Medication Sig Dispense Refill    Lactobacillus (ACIDOPHILUS PO) 1 capsule by Per G Tube route in the morning and at bedtime      lactulose (CHRONULAC) 10 GM/15ML solution 45 mLs by Per G Tube route 2 times daily      glycopyrrolate (ROBINUL) 1 MG tablet 2 tablets by Per G Tube route 3 times daily      bisacodyl (DULCOLAX) 10 MG suppository Place 1 suppository rectally daily as needed for Constipation      meclizine (ANTIVERT) 12.5 MG tablet Take 1 tablet by mouth every 6 hours as needed for Nausea or Dizziness      metoclopramide (REGLAN) 5 MG/5ML solution 5 mLs by Per G Tube route 3 times daily      docusate sodium (COLACE) 100 MG capsule Take 1 capsule by mouth daily as needed for Constipation      acetaminophen (TYLENOL) 325 MG tablet Take 2 tablets by mouth every 6 hours as needed for Pain or Fever      heparin, porcine, 5000 UNIT/ML injection Inject 1 mL into the skin in the morning and at bedtime      carvedilol (COREG) 12.5 MG tablet 1 tablet by Per G Tube route 2 times daily (with meals)      albuterol (PROVENTIL) (2.5 MG/3ML) 0.083% nebulizer solution Take 3 mLs by nebulization 4 times daily

## 2025-07-12 NOTE — PROGRESS NOTES
07/12/25 0950   Patient Observation   Pulse 87   Respirations 24   SpO2 100 %   Breath Sounds   Respiratory Pattern Regular   Breath Sounds Bilateral Diminished   Vent Information   Ventilator ID [id number]   Equipment Changed HME   Vent Mode AC/PRVC   $Ventilation $Subsequent Day   Ventilator Settings   Vt (Set, mL) 350 mL   Resp Rate (Set) 16 bpm   PEEP/CPAP (cmH2O) 5   FiO2  40 %   Insp Time (sec) 1.1 sec   Vent Patient Data (Readings)   Vt (Measured) 367 mL   Peak Inspiratory Pressure (cmH2O) 12 cmH2O   Rate Measured 23 br/min   Minute Volume (L/min) 7.98 Liters   Mean Airway Pressure (cmH2O) 7.7 cmH20   I:E Ratio 1:1.9   Backup Apnea On   Backup Rate 16 Breaths Per Minute   Backup Vt 350   Vent Alarm Settings   High Pressure (cmH2O) 40 cmH2O   Low Minute Volume (lpm) 2 L/min   High Minute Volume (lpm) 20 L/min   Low Exhaled Vt (ml) 200 mL   High Exhaled Vt (ml) 1000 mL   RR High (bpm) 40 br/min   Additional Respiratoray Assessments   Humidification Source HME   Circuit Condensation Not drained   Ambu Bag With Mask At Bedside Yes   Backup Trachs Available (Size) 6.0;5.0   Airway Clearance   Suction Trach   Suction Device Inline suction catheter   Sputum Method Obtained Tracheal   Sputum Amount Small   Sputum Color/Odor White;Yellow   Sputum Consistency Thick;Thin   Surgical Airway  Shiley Cuffed   No placement date or time found.   Present on Admission/Arrival: Yes  Surgical Airway Type: Tracheostomy  Brand: Gary  Style: Cuffed  Size: 6  Comments: 6xlt gary P   Status Secured   Site Assessment Dry   Site Care Dried;Dressing applied;Cleansed   Inner Cannula Care Changed/new   Ties Assessment Intact;Secure   Cuff Pressure 28 cm H2O   Spare Trach at Bedside Yes   Spare Trach Tube One Size Smaller at Bedside Yes   Ambu Bag With Mask at Bedside Yes   Ventilator Associated Pneumonia Bundle   Elevation of Head of Bed to 30-45 Degrees  Yes   ETT/Trach Suctioning Yes   Treatment   $Treatment Type $Inhaled

## 2025-07-12 NOTE — CONSULTS
[facility]    2025    Lindsay Lucio (:  1965) is a 60 y.o. female    Referring Provider: Rosario Stevens MD    HISTORY:  60F hx CAD s/p PCI, Anoxic brain injury 2024 admitted from [facility] for vomiting and bile drainage from J tube. She has tracheostomy. She is non verbal and does not follow commands. No family at bedside, hx per chart review. She was at [facility] and apparently turned for bathing. It sounds as if she aspirated at that point and has since had significant vomiting since that time. She was brought to the ER. Troponin was elevated in setting of GABY, Tachycardia, and HTN with hx CAD. Cardiology was consulted.      REVIEW OF SYSTEMS:  Unable to review 2/2 mental status.     Prior to Visit Medications    Medication Sig Taking? Authorizing Provider   Lactobacillus (ACIDOPHILUS PO) 1 capsule by Per G Tube route in the morning and at bedtime  Dylan Caicedo MD   lactulose (CHRONULAC) 10 GM/15ML solution 45 mLs by Per G Tube route 2 times daily  Dylan Caicedo MD   glycopyrrolate (ROBINUL) 1 MG tablet 2 tablets by Per G Tube route 3 times daily  Dylan Caicedo MD   bisacodyl (DULCOLAX) 10 MG suppository Place 1 suppository rectally daily as needed for Constipation  Dylan Caicedo MD   meclizine (ANTIVERT) 12.5 MG tablet Take 1 tablet by mouth every 6 hours as needed for Nausea or Dizziness  Dylan Caicedo MD   metoclopramide (REGLAN) 5 MG/5ML solution 5 mLs by Per G Tube route 3 times daily  Dylan Caicedo MD   docusate sodium (COLACE) 100 MG capsule Take 1 capsule by mouth daily as needed for Constipation  Dylan Caicedo MD   acetaminophen (TYLENOL) 325 MG tablet Take 2 tablets by mouth every 6 hours as needed for Pain or Fever  Dylan Caicedo MD   heparin, porcine, 5000 UNIT/ML injection Inject 1 mL into the skin in the morning and at bedtime  Dylan Caicedo MD   carvedilol (COREG) 12.5 MG tablet 1 tablet  LDL 69 02/07/2024 01:07 PM     PT/INR:  No results found for: \"PTINR\"  Lab Results   Component Value Date    TROPONINI <0.01 12/24/2022       EKG:  I have reviewed EKG with the following interpretation:  Impression:  NSR, Incomplete RBBB    07/11/25    ECHO (TTE) COMPLETE (PRN CONTRAST/BUBBLE/STRAIN/3D) 07/12/2025 11:11 AM (Final)    Interpretation Summary    Left Ventricle: Normal left ventricular systolic function with a visually estimated EF of 60%. Left ventricle size is normal. Increased wall thickness. Findings consistent with mild concentric hypertrophy. Normal wall motion. Normal diastolic function.    Right Ventricle: Right ventricle size is normal. Normal systolic function.    Mitral Valve: Thickened leaflets. MV Mean Gradient is 2 mmHg.    Tricuspid Valve: Unable to assess RVSP due to inadequate or insignificant tricuspid regurgitation.    Pericardium: Trivial pericardial effusion present. No indication of cardiac tamponade.    Image quality is technically difficult. Technically difficult Doppler study and procedure performed with the patient in a supine position.    Signed by: Omar Casillas MD on 7/12/2025 11:11 AM    [facility] 3/27/23  FINDINGS:  1.  Right-dominant coronary arterial circulation.  There was an  angiographic 80% right PDA lesion that was positive by fractional flow  reserve at 0.77.  This was intervened upon with a 2.5 x 18 mm [place] drug-eluting stent dilated to 0.55 mm in diameter.  The PDA was  ballooned with a 2-mm balloon more distally to this.  We did balloon  through the stent struts into the ostium of the posterolateral branch  restoring MARILIN 3 flow here.  There are patent stents in the mid RCA with  no significant in-stent restenosis.  On the left system, there is  trivial plaque disease in the left main.  The circumflex artery has 25%  plaque disease in the midportion on the left anterior descending artery,  has 50% disease in the proximal to mid LAD.  There is a 50%  Negative Screen

## 2025-07-12 NOTE — ED PROVIDER NOTES
8.7  Consistent with prior [PB]   2349 pH, Javad(!): 7.475  Metabolic alkalosis.  Suspect secondary to fluid loss. [PB]   2349 pCO2, Javad: 46.0 [PB]   2349 pO2, Javad(!): 117.0 [PB]   2349 Bicarbonate, Venous(!): 33.8 [PB]   2349 Base Excess, Javad(!): 9.3 [PB]   Sat Jul 12, 2025   0006 Lactic Acid, Sepsis: 1.5 [PB]   0021 RBC, UA(!): 5-10 [PB]   0022 Epithelial Cells, UA(!): 6-10 [PB]   0022 Bacteria, UA(!): 3+  Suspect contaminated sample.  Imaging pending [PB]   0022 Yeast, Urine(!): Present [PB]   0022 Crystals, UA(!): 3+ Triple Phos [PB]   0022 Potassium(!): 5.8  Will obtain EKG. [PB]   0022 Creatinine(!): 1.4  Slightly increased compared to prior [PB]   0022 Total Bilirubin: 0.8 [PB]   0022 Alkaline Phosphatase: 127 [PB]   0022 ALT: 14 [PB]   0022 AST: 18 [PB]   0022 Lipase: 17.0 [PB]   0022 Procalcitonin(!): 0.28  Elevated compared to 10 months prior [PB]   0022 Troponin, High Sensitivity(!): 200  Significantly elevated compared to prior.  EKG pending.  Suspect secondary to hypoxia given trach dependency and vomiting?       Will obtain repeat for trend. [PB]   0023 NT Pro-BNP(!): 404 [PB]   0023 XR CHEST PORTABLE  Negative for acute findings. [PB]   0030 EKG 12 Lead  Normal sinus with left axis deviation, no hyperkalemic changes, low voltage EKG likely secondary to body habitus, when compared to prior 9/13/2024 no significant changes [PB]   0056 Troponin  186 per lab call.  Downtrending.  Suspect type II NSTEMI, but will start heparin after CT brain rules out intracranial process. [PB]   0056 Potassium  6.0 per lab call.  Will treat. [PB]   0106 CT HEAD WO CONTRAST  Pending radiology read: No intracranial hematoma.  Will start heparin drip. [PB]   0107 CT CHEST ABDOMEN PELVIS W CONTRAST Additional Contrast? None  Pending radial read: Left lower lobe pneumonia.  Will give Vanco cefepime for antibiotic coverage for ventilator associated pneumonia.  GJ tube appears to be in place. [PB]   0150 CT CHEST ABDOMEN PELVIS W  that the patient had vomiting today and there was bile coming out, which prompted their visit to the ED    .  On examination, feeding tube is in place, abdomen is distended, we did place the G-tube to light suction, and abdomen did seem to soften with this.    Disposition Considerations (tests considered but not done, Admit vs D/C, Shared Decision Making, Pt Expectation of Test or Tx.):    Show her tracheostomy is in place and she does have rhonchorous breath sounds    Her EKG as documented by my attending, please see his note for further detail    .  CBC shows no evidence of leukocytosis, she is anemic at 8.7 which can be trended as an inpatient.  Her lactic acid is normal.  Her CMP does show dehydration with creatinine of 1.4.  Her glucose is 309 however her anion gap and CO2 are normal.  Potassium is elevated at 5.8 this is repeated and it was confirmed at 6.0 therefore will give insulin, bicarbonate, as well as calcium and albuterol.    Initial troponin is elevated at 200, this is repeating downward at 186    .  Her CT of her head shows no acute process, does have advanced white matter disease.  CT of the chest and pelvis does show a left lower lobe pneumonia, she does have a sacral decubitus ulcer with underlying erosive changes suggestive of osteomyelitis, this has been seen on previous scans.    In regards to the troponin, this is likely type II NSTEMI secondary to her sepsis and demand however due to the elevation of this we will place on heparin.    Patient has been covered with vancomycin and cefepime.  Hospitalist has been paged for admission, family is updated and is agreeable with plan    I am the Primary Clinician of Record.  FINAL IMPRESSION      1. Septicemia (HCC)    2. Pneumonia of left lower lobe due to infectious organism    3. Hyperkalemia    4. Ventilator dependent (HCC)    5. Pressure injury of skin of sacral region, unspecified injury stage    6. NSTEMI (non-ST elevated myocardial infarction)

## 2025-07-13 PROBLEM — N17.9 AKI (ACUTE KIDNEY INJURY): Status: ACTIVE | Noted: 2025-07-13

## 2025-07-13 PROBLEM — S06.9X9A BRAIN INJURY WITH LOSS OF CONSCIOUSNESS (HCC): Status: ACTIVE | Noted: 2025-07-13

## 2025-07-13 PROBLEM — R65.20 SEVERE SEPSIS (HCC): Status: ACTIVE | Noted: 2025-07-12

## 2025-07-13 PROBLEM — R93.89 INFILTRATE NOTED ON IMAGING STUDY: Status: ACTIVE | Noted: 2025-07-13

## 2025-07-13 PROBLEM — M86.659 CHRONIC OSTEOMYELITIS OF PELVIC REGION (HCC): Status: ACTIVE | Noted: 2025-07-13

## 2025-07-13 PROBLEM — L89.154 SACRAL DECUBITUS ULCER, STAGE IV (HCC): Status: ACTIVE | Noted: 2025-07-13

## 2025-07-13 PROBLEM — J96.10 CHRONIC RESPIRATORY FAILURE (HCC): Status: ACTIVE | Noted: 2025-07-13

## 2025-07-13 LAB
ABO/RH: NORMAL
ALBUMIN SERPL-MCNC: 2.8 G/DL (ref 3.4–5)
ALBUMIN/GLOB SERPL: 0.6 {RATIO} (ref 1.1–2.2)
ALP SERPL-CCNC: 87 U/L (ref 40–129)
ALT SERPL-CCNC: 11 U/L (ref 10–40)
ANION GAP SERPL CALCULATED.3IONS-SCNC: 9 MMOL/L (ref 3–16)
ANTIBODY SCREEN: NORMAL
AST SERPL-CCNC: 15 U/L (ref 15–37)
BASOPHILS # BLD: 0 K/UL (ref 0–0.2)
BASOPHILS NFR BLD: 0.7 %
BILIRUB SERPL-MCNC: 0.4 MG/DL (ref 0–1)
BLOOD BANK DISPENSE STATUS: NORMAL
BLOOD BANK PRODUCT CODE: NORMAL
BPU ID: NORMAL
BUN SERPL-MCNC: 32 MG/DL (ref 7–20)
CALCIUM SERPL-MCNC: 9.3 MG/DL (ref 8.3–10.6)
CHLORIDE SERPL-SCNC: 101 MMOL/L (ref 99–110)
CO2 SERPL-SCNC: 28 MMOL/L (ref 21–32)
CREAT SERPL-MCNC: 1.2 MG/DL (ref 0.6–1.2)
DEPRECATED RDW RBC AUTO: 16.9 % (ref 12.4–15.4)
DESCRIPTION BLOOD BANK: NORMAL
EOSINOPHIL # BLD: 0.1 K/UL (ref 0–0.6)
EOSINOPHIL NFR BLD: 2.3 %
GFR SERPLBLD CREATININE-BSD FMLA CKD-EPI: 51 ML/MIN/{1.73_M2}
GLUCOSE BLD-MCNC: 131 MG/DL (ref 70–99)
GLUCOSE BLD-MCNC: 155 MG/DL (ref 70–99)
GLUCOSE BLD-MCNC: 163 MG/DL (ref 70–99)
GLUCOSE BLD-MCNC: 172 MG/DL (ref 70–99)
GLUCOSE BLD-MCNC: 208 MG/DL (ref 70–99)
GLUCOSE SERPL-MCNC: 125 MG/DL (ref 70–99)
HCT VFR BLD AUTO: 19.1 % (ref 36–48)
HCT VFR BLD AUTO: 23.7 % (ref 36–48)
HGB BLD-MCNC: 6.7 G/DL (ref 12–16)
HGB BLD-MCNC: 8.2 G/DL (ref 12–16)
LYMPHOCYTES # BLD: 0.9 K/UL (ref 1–5.1)
LYMPHOCYTES NFR BLD: 18.1 %
MCH RBC QN AUTO: 32.6 PG (ref 26–34)
MCHC RBC AUTO-ENTMCNC: 34.8 G/DL (ref 31–36)
MCV RBC AUTO: 93.6 FL (ref 80–100)
MONOCYTES # BLD: 0.4 K/UL (ref 0–1.3)
MONOCYTES NFR BLD: 8.8 %
NEUTROPHILS # BLD: 3.5 K/UL (ref 1.7–7.7)
NEUTROPHILS NFR BLD: 70.1 %
PERFORMED ON: ABNORMAL
PLATELET # BLD AUTO: 152 K/UL (ref 135–450)
PMV BLD AUTO: 8.1 FL (ref 5–10.5)
POTASSIUM SERPL-SCNC: 4.2 MMOL/L (ref 3.5–5.1)
PROT SERPL-MCNC: 7.2 G/DL (ref 6.4–8.2)
RBC # BLD AUTO: 2.04 M/UL (ref 4–5.2)
SODIUM SERPL-SCNC: 138 MMOL/L (ref 136–145)
VANCOMYCIN SERPL-MCNC: 24.4 UG/ML
WBC # BLD AUTO: 4.9 K/UL (ref 4–11)

## 2025-07-13 PROCEDURE — 87641 MR-STAPH DNA AMP PROBE: CPT

## 2025-07-13 PROCEDURE — 2000000000 HC ICU R&B

## 2025-07-13 PROCEDURE — 99255 IP/OBS CONSLTJ NEW/EST HI 80: CPT | Performed by: INTERNAL MEDICINE

## 2025-07-13 PROCEDURE — 6360000002 HC RX W HCPCS

## 2025-07-13 PROCEDURE — 6360000002 HC RX W HCPCS: Performed by: STUDENT IN AN ORGANIZED HEALTH CARE EDUCATION/TRAINING PROGRAM

## 2025-07-13 PROCEDURE — 6360000002 HC RX W HCPCS: Performed by: INTERNAL MEDICINE

## 2025-07-13 PROCEDURE — 80202 ASSAY OF VANCOMYCIN: CPT

## 2025-07-13 PROCEDURE — 6370000000 HC RX 637 (ALT 250 FOR IP): Performed by: INTERNAL MEDICINE

## 2025-07-13 PROCEDURE — 6370000000 HC RX 637 (ALT 250 FOR IP)

## 2025-07-13 PROCEDURE — 6370000000 HC RX 637 (ALT 250 FOR IP): Performed by: STUDENT IN AN ORGANIZED HEALTH CARE EDUCATION/TRAINING PROGRAM

## 2025-07-13 PROCEDURE — 2500000003 HC RX 250 WO HCPCS

## 2025-07-13 PROCEDURE — 94003 VENT MGMT INPAT SUBQ DAY: CPT

## 2025-07-13 PROCEDURE — 36430 TRANSFUSION BLD/BLD COMPNT: CPT

## 2025-07-13 PROCEDURE — 99291 CRITICAL CARE FIRST HOUR: CPT | Performed by: INTERNAL MEDICINE

## 2025-07-13 PROCEDURE — 86900 BLOOD TYPING SEROLOGIC ABO: CPT

## 2025-07-13 PROCEDURE — 86850 RBC ANTIBODY SCREEN: CPT

## 2025-07-13 PROCEDURE — 2580000003 HC RX 258: Performed by: INTERNAL MEDICINE

## 2025-07-13 PROCEDURE — 85018 HEMOGLOBIN: CPT

## 2025-07-13 PROCEDURE — 2580000003 HC RX 258

## 2025-07-13 PROCEDURE — 94640 AIRWAY INHALATION TREATMENT: CPT

## 2025-07-13 PROCEDURE — 86923 COMPATIBILITY TEST ELECTRIC: CPT

## 2025-07-13 PROCEDURE — 80053 COMPREHEN METABOLIC PANEL: CPT

## 2025-07-13 PROCEDURE — 6370000000 HC RX 637 (ALT 250 FOR IP): Performed by: HOSPITALIST

## 2025-07-13 PROCEDURE — P9016 RBC LEUKOCYTES REDUCED: HCPCS

## 2025-07-13 PROCEDURE — 85014 HEMATOCRIT: CPT

## 2025-07-13 PROCEDURE — 30233N1 TRANSFUSION OF NONAUTOLOGOUS RED BLOOD CELLS INTO PERIPHERAL VEIN, PERCUTANEOUS APPROACH: ICD-10-PCS | Performed by: INTERNAL MEDICINE

## 2025-07-13 PROCEDURE — 85025 COMPLETE CBC W/AUTO DIFF WBC: CPT

## 2025-07-13 PROCEDURE — 86901 BLOOD TYPING SEROLOGIC RH(D): CPT

## 2025-07-13 PROCEDURE — 2580000003 HC RX 258: Performed by: STUDENT IN AN ORGANIZED HEALTH CARE EDUCATION/TRAINING PROGRAM

## 2025-07-13 RX ORDER — SODIUM CHLORIDE 9 MG/ML
INJECTION, SOLUTION INTRAVENOUS PRN
Status: DISCONTINUED | OUTPATIENT
Start: 2025-07-13 | End: 2025-07-21 | Stop reason: HOSPADM

## 2025-07-13 RX ORDER — METOCLOPRAMIDE HYDROCHLORIDE 5 MG/ML
5 INJECTION INTRAMUSCULAR; INTRAVENOUS EVERY 8 HOURS
Status: DISCONTINUED | OUTPATIENT
Start: 2025-07-13 | End: 2025-07-14

## 2025-07-13 RX ADMIN — Medication 1 CAPSULE: at 18:12

## 2025-07-13 RX ADMIN — VANCOMYCIN HYDROCHLORIDE 1000 MG: 1 INJECTION, POWDER, LYOPHILIZED, FOR SOLUTION INTRAVENOUS at 00:15

## 2025-07-13 RX ADMIN — ASPIRIN 81 MG: 81 TABLET, CHEWABLE ORAL at 08:30

## 2025-07-13 RX ADMIN — SODIUM ZIRCONIUM CYCLOSILICATE 10 G: 5 POWDER, FOR SUSPENSION ORAL at 00:22

## 2025-07-13 RX ADMIN — MEROPENEM 1000 MG: 1 INJECTION INTRAVENOUS at 12:22

## 2025-07-13 RX ADMIN — IPRATROPIUM BROMIDE AND ALBUTEROL SULFATE 1 DOSE: .5; 3 SOLUTION RESPIRATORY (INHALATION) at 21:03

## 2025-07-13 RX ADMIN — CARVEDILOL 6.25 MG: 6.25 TABLET, FILM COATED ORAL at 18:11

## 2025-07-13 RX ADMIN — Medication 2 PUFF: at 12:44

## 2025-07-13 RX ADMIN — Medication 10 ML: at 20:20

## 2025-07-13 RX ADMIN — CARVEDILOL 6.25 MG: 6.25 TABLET, FILM COATED ORAL at 08:30

## 2025-07-13 RX ADMIN — ACETAMINOPHEN 650 MG: 325 TABLET ORAL at 20:26

## 2025-07-13 RX ADMIN — Medication 1 CAPSULE: at 08:30

## 2025-07-13 RX ADMIN — MEROPENEM 1000 MG: 1 INJECTION INTRAVENOUS at 22:00

## 2025-07-13 RX ADMIN — IPRATROPIUM BROMIDE AND ALBUTEROL SULFATE 1 DOSE: .5; 3 SOLUTION RESPIRATORY (INHALATION) at 10:02

## 2025-07-13 RX ADMIN — METRONIDAZOLE 500 MG: 500 INJECTION, SOLUTION INTRAVENOUS at 05:45

## 2025-07-13 RX ADMIN — METOCLOPRAMIDE HYDROCHLORIDE 5 MG: 5 INJECTION, SOLUTION INTRAMUSCULAR; INTRAVENOUS at 15:25

## 2025-07-13 RX ADMIN — Medication 10 ML: at 08:31

## 2025-07-13 RX ADMIN — INSULIN GLARGINE 22 UNITS: 100 INJECTION, SOLUTION SUBCUTANEOUS at 20:27

## 2025-07-13 RX ADMIN — Medication 2 PUFF: at 21:03

## 2025-07-13 RX ADMIN — METOCLOPRAMIDE HYDROCHLORIDE 5 MG: 5 INJECTION, SOLUTION INTRAMUSCULAR; INTRAVENOUS at 20:26

## 2025-07-13 RX ADMIN — ENOXAPARIN SODIUM 40 MG: 100 INJECTION SUBCUTANEOUS at 08:30

## 2025-07-13 RX ADMIN — ATORVASTATIN CALCIUM 20 MG: 20 TABLET, FILM COATED ORAL at 08:30

## 2025-07-13 RX ADMIN — INSULIN LISPRO 1 UNITS: 100 INJECTION, SOLUTION INTRAVENOUS; SUBCUTANEOUS at 20:25

## 2025-07-13 RX ADMIN — PANTOPRAZOLE SODIUM 40 MG: 40 INJECTION, POWDER, FOR SOLUTION INTRAVENOUS at 08:30

## 2025-07-13 RX ADMIN — VANCOMYCIN HYDROCHLORIDE 1000 MG: 1 INJECTION, POWDER, LYOPHILIZED, FOR SOLUTION INTRAVENOUS at 20:23

## 2025-07-13 ASSESSMENT — PAIN SCALES - WONG BAKER
WONGBAKER_NUMERICALRESPONSE: NO HURT

## 2025-07-13 ASSESSMENT — PULMONARY FUNCTION TESTS
PIF_VALUE: 21
PIF_VALUE: 8.6
PIF_VALUE: 11
PIF_VALUE: 8.9
PIF_VALUE: 15
PIF_VALUE: 15

## 2025-07-13 NOTE — PROGRESS NOTES
Admit Date: 7/11/2025  Diet: Diet NPO  ADULT TUBE FEEDING; PEJ; Standard with Fiber; Continuous; 20; No; 30; Q 4 hours    CC: Vomiting    Interval history:   Overnight, there were no acute events. Patient's vitals remained stable    Patient was seen this morning. I discussed the plan of the day with her in detail. Pt did not really follow commands     Plan:     -Continue IV Vanc, Cefepime and Flagyl  -Probiotics  -Blood Cx pending  -Will await any recs from ID  -Transfuse 1 unit PRBC   -MRI L Spine in light of sacrococcygeal erosive changes suggestive of osteomyelitis  on CT    Assessment:   Lindsay Lucio is a 60 y.o. female with PMH of anoxic brain injury, T2DM, CAD, and COPD  who was admitted with sepsis    Sepsis in the setting of osteomyelitis  Pt presented per her daughter, \"states that the patient was being fed through her GJ tube by the aids at the nursing facility and they laid her flat without pausing her tube feeding when she started vomiting. Patient's daughter states that this is what brought them to the hospital. Patient's daughter states that she will sometimes find her mother sitting in piles of feces at the previous nursing facility. She states that her mothers wound Vac that had been placed on her sacral ulceration had a lot of drainage out of it after being taken off earlier today.\" On admission, LA 2.3, UA with + nitrites and 6-10 WBC, tachypnea of 23 and tachycardic to 110+. CT A/P with showed LLL infiltrate suggestive of pneumonitis. Sacral decubitus ulcer with underlying sacrococcygeal erosive changes suggestive of osteomyelitis with suspect small bilateral abscess posterior to acetabulum.    -ID consulted  -Critical care consulted    Anemia  Patient hemoglobin down trended from 7.6 to 6.7 today.    CAD  Pt is s/p stent. Pt is on ASA, Coreg    Elevated Scr  Pt on admission was found to have Cr 1.4 from a previous baseline of 0.8  -Nephrology consulted    T2DM  At home, pt is on

## 2025-07-13 NOTE — CONSULTS
[facility] Pulmonary and Critical Care   Consult Note      Reason for Consult: Severe sepsis/vent management  Requesting Physician: Javan Walls    Subjective:   CHIEF COMPLAINT: Vomiting     HPI: History of anoxic brain injury cardiac arrest during EGD/colonoscopy in September 2024 requiring chronic trach/PEJ tube placement.  Currently a resident of [facility].  She hospitalized at [facility] 5/22 and 6/20 for PEJ tube malfunction/leak and infection.  The J-tube was repositioned and skin closure was provide.  Course was also complicated with Candida auris fungemia.  Also has prior history of MDRO Klebsiella and Pseudomonas pneumonia related to recurrent aspiration.  Subsequent to this, especially hospital and ultimately discharged on 7/11 to [facility].    As per reports, patient was laid flat during care at the facility-leading to vomiting episodes with concerns for aspiration.  Patient was hence transferred to our ER for further management.    Patient has decubitus ulcer/sacral underlying osteomyelitis.  She is trach [# 6 Shiley XLT/ventilator dependent and PEJ for feeds.  History of DM2 with gastroparesis.  Also has prior history of coronary artery disease.       The patient is a 60 y.o. female with significant past medical history of:      Diagnosis Date    Acute respiratory failure with hypoxemia ([facility])     Acute ST elevation myocardial infarction (STEMI) involving right coronary artery ([facility]) 10/06/2020    Arthritis     Back pain     CAD (coronary artery disease)     CHF (congestive heart failure) (HCC)     Chronic kidney disease     COPD (chronic obstructive pulmonary disease) (HCC)     Depression     Diabetes mellitus (HCC)     REHMAN (dyspnea on exertion)     Eye abnormalities     bleed in back of eyes with injections Q 8 weeks    Fatty liver     GERD (gastroesophageal reflux disease)     Hx of blood clots     ? PE 3 weeks ago ~11/22    Hyperlipidemia     Hypertension     MRSA

## 2025-07-13 NOTE — PROGRESS NOTES
07/13/25 0400   Vent Patient Data (Readings)   Static Compliance (L/cm H2O) 16   Dynamic Compliance (L/cm H2O) 26 L/cm H2O

## 2025-07-13 NOTE — PROGRESS NOTES
[facility]   Nephrology progress Note  [phone]  [phone]   [email or web address]         Reason for Consult:  GABY, hyperkalemia    HISTORY OF PRESENT ILLNESS:      The patient is a 60 y.o. female with significant past medical history of chronic respiratory failure, diabetes, hypertension, hyperlipidemia, recent GABY who presents with vomiting. Patient with tracheostomy and anoxic brain injury. Was recently at [facility] with GABY, which recovered. On admission, noted to have mild AGBY with hyperkalemia. Therefore nephrology is consulted.     Interval history  Creatinine improved. BP better this AM  Hgb 6.7 g/dL  No family at bedside     vancomycin  1,000 mg IntraVENous Once    vancomycin (VANCOCIN) intermittent dosing (placeholder)   Other RX Placeholder    aspirin  81 mg Oral Daily    atorvastatin  20 mg Per G Tube Daily    budesonide-formoterol  2 puff Inhalation BID RT    sodium chloride flush  5-40 mL IntraVENous 2 times per day    sodium chloride flush  5-40 mL IntraVENous 2 times per day    ipratropium 0.5 mg-albuterol 2.5 mg  1 Dose Inhalation BID RT    metroNIDAZOLE  500 mg IntraVENous Q8H    insulin glargine  22 Units SubCUTAneous Nightly    insulin lispro  0-4 Units SubCUTAneous 4x Daily AC & HS    pantoprazole  40 mg IntraVENous Daily    lactobacillus  1 capsule Oral BID WC    carvedilol  6.25 mg Per G Tube BID WC    enoxaparin  40 mg SubCUTAneous Daily           Allergies:  Latex, Tramadol, Codeine, and Penicillins      PHYSICAL EXAM:    Vitals:    BP (!) 136/52   Pulse 72   Temp 97 °F (36.1 °C) (Temporal)   Resp 20   Ht 1.524 m (5')   Wt 74.1 kg (163 lb 5.8 oz)   LMP 11/30/2005   SpO2 94%   BMI 31.90 kg/m²     Intake/Output Summary (Last 24 hours) at 7/13/2025 0843  Last data filed at 7/13/2025 0600  Gross per 24 hour   Intake 4711.32 ml   Output 1700 ml   Net 3011.32 ml       Physical Exam:  CONSTITUTIONAL/PSYCHIATRY: lethargic. Trach dependent.    RESPIRATORY:

## 2025-07-13 NOTE — PROGRESS NOTES
Admit Date: 7/11/2025  Diet: Diet NPO    CC: Vomiting    Interval history:   Overnight, there were no acute events. Patient's vitals remained stable    Patient was seen this morning. I discussed the plan of the day with her in detail. Pt did not really follow commands     Plan:     -Continue IV Vanc, Cefepime and Flagyl  -Probiotics  -Blood Cx pending  -Will await any recs from ID  -IVF hydration    Assessment:   Lindsay Lucio is a 60 y.o. female with PMH of anoxic brain injury, T2DM, CAD, and COPD  who was admitted with sepsis    Sepsis in the setting of osteomyelitis  Pt presented per her daughter, \"states that the patient was being fed through her GJ tube by the aids at the nursing facility and they laid her flat without pausing her tube feeding when she started vomiting. Patient's daughter states that this is what brought them to the hospital. Patient's daughter states that she will sometimes find her mother sitting in piles of feces at the previous nursing facility. She states that her mothers wound Vac that had been placed on her sacral ulceration had a lot of drainage out of it after being taken off earlier today.\" On admission, LA 2.3, UA with + nitrites and 6-10 WBC, tachypnea of 23 and tachycardic to 110+. CT A/P with showed LLL infiltrate suggestive of pneumonitis. Sacral decubitus ulcer with underlying sacrococcygeal erosive changes suggestive of osteomyelitis with suspect small bilateral abscess posterior to acetabulum.    -ID consulted  -Critical care consulted    CAD  Pt is s/p stent. Pt is on ASA, Coreg    Elevated Scr  Pt on admission was found to have Cr 1.4 from a previous baseline of 0.8  -Nephrology consulted    T2DM  At home, pt is on insulin    Anoxic Brain injury  Pt had an anoxic brain injury after sedation for EGD/colonoscopy in September of last year.  Patient has a tracheostomy tube and due to her anoxic brain injury only opens eyes to respond to voice sometimes and is not

## 2025-07-13 NOTE — CONSULTS
Infectious Diseases Inpatient Consult Note    Reason for Consult:   Sacrococcygeal PU with extension osteomyelitis  Requesting Physician:   Dr Walls  Primary Care Physician:  Rosario Stevens MD  History Obtained From:   Pt, EPIC    Admit Date: 7/11/2025  Hospital Day: 3    CHIEF COMPLAINT:       Chief Complaint   Patient presents with    Vomiting     Pt. From [facility] and arrived by [place] squad. Pt. Has been vomiting bile and large amount of bile draining from J tube.        HISTORY OF PRESENT ILLNESS:      61 yo woman   PMH - brain injury (9/2024), DM, CAD, COPD on chronic O2, CHF, depression, DVT, MILE, HL, thyroid  PSurgHx - R ankle ORIF, PEG, trach  Lives at nursing facility     Presents with vomiting  Admit 7/11- afeb, WBC 10.8, Cr 1.4  UA small LE, micro 3-4 WBC    Noted to have sacral PU  CT - LLL density, sacral PU with erosive sacrum / pelvic changes  Started on Vancomycin + Meropenem      Past Medical History:    Past Medical History:   Diagnosis Date    Acute respiratory failure with hypoxemia (HCC)     Acute ST elevation myocardial infarction (STEMI) involving right coronary artery (HCC) 10/06/2020    Arthritis     Back pain     CAD (coronary artery disease)     CHF (congestive heart failure) (HCC)     Chronic kidney disease     COPD (chronic obstructive pulmonary disease) (HCC)     Depression     Diabetes mellitus (HCC)     REHMAN (dyspnea on exertion)     Eye abnormalities     bleed in back of eyes with injections Q 8 weeks    Fatty liver     GERD (gastroesophageal reflux disease)     Hx of blood clots     ? PE 3 weeks ago ~11/22    Hyperlipidemia     Hypertension     MRSA (methicillin resistant staph aureus) culture positive 08/15/2018    arm    On home O2     2 l/m    Oxygen deficiency     PVD (peripheral vascular disease)     Stress incontinence     Thyroid disease     CYST ON THYROID--FOUND 20 YEARS AGO    Ventilator dependence (HCC)        Past Surgical History:    Past Surgical History:

## 2025-07-13 NOTE — PROGRESS NOTES
Clinical Pharmacy Note: Pharmacy to Dose Vancomycin    Vancomycin Day: 2  Indication: bone and joint infection   Current Dose: intermittently dosing for GABY   Dosing Method: Dosing by random levels    Random: 24.4 (level drawn 4 hours after vanc infusion)     Recent Labs     07/12/25  0643 07/13/25  0429   BUN 45* 32*       Recent Labs     07/12/25  0643 07/13/25  0429   CREATININE 1.4* 1.2       Recent Labs     07/12/25  0643 07/13/25  0429   WBC 8.0 4.9         Intake/Output Summary (Last 24 hours) at 7/13/2025 0721  Last data filed at 7/13/2025 0600  Gross per 24 hour   Intake 4711.32 ml   Output 1850 ml   Net 2861.32 ml         Ht Readings from Last 1 Encounters:   07/12/25 1.524 m (5')        Wt Readings from Last 1 Encounters:   07/13/25 74.1 kg (163 lb 5.8 oz)         Body mass index is 31.9 kg/m².    Estimated Creatinine Clearance: 44 mL/min (based on SCr of 1.2 mg/dL).      Assessment/Plan:  Vancomycin level is therapeutic.  Will redose vancomycin 1000 mg one time today.   A vancomycin random level has been ordered on 7/14 at 0600 for follow-up.  Changes in regimen will be determined based on culture results, renal function, and clinical response.  Pharmacy will continue to monitor and adjust regimen as necessary.    Thank you for the consult,    Paul Almonte, PharmD   [id number]

## 2025-07-13 NOTE — PLAN OF CARE
Problem: Chronic Conditions and Co-morbidities  Goal: Patient's chronic conditions and co-morbidity symptoms are monitored and maintained or improved  Outcome: Progressing  Flowsheets (Taken 7/12/2025 2000)  Care Plan - Patient's Chronic Conditions and Co-Morbidity Symptoms are Monitored and Maintained or Improved: Monitor and assess patient's chronic conditions and comorbid symptoms for stability, deterioration, or improvement     Problem: Discharge Planning  Goal: Discharge to home or other facility with appropriate resources  Outcome: Progressing  Flowsheets (Taken 7/12/2025 2000)  Discharge to home or other facility with appropriate resources: Identify discharge learning needs (meds, wound care, etc)     Problem: Pain  Goal: Verbalizes/displays adequate comfort level or baseline comfort level  Outcome: Progressing     Problem: Safety - Adult  Goal: Free from fall injury  Outcome: Progressing     Problem: Skin/Tissue Integrity  Goal: Skin integrity remains intact  Description: 1.  Monitor for areas of redness and/or skin breakdown  2.  Assess vascular access sites hourly  3.  Every 4-6 hours minimum:  Change oxygen saturation probe site  4.  Every 4-6 hours:  If on nasal continuous positive airway pressure, respiratory therapy assess nares and determine need for appliance change or resting period  Outcome: Progressing  Flowsheets (Taken 7/12/2025 2000)  Skin Integrity Remains Intact: Monitor for areas of redness and/or skin breakdown     Problem: Nutrition Deficit:  Goal: Optimize nutritional status  Outcome: Progressing

## 2025-07-13 NOTE — PROGRESS NOTES
Patient unable to give consent for blood. This RN reached out to all members on patient contact list to obtain consent for blood administration. No people on the contact list answered the phone. This RN made Dr. Walls aware of the situation.

## 2025-07-13 NOTE — PLAN OF CARE
Problem: Chronic Conditions and Co-morbidities  Goal: Patient's chronic conditions and co-morbidity symptoms are monitored and maintained or improved  Outcome: Progressing  Flowsheets (Taken 7/13/2025 0800)  Care Plan - Patient's Chronic Conditions and Co-Morbidity Symptoms are Monitored and Maintained or Improved: Collaborate with multidisciplinary team to address chronic and comorbid conditions and prevent exacerbation or deterioration     Problem: Discharge Planning  Goal: Discharge to home or other facility with appropriate resources  Outcome: Progressing  Flowsheets (Taken 7/13/2025 0800)  Discharge to home or other facility with appropriate resources: Arrange for needed discharge resources and transportation as appropriate     Problem: Pain  Goal: Verbalizes/displays adequate comfort level or baseline comfort level  Outcome: Progressing

## 2025-07-14 PROBLEM — E87.5 HYPERKALEMIA: Status: ACTIVE | Noted: 2025-07-14

## 2025-07-14 PROBLEM — Z86.14 HISTORY OF METHICILLIN RESISTANT STAPHYLOCOCCUS AUREUS (MRSA): Status: ACTIVE | Noted: 2025-07-14

## 2025-07-14 PROBLEM — L89.159 PRESSURE INJURY OF SKIN OF SACRAL REGION: Status: ACTIVE | Noted: 2025-07-13

## 2025-07-14 PROBLEM — B99.9 INFECTION REQUIRING CONTACT ISOLATION PRECAUTIONS: Status: ACTIVE | Noted: 2025-07-14

## 2025-07-14 PROBLEM — Z86.19 HISTORY OF INFECTION WITH VANCOMYCIN RESISTANT ENTEROCOCCUS (VRE): Status: ACTIVE | Noted: 2025-07-14

## 2025-07-14 PROBLEM — I21.4 NSTEMI (NON-ST ELEVATED MYOCARDIAL INFARCTION) (HCC): Status: ACTIVE | Noted: 2025-07-14

## 2025-07-14 PROBLEM — Z99.11 VENTILATOR DEPENDENT (HCC): Status: ACTIVE | Noted: 2025-07-14

## 2025-07-14 LAB
ALBUMIN SERPL-MCNC: 2.7 G/DL (ref 3.4–5)
ALBUMIN/GLOB SERPL: 0.6 {RATIO} (ref 1.1–2.2)
ALP SERPL-CCNC: 92 U/L (ref 40–129)
ALT SERPL-CCNC: 10 U/L (ref 10–40)
ANION GAP SERPL CALCULATED.3IONS-SCNC: 9 MMOL/L (ref 3–16)
AST SERPL-CCNC: 15 U/L (ref 15–37)
BASOPHILS # BLD: 0 K/UL (ref 0–0.2)
BASOPHILS NFR BLD: 0.4 %
BILIRUB DIRECT SERPL-MCNC: 0.2 MG/DL (ref 0–0.3)
BILIRUB INDIRECT SERPL-MCNC: 0.3 MG/DL (ref 0–1)
BILIRUB SERPL-MCNC: 0.5 MG/DL (ref 0–1)
BUN SERPL-MCNC: 22 MG/DL (ref 7–20)
CALCIUM SERPL-MCNC: 9.1 MG/DL (ref 8.3–10.6)
CHLORIDE SERPL-SCNC: 104 MMOL/L (ref 99–110)
CO2 SERPL-SCNC: 25 MMOL/L (ref 21–32)
CREAT SERPL-MCNC: 0.9 MG/DL (ref 0.6–1.2)
CRP SERPL-MCNC: 29.3 MG/L (ref 0–5.1)
DEPRECATED RDW RBC AUTO: 16.3 % (ref 12.4–15.4)
EOSINOPHIL # BLD: 0.1 K/UL (ref 0–0.6)
EOSINOPHIL NFR BLD: 2.2 %
ERYTHROCYTE [SEDIMENTATION RATE] IN BLOOD BY WESTERGREN METHOD: 37 MM/HR (ref 0–30)
GFR SERPLBLD CREATININE-BSD FMLA CKD-EPI: 73 ML/MIN/{1.73_M2}
GLUCOSE BLD-MCNC: 136 MG/DL (ref 70–99)
GLUCOSE BLD-MCNC: 169 MG/DL (ref 70–99)
GLUCOSE BLD-MCNC: 178 MG/DL (ref 70–99)
GLUCOSE BLD-MCNC: 206 MG/DL (ref 70–99)
GLUCOSE SERPL-MCNC: 174 MG/DL (ref 70–99)
HCT VFR BLD AUTO: 24.8 % (ref 36–48)
HCT VFR BLD AUTO: 25.5 % (ref 36–48)
HCT VFR BLD AUTO: 26 % (ref 36–48)
HGB BLD-MCNC: 8.7 G/DL (ref 12–16)
HGB BLD-MCNC: 8.8 G/DL (ref 12–16)
HGB BLD-MCNC: 9.1 G/DL (ref 12–16)
LYMPHOCYTES # BLD: 1.1 K/UL (ref 1–5.1)
LYMPHOCYTES NFR BLD: 19.1 %
MAGNESIUM SERPL-MCNC: 1.99 MG/DL (ref 1.8–2.4)
MCH RBC QN AUTO: 31.7 PG (ref 26–34)
MCHC RBC AUTO-ENTMCNC: 34 G/DL (ref 31–36)
MCV RBC AUTO: 93.1 FL (ref 80–100)
MONOCYTES # BLD: 0.6 K/UL (ref 0–1.3)
MONOCYTES NFR BLD: 9.9 %
MRSA DNA SPEC QL NAA+PROBE: NORMAL
NEUTROPHILS # BLD: 4 K/UL (ref 1.7–7.7)
NEUTROPHILS NFR BLD: 68.4 %
PERFORMED ON: ABNORMAL
PHOSPHATE SERPL-MCNC: 3.6 MG/DL (ref 2.5–4.9)
PLATELET # BLD AUTO: 151 K/UL (ref 135–450)
PMV BLD AUTO: 8.4 FL (ref 5–10.5)
POTASSIUM SERPL-SCNC: 4.2 MMOL/L (ref 3.5–5.1)
PROT SERPL-MCNC: 7.2 G/DL (ref 6.4–8.2)
RBC # BLD AUTO: 2.74 M/UL (ref 4–5.2)
SODIUM SERPL-SCNC: 138 MMOL/L (ref 136–145)
TRIGL SERPL-MCNC: 217 MG/DL (ref 0–150)
VANCOMYCIN SERPL-MCNC: 22.8 UG/ML
WBC # BLD AUTO: 5.9 K/UL (ref 4–11)

## 2025-07-14 PROCEDURE — 6370000000 HC RX 637 (ALT 250 FOR IP)

## 2025-07-14 PROCEDURE — 6360000002 HC RX W HCPCS: Performed by: INTERNAL MEDICINE

## 2025-07-14 PROCEDURE — 6360000002 HC RX W HCPCS

## 2025-07-14 PROCEDURE — 85025 COMPLETE CBC W/AUTO DIFF WBC: CPT

## 2025-07-14 PROCEDURE — 83735 ASSAY OF MAGNESIUM: CPT

## 2025-07-14 PROCEDURE — 2000000000 HC ICU R&B

## 2025-07-14 PROCEDURE — 94003 VENT MGMT INPAT SUBQ DAY: CPT

## 2025-07-14 PROCEDURE — 82248 BILIRUBIN DIRECT: CPT

## 2025-07-14 PROCEDURE — 2580000003 HC RX 258

## 2025-07-14 PROCEDURE — 84478 ASSAY OF TRIGLYCERIDES: CPT

## 2025-07-14 PROCEDURE — 6370000000 HC RX 637 (ALT 250 FOR IP): Performed by: STUDENT IN AN ORGANIZED HEALTH CARE EDUCATION/TRAINING PROGRAM

## 2025-07-14 PROCEDURE — 80053 COMPREHEN METABOLIC PANEL: CPT

## 2025-07-14 PROCEDURE — G0545 PR INHERENT VISIT TO INPT: HCPCS | Performed by: INTERNAL MEDICINE

## 2025-07-14 PROCEDURE — 80202 ASSAY OF VANCOMYCIN: CPT

## 2025-07-14 PROCEDURE — 2500000003 HC RX 250 WO HCPCS

## 2025-07-14 PROCEDURE — 94761 N-INVAS EAR/PLS OXIMETRY MLT: CPT

## 2025-07-14 PROCEDURE — 85014 HEMATOCRIT: CPT

## 2025-07-14 PROCEDURE — 86140 C-REACTIVE PROTEIN: CPT

## 2025-07-14 PROCEDURE — 99291 CRITICAL CARE FIRST HOUR: CPT | Performed by: INTERNAL MEDICINE

## 2025-07-14 PROCEDURE — 94640 AIRWAY INHALATION TREATMENT: CPT

## 2025-07-14 PROCEDURE — 36415 COLL VENOUS BLD VENIPUNCTURE: CPT

## 2025-07-14 PROCEDURE — 6360000002 HC RX W HCPCS: Performed by: STUDENT IN AN ORGANIZED HEALTH CARE EDUCATION/TRAINING PROGRAM

## 2025-07-14 PROCEDURE — 85652 RBC SED RATE AUTOMATED: CPT

## 2025-07-14 PROCEDURE — 84100 ASSAY OF PHOSPHORUS: CPT

## 2025-07-14 PROCEDURE — 2700000000 HC OXYGEN THERAPY PER DAY

## 2025-07-14 PROCEDURE — 2580000003 HC RX 258: Performed by: STUDENT IN AN ORGANIZED HEALTH CARE EDUCATION/TRAINING PROGRAM

## 2025-07-14 PROCEDURE — 6370000000 HC RX 637 (ALT 250 FOR IP): Performed by: HOSPITALIST

## 2025-07-14 PROCEDURE — 2580000003 HC RX 258: Performed by: INTERNAL MEDICINE

## 2025-07-14 PROCEDURE — 85018 HEMOGLOBIN: CPT

## 2025-07-14 PROCEDURE — 6370000000 HC RX 637 (ALT 250 FOR IP): Performed by: INTERNAL MEDICINE

## 2025-07-14 PROCEDURE — 99233 SBSQ HOSP IP/OBS HIGH 50: CPT | Performed by: INTERNAL MEDICINE

## 2025-07-14 RX ORDER — METOCLOPRAMIDE HYDROCHLORIDE 5 MG/ML
10 INJECTION INTRAMUSCULAR; INTRAVENOUS EVERY 8 HOURS
Status: DISCONTINUED | OUTPATIENT
Start: 2025-07-14 | End: 2025-07-21 | Stop reason: HOSPADM

## 2025-07-14 RX ORDER — IPRATROPIUM BROMIDE AND ALBUTEROL SULFATE 2.5; .5 MG/3ML; MG/3ML
1 SOLUTION RESPIRATORY (INHALATION) 3 TIMES DAILY
COMMUNITY

## 2025-07-14 RX ADMIN — ASPIRIN 81 MG: 81 TABLET, CHEWABLE ORAL at 09:12

## 2025-07-14 RX ADMIN — CARVEDILOL 6.25 MG: 6.25 TABLET, FILM COATED ORAL at 17:14

## 2025-07-14 RX ADMIN — SODIUM CHLORIDE 1250 MG: 0.9 INJECTION, SOLUTION INTRAVENOUS at 20:05

## 2025-07-14 RX ADMIN — IPRATROPIUM BROMIDE AND ALBUTEROL SULFATE 1 DOSE: .5; 3 SOLUTION RESPIRATORY (INHALATION) at 22:02

## 2025-07-14 RX ADMIN — METOCLOPRAMIDE HYDROCHLORIDE 10 MG: 5 INJECTION INTRAMUSCULAR; INTRAVENOUS at 22:36

## 2025-07-14 RX ADMIN — Medication 10 ML: at 08:18

## 2025-07-14 RX ADMIN — SODIUM CHLORIDE: 0.9 INJECTION, SOLUTION INTRAVENOUS at 10:37

## 2025-07-14 RX ADMIN — MEROPENEM 1000 MG: 1 INJECTION INTRAVENOUS at 10:38

## 2025-07-14 RX ADMIN — Medication 1 CAPSULE: at 17:14

## 2025-07-14 RX ADMIN — METOCLOPRAMIDE HYDROCHLORIDE 5 MG: 5 INJECTION, SOLUTION INTRAMUSCULAR; INTRAVENOUS at 05:14

## 2025-07-14 RX ADMIN — Medication 10 ML: at 21:15

## 2025-07-14 RX ADMIN — Medication 2 PUFF: at 22:02

## 2025-07-14 RX ADMIN — METOCLOPRAMIDE HYDROCHLORIDE 10 MG: 5 INJECTION INTRAMUSCULAR; INTRAVENOUS at 14:02

## 2025-07-14 RX ADMIN — PANTOPRAZOLE SODIUM 40 MG: 40 INJECTION, POWDER, FOR SOLUTION INTRAVENOUS at 09:12

## 2025-07-14 RX ADMIN — IPRATROPIUM BROMIDE AND ALBUTEROL SULFATE 1 DOSE: .5; 3 SOLUTION RESPIRATORY (INHALATION) at 09:27

## 2025-07-14 RX ADMIN — Medication 2 PUFF: at 09:26

## 2025-07-14 RX ADMIN — CARVEDILOL 6.25 MG: 6.25 TABLET, FILM COATED ORAL at 09:12

## 2025-07-14 RX ADMIN — INSULIN LISPRO 1 UNITS: 100 INJECTION, SOLUTION INTRAVENOUS; SUBCUTANEOUS at 11:56

## 2025-07-14 RX ADMIN — MEROPENEM 1000 MG: 1 INJECTION INTRAVENOUS at 22:34

## 2025-07-14 RX ADMIN — Medication 1 CAPSULE: at 09:16

## 2025-07-14 RX ADMIN — ATORVASTATIN CALCIUM 20 MG: 20 TABLET, FILM COATED ORAL at 09:12

## 2025-07-14 ASSESSMENT — PULMONARY FUNCTION TESTS
PIF_VALUE: 8.8
PIF_VALUE: 15
PIF_VALUE: 18
PIF_VALUE: 8.8
PIF_VALUE: 15

## 2025-07-14 NOTE — CARE COORDINATION
Discharge Planning Note:    Writer called [facility] [phone] and cell # [phone] with Am[facility]ep regarding LOC on patient. VM full and can not leave message.    Electronically signed by Nabila Sanches RN on 7/14/2025 at 3:21 PM

## 2025-07-14 NOTE — PROGRESS NOTES
Nutrition Note    RECOMMENDATIONS  PO Diet: NPO  ONS: NPO   Nutrition Support:   Consider adding Reglan to promote motility if appropriate per MD.   Recommend placing G-port to gravity.   Trial Osmolite 1.2 (standard formula without fiber) at 20 mL/hr. Can consider rate advancement 7/15 depending on tolerance.   Water flush 30 mL q 4 hours.      ASSESSMENT   Follow up assessment. Pt was started on Jevity 1.5 at 20 mL/hr via J port by Dr. Ortega on 7/13. Pt with episode of emesis this morning, appeared to be bile rather than tube feed per RN. Per chart review, pt with hx chronic nausea/vomiting. Of note, pt experienced emesis at [facility] on 7/9 thought to be associated with moving the pt. At that time, pt was on Glucerna 1.5 at goal rate 55 mL/hr. RD note from [facility] indicates pt's dose of Reglan had been discontinued earlier that week. Pt is not receiving Reglan. Further, appears G port was to gravity while at [facility]), has not been to gravity during admission. Pt with increased nutrients d/t stage IV wound s/p debridement.       Malnutrition Status: No malnutrition      NUTRITION DIAGNOSIS   Inadequate oral intake related to cognitive or neurological impairment, impaired respiratory function as evidenced by NPO or clear liquid status due to medical condition, nutrition support - enteral nutrition  Increased nutrient needs related to increase demand for energy/nutrients as evidenced by wounds    Goals: Tolerate nutrition support at goal rate, by next RD assessment     NUTRITION RELATED FINDINGS  Objective: LBM 7/13. +2 pitting BUE edema. Appears adequately nourished.  Wounds: Pressure Injury, Stage IV (sacrum)    CURRENT NUTRITION THERAPIES  Diet NPO  ADULT TUBE FEEDING; PEG/J; Standard without Fiber; Continuous; 20; No; 30; Q 4 hours       PO Intake: NPO   PO Supplement Intake:NPO    Current Tube Feeding (TF) Orders:  Feeding Route: PEG/J (Tube feeds via J port, G port to gravity)  Formula: Standard without

## 2025-07-14 NOTE — CARE COORDINATION
Patient admitted for septicemia.  Patient not admitted for wound.  However wound was debrided by Dr Cannon, 7/12.  Gen surg rounding in case wound needs additional debridement. Wound care dressing orders placed.  Will follow this admission. JENNA GALLARDON, RN, [facility]  Inpatient  Wound/Ostomy Care  [phone]

## 2025-07-14 NOTE — PROGRESS NOTES
07/14/25 0927   Vent Patient Data (Readings)   Plateau Pressure (cm H2O) 18 cm H2O   Driving Pressure 13   I:E Ratio 1:1.3   Flow Sensitivity 3 L/min   Static Compliance (L/cm H2O) 17   Dynamic Compliance (L/cm H2O) 30.61 L/cm H2O

## 2025-07-14 NOTE — PROGRESS NOTES
Admit Date: 7/11/2025  Diet: Diet NPO  ADULT TUBE FEEDING; PEG/J; Standard without Fiber; Continuous; 20; No; 30; Q 4 hours    CC: Vomiting    Interval history:   Overnight, there were no acute events. Patient's vitals remained stable    Patient was seen this morning. I discussed the plan of the day with her in detail. Pt did not really follow commands. She was asleep and did not want to open her eyes to name calling    Plan:     -Continue IV Vanc, Cefepime and Flagyl  -Probiotics  -Blood Cx pending, NGTD  -MRI L Spine in light of sacrococcygeal erosive changes suggestive of osteomyelitis  on CT    Assessment:   Lindsay Lucio is a 60 y.o. female with PMH of anoxic brain injury, T2DM, CAD, and COPD  who was admitted with sepsis    Sepsis in the setting of osteomyelitis  Pt presented per her daughter, \"states that the patient was being fed through her GJ tube by the aids at the nursing facility and they laid her flat without pausing her tube feeding when she started vomiting. Patient's daughter states that this is what brought them to the hospital. Patient's daughter states that she will sometimes find her mother sitting in piles of feces at the previous nursing facility. She states that her mothers wound Vac that had been placed on her sacral ulceration had a lot of drainage out of it after being taken off earlier today.\" On admission, LA 2.3, UA with + nitrites and 6-10 WBC, tachypnea of 23 and tachycardic to 110+. CT A/P with showed LLL infiltrate suggestive of pneumonitis. Sacral decubitus ulcer with underlying sacrococcygeal erosive changes suggestive of osteomyelitis with suspect small bilateral abscess posterior to acetabulum.    -ID consulted  -Critical care consulted    Anemia  Patient hemoglobin down trended from 7.6 to 6.7 today.    CAD  Pt is s/p stent. Pt is on ASA, Coreg    Elevated Scr  Pt on admission was found to have Cr 1.4 from a previous baseline of 0.8  -Nephrology

## 2025-07-14 NOTE — PROGRESS NOTES
Clinical Pharmacy Note: Pharmacy to Dose Vancomycin    Vancomycin Day: 3  Indication: OM, wound infection  Current Dose: intermittent  Dosing Method: Bayesian Modeling    Random: 22.8 mg/L    Recent Labs     07/13/25  0429 07/14/25  0426   BUN 32* 22*       Recent Labs     07/13/25  0429 07/14/25  0426   CREATININE 1.2 0.9       Recent Labs     07/13/25  0429 07/14/25  0426   WBC 4.9 5.9         Intake/Output Summary (Last 24 hours) at 7/14/2025 0720  Last data filed at 7/14/2025 0500  Gross per 24 hour   Intake 432 ml   Output 1625 ml   Net -1193 ml         Ht Readings from Last 1 Encounters:   07/12/25 1.524 m (5')        Wt Readings from Last 1 Encounters:   07/14/25 73.7 kg (162 lb 7.7 oz)         Body mass index is 31.73 kg/m².    Estimated Creatinine Clearance: 59 mL/min (based on SCr of 0.9 mg/dL).      Assessment/Plan:  Vancomycin level is subtherapeutic.  Increase vancomycin regimen to 1250 mg every 24 hours.   Bayesian Modeling predicts an AUC of 513 mg/L*hr and trough of 11.2 mg/L.  A vancomycin random level has been ordered on 7/17 at 0600 for follow-up.  Changes in regimen will be determined based on culture results, renal function, and clinical response.  Pharmacy will continue to monitor and adjust regimen as necessary.    Thank you for the consult,    Edith Garza, PharmD, [facility]  [id number]  7/14/2025 7:21 AM

## 2025-07-14 NOTE — PROGRESS NOTES
ICU Resident  Progress Note    Name:  Lindsay Lucio    /Age/Sex: 1965  (60 y.o. female)  MRN & CSN:  [id number] & [id number]    Date of Admission: 2025    Chief Complaint:   Chief Complaint   Patient presents with    Vomiting     [facility] Has been vomiting bile and large amount of bile draining from J tube.      Hospital Course:   60-year-old female with with a PMHx of anoxic brain injury cardiac arrest during EGD/colonoscopy 2024, requiring a chronic trach/PEJ tube placement.  Patient is a resident at the [facility].  She was hospitalized at [facility] 2025 and 2025 for PEJ tube malfunction/leak and infection.  The J-tube was repositioned and skin closure was provided.  There was also a concurrent infection with Candida auris fungemia.  Patient has a prior history of MDRO Klebsiella and Pseudomonas pneumonia related to the recurrent aspiration.  Patient was discharged back to [facility] on 2025      Subjective:  Today is:  Hospital Day: 4.  Patient seen and examined in [id number].     Patient evaluated at bedside today. Per nurse, patient had an episode of emesis this morning, appeared to be bile, not tube feed.    Overnight:  No acute events overnight    Vitals:  Vital stable overnight with BP ranges 140-150s/50-60s, MAP between 80-90s    Ventilator Settings:  AC/VC:  / RR 16/  FiO2 40% / PEEP 5    Medications:  Reviewed    Infusion Medications    sodium chloride      dextrose      sodium chloride      sodium chloride 10 mL/hr at 25 1037     Scheduled Medications    vancomycin  1,250 mg IntraVENous Q24H    metoclopramide  10 mg IntraVENous q8h    meropenem  1,000 mg IntraVENous Q12H    [Held by provider] apixaban  2.5 mg Oral BID    aspirin  81 mg Oral Daily    atorvastatin  20 mg Per G Tube Daily    budesonide-formoterol  2 puff Inhalation BID RT    sodium chloride  1.  Baby ASA 81mg EC po every day  2.  Lipitor 40mg po at bedtime  3.  Virtual video followup visit via [employer] 6mo with labs prior   flush  5-40 mL IntraVENous 2 times per day    sodium chloride flush  5-40 mL IntraVENous 2 times per day    ipratropium 0.5 mg-albuterol 2.5 mg  1 Dose Inhalation BID RT    insulin lispro  0-4 Units SubCUTAneous 4x Daily AC & HS    pantoprazole  40 mg IntraVENous Daily    lactobacillus  1 capsule Oral BID WC    carvedilol  6.25 mg Per G Tube BID      PRN Meds: sodium chloride, dextrose bolus **OR** dextrose bolus, glucagon (rDNA), dextrose, ipratropium, sodium chloride flush, sodium chloride, potassium chloride **OR** potassium alternative oral replacement **OR** potassium chloride, magnesium sulfate, polyethylene glycol, acetaminophen **OR** acetaminophen, sodium chloride flush, sodium chloride, ondansetron      Intake/Output Summary (Last 24 hours) at 7/14/2025 1321  Last data filed at 7/14/2025 1200  Gross per 24 hour   Intake 492 ml   Output 1725 ml   Net -1233 ml     Physical Exam Performed:    BP (!) 148/65   Pulse 69   Temp 97.4 °F (36.3 °C) (Temporal)   Resp 17   Ht 1.524 m (5')   Wt 73.7 kg (162 lb 7.7 oz)   LMP 11/30/2005   SpO2 100%   BMI 31.73 kg/m²     General appearance: No apparent distress, appears stated age and cooperative.   HEENT: Pupils equal, round, and reactive to light. Conjunctivae/corneas clear.  Neck: Supple, with full range of motion. No jugular venous distention. Trachea midline.  Respiratory:  Normal respiratory effort. Clear to auscultation, bilaterally without Rales/Wheezes/Rhonchi.  Chronically tracheostomy dependent.  Cardiovascular: Regular rate and rhythm with normal S1/S2.  Abdomen: Soft, non-tender, non-distended with normal bowel sounds.  Musculoskeletal: No clubbing or cyanosis.  Full range of motion without deformity.  Neurologic:  Neurovascularly intact without any focal sensory/motor deficits. Cranial nerves: II-XII intact, grossly non-focal.  Psychiatric: Not alert and oriented, thought content appropriate, normal insight  Capillary Refill: Brisk, 3 seconds,

## 2025-07-14 NOTE — PROGRESS NOTES
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  Need for contact isolation  History of anoxic brain injury in September 2024    Microbiology:      I have reviewed allavailable micro lab data and cultures    Results       Procedure Component Value Units Date/Time    MRSA DNA Probe, Nasal [[id number]] Collected: 07/13/25 1833    Order Status: Sent Specimen: Nares Updated: 07/14/25 0353    Culture, Respiratory [[id number]]     Order Status: Sent Specimen: Tracheal Aspirate     MRSA DNA Probe, Nasal [[id number]] Collected: 07/13/25 0000    Order Status: Canceled Specimen: Nares     Culture, Blood 2 [[id number]] Collected: 07/12/25 0004    Order Status: Completed Specimen: Blood Updated: 07/13/25 0115     Culture, Blood 2 No Growth to date.  Any change in status will be called.    Narrative:      ORDER#: [id number]                          ORDERED BY: CRISTINO BARCENAS  SOURCE: Blood Hand, Left                   COLLECTED:  07/12/25 00:04  ANTIBIOTICS AT [place].:                      RECEIVED :  07/12/25 12:16  If child <=2 yrs old please draw pediatric bottle.~Blood Culture #2    Culture, Blood 1 [[id number]] Collected: 07/11/25 2314    Order Status: Completed Specimen: Blood Updated: 07/13/25 0115     Blood Culture, Routine No Growth to date.  Any change in status will be called.    Narrative:      ORDER#: [id number]                          ORDERED BY: CRISTINO BARCENAS  SOURCE: Blood l arm picc                   COLLECTED:  07/11/25 23:14  ANTIBIOTICS AT [place].:                      RECEIVED :

## 2025-07-14 NOTE — PROGRESS NOTES
[facility]   Nephrology progress Note  [phone]  [phone]   [email or web address]         Reason for Consult:  GABY, hyperkalemia    HISTORY OF PRESENT ILLNESS:      The patient is a 60 y.o. female with significant past medical history of chronic respiratory failure, diabetes, hypertension, hyperlipidemia, recent GABY who presents with vomiting. Patient with tracheostomy and anoxic brain injury. Was recently at [facility] with GABY, which recovered. On admission, noted to have mild GABY with hyperkalemia. Therefore nephrology is consulted.     Interval history   BP in better range   Creatinine improved down to 0.9 .  Hb improved.        vancomycin  1,250 mg IntraVENous Q24H    meropenem  1,000 mg IntraVENous Q12H    metoclopramide  5 mg IntraVENous q8h    [Held by provider] apixaban  2.5 mg Oral BID    aspirin  81 mg Oral Daily    atorvastatin  20 mg Per G Tube Daily    budesonide-formoterol  2 puff Inhalation BID RT    sodium chloride flush  5-40 mL IntraVENous 2 times per day    sodium chloride flush  5-40 mL IntraVENous 2 times per day    ipratropium 0.5 mg-albuterol 2.5 mg  1 Dose Inhalation BID RT    insulin glargine  22 Units SubCUTAneous Nightly    insulin lispro  0-4 Units SubCUTAneous 4x Daily AC & HS    pantoprazole  40 mg IntraVENous Daily    lactobacillus  1 capsule Oral BID WC    carvedilol  6.25 mg Per G Tube BID WC           Allergies:  Latex, Tramadol, Codeine, and Penicillins      PHYSICAL EXAM:    Vitals:    /63   Pulse 74   Temp 97.4 °F (36.3 °C) (Temporal)   Resp 20   Ht 1.524 m (5')   Wt 73.7 kg (162 lb 7.7 oz)   LMP 11/30/2005   SpO2 95%   BMI 31.73 kg/m²     Intake/Output Summary (Last 24 hours) at 7/14/2025 1042  Last data filed at 7/14/2025 0900  Gross per 24 hour   Intake 492 ml   Output 1675 ml   Net -1183 ml       Physical Exam:  CONSTITUTIONAL/PSYCHIATRY: lethargic. Trach dependent.    RESPIRATORY: Respiratory effort: normal. Auscultation:

## 2025-07-14 NOTE — PLAN OF CARE
Per nursing wound base is clean  No acute debridement necessary  Will follow peripherally. Please call with questions    Andrea Cannon MD, FACS  7/14/2025  1:14 PM

## 2025-07-14 NOTE — PROGRESS NOTES
Pulmonary and Critical Care Medicine    CC: Severe sepsis  Date: 2025  Admit Date:  2025  Reason for Consultation: Multifocal pneumonia, osteomyelitis, chronic tracheostomy/ventilator dependence  Consult Requesting Physician: Javan Walls MD       HPI     Patient has a history of anoxic brain injury following cardiac arrest from EGD colonoscopy .  She has been trach, PEG dependent since.  She is normally at [facility].  She had recent difficulty with her PEJ tube and was at [facility] for this.  Her hospital course was complicated by Candida aureus fungal EMEA.  She also has prior history of MDRO Klebsiella and Pseudomonas pneumonia related to recurrent aspiration.  She had only been back at [place] for 1 day prior to admission to the hospital here.  She was sent here because she had repeated episodes of vomiting.  There was concern for aspiration.  The patient also had an underlying decubitus on admission.    ROS: No review of systems due to patient factors    PMH:  has a past medical history of Acute respiratory failure with hypoxemia ([facility]), Acute ST elevation myocardial infarction (STEMI) involving right coronary artery ([facility]), Arthritis, Back pain, CAD (coronary artery disease), CHF (congestive heart failure) ([facility]), Chronic kidney disease, COPD (chronic obstructive pulmonary disease) ([facility]), Depression, Diabetes mellitus ([facility]), REHMAN (dyspnea on exertion), Eye abnormalities, Fatty liver, GERD (gastroesophageal reflux disease), Hx of blood clots, Hyperlipidemia, Hypertension, MRSA (methicillin resistant staph aureus) culture positive, On home O2, Oxygen deficiency, PVD (peripheral vascular disease), Stress incontinence, Thyroid disease, and Ventilator dependence ([facility]).   PSH:  has a past surgical history that includes  section; Shoulder arthroscopy (Right, 2015); Cholecystectomy; hernia repair; eye surgery (Bilateral); Upper gastrointestinal endoscopy (2017);

## 2025-07-14 NOTE — PROGRESS NOTES
07/14/25 0927   Surgical Airway  Shiley Cuffed   No placement date or time found.   Present on Admission/Arrival: Yes  Surgical Airway Type: Tracheostomy  Brand: Gary  Style: Cuffed  Size: 6  Comments: 6xlt gary P   Site Assessment Clean;Dry   Site Care Cleansed;Dried   Inner Cannula Care Changed/new   Ties Assessment Dry   Spare Trach at Bedside Yes   Spare Trach Tube One Size Smaller at Bedside Yes   Ambu Bag With Mask at Bedside Yes

## 2025-07-15 ENCOUNTER — APPOINTMENT (OUTPATIENT)
Dept: GENERAL RADIOLOGY | Age: 60
DRG: 710 | End: 2025-07-15
Payer: COMMERCIAL

## 2025-07-15 LAB
ALBUMIN SERPL-MCNC: 2.9 G/DL (ref 3.4–5)
ALBUMIN/GLOB SERPL: 0.7 {RATIO} (ref 1.1–2.2)
ALP SERPL-CCNC: 85 U/L (ref 40–129)
ALT SERPL-CCNC: 12 U/L (ref 10–40)
ANION GAP SERPL CALCULATED.3IONS-SCNC: 9 MMOL/L (ref 3–16)
AST SERPL-CCNC: 14 U/L (ref 15–37)
BASOPHILS # BLD: 0 K/UL (ref 0–0.2)
BASOPHILS NFR BLD: 0.5 %
BILIRUB DIRECT SERPL-MCNC: 0.2 MG/DL (ref 0–0.3)
BILIRUB INDIRECT SERPL-MCNC: 0.2 MG/DL (ref 0–1)
BILIRUB SERPL-MCNC: 0.4 MG/DL (ref 0–1)
BUN SERPL-MCNC: 17 MG/DL (ref 7–20)
CALCIUM SERPL-MCNC: 9.2 MG/DL (ref 8.3–10.6)
CHLORIDE SERPL-SCNC: 106 MMOL/L (ref 99–110)
CO2 SERPL-SCNC: 26 MMOL/L (ref 21–32)
CREAT SERPL-MCNC: 0.8 MG/DL (ref 0.6–1.2)
DEPRECATED RDW RBC AUTO: 16.3 % (ref 12.4–15.4)
EOSINOPHIL # BLD: 0.1 K/UL (ref 0–0.6)
EOSINOPHIL NFR BLD: 2.6 %
GFR SERPLBLD CREATININE-BSD FMLA CKD-EPI: 84 ML/MIN/{1.73_M2}
GLUCOSE BLD-MCNC: 145 MG/DL (ref 70–99)
GLUCOSE BLD-MCNC: 149 MG/DL (ref 70–99)
GLUCOSE BLD-MCNC: 160 MG/DL (ref 70–99)
GLUCOSE BLD-MCNC: 167 MG/DL (ref 70–99)
GLUCOSE BLD-MCNC: 191 MG/DL (ref 70–99)
GLUCOSE SERPL-MCNC: 126 MG/DL (ref 70–99)
HCT VFR BLD AUTO: 24.1 % (ref 36–48)
HCT VFR BLD AUTO: 25.1 % (ref 36–48)
HGB BLD-MCNC: 8.4 G/DL (ref 12–16)
HGB BLD-MCNC: 8.8 G/DL (ref 12–16)
LYMPHOCYTES # BLD: 0.9 K/UL (ref 1–5.1)
LYMPHOCYTES NFR BLD: 20 %
MAGNESIUM SERPL-MCNC: 1.9 MG/DL (ref 1.8–2.4)
MCH RBC QN AUTO: 32 PG (ref 26–34)
MCHC RBC AUTO-ENTMCNC: 34.7 G/DL (ref 31–36)
MCV RBC AUTO: 92.3 FL (ref 80–100)
MONOCYTES # BLD: 0.4 K/UL (ref 0–1.3)
MONOCYTES NFR BLD: 9.7 %
NEUTROPHILS # BLD: 2.9 K/UL (ref 1.7–7.7)
NEUTROPHILS NFR BLD: 67.2 %
PERFORMED ON: ABNORMAL
PHOSPHATE SERPL-MCNC: 3.2 MG/DL (ref 2.5–4.9)
PLATELET # BLD AUTO: 151 K/UL (ref 135–450)
PMV BLD AUTO: 7.1 FL (ref 5–10.5)
POTASSIUM SERPL-SCNC: 3.8 MMOL/L (ref 3.5–5.1)
PROT SERPL-MCNC: 7.2 G/DL (ref 6.4–8.2)
RBC # BLD AUTO: 2.61 M/UL (ref 4–5.2)
SODIUM SERPL-SCNC: 141 MMOL/L (ref 136–145)
WBC # BLD AUTO: 4.4 K/UL (ref 4–11)

## 2025-07-15 PROCEDURE — 74018 RADEX ABDOMEN 1 VIEW: CPT

## 2025-07-15 PROCEDURE — 85014 HEMATOCRIT: CPT

## 2025-07-15 PROCEDURE — 85018 HEMOGLOBIN: CPT

## 2025-07-15 PROCEDURE — 94761 N-INVAS EAR/PLS OXIMETRY MLT: CPT

## 2025-07-15 PROCEDURE — 6360000002 HC RX W HCPCS: Performed by: INTERNAL MEDICINE

## 2025-07-15 PROCEDURE — 2500000003 HC RX 250 WO HCPCS

## 2025-07-15 PROCEDURE — 6360000002 HC RX W HCPCS: Performed by: STUDENT IN AN ORGANIZED HEALTH CARE EDUCATION/TRAINING PROGRAM

## 2025-07-15 PROCEDURE — 6370000000 HC RX 637 (ALT 250 FOR IP): Performed by: HOSPITALIST

## 2025-07-15 PROCEDURE — 80053 COMPREHEN METABOLIC PANEL: CPT

## 2025-07-15 PROCEDURE — 6370000000 HC RX 637 (ALT 250 FOR IP)

## 2025-07-15 PROCEDURE — 6370000000 HC RX 637 (ALT 250 FOR IP): Performed by: INTERNAL MEDICINE

## 2025-07-15 PROCEDURE — 2580000003 HC RX 258: Performed by: INTERNAL MEDICINE

## 2025-07-15 PROCEDURE — 2000000000 HC ICU R&B

## 2025-07-15 PROCEDURE — 94003 VENT MGMT INPAT SUBQ DAY: CPT

## 2025-07-15 PROCEDURE — 6360000002 HC RX W HCPCS

## 2025-07-15 PROCEDURE — 94640 AIRWAY INHALATION TREATMENT: CPT

## 2025-07-15 PROCEDURE — 2700000000 HC OXYGEN THERAPY PER DAY

## 2025-07-15 PROCEDURE — 99291 CRITICAL CARE FIRST HOUR: CPT | Performed by: INTERNAL MEDICINE

## 2025-07-15 PROCEDURE — 99233 SBSQ HOSP IP/OBS HIGH 50: CPT | Performed by: INTERNAL MEDICINE

## 2025-07-15 PROCEDURE — 82248 BILIRUBIN DIRECT: CPT

## 2025-07-15 PROCEDURE — 36415 COLL VENOUS BLD VENIPUNCTURE: CPT

## 2025-07-15 PROCEDURE — G0545 PR INHERENT VISIT TO INPT: HCPCS | Performed by: INTERNAL MEDICINE

## 2025-07-15 PROCEDURE — 85025 COMPLETE CBC W/AUTO DIFF WBC: CPT

## 2025-07-15 PROCEDURE — 84100 ASSAY OF PHOSPHORUS: CPT

## 2025-07-15 PROCEDURE — 83735 ASSAY OF MAGNESIUM: CPT

## 2025-07-15 PROCEDURE — 6370000000 HC RX 637 (ALT 250 FOR IP): Performed by: STUDENT IN AN ORGANIZED HEALTH CARE EDUCATION/TRAINING PROGRAM

## 2025-07-15 RX ORDER — IPRATROPIUM BROMIDE AND ALBUTEROL SULFATE 2.5; .5 MG/3ML; MG/3ML
1 SOLUTION RESPIRATORY (INHALATION)
Status: DISCONTINUED | OUTPATIENT
Start: 2025-07-15 | End: 2025-07-21 | Stop reason: HOSPADM

## 2025-07-15 RX ADMIN — ATORVASTATIN CALCIUM 20 MG: 20 TABLET, FILM COATED ORAL at 08:27

## 2025-07-15 RX ADMIN — CARVEDILOL 6.25 MG: 6.25 TABLET, FILM COATED ORAL at 17:09

## 2025-07-15 RX ADMIN — INSULIN LISPRO 1 UNITS: 100 INJECTION, SOLUTION INTRAVENOUS; SUBCUTANEOUS at 13:33

## 2025-07-15 RX ADMIN — IPRATROPIUM BROMIDE AND ALBUTEROL SULFATE 1 DOSE: .5; 3 SOLUTION RESPIRATORY (INHALATION) at 07:41

## 2025-07-15 RX ADMIN — IPRATROPIUM BROMIDE AND ALBUTEROL SULFATE 1 DOSE: .5; 3 SOLUTION RESPIRATORY (INHALATION) at 21:06

## 2025-07-15 RX ADMIN — METOCLOPRAMIDE HYDROCHLORIDE 10 MG: 5 INJECTION INTRAMUSCULAR; INTRAVENOUS at 06:00

## 2025-07-15 RX ADMIN — Medication 2 PUFF: at 07:42

## 2025-07-15 RX ADMIN — PANTOPRAZOLE SODIUM 40 MG: 40 INJECTION, POWDER, FOR SOLUTION INTRAVENOUS at 08:27

## 2025-07-15 RX ADMIN — METOCLOPRAMIDE HYDROCHLORIDE 10 MG: 5 INJECTION INTRAMUSCULAR; INTRAVENOUS at 13:34

## 2025-07-15 RX ADMIN — CARVEDILOL 6.25 MG: 6.25 TABLET, FILM COATED ORAL at 08:27

## 2025-07-15 RX ADMIN — Medication 10 ML: at 20:22

## 2025-07-15 RX ADMIN — Medication 10 ML: at 08:28

## 2025-07-15 RX ADMIN — Medication 1 CAPSULE: at 08:27

## 2025-07-15 RX ADMIN — ASPIRIN 81 MG: 81 TABLET, CHEWABLE ORAL at 08:27

## 2025-07-15 RX ADMIN — METOCLOPRAMIDE HYDROCHLORIDE 10 MG: 5 INJECTION INTRAMUSCULAR; INTRAVENOUS at 22:07

## 2025-07-15 RX ADMIN — Medication 10 ML: at 08:27

## 2025-07-15 RX ADMIN — MEROPENEM 1000 MG: 1 INJECTION INTRAVENOUS at 22:07

## 2025-07-15 RX ADMIN — MEROPENEM 1000 MG: 1 INJECTION INTRAVENOUS at 10:19

## 2025-07-15 RX ADMIN — Medication 1 CAPSULE: at 17:09

## 2025-07-15 RX ADMIN — Medication 2 PUFF: at 21:06

## 2025-07-15 ASSESSMENT — PULMONARY FUNCTION TESTS
PIF_VALUE: 9.1
PIF_VALUE: 16
PIF_VALUE: 14
PIF_VALUE: 11
PIF_VALUE: 33
PIF_VALUE: 15

## 2025-07-15 NOTE — PROGRESS NOTES
ICU Resident  Progress Note    Name:  Lindsay Lucio    /Age/Sex: 1965  (60 y.o. female)  MRN & CSN:  [id number] & [id number]    Date of Admission: 2025    Chief Complaint:   Chief Complaint   Patient presents with    Vomiting     Pt. From [facility] and arrived by [place] squad. Pt. Has been vomiting bile and large amount of bile draining from J tube.      Hospital Course:   60-year-old female with with a PMHx of anoxic brain injury cardiac arrest during EGD/colonoscopy 2024, requiring a chronic trach/PEJ tube placement.  Patient is a resident at the [facility]. She was hospitalized at [facility] 2025 and 2025 for PEJ tube malfunction/leak and infection. The J-tube was repositioned and skin closure was provided. There was also a concurrent infection with Candida auris fungemia. Patient has a prior history of MDRO Klebsiella and Pseudomonas pneumonia, CRE, ESBL related to the recurrent aspiration. Patient was discharged back to [facility] on 2025.    Subjective:  Today is:  Hospital Day: 5.  Patient seen and examined in ICU-3917/3917-01.     Patient evaluated at bedside today. Patient continues to have bilious vomiting episodes, even before feeds have been initiated. Patient's elder daughter, who was present during rounds, voiced a concern that she would have handled care differently for her mother if she could. Further investigation revealed patient's youngest daughter has power of  and who is making all the decisions currently. Per the elder daughter, she would want her mother to be comfortable and would not have avoided tube feeds and trach/vents. Ethics team consulted.    Overnight:  No acute events overnight, patient had output approximately 138 mL within the last 24 hours    Vitals:  Vital stable overnight with BP ranges 140-150s/50-70s, MAP between 80-90s    Ventilator Settings:  AC/VC:  / RR 18 / FiO2 40% / PEEP

## 2025-07-15 NOTE — CONSULTS
Clinical Ethics Consultation Note    History and Context:  Pt admitted to ICU. Pt has history of anoxic brain injury and is currently ventilator dependent with trach. Pt was brought to ED via EMS from nursing home. Pt has several adult children. Pt is incapacitated.  Length of Stay: 3  Verified Advance Directive: Yes    Process:  Ethics consulted to assist care team in determining most appropriate surrogate decision maker. Ethics conducted chart review. Ethics was able to obtain papers of legal guardianship from [facility] court. Guardianship documents were uploaded into Epic.     Ethical Question(s) and Concern(s):  Who is the most appropriate surrogate decision maker?    Analysis:  It is the responsibility of a surrogate decision maker to make medical decisions on behalf of the pt, in alignment with the pt's known wishes/values, and in the pt's best interest.     Recommendations:  This pt has a legal guardian: Mary Ellen Lucio. This is the most appropriate surrogate decision maker at this time.   Should the pt's other children desire decision making authority, they will need to pursue this through legal proceedings.     Closing:  Thank you for allowing me to participate in the care of this pt. Ethics signing off at this time.     Primary Ethical Themes: Surrogate Decision Making  Secondary Ethical Themes: Clarify Legal Surrogate decision maker    Mayda Shukla, Director of [place], [phone]      Electronically signed by Mayda Shukla on 7/15/2025 at 2:54 PM

## 2025-07-15 NOTE — PROGRESS NOTES
[facility]   Nephrology progress Note  [phone]  [phone]   [facility]         Reason for Consult:  GABY, hyperkalemia    HISTORY OF PRESENT ILLNESS:      The patient is a 60 y.o. female with significant past medical history of chronic respiratory failure, diabetes, hypertension, hyperlipidemia, recent GABY who presents with vomiting. Patient with tracheostomy and anoxic brain injury. Was recently at [facility] with GABY, which recovered. On admission, noted to have mild GABY with hyperkalemia. Therefore nephrology is consulted.     Interval history  BP in better range , good UOP   Creatinine improved further down to 0.8  .  Hb stable       vancomycin  1,250 mg IntraVENous Q24H    metoclopramide  10 mg IntraVENous q8h    meropenem  1,000 mg IntraVENous Q12H    [Held by provider] apixaban  2.5 mg Oral BID    aspirin  81 mg Oral Daily    atorvastatin  20 mg Per G Tube Daily    budesonide-formoterol  2 puff Inhalation BID RT    sodium chloride flush  5-40 mL IntraVENous 2 times per day    sodium chloride flush  5-40 mL IntraVENous 2 times per day    ipratropium 0.5 mg-albuterol 2.5 mg  1 Dose Inhalation BID RT    insulin lispro  0-4 Units SubCUTAneous 4x Daily AC & HS    pantoprazole  40 mg IntraVENous Daily    lactobacillus  1 capsule Oral BID WC    carvedilol  6.25 mg Per G Tube BID WC           Allergies:  Latex, Tramadol, Codeine, and Penicillins      PHYSICAL EXAM:    Vitals:    BP (!) 139/59   Pulse 69   Temp (!) 96.5 °F (35.8 °C) (Temporal)   Resp 19   Ht 1.524 m (5')   Wt 72.7 kg (160 lb 4.4 oz)   LMP 11/30/2005   SpO2 100%   BMI 31.30 kg/m²     Intake/Output Summary (Last 24 hours) at 7/15/2025 0954  Last data filed at 7/15/2025 0614  Gross per 24 hour   Intake 1051.49 ml   Output 1010 ml   Net 41.49 ml       Physical Exam:  CONSTITUTIONAL/PSYCHIATRY: lethargic. Trach dependent.    RESPIRATORY: Respiratory effort: normal. Auscultation: diminished  CARDIOVASCULAR:

## 2025-07-15 NOTE — PROGRESS NOTES
Hospitalist Progress Note      PCP: Rosario Stevens MD    Date of Admission: 7/11/2025    LOS: 3    Chief Complaint:   Chief Complaint   Patient presents with    Vomiting     Pt. From [facility] and arrived by [place] squad. Pt. Has been vomiting bile and large amount of bile draining from J tube.        Case Summary:   60-year-old lady with history of CAD, hyperlipidemia, COPD, chronic respiratory failure with trach in place, history of anoxic brain injury none interactive who was admitted from [facility] with repeated episodes of vomiting at the nursing home found to have sepsis due to left lower lobe aspiration pneumonia, infected sacral pressure ulcer with contiguous osteomyelitis complicated by acute kidney injury and probable UTI and Candida aureus.  She had hyperkalemia which has since resolved, acute kidney injury now improved      Principal Problem:    Severe sepsis (HCC)    Pneumonia of left lower lobe due to infectious organism    Sacral decubitus ulcer, stage IV with continuous osteomyelitis   -Continue IV antibiotics  - Continue probiotics  - ID following with recommendation  -Wound care and pressure minimizing technique      Nausea and vomiting: Still with bilious aspirate via gastric tube in vomitus.  KUB to rule out obstruction.  Monitor bowel habits.    Active Problems:    Mixed hyperlipidemia: On statin    Uncontrolled type 2 diabetes mellitus with hyperglycemia (HCC): Sliding scale insulin    Stage 2 moderate COPD: Without exacerbation.  Continue inhalers    Chronic anemia: Stable.  Will monitor    Chronic respiratory failure with chronic trach: Stable.  Will monitor    Brain injury with loss of consciousness (HCC)    Resolved Problems:    GABY (acute kidney injury)    Hyperkalemia          Medications:  Reviewed  Infusion Medications    sodium chloride      dextrose      sodium chloride      sodium chloride 10 mL/hr at 07/15/25 0614     Scheduled Medications    ipratropium 0.5 mg-albuterol

## 2025-07-15 NOTE — CARE COORDINATION
Discharge Planning Note:     Writer spoke with Mary Ellen Love via telephone regarding if patient will need precert to return to LTC. Mary Ellen will find out info and update writer.    Electronically signed by Nabila Sanches RN on 7/15/2025 at 10:40 AM     1044 Writer received a return call from Mary Ellen Love, the patient will need a precert to return. Mary Ellen will start precert today (7/15).    Electronically signed by Nabila Sanches RN on 7/15/2025 at 10:45 AM

## 2025-07-15 NOTE — PLAN OF CARE
Problem: Chronic Conditions and Co-morbidities  Goal: Patient's chronic conditions and co-morbidity symptoms are monitored and maintained or improved  Outcome: Progressing  Flowsheets (Taken 7/15/2025 0429)  Care Plan - Patient's Chronic Conditions and Co-Morbidity Symptoms are Monitored and Maintained or Improved:   Monitor and assess patient's chronic conditions and comorbid symptoms for stability, deterioration, or improvement   Collaborate with multidisciplinary team to address chronic and comorbid conditions and prevent exacerbation or deterioration     Problem: Discharge Planning  Goal: Discharge to home or other facility with appropriate resources  Outcome: Progressing  Flowsheets (Taken 7/15/2025 0429)  Discharge to home or other facility with appropriate resources:   Identify barriers to discharge with patient and caregiver   Arrange for needed discharge resources and transportation as appropriate   Refer to discharge planning if patient needs post-hospital services based on physician order or complex needs related to functional status, cognitive ability or social support system     Problem: Pain  Goal: Verbalizes/displays adequate comfort level or baseline comfort level  Outcome: Progressing  Flowsheets (Taken 7/15/2025 0429)  Verbalizes/displays adequate comfort level or baseline comfort level:   Assess pain using appropriate pain scale   Administer analgesics based on type and severity of pain and evaluate response   Implement non-pharmacological measures as appropriate and evaluate response     Problem: Safety - Adult  Goal: Free from fall injury  Outcome: Progressing  Flowsheets (Taken 7/15/2025 0429)  Free From Fall Injury: Instruct family/caregiver on patient safety     Problem: Skin/Tissue Integrity  Goal: Skin integrity remains intact  Description: 1.  Monitor for areas of redness and/or skin breakdown  2.  Assess vascular access sites hourly  3.  Every 4-6 hours minimum:  Change oxygen  saturation probe site  4.  Every 4-6 hours:  If on nasal continuous positive airway pressure, respiratory therapy assess nares and determine need for appliance change or resting period  Outcome: Progressing  Flowsheets (Taken 7/15/2025 0429)  Skin Integrity Remains Intact:   Monitor for areas of redness and/or skin breakdown   Turn and reposition as indicated   Positioning devices   Monitor skin under medical devices     Problem: Nutrition Deficit:  Goal: Optimize nutritional status  Outcome: Progressing  Flowsheets (Taken 7/15/2025 0429)  Nutrient intake appropriate for improving, restoring, or maintaining nutritional needs: Recommend, monitor, and adjust tube feedings and TPN/PPN based on assessed needs

## 2025-07-15 NOTE — PROGRESS NOTES
Infectious Diseases   Progress Note      Admission Date: 7/11/2025  Hospital Day: Hospital Day: 5   Attending: Samm Corbin MD  Date of service: 7/15/2025     Chief complaint/ Reason for consult:       Infected sacral pressure ulcer with contiguous osteomyelitis  Left lower lobe aspiration pneumonia  Bacteriuria versus complicated urinary tract infection  Nausea and vomiting on admission  History of MRSA, ESBL and Carbapenem resistant Enterobacteriaceae as well as Candida auris  Need for contact isolation  History of anoxic brain injury in September 2024    Microbiology:      I have reviewed allavailable micro lab data and cultures    Results       Procedure Component Value Units Date/Time    MRSA DNA Probe, Nasal [[id number]] Collected: 07/13/25 1833    Order Status: Completed Specimen: Nares Updated: 07/14/25 1536     MRSA SCREEN RT-PCR --     Negative  MRSA DNA not detected.  Normal Range: Not detected      Narrative:      ORDER#: [id number]                          ORDERED BY: UNKNOWN, DOCTOR  SOURCE: Nares                              COLLECTED:  07/13/25 18:33  ANTIBIOTICS AT [place].:                      RECEIVED :  07/14/25 03:48    Culture, Respiratory [[id number]]     Order Status: Sent Specimen: Tracheal Aspirate     MRSA DNA Probe, Nasal [[id number]] Collected: 07/13/25 0000    Order Status: Canceled Specimen: Nares     Culture, Blood 2 [[id number]] Collected: 07/12/25 0004    Order Status: Completed Specimen: Blood Updated: 07/13/25 0115     Culture, Blood 2 No Growth to date.  Any change in status will be called.    Narrative:      ORDER#: [id number]                          ORDERED BY: CRISTINO BARCENAS  SOURCE: Blood Hand, Left                   COLLECTED:  07/12/25 00:04  ANTIBIOTICS AT [place].:                      RECEIVED :  07/12/25 12:16  If child <=2 yrs old please draw pediatric bottle.~Blood Culture #2    Culture, Blood 1 [[id number]] Collected: 07/11/25 2314    Order Status: Completed  potential for severe exacerbation of infection/side effects of treatment.  Therapy requires intensive monitoring for antimicrobial agent toxicity.   I did an In-depth patient chart review that entails going back  in time and assessing the complete breadth of all health care interactions, with higher-level synthesis for the patient's complex diagnoses.  Counseled patients, family members, and caregivers regarding infection prevention antimicrobial stewardship and resistance for the patient.  Engaging in complex medical decision-making associated with antimicrobial prescribing including considerations such as antimicrobial resistance patterns, hospital antibiogram, recent antibiotic exposures, interactions/complications from comorbidities including concurrent infections, public health considerations to minimize development of antimicrobial resistance  Developing, following, and supervising specialized, individualized infection control protocols for an individual patient based on their diagnosis and risks in order to reduce risk of disease transmission.  Coordinating with staff regarding infection prevention and control measures to enable healthcare facility staff to safely care for patient.  Managing infection prevention and treatment protocols associated with transitions of care for this complex patient.     Thank you for involving me in the care of your patient. I will continue to follow. If you have anyadditional questions, please do not hesitate to contact me.    Subjective:     Interval history: Interval history was obtained from chart review and patient/ RN.  The patient was seen and examined at bedside today.  The patient is tracheostomy dependent.  She is on a ventilator.  Seems to be tolerating antibiotic okay.  No diarrhea     REVIEW OF SYSTEMS:      Review of Systems   Unable to perform ROS: Other     Tracheostomy status    Past Medical History: All past medical history reviewed today.    Past Medical

## 2025-07-15 NOTE — PROGRESS NOTES
07/15/25 1226   RT Protocol   History Pulmonary Disease 2   Respiratory pattern 0   Breath sounds 2   Cough 1   Bronchodilator Assessment Score 5

## 2025-07-15 NOTE — PROGRESS NOTES
PULMONARY AND CRITICAL CARE MEDICINE PROGRESS NOTE    Subjective: Patient continues to have issues with vomiting.  G-tube is to drainage.    ROS could not be obtained due to patient factors.     MEDICATIONS:     Scheduled Meds:   ipratropium 0.5 mg-albuterol 2.5 mg  1 Dose Inhalation TID RT    metoclopramide  10 mg IntraVENous q8h    meropenem  1,000 mg IntraVENous Q12H    [Held by provider] apixaban  2.5 mg Oral BID    aspirin  81 mg Oral Daily    atorvastatin  20 mg Per G Tube Daily    budesonide-formoterol  2 puff Inhalation BID RT    sodium chloride flush  5-40 mL IntraVENous 2 times per day    sodium chloride flush  5-40 mL IntraVENous 2 times per day    insulin lispro  0-4 Units SubCUTAneous 4x Daily AC & HS    pantoprazole  40 mg IntraVENous Daily    lactobacillus  1 capsule Oral BID WC    carvedilol  6.25 mg Per G Tube BID WC       Current Infusions:    sodium chloride      dextrose      sodium chloride      sodium chloride 10 mL/hr at 07/15/25 0614       PRN meds:  sodium chloride, dextrose bolus **OR** dextrose bolus, glucagon (rDNA), dextrose, ipratropium, sodium chloride flush, sodium chloride, potassium chloride **OR** potassium alternative oral replacement **OR** potassium chloride, magnesium sulfate, polyethylene glycol, acetaminophen **OR** acetaminophen, sodium chloride flush, sodium chloride, ondansetron    PHYSICAL EXAM:  BP (!) 164/67   Pulse 69   Temp 96.9 °F (36.1 °C) (Temporal)   Resp 20   Ht 1.524 m (5')   Wt 72.7 kg (160 lb 4.4 oz)   LMP 11/30/2005   SpO2 100%   BMI 31.30 kg/m²  I/O last 3 completed shifts:  In: 1483.5 [I.V.:121; NG/GT:432; IV Piggyback:930.5]  Out: 2185 [Urine:2000; Emesis/NG output:185] I/O this shift:  In: -   Out: 365 [Urine:325; Emesis/NG output:40]    Intake/Output Summary (Last 24 hours) at 7/15/2025 1548  Last data filed at 7/15/2025 1329  Gross per 24 hour   Intake 469.81 ml   Output 1035 ml   Net -565.19 ml       CONSTITUTIONAL: She is a 60

## 2025-07-16 LAB
ALBUMIN SERPL-MCNC: 2.9 G/DL (ref 3.4–5)
ALBUMIN SERPL-MCNC: 3 G/DL (ref 3.4–5)
ALBUMIN/GLOB SERPL: 0.6 {RATIO} (ref 1.1–2.2)
ALP SERPL-CCNC: 85 U/L (ref 40–129)
ALP SERPL-CCNC: 85 U/L (ref 40–129)
ALT SERPL-CCNC: 13 U/L (ref 10–40)
ALT SERPL-CCNC: 14 U/L (ref 10–40)
ANION GAP SERPL CALCULATED.3IONS-SCNC: 11 MMOL/L (ref 3–16)
AST SERPL-CCNC: 13 U/L (ref 15–37)
AST SERPL-CCNC: 15 U/L (ref 15–37)
BACTERIA BLD CULT ORG #2: NORMAL
BACTERIA BLD CULT: NORMAL
BASOPHILS # BLD: 0 K/UL (ref 0–0.2)
BASOPHILS NFR BLD: 0.7 %
BILIRUB DIRECT SERPL-MCNC: 0.2 MG/DL (ref 0–0.3)
BILIRUB INDIRECT SERPL-MCNC: 0.2 MG/DL (ref 0–1)
BILIRUB SERPL-MCNC: 0.4 MG/DL (ref 0–1)
BILIRUB SERPL-MCNC: 0.5 MG/DL (ref 0–1)
BUN SERPL-MCNC: 16 MG/DL (ref 7–20)
CALCIUM SERPL-MCNC: 9.3 MG/DL (ref 8.3–10.6)
CHLORIDE SERPL-SCNC: 107 MMOL/L (ref 99–110)
CO2 SERPL-SCNC: 25 MMOL/L (ref 21–32)
CREAT SERPL-MCNC: 0.7 MG/DL (ref 0.6–1.2)
CRP SERPL-MCNC: 16.2 MG/L (ref 0–5.1)
DEPRECATED RDW RBC AUTO: 16.1 % (ref 12.4–15.4)
EOSINOPHIL # BLD: 0.1 K/UL (ref 0–0.6)
EOSINOPHIL NFR BLD: 3 %
ERYTHROCYTE [SEDIMENTATION RATE] IN BLOOD BY WESTERGREN METHOD: 44 MM/HR (ref 0–30)
GFR SERPLBLD CREATININE-BSD FMLA CKD-EPI: >90 ML/MIN/{1.73_M2}
GLUCOSE BLD-MCNC: 154 MG/DL (ref 70–99)
GLUCOSE BLD-MCNC: 161 MG/DL (ref 70–99)
GLUCOSE BLD-MCNC: 182 MG/DL (ref 70–99)
GLUCOSE BLD-MCNC: 191 MG/DL (ref 70–99)
GLUCOSE SERPL-MCNC: 132 MG/DL (ref 70–99)
HCT VFR BLD AUTO: 25.7 % (ref 36–48)
HGB BLD-MCNC: 8.6 G/DL (ref 12–16)
LYMPHOCYTES # BLD: 1 K/UL (ref 1–5.1)
LYMPHOCYTES NFR BLD: 23.3 %
MAGNESIUM SERPL-MCNC: 1.87 MG/DL (ref 1.8–2.4)
MCH RBC QN AUTO: 31.3 PG (ref 26–34)
MCHC RBC AUTO-ENTMCNC: 33.7 G/DL (ref 31–36)
MCV RBC AUTO: 93.1 FL (ref 80–100)
MONOCYTES # BLD: 0.4 K/UL (ref 0–1.3)
MONOCYTES NFR BLD: 10.3 %
NEUTROPHILS # BLD: 2.6 K/UL (ref 1.7–7.7)
NEUTROPHILS NFR BLD: 62.7 %
PERFORMED ON: ABNORMAL
PHOSPHATE SERPL-MCNC: 3.5 MG/DL (ref 2.5–4.9)
PLATELET # BLD AUTO: 150 K/UL (ref 135–450)
PMV BLD AUTO: 7.4 FL (ref 5–10.5)
POTASSIUM SERPL-SCNC: 3.6 MMOL/L (ref 3.5–5.1)
POTASSIUM SERPL-SCNC: 3.6 MMOL/L (ref 3.5–5.1)
PROT SERPL-MCNC: 6.9 G/DL (ref 6.4–8.2)
PROT SERPL-MCNC: 7.4 G/DL (ref 6.4–8.2)
RBC # BLD AUTO: 2.76 M/UL (ref 4–5.2)
SODIUM SERPL-SCNC: 143 MMOL/L (ref 136–145)
WBC # BLD AUTO: 4.1 K/UL (ref 4–11)

## 2025-07-16 PROCEDURE — 84100 ASSAY OF PHOSPHORUS: CPT

## 2025-07-16 PROCEDURE — G0545 PR INHERENT VISIT TO INPT: HCPCS | Performed by: INTERNAL MEDICINE

## 2025-07-16 PROCEDURE — 83735 ASSAY OF MAGNESIUM: CPT

## 2025-07-16 PROCEDURE — 86140 C-REACTIVE PROTEIN: CPT

## 2025-07-16 PROCEDURE — 2000000000 HC ICU R&B

## 2025-07-16 PROCEDURE — 85025 COMPLETE CBC W/AUTO DIFF WBC: CPT

## 2025-07-16 PROCEDURE — 99233 SBSQ HOSP IP/OBS HIGH 50: CPT | Performed by: INTERNAL MEDICINE

## 2025-07-16 PROCEDURE — 6370000000 HC RX 637 (ALT 250 FOR IP): Performed by: STUDENT IN AN ORGANIZED HEALTH CARE EDUCATION/TRAINING PROGRAM

## 2025-07-16 PROCEDURE — 6360000002 HC RX W HCPCS: Performed by: STUDENT IN AN ORGANIZED HEALTH CARE EDUCATION/TRAINING PROGRAM

## 2025-07-16 PROCEDURE — 85652 RBC SED RATE AUTOMATED: CPT

## 2025-07-16 PROCEDURE — 6370000000 HC RX 637 (ALT 250 FOR IP)

## 2025-07-16 PROCEDURE — 2500000003 HC RX 250 WO HCPCS

## 2025-07-16 PROCEDURE — 2580000003 HC RX 258: Performed by: INTERNAL MEDICINE

## 2025-07-16 PROCEDURE — 36415 COLL VENOUS BLD VENIPUNCTURE: CPT

## 2025-07-16 PROCEDURE — 99291 CRITICAL CARE FIRST HOUR: CPT | Performed by: INTERNAL MEDICINE

## 2025-07-16 PROCEDURE — 2700000000 HC OXYGEN THERAPY PER DAY

## 2025-07-16 PROCEDURE — 94640 AIRWAY INHALATION TREATMENT: CPT

## 2025-07-16 PROCEDURE — 6360000002 HC RX W HCPCS: Performed by: INTERNAL MEDICINE

## 2025-07-16 PROCEDURE — 2580000003 HC RX 258

## 2025-07-16 PROCEDURE — 6370000000 HC RX 637 (ALT 250 FOR IP): Performed by: HOSPITALIST

## 2025-07-16 PROCEDURE — 6360000002 HC RX W HCPCS

## 2025-07-16 PROCEDURE — 6370000000 HC RX 637 (ALT 250 FOR IP): Performed by: INTERNAL MEDICINE

## 2025-07-16 PROCEDURE — 94761 N-INVAS EAR/PLS OXIMETRY MLT: CPT

## 2025-07-16 PROCEDURE — 80053 COMPREHEN METABOLIC PANEL: CPT

## 2025-07-16 PROCEDURE — 94003 VENT MGMT INPAT SUBQ DAY: CPT

## 2025-07-16 RX ORDER — CIPROFLOXACIN 500 MG/1
500 TABLET, FILM COATED ORAL EVERY 12 HOURS SCHEDULED
Status: DISCONTINUED | OUTPATIENT
Start: 2025-07-16 | End: 2025-07-21 | Stop reason: HOSPADM

## 2025-07-16 RX ADMIN — Medication 2 PUFF: at 21:36

## 2025-07-16 RX ADMIN — INSULIN LISPRO 1 UNITS: 100 INJECTION, SOLUTION INTRAVENOUS; SUBCUTANEOUS at 12:39

## 2025-07-16 RX ADMIN — IPRATROPIUM BROMIDE AND ALBUTEROL SULFATE 1 DOSE: .5; 3 SOLUTION RESPIRATORY (INHALATION) at 21:36

## 2025-07-16 RX ADMIN — INSULIN LISPRO 1 UNITS: 100 INJECTION, SOLUTION INTRAVENOUS; SUBCUTANEOUS at 17:02

## 2025-07-16 RX ADMIN — CARVEDILOL 6.25 MG: 6.25 TABLET, FILM COATED ORAL at 09:39

## 2025-07-16 RX ADMIN — METOCLOPRAMIDE HYDROCHLORIDE 10 MG: 5 INJECTION INTRAMUSCULAR; INTRAVENOUS at 22:01

## 2025-07-16 RX ADMIN — CARVEDILOL 6.25 MG: 6.25 TABLET, FILM COATED ORAL at 17:02

## 2025-07-16 RX ADMIN — ASPIRIN 81 MG: 81 TABLET, CHEWABLE ORAL at 09:39

## 2025-07-16 RX ADMIN — Medication 1 CAPSULE: at 17:02

## 2025-07-16 RX ADMIN — ATORVASTATIN CALCIUM 20 MG: 20 TABLET, FILM COATED ORAL at 09:39

## 2025-07-16 RX ADMIN — METOCLOPRAMIDE HYDROCHLORIDE 10 MG: 5 INJECTION INTRAMUSCULAR; INTRAVENOUS at 05:51

## 2025-07-16 RX ADMIN — Medication 10 ML: at 22:01

## 2025-07-16 RX ADMIN — Medication 2 PUFF: at 08:41

## 2025-07-16 RX ADMIN — Medication 10 ML: at 09:42

## 2025-07-16 RX ADMIN — Medication 1 CAPSULE: at 09:39

## 2025-07-16 RX ADMIN — IPRATROPIUM BROMIDE AND ALBUTEROL SULFATE 1 DOSE: .5; 3 SOLUTION RESPIRATORY (INHALATION) at 08:40

## 2025-07-16 RX ADMIN — MEROPENEM 1000 MG: 1 INJECTION INTRAVENOUS at 09:41

## 2025-07-16 RX ADMIN — IPRATROPIUM BROMIDE AND ALBUTEROL SULFATE 1 DOSE: .5; 3 SOLUTION RESPIRATORY (INHALATION) at 13:17

## 2025-07-16 RX ADMIN — PANTOPRAZOLE SODIUM 40 MG: 40 INJECTION, POWDER, FOR SOLUTION INTRAVENOUS at 09:42

## 2025-07-16 RX ADMIN — SODIUM CHLORIDE: 0.9 INJECTION, SOLUTION INTRAVENOUS at 23:38

## 2025-07-16 RX ADMIN — METOCLOPRAMIDE HYDROCHLORIDE 10 MG: 5 INJECTION INTRAMUSCULAR; INTRAVENOUS at 14:59

## 2025-07-16 ASSESSMENT — PULMONARY FUNCTION TESTS
PIF_VALUE: 15
PIF_VALUE: 15
PIF_VALUE: 18
PIF_VALUE: 14
PIF_VALUE: 16
PIF_VALUE: 15

## 2025-07-16 NOTE — PROGRESS NOTES
Physician Progress Note      PATIENT:               LIVIA CALDERON  [facility] #:                  [id number]  :                       1965  ADMIT DATE:       2025 10:55 PM  DISCH DATE:  RESPONDING  PROVIDER #:        Samm Corbin MD          QUERY TEXT:    Sepsis is documented in the medical record  IM H+P. Please provide   additional clinical indicators supportive of your documentation. Or please   document if the diagnosis of sepsis has been ruled out after study.    The clinical indicators include:  This is a 60 y.o female with PMHx of anoxic brain injury with trach and vent   dependence, chronic respiratory failure, CHF, HTN.   - Vs/Labs: T - 98.6, R - 30 > 20 > 21 > 23 > 24 > 25, HR - 105 > 1-02 >   99 > 97 > 96 > 113, WBC - 10.8, LA - 2.3.   - IM H+P - Sepsis 2/2 sacral ulcer with osteomyelitis/LLL pneumonitis.   Elevated troponins 2/2 type II NSTEMI. GABY 2/2 dehydration.  This was treated with IV antibiotics, 1L LR bolus, 1.3L NS bolus, imaging,   BC/UC, ID consult  Options provided:  -- Sepsis present as evidenced by, Please document evidence.  -- Sepsis was ruled out after study  -- Other - I will add my own diagnosis  -- Disagree - Not applicable / Not valid  -- Disagree - Clinically unable to determine / Unknown  -- Refer to Clinical Documentation Reviewer    PROVIDER RESPONSE TEXT:    Provider disagreed with this query.    Query created by: Lito De La Paz on 2025 9:40 AM      Electronically signed by:  Samm Corbin MD 2025 8:12 AM

## 2025-07-16 NOTE — PROGRESS NOTES
ICU Resident  Progress Note    Name:  Lindsay Lucio    /Age/Sex: 1965  (60 y.o. female)  MRN & CSN:  [id number] & [id number]    Date of Admission: 2025    Chief Complaint:   Chief Complaint   Patient presents with    Vomiting     Pt. From [facility] and arrived by [facility]. Pt. Has been vomiting bile and large amount of bile draining from J tube.      Hospital Course:   60-year-old female with with a PMHx of anoxic brain injury cardiac arrest during EGD/colonoscopy 2024, requiring a chronic trach/PEJ tube placement.  Patient is a resident at the [facility]. She was hospitalized at [facility] 2025 and 2025 for PEJ tube malfunction/leak and infection. The J-tube was repositioned and skin closure was provided. There was also a concurrent infection with Candida auris fungemia. Patient has a prior history of MDRO Klebsiella and Pseudomonas pneumonia, CRE, ESBL related to the recurrent aspiration. Patient was discharged back to [facility] on 2025. Patient admitted back to [facility] on 2025. Cefepime 2000 mg, furosemide 40 mg, meropenem 1000 mg, vancomycin 1000 mg, and Flagyl 500 mg administered on 2025. All except meropenem 1000 mg continued.  7/15/2025 patient continued to have bilious vomiting even prior to tube feeds being initiated. Patient's elder daughter, who was present during rounds, voiced a concern that she would have handled care differently for her mother if she could. Further investigation revealed patient's youngest daughter has power of  and who is making all the decisions currently. Per the elder daughter, she would want her mother to be comfortable and would have avoided tube feeds and trach/vents. Ethics team confirmed that the youngest daughter Mary Ellen Lucio is in fact the appropriate surrogate decision maker and legal guardian.  Patient has had numerous emesis episodes, not

## 2025-07-16 NOTE — PROGRESS NOTES
Nutrition Note    Pt seen during critical care rounds.  Tube feeds have been on hold.  Will begin trickle feeds via j-tube and keep g-tube to gravity.  Pt must be upright with feeds, continue Protonix and Reglan.  Pt with stage 4 wound (s/p debridement) and would benefit from Markel and ProSource TF20, but will hold on ordering until tolerance to tube feed is demonstrated.     Recommend  Vital AF 1.2 (peptide based) at 20 mL/hr.  Do not advance rate.  Water flush 30 mL q3 hr.        Electronically signed by CHERYL RODRIGUES, LIS, LD on 7/16/25 at 11:13 AM EDT  Contact: [phone]

## 2025-07-16 NOTE — PROGRESS NOTES
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  Normal Range: Not detected      Narrative:      ORDER#: [id number]                          ORDERED BY: UNKNOWN, DOCTOR  SOURCE: Nares                              COLLECTED:  07/13/25 18:33  ANTIBIOTICS AT [place].:                      RECEIVED :  07/14/25 03:48    Culture, Respiratory [[id number]]     Order Status: Sent Specimen: Tracheal Aspirate     MRSA DNA Probe, Nasal [[id number]] Collected: 07/13/25 0000    Order Status: Canceled Specimen: Nares     Culture, Blood 2 [[id number]] Collected: 07/12/25 0004    Order Status: Completed Specimen: Blood Updated: 07/16/25 0115     Culture, Blood 2 No Growth after 4 days of incubation.    Narrative:      ORDER#: [id number]                          ORDERED BY: CRISTINO BARCENAS  SOURCE: Blood Hand, Left                   COLLECTED:  07/12/25 00:04  ANTIBIOTICS AT [place].:                      RECEIVED :  07/12/25 12:16  If child <=2 yrs old please draw pediatric bottle.~Blood Culture #2    Culture, Blood 1 [[id number]] Collected: 07/11/25 2314    Order Status: Completed Specimen: Blood

## 2025-07-16 NOTE — PROGRESS NOTES
Hospitalist Progress Note      PCP: Rosario Stevens MD    Date of Admission: 7/11/2025    LOS: 4    Chief Complaint:   Chief Complaint   Patient presents with    Vomiting     Pt. From [facility] and arrived by [facility]. Pt. Has been vomiting bile and large amount of bile draining from J tube.        Case Summary:   60-year-old lady with history of CAD, hyperlipidemia, COPD, chronic respiratory failure with trach in place, history of anoxic brain injury none interactive who was admitted from [facility] with repeated episodes of vomiting at the nursing home found to have sepsis due to left lower lobe aspiration pneumonia, infected sacral pressure ulcer with contiguous osteomyelitis complicated by acute kidney injury and probable UTI and Candida aureus.  She had hyperkalemia which has since resolved, acute kidney injury now improved      Principal Problem:    Severe sepsis (HCC)    Pneumonia of left lower lobe due to infectious organism    Sacral decubitus ulcer, stage IV with continuous osteomyelitis  - Remains on antibiotics and probiotics  - ID following with recommendations  -Wound care and pressure minimizing technique      Nausea and vomiting: Still with bilious aspirate via gastric tube in vomitus.  KUB yesterday with no evidence of bowel obstruction.  Intensivist following with plans for tube feeds trial today if tolerated.      Active Problems:    Mixed hyperlipidemia: On statin    Uncontrolled type 2 diabetes mellitus with hyperglycemia (HCC): Sliding scale insulin    Stage 2 moderate COPD: Without exacerbation.  Continue inhalers    Chronic anemia: Stable.  Will monitor    Chronic respiratory failure with chronic trach: Stable.  Will monitor    Brain injury with loss of consciousness (HCC): Nonverbal    Resolved Problems:    GABY (acute kidney injury)    Hyperkalemia          Medications:  Reviewed  Infusion Medications    sodium chloride      dextrose      sodium chloride      sodium chloride 10

## 2025-07-16 NOTE — PROGRESS NOTES
PULMONARY AND CRITICAL CARE MEDICINE PROGRESS NOTE    Subjective: Patient has not had any episodes of vomiting although she is having dry heaving.  G-tube remains to gravity.  X-ray KUB is normal.    ROS could not be obtained due to patient factors.     MEDICATIONS:     Scheduled Meds:   ipratropium 0.5 mg-albuterol 2.5 mg  1 Dose Inhalation TID RT    metoclopramide  10 mg IntraVENous q8h    meropenem  1,000 mg IntraVENous Q12H    [Held by provider] apixaban  2.5 mg Oral BID    aspirin  81 mg Oral Daily    atorvastatin  20 mg Per G Tube Daily    budesonide-formoterol  2 puff Inhalation BID RT    sodium chloride flush  5-40 mL IntraVENous 2 times per day    sodium chloride flush  5-40 mL IntraVENous 2 times per day    insulin lispro  0-4 Units SubCUTAneous 4x Daily AC & HS    pantoprazole  40 mg IntraVENous Daily    lactobacillus  1 capsule Oral BID WC    carvedilol  6.25 mg Per G Tube BID WC       Current Infusions:    sodium chloride      dextrose      sodium chloride      sodium chloride 10 mL/hr at 07/16/25 0305       PRN meds:  sodium chloride, dextrose bolus **OR** dextrose bolus, glucagon (rDNA), dextrose, ipratropium, sodium chloride flush, sodium chloride, potassium chloride **OR** potassium alternative oral replacement **OR** potassium chloride, magnesium sulfate, polyethylene glycol, acetaminophen **OR** acetaminophen, sodium chloride flush, sodium chloride, ondansetron    PHYSICAL EXAM:  BP (!) 167/63   Pulse 74   Temp 97 °F (36.1 °C) (Temporal)   Resp 18   Ht 1.524 m (5')   Wt 71.6 kg (157 lb 13.6 oz)   LMP 11/30/2005   SpO2 96%   BMI 30.83 kg/m²  I/O last 3 completed shifts:  In: 781.1 [I.V.:231.1; IV Piggyback:550]  Out: 1380 [Urine:1100; Emesis/NG output:280] No intake/output data recorded.    Intake/Output Summary (Last 24 hours) at 7/16/2025 1347  Last data filed at 7/16/2025 0600  Gross per 24 hour   Intake 348.44 ml   Output 490 ml   Net -141.56 ml       CONSTITUTIONAL: She is a 60  y.o.-year-old who appears her stated age. She is in no acute distress.   CARDIOVASCULAR: S1 S2 RRR. Without murmer  RESPIRATORY & CHEST: Lungs are clear to auscultation and percussion. No wheezing, no crackles. Good air movement  GASTROINTESTINAL & ABDOMEN: Soft, nontender, positive bowel sounds in all quadrants, non-distended.   GENITOURINARY: Deferred.   MUSCULOSKELETAL: No tenderness to palpation of the axial skeleton. There is no clubbing. No cyanosis. No edema of the lower extremities.   SKIN OF BODY: No rash or jaundice.   PSYCHIATRIC EVALUATION: Could not be assessed  HEMATOLOGIC/LYMPHATIC/ IMMUNOLOGIC: No palpable lymphadenopathy.  NEUROLOGIC: Altered, does not follow commands.Groslly non-focal.      LABS:    Recent Labs     07/14/25 0426 07/14/25  1424 07/15/25  0139 07/15/25  0726 07/16/25  0431   WBC 5.9  --  4.4  --  4.1   RBC 2.74*  --  2.61*  --  2.76*   HGB 8.7*   < > 8.4* 8.8* 8.6*   HCT 25.5*   < > 24.1* 25.1* 25.7*   MCH 31.7  --  32.0  --  31.3   MCHC 34.0  --  34.7  --  33.7   RDW 16.3*  --  16.3*  --  16.1*     --  151  --  150   MPV 8.4  --  7.1  --  7.4   NEUTOPHILPCT 68.4  --  67.2  --  62.7   MONOPCT 9.9  --  9.7  --  10.3   BASOPCT 0.4  --  0.5  --  0.7    < > = values in this interval not displayed.     Recent Labs     07/14/25  0426 07/15/25  0139 07/16/25  0431    141 143   K 4.2 3.8 3.6  3.6    106 107   ANIONGAP 9 9 11   CO2 25 26 25   BUN 22* 17 16   CREATININE 0.9 0.8 0.7   CALCIUM 9.1 9.2 9.3   ALBUMIN 2.7* 2.9* 2.9*  3.0*   BILITOT 0.5 0.4 0.5  0.4   ALKPHOS 92 85 85  85   ALT 10 12 13  14   AST 15 14* 15  13*   GLUCOSE 174* 126* 132*       IMPRESSION:  Chronic hypoxic respiratory failure  Tracheostomy dependent, ventilator dependent  Aspiration pneumonia  Recurrent vomiting  Sacral diabetes ulcer with osteomyelitis s/p debridement  Chronic anemia  Centrilobular emphysema     PLAN:  Patient has chronic tracheostomy and ventilator dependence.  She has

## 2025-07-17 PROBLEM — E43 SEVERE MALNUTRITION: Chronic | Status: ACTIVE | Noted: 2025-07-17

## 2025-07-17 LAB
ALBUMIN SERPL-MCNC: 2.9 G/DL (ref 3.4–5)
ALBUMIN/GLOB SERPL: 0.7 {RATIO} (ref 1.1–2.2)
ALP SERPL-CCNC: 82 U/L (ref 40–129)
ALT SERPL-CCNC: 12 U/L (ref 10–40)
ANION GAP SERPL CALCULATED.3IONS-SCNC: 12 MMOL/L (ref 3–16)
AST SERPL-CCNC: 13 U/L (ref 15–37)
BASOPHILS # BLD: 0 K/UL (ref 0–0.2)
BASOPHILS NFR BLD: 0.5 %
BILIRUB SERPL-MCNC: 0.4 MG/DL (ref 0–1)
BUN SERPL-MCNC: 19 MG/DL (ref 7–20)
CALCIUM SERPL-MCNC: 9.1 MG/DL (ref 8.3–10.6)
CHLORIDE SERPL-SCNC: 107 MMOL/L (ref 99–110)
CO2 SERPL-SCNC: 24 MMOL/L (ref 21–32)
CREAT SERPL-MCNC: 0.8 MG/DL (ref 0.6–1.2)
DEPRECATED RDW RBC AUTO: 16.2 % (ref 12.4–15.4)
EOSINOPHIL # BLD: 0.1 K/UL (ref 0–0.6)
EOSINOPHIL NFR BLD: 1.8 %
GFR SERPLBLD CREATININE-BSD FMLA CKD-EPI: 84 ML/MIN/{1.73_M2}
GLUCOSE BLD-MCNC: 177 MG/DL (ref 70–99)
GLUCOSE BLD-MCNC: 209 MG/DL (ref 70–99)
GLUCOSE BLD-MCNC: 225 MG/DL (ref 70–99)
GLUCOSE BLD-MCNC: 235 MG/DL (ref 70–99)
GLUCOSE SERPL-MCNC: 208 MG/DL (ref 70–99)
HCT VFR BLD AUTO: 24.7 % (ref 36–48)
HGB BLD-MCNC: 8.6 G/DL (ref 12–16)
LYMPHOCYTES # BLD: 0.9 K/UL (ref 1–5.1)
LYMPHOCYTES NFR BLD: 18 %
MAGNESIUM SERPL-MCNC: 1.8 MG/DL (ref 1.8–2.4)
MCH RBC QN AUTO: 32.2 PG (ref 26–34)
MCHC RBC AUTO-ENTMCNC: 34.8 G/DL (ref 31–36)
MCV RBC AUTO: 92.6 FL (ref 80–100)
MONOCYTES # BLD: 0.3 K/UL (ref 0–1.3)
MONOCYTES NFR BLD: 6.7 %
NEUTROPHILS # BLD: 3.7 K/UL (ref 1.7–7.7)
NEUTROPHILS NFR BLD: 73 %
PERFORMED ON: ABNORMAL
PHOSPHATE SERPL-MCNC: 3.6 MG/DL (ref 2.5–4.9)
PLATELET # BLD AUTO: 149 K/UL (ref 135–450)
PMV BLD AUTO: 8.2 FL (ref 5–10.5)
POTASSIUM SERPL-SCNC: 3.6 MMOL/L (ref 3.5–5.1)
PROT SERPL-MCNC: 7.3 G/DL (ref 6.4–8.2)
RBC # BLD AUTO: 2.67 M/UL (ref 4–5.2)
SODIUM SERPL-SCNC: 143 MMOL/L (ref 136–145)
WBC # BLD AUTO: 5 K/UL (ref 4–11)

## 2025-07-17 PROCEDURE — 6370000000 HC RX 637 (ALT 250 FOR IP): Performed by: INTERNAL MEDICINE

## 2025-07-17 PROCEDURE — G0545 PR INHERENT VISIT TO INPT: HCPCS | Performed by: INTERNAL MEDICINE

## 2025-07-17 PROCEDURE — 6370000000 HC RX 637 (ALT 250 FOR IP): Performed by: STUDENT IN AN ORGANIZED HEALTH CARE EDUCATION/TRAINING PROGRAM

## 2025-07-17 PROCEDURE — 80053 COMPREHEN METABOLIC PANEL: CPT

## 2025-07-17 PROCEDURE — 6370000000 HC RX 637 (ALT 250 FOR IP): Performed by: HOSPITALIST

## 2025-07-17 PROCEDURE — 2500000003 HC RX 250 WO HCPCS

## 2025-07-17 PROCEDURE — 94640 AIRWAY INHALATION TREATMENT: CPT

## 2025-07-17 PROCEDURE — 2000000000 HC ICU R&B

## 2025-07-17 PROCEDURE — 6360000002 HC RX W HCPCS: Performed by: STUDENT IN AN ORGANIZED HEALTH CARE EDUCATION/TRAINING PROGRAM

## 2025-07-17 PROCEDURE — 84100 ASSAY OF PHOSPHORUS: CPT

## 2025-07-17 PROCEDURE — 99233 SBSQ HOSP IP/OBS HIGH 50: CPT | Performed by: INTERNAL MEDICINE

## 2025-07-17 PROCEDURE — 99232 SBSQ HOSP IP/OBS MODERATE 35: CPT | Performed by: INTERNAL MEDICINE

## 2025-07-17 PROCEDURE — 83735 ASSAY OF MAGNESIUM: CPT

## 2025-07-17 PROCEDURE — 6360000002 HC RX W HCPCS

## 2025-07-17 PROCEDURE — 6370000000 HC RX 637 (ALT 250 FOR IP)

## 2025-07-17 PROCEDURE — 85025 COMPLETE CBC W/AUTO DIFF WBC: CPT

## 2025-07-17 PROCEDURE — 2700000000 HC OXYGEN THERAPY PER DAY

## 2025-07-17 RX ADMIN — CARVEDILOL 6.25 MG: 6.25 TABLET, FILM COATED ORAL at 17:42

## 2025-07-17 RX ADMIN — Medication 2 PUFF: at 20:58

## 2025-07-17 RX ADMIN — ASPIRIN 81 MG: 81 TABLET, CHEWABLE ORAL at 09:01

## 2025-07-17 RX ADMIN — Medication 10 ML: at 09:02

## 2025-07-17 RX ADMIN — INSULIN LISPRO 1 UNITS: 100 INJECTION, SOLUTION INTRAVENOUS; SUBCUTANEOUS at 06:24

## 2025-07-17 RX ADMIN — CIPROFLOXACIN HYDROCHLORIDE 500 MG: 500 TABLET, FILM COATED ORAL at 20:11

## 2025-07-17 RX ADMIN — INSULIN LISPRO 1 UNITS: 100 INJECTION, SOLUTION INTRAVENOUS; SUBCUTANEOUS at 20:11

## 2025-07-17 RX ADMIN — METOCLOPRAMIDE HYDROCHLORIDE 10 MG: 5 INJECTION INTRAMUSCULAR; INTRAVENOUS at 06:25

## 2025-07-17 RX ADMIN — IPRATROPIUM BROMIDE AND ALBUTEROL SULFATE 1 DOSE: .5; 3 SOLUTION RESPIRATORY (INHALATION) at 10:06

## 2025-07-17 RX ADMIN — PANTOPRAZOLE SODIUM 40 MG: 40 INJECTION, POWDER, FOR SOLUTION INTRAVENOUS at 09:01

## 2025-07-17 RX ADMIN — METOCLOPRAMIDE HYDROCHLORIDE 10 MG: 5 INJECTION INTRAMUSCULAR; INTRAVENOUS at 14:15

## 2025-07-17 RX ADMIN — CARVEDILOL 6.25 MG: 6.25 TABLET, FILM COATED ORAL at 09:01

## 2025-07-17 RX ADMIN — METOCLOPRAMIDE HYDROCHLORIDE 10 MG: 5 INJECTION INTRAMUSCULAR; INTRAVENOUS at 22:28

## 2025-07-17 RX ADMIN — Medication 1 CAPSULE: at 17:42

## 2025-07-17 RX ADMIN — INSULIN LISPRO 1 UNITS: 100 INJECTION, SOLUTION INTRAVENOUS; SUBCUTANEOUS at 17:53

## 2025-07-17 RX ADMIN — Medication 1 CAPSULE: at 09:01

## 2025-07-17 RX ADMIN — APIXABAN 2.5 MG: 2.5 TABLET, FILM COATED ORAL at 20:11

## 2025-07-17 RX ADMIN — CIPROFLOXACIN HYDROCHLORIDE 500 MG: 500 TABLET, FILM COATED ORAL at 09:01

## 2025-07-17 RX ADMIN — IPRATROPIUM BROMIDE AND ALBUTEROL SULFATE 1 DOSE: .5; 3 SOLUTION RESPIRATORY (INHALATION) at 13:00

## 2025-07-17 RX ADMIN — IPRATROPIUM BROMIDE AND ALBUTEROL SULFATE 1 DOSE: .5; 3 SOLUTION RESPIRATORY (INHALATION) at 20:58

## 2025-07-17 RX ADMIN — Medication 2 PUFF: at 10:15

## 2025-07-17 RX ADMIN — ATORVASTATIN CALCIUM 20 MG: 20 TABLET, FILM COATED ORAL at 09:01

## 2025-07-17 RX ADMIN — Medication 10 ML: at 20:11

## 2025-07-17 RX ADMIN — Medication 10 ML: at 20:12

## 2025-07-17 ASSESSMENT — PULMONARY FUNCTION TESTS
PIF_VALUE: 13
PIF_VALUE: 12
PIF_VALUE: 10
PIF_VALUE: 15
PIF_VALUE: 14

## 2025-07-17 ASSESSMENT — PAIN SCALES - WONG BAKER: WONGBAKER_NUMERICALRESPONSE: HURTS WORST

## 2025-07-17 ASSESSMENT — PAIN SCALES - GENERAL
PAINLEVEL_OUTOF10: 0
PAINLEVEL_OUTOF10: 3

## 2025-07-17 NOTE — PROGRESS NOTES
Nutrition Note    RECOMMENDATIONS  PO Diet: NPO  ONS: NPO  Nutrition Support:   Continue G tube to gravity.   Resume Vital AF 1.2 via J tube at 20 mL/hr x 4 hours, then increase to 40 mL/hr and hold here.   Water flush 30 mL q 4 hours for tube maintenance.   If pt has mild emesis consisting of bile only, particularly with repositioning, please do not stop tube feeds.     ASSESSMENT   Pt seen for follow up assessment during IPOC critical care rounds. Pt continues with G tube to gravity. Receiving Reglan and Protonix. RD has tried Jevity 1.5, Osmolite 1.2, and now Vital AF 1.2, which was infusing at 20 mL/hr. Pt had emesis again this morning, reported to be bile and not tube feed per RN. Pt has had emesis both while on and off tube feeds. Emesis seems to occur only when pt is repositioned. Pt is having bowel movements. KUB on 7/15 was normal. Emesis appears to be d/t repositioning and occurs independently of tube feed. Discussed with Dr. Kearns and ICU team; OK to continue tube feeds and advance rate today. If unsuccessful, recommend TPN to start as pt is a full code and has increased nutrition needs d/t significant wound.     Malnutrition Status: Severe malnutrition  Acute Illness  Findings of the 6 clinical characteristics of malnutrition:  Energy Intake:  50% or less of estimated energy requirements for 5 or more days  Weight Loss:  Greater than 2% over 1 week     Body Fat Loss:  No body fat loss     Muscle Mass Loss:  No muscle mass loss    Fluid Accumulation:  Mild Extremities   Strength:  Not Performed      NUTRITION DIAGNOSIS   Inadequate oral intake related to cognitive or neurological impairment, impaired respiratory function as evidenced by NPO or clear liquid status due to medical condition, nutrition support - enteral nutrition  Increased nutrient needs related to increase demand for energy/nutrients as evidenced by wounds    Goals: Tolerate nutrition support at goal rate, by next RD assessment

## 2025-07-17 NOTE — DISCHARGE INSTR - COC
Continuity of Care Form    Patient Name: Lindsay Lucio   :  1965  MRN:  [id number]    Admit date:  2025  Discharge date:  25    Code Status Order: Full Code   Advance Directives:     Admitting Physician:  Gurdeep Aguirre DO  PCP: Rosario Stevens MD    Discharging Nurse:   Discharging Hospital Unit/Room#: [id number]/3917-01  Discharging Unit Phone Number: [phone]    Emergency Contact:   Extended Emergency Contact Information  Primary Emergency Contact: Mary Ellen Lucio  Home Phone: [phone]  Relation: Child   needed? No  Secondary Emergency Contact: Lisa Lucio  Home Phone: [phone]  Relation: Child   needed? No    Past Surgical History:  Past Surgical History:   Procedure Laterality Date    ANKLE FRACTURE SURGERY Right 2023    RIGHT OPEN REDUCTION AND INTERNAL FIXATION LATERAL MALLEOLUS AND SYNDESMOTIC REPAIR-MICHAEL performed by Wyatt Garsia MD at [facility] OR    CARDIAC CATHETERIZATION      X 2     SECTION      TIMES 3    CHOLECYSTECTOMY      COLONOSCOPY  2017      Colonoscopy with snare and biopsy    COLONOSCOPY N/A 2024    COLONOSCOPY performed by Ja Guadarrama MD at [facility] ENDOSCOPY    ESOPHAGEAL DILATATION  2024    ESOPHAGEAL DILATION GARCIA performed by Ja Guadarrama MD at [facility] ENDOSCOPY    EYE SURGERY Bilateral     muscle correction and eyelid     HERNIA REPAIR      UMBILICAL AREA x 2    HYSTERECTOMY, TOTAL ABDOMINAL (CERVIX REMOVED)      PTCA  10/2020    SHEFALI to RCA x 2; SHEFALI to LAD    SHOULDER ARTHROSCOPY Right 2015    Right shoulder arthroscopy labral debridement open [place] subacromial decompression and chrondroplasty    TRACHEOSTOMY N/A 2024    TRACHEOTOMY performed by Eleuterio Remy MD at [facility] OR    UPPER GASTROINTESTINAL ENDOSCOPY  2017    Esophagogastroduodenoscopy with biopsy    UPPER GASTROINTESTINAL ENDOSCOPY N/A 2024    ESOPHAGOGASTRODUODENOSCOPY BIOPSY performed by

## 2025-07-17 NOTE — PROGRESS NOTES
Infectious Diseases   Progress Note      Admission Date: 7/11/2025  Hospital Day: Hospital Day: 7   Attending: Samm Corbin MD  Date of service: 7/17/2025     Chief complaint/ Reason for consult:       Infected sacral pressure ulcer with contiguous osteomyelitis  Left lower lobe aspiration pneumonia  Bacteriuria versus complicated urinary tract infection  Nausea and vomiting on admission  History of MRSA, ESBL and Carbapenem resistant Enterobacteriaceae as well as Candida auris  Need for contact isolation  History of anoxic brain injury in September 2024    Microbiology:      I have reviewed allavailable micro lab data and cultures    Results       Procedure Component Value Units Date/Time    MRSA DNA Probe, Nasal [[id number]] Collected: 07/13/25 1833    Order Status: Completed Specimen: Nares Updated: 07/14/25 1536     MRSA SCREEN RT-PCR --     Negative  MRSA DNA not detected.  Normal Range: Not detected      Narrative:      ORDER#: [id number]                          ORDERED BY: UNKNOWN, DOCTOR  SOURCE: Nares                              COLLECTED:  07/13/25 18:33  ANTIBIOTICS AT [place].:                      RECEIVED :  07/14/25 03:48    Culture, Respiratory [[id number]]     Order Status: Sent Specimen: Tracheal Aspirate     MRSA DNA Probe, Nasal [[id number]] Collected: 07/13/25 0000    Order Status: Canceled Specimen: Nares     Culture, Blood 2 [[id number]] Collected: 07/12/25 0004    Order Status: Completed Specimen: Blood Updated: 07/16/25 0115     Culture, Blood 2 No Growth after 4 days of incubation.    Narrative:      ORDER#: [id number]                          ORDERED BY: CRISTINO BARCENAS  SOURCE: Blood Hand, Left                   COLLECTED:  07/12/25 00:04  ANTIBIOTICS AT [place].:                      RECEIVED :  07/12/25 12:16  If child <=2 yrs old please draw pediatric bottle.~Blood Culture #2    Culture, Blood 1 [[id number]] Collected: 07/11/25 2314    Order Status: Completed Specimen: Blood  8.6* 8.6*   HCT 24.1* 25.1* 25.7* 24.7*     --  150 149   MCV 92.3  --  93.1 92.6   MCH 32.0  --  31.3 32.2   MCHC 34.7  --  33.7 34.8   RDW 16.3*  --  16.1* 16.2*        BMP:  Recent Labs     07/15/25  0139 07/16/25  0431 07/17/25  0416    143 143   K 3.8 3.6  3.6 3.6    107 107   CO2 26 25 24   BUN 17 16 19   CREATININE 0.8 0.7 0.8   CALCIUM 9.2 9.3 9.1   GLUCOSE 126* 132* 208*        Hepatic Function Panel:   Lab Results   Component Value Date/Time    ALKPHOS 82 07/17/2025 04:16 AM    ALT 12 07/17/2025 04:16 AM    AST 13 07/17/2025 04:16 AM    BILITOT 0.4 07/17/2025 04:16 AM    BILIDIR 0.2 07/16/2025 04:31 AM    IBILI 0.2 07/16/2025 04:31 AM       CPK: No results found for: \"CKTOTAL\"  ESR:   Lab Results   Component Value Date    SEDRATE 44 (H) 07/16/2025     CRP:   Lab Results   Component Value Date    CRP 16.2 (H) 07/16/2025           Imaging:    All pertinent images and reports for the current visit were reviewed by me during this visit.  I reviewed the chest x-ray/CT scan/MRI images today and independently interpreted the findings and results today.    XR ABDOMEN (KUB) (SINGLE AP VIEW)   Final Result   No evidence of bowel obstruction.         CT HEAD WO CONTRAST   Final Result   No acute intracranial abnormality.  Advanced parenchymal atrophy and severe   white matter disease.  This has significantly progressed from examination 11   months prior.         CT CHEST ABDOMEN PELVIS W CONTRAST Additional Contrast? None   Final Result   Left lower lobe infiltrate suggestive of pneumonia.      Sacral decubitus ulcer with underlying sacrococcygeal erosive changes   suggestive of osteomyelitis.  Suspect small bilateral abscesses posterior to   the acetabulum.      Punctate right nephrolithiasis.      Moderate severe atherosclerotic disease.         XR CHEST PORTABLE   Final Result      Lungs are clear             Medications: All current and past medications were reviewed.     ciprofloxacin  500 mg

## 2025-07-17 NOTE — CARE COORDINATION
Case Management Assessment  Initial Evaluation    Date/Time of Evaluation: 7/17/2025 12:06 PM  Assessment Completed by: Nabila Sanches RN    If patient is discharged prior to next notation, then this note serves as note for discharge by case management.    Patient Name: Lindsay Lucio                   YOB: 1965  Diagnosis: Hyperkalemia [E87.5]  Septicemia (HCC) [A41.9]  Ventilator dependent (HCC) [Z99.11]  NSTEMI (non-ST elevated myocardial infarction) (HCC) [I21.4]  Sepsis (HCC) [A41.9]  Pneumonia of left lower lobe due to infectious organism [J18.9]  Pressure injury of skin of sacral region, unspecified injury stage [L89.159]                   Date / Time: 7/11/2025 10:55 PM    Patient Admission Status: Inpatient   Readmission Risk (Low < 19, Mod (19-27), High > 27): Readmission Risk Score: 15.5    Current PCP: Rosario Stevens MD  PCP verified by CM? (P) Yes (facility MD)    Chart Reviewed: Yes      History Provided by: Medical Record, Other (see comment) (Mary Ellen dickey/Amandeep)  Patient Orientation: Other (see comment) (Vent/Trach)    Patient Cognition: Severely Impaired (Vent/Trach)    Hospitalization in the last 30 days (Readmission):  No    If yes, Readmission Assessment in  Navigator will be completed.    Advance Directives:      Code Status: Full Code   Patient's Primary Decision Maker is: (P) Named in Scanned ACP Document (Legal Guardian)    Primary Decision Maker: NaveedMary Ellen christian - Child, Legal Guardian - [phone]    Supplemental (Other) Decision Maker: NaveedLisa Guerda Child - [phone]    Supplemental (Other) Decision Maker: Meka Lucios Guerda Child - [phone]    Discharge Planning:    Patient lives with: (P) Other (Comment) ([facility]) Type of Home: (P) Long-Term Care ([place])  Primary Care Giver: Other (Comment) ([facility] staff)  Patient Support Systems include: Children   Current Financial resources: (P) Medicaid  Current community resources: (P)

## 2025-07-17 NOTE — PROGRESS NOTES
PULMONARY AND CRITICAL CARE MEDICINE PROGRESS NOTE    Subjective: Patient has issues with dry heaving and vomiting when she is laid flat to turn in the bed.  Remains on trickle J-tube feedings.  G-tube to gravity.    ROS could not be obtained due to patient factors.     MEDICATIONS:     Scheduled Meds:   ciprofloxacin  500 mg Oral 2 times per day    ipratropium 0.5 mg-albuterol 2.5 mg  1 Dose Inhalation TID RT    metoclopramide  10 mg IntraVENous q8h    [Held by provider] apixaban  2.5 mg Oral BID    aspirin  81 mg Oral Daily    atorvastatin  20 mg Per G Tube Daily    budesonide-formoterol  2 puff Inhalation BID RT    sodium chloride flush  5-40 mL IntraVENous 2 times per day    sodium chloride flush  5-40 mL IntraVENous 2 times per day    insulin lispro  0-4 Units SubCUTAneous 4x Daily AC & HS    pantoprazole  40 mg IntraVENous Daily    lactobacillus  1 capsule Oral BID WC    carvedilol  6.25 mg Per G Tube BID WC       Current Infusions:    sodium chloride      dextrose      sodium chloride      sodium chloride 10 mL/hr at 07/17/25 0414       PRN meds:  sodium chloride, dextrose bolus **OR** dextrose bolus, glucagon (rDNA), dextrose, ipratropium, sodium chloride flush, sodium chloride, potassium chloride **OR** potassium alternative oral replacement **OR** potassium chloride, magnesium sulfate, polyethylene glycol, acetaminophen **OR** acetaminophen, sodium chloride flush, sodium chloride, ondansetron    PHYSICAL EXAM:  BP (!) 142/59   Pulse 68   Temp 96.8 °F (36 °C) (Temporal)   Resp 16   Ht 1.524 m (5')   Wt 71.7 kg (158 lb 1.1 oz)   LMP 11/30/2005   SpO2 96%   BMI 30.87 kg/m²  I/O last 3 completed shifts:  In: 1341.2 [I.V.:284.2; Blood:300; NG/GT:557; IV Piggyback:200]  Out: 870 [Urine:650; Emesis/NG output:220] I/O this shift:  In: 168 [NG/GT:168]  Out: 250 [Urine:100; Emesis/NG output:150]    Intake/Output Summary (Last 24 hours) at 7/17/2025 1339  Last data filed at 7/17/2025 1332  Gross per 24

## 2025-07-17 NOTE — PROGRESS NOTES
ICU Resident  Progress Note    Name:  Lindsay Lucio    /Age/Sex: 1965  (60 y.o. female)  MRN & CSN:  [id number] & [id number]    Date of Admission: 2025    Chief Complaint:   Chief Complaint   Patient presents with    Vomiting     Pt. From [facility] and arrived by [facility]. Pt. Has been vomiting bile and large amount of bile draining from J tube.      Hospital Course:   60-year-old female with with a PMHx of anoxic brain injury cardiac arrest during EGD/colonoscopy 2024, requiring a chronic trach/PEJ tube placement.  Patient is a resident at the [facility]. She was hospitalized at [facility] 2025 and 2025 for PEJ tube malfunction/leak and infection. The J-tube was repositioned and skin closure was provided. There was also a concurrent infection with Candida auris fungemia. Patient has a prior history of MDRO Klebsiella and Pseudomonas pneumonia, CRE, ESBL related to the recurrent aspiration. Patient was discharged back to [facility] on 2025. Patient admitted back to [facility] on 2025. Cefepime 2000 mg, furosemide 40 mg, meropenem 1000 mg, vancomycin 1000 mg, and Flagyl 500 mg administered on 2025. All except meropenem 1000 mg continued. 7/15/2025 patient continued to have bilious vomiting even prior to tube feeds being initiated. Patient's elder daughter, who was present during rounds, voiced a concern that she would have handled care differently for her mother if she could. Further investigation revealed patient's youngest daughter has power of  and who is making all the decisions currently. Per the elder daughter, she would want her mother to be comfortable and would have avoided tube feeds and trach/vents. Ethics team confirmed that the youngest daughter Mary Ellen Lucio is in fact the appropriate surrogate decision maker and legal guardian. Patient has had numerous emesis episodes, not able

## 2025-07-17 NOTE — PROGRESS NOTES
Hospitalist Progress Note      PCP: Rosario Stevens MD    Date of Admission: 7/11/2025    LOS: 5    Chief Complaint:   Chief Complaint   Patient presents with    Vomiting     Pt. From [facility] and arrived by [facility]. Pt. Has been vomiting bile and large amount of bile draining from J tube.        Case Summary:   60-year-old lady with history of CAD, hyperlipidemia, COPD, chronic respiratory failure with trach in place, history of anoxic brain injury none interactive who was admitted from ECF with repeated episodes of vomiting at the nursing home found to have sepsis due to left lower lobe aspiration pneumonia, infected sacral pressure ulcer with contiguous osteomyelitis complicated by acute kidney injury and probable UTI and Candida aureus.  She had hyperkalemia which has since resolved, acute kidney injury now improved      Principal Problem:    Pneumonia of left lower lobe due to infectious organism    Sacral decubitus ulcer, stage IV with continuous osteomyelitis  - continue antibiotics and probiotics  - Continue wound care and pressure relieving techniques  - ID following with recommendations      Nausea and vomiting: Still with bilious aspirate via gastric tube in vomitus.  KUB with no evidence of bowel obstruction.  Intensivist following with plans for tube feeds trial today if tolerated.  Improved.  Started on trickle feeds.      Nutrition: Started on trickle feeds.  Will monitor for evidence of vomiting      Active Problems:    Mixed hyperlipidemia: On statin    Uncontrolled type 2 diabetes mellitus with hyperglycemia (HCC): Sliding scale insulin    Stage 2 moderate COPD: Without exacerbation.  Continue inhalers    Chronic anemia:  Status post PRBC 1 unit. Stable.  Will monitor.    Chronic respiratory failure with chronic trach: Stable.  Will monitor.  Intensivist/pulmonology on the vent    Brain injury with loss of consciousness (HCC): Nonverbal    Resolved Problems:    GABY (acute

## 2025-07-17 NOTE — PLAN OF CARE
Problem: Chronic Conditions and Co-morbidities  Goal: Patient's chronic conditions and co-morbidity symptoms are monitored and maintained or improved  7/17/2025 1611 by Lilian Padilla RN  Outcome: Progressing  7/17/2025 0340 by Moises Mcneil RN  Outcome: Progressing     Problem: Discharge Planning  Goal: Discharge to home or other facility with appropriate resources  7/17/2025 1611 by Lilian Padilla RN  Outcome: Progressing  7/17/2025 0340 by Moises Mcneil RN  Outcome: Progressing     Problem: Pain  Goal: Verbalizes/displays adequate comfort level or baseline comfort level  7/17/2025 1611 by Lilian Padilla RN  Outcome: Progressing  7/17/2025 0340 by Moises Mcneil RN  Outcome: Progressing     Problem: Safety - Adult  Goal: Free from fall injury  7/17/2025 1611 by Lilian Padilla RN  Outcome: Progressing  7/17/2025 0340 by Moises Mcneil RN  Outcome: Progressing     Problem: Skin/Tissue Integrity  Goal: Skin integrity remains intact  Description: 1.  Monitor for areas of redness and/or skin breakdown  2.  Assess vascular access sites hourly  3.  Every 4-6 hours minimum:  Change oxygen saturation probe site  4.  Every 4-6 hours:  If on nasal continuous positive airway pressure, respiratory therapy assess nares and determine need for appliance change or resting period  7/17/2025 1611 by Lilian Padilla RN  Outcome: Progressing  7/17/2025 0340 by Moises Mcneil RN  Outcome: Progressing     Problem: Nutrition Deficit:  Goal: Optimize nutritional status  7/17/2025 1611 by Lilian Padilla RN  Outcome: Progressing  Flowsheets (Taken 7/17/2025 1155 by Kiah Mackenzie, MS, RD, LD)  Nutrient intake appropriate for improving, restoring, or maintaining nutritional needs: Recommend, monitor, and adjust tube feedings and TPN/PPN based on assessed needs  7/17/2025 0340 by Moises Mcneil RN  Outcome: Progressing

## 2025-07-18 LAB
ALBUMIN SERPL-MCNC: 3 G/DL (ref 3.4–5)
ALBUMIN/GLOB SERPL: 0.7 {RATIO} (ref 1.1–2.2)
ALP SERPL-CCNC: 82 U/L (ref 40–129)
ALT SERPL-CCNC: 11 U/L (ref 10–40)
ANION GAP SERPL CALCULATED.3IONS-SCNC: 11 MMOL/L (ref 3–16)
AST SERPL-CCNC: 11 U/L (ref 15–37)
BASOPHILS # BLD: 0 K/UL (ref 0–0.2)
BASOPHILS NFR BLD: 0.4 %
BILIRUB SERPL-MCNC: 0.3 MG/DL (ref 0–1)
BUN SERPL-MCNC: 23 MG/DL (ref 7–20)
CALCIUM SERPL-MCNC: 9.2 MG/DL (ref 8.3–10.6)
CHLORIDE SERPL-SCNC: 107 MMOL/L (ref 99–110)
CO2 SERPL-SCNC: 25 MMOL/L (ref 21–32)
CREAT SERPL-MCNC: 0.9 MG/DL (ref 0.6–1.2)
DEPRECATED RDW RBC AUTO: 15.9 % (ref 12.4–15.4)
EOSINOPHIL # BLD: 0.1 K/UL (ref 0–0.6)
EOSINOPHIL NFR BLD: 2.4 %
GFR SERPLBLD CREATININE-BSD FMLA CKD-EPI: 73 ML/MIN/{1.73_M2}
GLUCOSE BLD-MCNC: 237 MG/DL (ref 70–99)
GLUCOSE BLD-MCNC: 250 MG/DL (ref 70–99)
GLUCOSE BLD-MCNC: 266 MG/DL (ref 70–99)
GLUCOSE BLD-MCNC: 267 MG/DL (ref 70–99)
GLUCOSE BLD-MCNC: 270 MG/DL (ref 70–99)
GLUCOSE BLD-MCNC: 272 MG/DL (ref 70–99)
GLUCOSE SERPL-MCNC: 233 MG/DL (ref 70–99)
HCT VFR BLD AUTO: 25.8 % (ref 36–48)
HGB BLD-MCNC: 8.9 G/DL (ref 12–16)
LYMPHOCYTES # BLD: 1.2 K/UL (ref 1–5.1)
LYMPHOCYTES NFR BLD: 23.7 %
MAGNESIUM SERPL-MCNC: 1.82 MG/DL (ref 1.8–2.4)
MCH RBC QN AUTO: 32.1 PG (ref 26–34)
MCHC RBC AUTO-ENTMCNC: 34.4 G/DL (ref 31–36)
MCV RBC AUTO: 93.3 FL (ref 80–100)
MONOCYTES # BLD: 0.5 K/UL (ref 0–1.3)
MONOCYTES NFR BLD: 9.7 %
NEUTROPHILS # BLD: 3.3 K/UL (ref 1.7–7.7)
NEUTROPHILS NFR BLD: 63.8 %
PERFORMED ON: ABNORMAL
PHOSPHATE SERPL-MCNC: 3.7 MG/DL (ref 2.5–4.9)
PLATELET # BLD AUTO: 156 K/UL (ref 135–450)
PMV BLD AUTO: 7.6 FL (ref 5–10.5)
POTASSIUM SERPL-SCNC: 3.6 MMOL/L (ref 3.5–5.1)
PROT SERPL-MCNC: 7.5 G/DL (ref 6.4–8.2)
RBC # BLD AUTO: 2.76 M/UL (ref 4–5.2)
SODIUM SERPL-SCNC: 143 MMOL/L (ref 136–145)
WBC # BLD AUTO: 5.2 K/UL (ref 4–11)

## 2025-07-18 PROCEDURE — 2500000003 HC RX 250 WO HCPCS

## 2025-07-18 PROCEDURE — 84100 ASSAY OF PHOSPHORUS: CPT

## 2025-07-18 PROCEDURE — 6370000000 HC RX 637 (ALT 250 FOR IP): Performed by: INTERNAL MEDICINE

## 2025-07-18 PROCEDURE — 94761 N-INVAS EAR/PLS OXIMETRY MLT: CPT

## 2025-07-18 PROCEDURE — 85025 COMPLETE CBC W/AUTO DIFF WBC: CPT

## 2025-07-18 PROCEDURE — 6370000000 HC RX 637 (ALT 250 FOR IP)

## 2025-07-18 PROCEDURE — 80053 COMPREHEN METABOLIC PANEL: CPT

## 2025-07-18 PROCEDURE — 2000000000 HC ICU R&B

## 2025-07-18 PROCEDURE — 6360000002 HC RX W HCPCS

## 2025-07-18 PROCEDURE — 94640 AIRWAY INHALATION TREATMENT: CPT

## 2025-07-18 PROCEDURE — 6360000002 HC RX W HCPCS: Performed by: STUDENT IN AN ORGANIZED HEALTH CARE EDUCATION/TRAINING PROGRAM

## 2025-07-18 PROCEDURE — G0545 PR INHERENT VISIT TO INPT: HCPCS | Performed by: INTERNAL MEDICINE

## 2025-07-18 PROCEDURE — 94003 VENT MGMT INPAT SUBQ DAY: CPT

## 2025-07-18 PROCEDURE — 99232 SBSQ HOSP IP/OBS MODERATE 35: CPT | Performed by: INTERNAL MEDICINE

## 2025-07-18 PROCEDURE — 2580000003 HC RX 258: Performed by: HOSPITALIST

## 2025-07-18 PROCEDURE — 83735 ASSAY OF MAGNESIUM: CPT

## 2025-07-18 PROCEDURE — 2700000000 HC OXYGEN THERAPY PER DAY

## 2025-07-18 PROCEDURE — 6370000000 HC RX 637 (ALT 250 FOR IP): Performed by: STUDENT IN AN ORGANIZED HEALTH CARE EDUCATION/TRAINING PROGRAM

## 2025-07-18 PROCEDURE — 6370000000 HC RX 637 (ALT 250 FOR IP): Performed by: HOSPITALIST

## 2025-07-18 RX ORDER — INSULIN LISPRO 100 [IU]/ML
0-8 INJECTION, SOLUTION INTRAVENOUS; SUBCUTANEOUS
Status: DISCONTINUED | OUTPATIENT
Start: 2025-07-18 | End: 2025-07-19

## 2025-07-18 RX ORDER — SODIUM CHLORIDE 9 MG/ML
INJECTION, SOLUTION INTRAVENOUS CONTINUOUS
Status: DISCONTINUED | OUTPATIENT
Start: 2025-07-18 | End: 2025-07-21 | Stop reason: HOSPADM

## 2025-07-18 RX ADMIN — CIPROFLOXACIN HYDROCHLORIDE 500 MG: 500 TABLET, FILM COATED ORAL at 09:01

## 2025-07-18 RX ADMIN — INSULIN LISPRO 4 UNITS: 100 INJECTION, SOLUTION INTRAVENOUS; SUBCUTANEOUS at 12:02

## 2025-07-18 RX ADMIN — IPRATROPIUM BROMIDE AND ALBUTEROL SULFATE 1 DOSE: .5; 3 SOLUTION RESPIRATORY (INHALATION) at 15:57

## 2025-07-18 RX ADMIN — Medication 10 ML: at 09:01

## 2025-07-18 RX ADMIN — SODIUM CHLORIDE: 0.9 INJECTION, SOLUTION INTRAVENOUS at 19:28

## 2025-07-18 RX ADMIN — SODIUM CHLORIDE: 0.9 INJECTION, SOLUTION INTRAVENOUS at 06:00

## 2025-07-18 RX ADMIN — INSULIN LISPRO 4 UNITS: 100 INJECTION, SOLUTION INTRAVENOUS; SUBCUTANEOUS at 20:48

## 2025-07-18 RX ADMIN — Medication 1 CAPSULE: at 16:50

## 2025-07-18 RX ADMIN — METOCLOPRAMIDE HYDROCHLORIDE 10 MG: 5 INJECTION INTRAMUSCULAR; INTRAVENOUS at 22:15

## 2025-07-18 RX ADMIN — IPRATROPIUM BROMIDE AND ALBUTEROL SULFATE 1 DOSE: .5; 3 SOLUTION RESPIRATORY (INHALATION) at 19:29

## 2025-07-18 RX ADMIN — Medication 1 CAPSULE: at 09:01

## 2025-07-18 RX ADMIN — INSULIN LISPRO 4 UNITS: 100 INJECTION, SOLUTION INTRAVENOUS; SUBCUTANEOUS at 17:11

## 2025-07-18 RX ADMIN — PANTOPRAZOLE SODIUM 40 MG: 40 INJECTION, POWDER, FOR SOLUTION INTRAVENOUS at 09:00

## 2025-07-18 RX ADMIN — ASPIRIN 81 MG: 81 TABLET, CHEWABLE ORAL at 09:01

## 2025-07-18 RX ADMIN — Medication 10 ML: at 20:49

## 2025-07-18 RX ADMIN — CARVEDILOL 6.25 MG: 6.25 TABLET, FILM COATED ORAL at 09:01

## 2025-07-18 RX ADMIN — APIXABAN 2.5 MG: 2.5 TABLET, FILM COATED ORAL at 09:01

## 2025-07-18 RX ADMIN — METOCLOPRAMIDE HYDROCHLORIDE 10 MG: 5 INJECTION INTRAMUSCULAR; INTRAVENOUS at 05:52

## 2025-07-18 RX ADMIN — CIPROFLOXACIN HYDROCHLORIDE 500 MG: 500 TABLET, FILM COATED ORAL at 20:48

## 2025-07-18 RX ADMIN — Medication 2 PUFF: at 07:57

## 2025-07-18 RX ADMIN — CARVEDILOL 6.25 MG: 6.25 TABLET, FILM COATED ORAL at 16:50

## 2025-07-18 RX ADMIN — APIXABAN 2.5 MG: 2.5 TABLET, FILM COATED ORAL at 20:48

## 2025-07-18 RX ADMIN — Medication 2 PUFF: at 19:29

## 2025-07-18 RX ADMIN — ATORVASTATIN CALCIUM 20 MG: 20 TABLET, FILM COATED ORAL at 09:01

## 2025-07-18 RX ADMIN — METOCLOPRAMIDE HYDROCHLORIDE 10 MG: 5 INJECTION INTRAMUSCULAR; INTRAVENOUS at 13:56

## 2025-07-18 RX ADMIN — IPRATROPIUM BROMIDE AND ALBUTEROL SULFATE 1 DOSE: .5; 3 SOLUTION RESPIRATORY (INHALATION) at 07:57

## 2025-07-18 RX ADMIN — INSULIN LISPRO 2 UNITS: 100 INJECTION, SOLUTION INTRAVENOUS; SUBCUTANEOUS at 05:52

## 2025-07-18 ASSESSMENT — PULMONARY FUNCTION TESTS
PIF_VALUE: 19
PIF_VALUE: 17
PIF_VALUE: 15
PIF_VALUE: 20
PIF_VALUE: 21
PIF_VALUE: 19

## 2025-07-18 ASSESSMENT — PAIN SCALES - GENERAL
PAINLEVEL_OUTOF10: 0

## 2025-07-18 NOTE — CARE COORDINATION
Discharge Planning Note:     Chart reviewed:IP LOS day 6    Risk Score: [facility] RISK OF UNPLANNED READMISSION 2.0             15.5 Total Score         Primary Care Physician is: Rosario Stevens MD    Primary insurance is:[facility]      Writer received a voice message from Delmy with [employer] garcia approved.     Case management will continue to follow progress and update discharge plan as needed.    Electronically signed by Nabila Sanches RN on 7/18/2025 at 12:07 PM

## 2025-07-18 NOTE — PROGRESS NOTES
Patient laid down to change wound dressing and erica-care, turn and reposition, she had much green bile out from mouth, it appears to come out of mouth when coughing, large hernia pulls upward and bile comes out.

## 2025-07-18 NOTE — PROGRESS NOTES
Spiritual Health History and Assessment/Progress Note  [facility]    (P) Spiritual/Emotional Needs,  ,  ,      Name: Lindsay Lucio MRN: [id number]    Age: 60 y.o.     Sex: female   Language: English   Anabaptist: Anabaptism   Septicemia (HCC)     Date: 7/18/2025            Total Time Calculated: (P) 6 min              Spiritual Assessment began in [facility] ICU        Referral/Consult From: (P) Rounding   Encounter Overview/Reason: (P) Spiritual/Emotional Needs  Service Provided For: (P) Patient    Catrachita, Belief, Meaning:   Patient unable to assess at this time  Family/Friends No family/friends present      Importance and Influence:  Patient unable to assess at this time  Family/Friends No family/friends present    Community:  Patient Other: unable to assess at this time  Family/Friends No family/friends present    Assessment and Plan of Care:     Patient Interventions include: Other: Offered prayer  Family/Friends Interventions include: No family/friends present    Patient Plan of Care: Spiritual Care available upon further referral  Family/Friends Plan of Care: No family/friends present    Electronically signed by Chaplain Estuardo on 7/18/2025 at 5:56 PM

## 2025-07-18 NOTE — PROGRESS NOTES
Hospitalist Progress Note      PCP: Rosario Stevens MD    Date of Admission: 7/11/2025    LOS: 6    Chief Complaint:   Chief Complaint   Patient presents with    Vomiting     Pt. From [facility] and arrived by [facility]. Pt. Has been vomiting bile and large amount of bile draining from J tube.        Case Summary:   60-year-old lady with history of CAD, hyperlipidemia, COPD, chronic respiratory failure with trach in place, history of anoxic brain injury none interactive who was admitted from F with repeated episodes of vomiting at the nursing home found to have sepsis due to left lower lobe aspiration pneumonia, infected sacral pressure ulcer with contiguous osteomyelitis complicated by acute kidney injury and probable UTI and Candida aureus.  She had hyperkalemia which has since resolved, acute kidney injury now improved      Principal Problem:    Pneumonia of left lower lobe due to infectious organism    Sacral decubitus ulcer, stage IV with continuous osteomyelitis  Slowly improving.  Remains afebrile with no leukocytosis no further vomiting being propped up while feeding  - Continue antibiotics as per ID and probiotics  - Continue pressure relieving techniques  - ID following with recommendations      Nausea and vomiting: Increase tube feeds with good tolerance.  Will monitor for evidence of regurgitation.  Minimal gastrostomy.      Nutrition: On J-tube feeds with good tolerance.  Monitor residuals and for evidence of vomiting.  If tolerating at goal without vomiting, will consider discharge in the next 24 to 48 hours      Chronic respiratory failure with chronic trach: Stable.  Will monitor.     Active Problems:    Mixed hyperlipidemia: On statin    Uncontrolled type 2 diabetes mellitus with hyperglycemia (HCC): Sliding scale insulin    Stage 2 moderate COPD: Without exacerbation.  Continue inhalers    Chronic anemia:  Status post PRBC 1 unit. Stable.  Will monitor.      Brain injury with

## 2025-07-18 NOTE — PROGRESS NOTES
10/06/2020    Arthritis     Back pain     CAD (coronary artery disease)     CHF (congestive heart failure) (HCC)     Chronic kidney disease     COPD (chronic obstructive pulmonary disease) (HCC)     Depression     Diabetes mellitus (HCC)     REHMAN (dyspnea on exertion)     Eye abnormalities     bleed in back of eyes with injections Q 8 weeks    Fatty liver     GERD (gastroesophageal reflux disease)     Hx of blood clots     ? PE 3 weeks ago ~    Hyperlipidemia     Hypertension     MRSA (methicillin resistant staph aureus) culture positive 08/15/2018    arm    On home O2     2 l/m    Oxygen deficiency     PVD (peripheral vascular disease)     Stress incontinence     Thyroid disease     CYST ON THYROID--FOUND 20 YEARS AGO    Ventilator dependence ([facility])        Past Surgical History: All past surgical history was reviewed today.    Past Surgical History:   Procedure Laterality Date    ANKLE FRACTURE SURGERY Right 2023    RIGHT OPEN REDUCTION AND INTERNAL FIXATION LATERAL MALLEOLUS AND SYNDESMOTIC REPAIR-MICHAEL performed by Wyatt Garsia MD at [facility] OR    CARDIAC CATHETERIZATION      X 2     SECTION      TIMES 3    CHOLECYSTECTOMY      COLONOSCOPY  2017      Colonoscopy with snare and biopsy    COLONOSCOPY N/A 2024    COLONOSCOPY performed by Ja Guadarrama MD at [facility] ENDOSCOPY    ESOPHAGEAL DILATATION  2024    ESOPHAGEAL DILATION GARCIA performed by Ja Guadarrama MD at [facility] ENDOSCOPY    EYE SURGERY Bilateral     muscle correction and eyelid     HERNIA REPAIR      UMBILICAL AREA x 2    HYSTERECTOMY, TOTAL ABDOMINAL (CERVIX REMOVED)      PTCA  10/2020    SHEFALI to RCA x 2; SHEFALI to LAD    SHOULDER ARTHROSCOPY Right 2015    Right shoulder arthroscopy labral debridement open nedra subacromial decompression and chrondroplasty    TRACHEOSTOMY N/A 2024    TRACHEOTOMY performed by Eleuterio Remy MD at [facility] OR    UPPER GASTROINTESTINAL ENDOSCOPY  2017        Acute exacerbation of chronic obstructive pulmonary disease (COPD) ([facility]) J44.1    Community acquired pneumonia J18.9    Hyperglycemia R73.9    Bronchopneumonia J18.0    Hypoxia R09.02    Pleural effusion, bilateral J90    Atherosclerosis of native coronary artery of native heart without angina pectoris I25.10    Positive blood culture R78.81    Essential hypertension I10    Class 2 obesity due to excess calories with body mass index (BMI) of 39.0 to 39.9 in adult E66.812, E66.09, Z68.39    Diabetes education, encounter for Z71.89    Weight loss counseling, encounter for Z71.3    Closed displaced fracture of lateral malleolus of right fibula S82.61XA    Sprain of anterior talofibular ligament of left ankle S93.492A    Syndesmotic disruption of right ankle S93.431A    Pneumonia of left lower lobe due to infectious organism J18.9    Cardiac arrest ([facility]) I46.9    Chronic anemia D64.9    Severe sepsis ([facility]) A41.9, R65.20    Infiltrate noted on imaging study R93.89    Sacral decubitus ulcer, stage IV ([facility]) L89.154    GABY (acute kidney injury) N17.9    Chronic osteomyelitis of pelvic region ([facility]) M86.659    Chronic respiratory failure ([facility]) J96.10    Brain injury with loss of consciousness ([facility]) S06.9X9A    Ventilator dependent ([facility]) Z99.11    Hyperkalemia E87.5    NSTEMI (non-ST elevated myocardial infarction) ([facility]) I21.4    History of infection with vancomycin resistant Enterococcus (VRE) Z86.19    History of methicillin resistant staphylococcus aureus (MRSA) Z86.14    Infection requiring contact isolation precautions B99.9    Severe malnutrition E43       Please note that this chart was generated using Dragon dictation software. Although every effort was made to ensure the accuracy of this automated transcription, some errors in transcription may have occurred inadvertently. If you may need any clarification, please do not hesitate to contact me through EPIC or at the phone number provided below with my electronic

## 2025-07-18 NOTE — PROGRESS NOTES
BRIEF NUTRITION NOTE    Per RN (Lilian) pt is tolerating Vital AF 1.2 at 40 mL/hr with water flush 30 mL q 4 hours. Lytes (K+, Mg+ and Phos) are stable. LBM 7/17. Will increase tube feeds to goal rate and monitor tolerance.     Recommendations:  Continue G tube to gravity.  Vital AF 1.2 (peptide based formula) via J tube at goal rate 55 mL/hr.   Continue water flush 30 mL q 4 hours for tube maintenance.   If pt has mild emesis consisting of bile only, particularly with repositioning, please do not stop tube feeds.     Electronically signed by Kiah Mackenzie MS, RD, LD on 7/18/2025 at 10:56 AM

## 2025-07-19 ENCOUNTER — APPOINTMENT (OUTPATIENT)
Dept: GENERAL RADIOLOGY | Age: 60
DRG: 710 | End: 2025-07-19
Payer: COMMERCIAL

## 2025-07-19 LAB
ALBUMIN SERPL-MCNC: 2.8 G/DL (ref 3.4–5)
ALBUMIN/GLOB SERPL: 0.7 {RATIO} (ref 1.1–2.2)
ALP SERPL-CCNC: 75 U/L (ref 40–129)
ALT SERPL-CCNC: 6 U/L (ref 10–40)
ANION GAP SERPL CALCULATED.3IONS-SCNC: 10 MMOL/L (ref 3–16)
AST SERPL-CCNC: 11 U/L (ref 15–37)
BASOPHILS # BLD: 0 K/UL (ref 0–0.2)
BASOPHILS NFR BLD: 0.4 %
BILIRUB SERPL-MCNC: 0.3 MG/DL (ref 0–1)
BUN SERPL-MCNC: 22 MG/DL (ref 7–20)
CALCIUM SERPL-MCNC: 8.7 MG/DL (ref 8.3–10.6)
CHLORIDE SERPL-SCNC: 110 MMOL/L (ref 99–110)
CO2 SERPL-SCNC: 22 MMOL/L (ref 21–32)
CREAT SERPL-MCNC: 0.7 MG/DL (ref 0.6–1.2)
DEPRECATED RDW RBC AUTO: 15.9 % (ref 12.4–15.4)
EOSINOPHIL # BLD: 0.1 K/UL (ref 0–0.6)
EOSINOPHIL NFR BLD: 2 %
GFR SERPLBLD CREATININE-BSD FMLA CKD-EPI: >90 ML/MIN/{1.73_M2}
GLUCOSE BLD-MCNC: 230 MG/DL (ref 70–99)
GLUCOSE BLD-MCNC: 276 MG/DL (ref 70–99)
GLUCOSE BLD-MCNC: 288 MG/DL (ref 70–99)
GLUCOSE BLD-MCNC: 305 MG/DL (ref 70–99)
GLUCOSE SERPL-MCNC: 281 MG/DL (ref 70–99)
HCT VFR BLD AUTO: 25.9 % (ref 36–48)
HGB BLD-MCNC: 8.5 G/DL (ref 12–16)
LYMPHOCYTES # BLD: 0.9 K/UL (ref 1–5.1)
LYMPHOCYTES NFR BLD: 20.8 %
MAGNESIUM SERPL-MCNC: 1.57 MG/DL (ref 1.8–2.4)
MCH RBC QN AUTO: 31.3 PG (ref 26–34)
MCHC RBC AUTO-ENTMCNC: 32.7 G/DL (ref 31–36)
MCV RBC AUTO: 95.6 FL (ref 80–100)
MONOCYTES # BLD: 0.4 K/UL (ref 0–1.3)
MONOCYTES NFR BLD: 8.2 %
NEUTROPHILS # BLD: 3.1 K/UL (ref 1.7–7.7)
NEUTROPHILS NFR BLD: 68.6 %
PERFORMED ON: ABNORMAL
PHOSPHATE SERPL-MCNC: 3.3 MG/DL (ref 2.5–4.9)
PLATELET # BLD AUTO: 147 K/UL (ref 135–450)
PMV BLD AUTO: 7.9 FL (ref 5–10.5)
POTASSIUM SERPL-SCNC: 3.6 MMOL/L (ref 3.5–5.1)
PROT SERPL-MCNC: 6.9 G/DL (ref 6.4–8.2)
RBC # BLD AUTO: 2.71 M/UL (ref 4–5.2)
SODIUM SERPL-SCNC: 142 MMOL/L (ref 136–145)
WBC # BLD AUTO: 4.5 K/UL (ref 4–11)

## 2025-07-19 PROCEDURE — 94762 N-INVAS EAR/PLS OXIMTRY CONT: CPT

## 2025-07-19 PROCEDURE — 2000000000 HC ICU R&B

## 2025-07-19 PROCEDURE — 6370000000 HC RX 637 (ALT 250 FOR IP): Performed by: INTERNAL MEDICINE

## 2025-07-19 PROCEDURE — 36415 COLL VENOUS BLD VENIPUNCTURE: CPT

## 2025-07-19 PROCEDURE — 6370000000 HC RX 637 (ALT 250 FOR IP): Performed by: HOSPITALIST

## 2025-07-19 PROCEDURE — 2700000000 HC OXYGEN THERAPY PER DAY

## 2025-07-19 PROCEDURE — 6360000002 HC RX W HCPCS: Performed by: STUDENT IN AN ORGANIZED HEALTH CARE EDUCATION/TRAINING PROGRAM

## 2025-07-19 PROCEDURE — 84100 ASSAY OF PHOSPHORUS: CPT

## 2025-07-19 PROCEDURE — 80053 COMPREHEN METABOLIC PANEL: CPT

## 2025-07-19 PROCEDURE — 94761 N-INVAS EAR/PLS OXIMETRY MLT: CPT

## 2025-07-19 PROCEDURE — 83735 ASSAY OF MAGNESIUM: CPT

## 2025-07-19 PROCEDURE — 85025 COMPLETE CBC W/AUTO DIFF WBC: CPT

## 2025-07-19 PROCEDURE — 94640 AIRWAY INHALATION TREATMENT: CPT

## 2025-07-19 PROCEDURE — 2500000003 HC RX 250 WO HCPCS

## 2025-07-19 PROCEDURE — 6360000002 HC RX W HCPCS

## 2025-07-19 PROCEDURE — 74018 RADEX ABDOMEN 1 VIEW: CPT

## 2025-07-19 PROCEDURE — 2580000003 HC RX 258: Performed by: HOSPITALIST

## 2025-07-19 PROCEDURE — 6370000000 HC RX 637 (ALT 250 FOR IP)

## 2025-07-19 PROCEDURE — 94003 VENT MGMT INPAT SUBQ DAY: CPT

## 2025-07-19 PROCEDURE — 6370000000 HC RX 637 (ALT 250 FOR IP): Performed by: STUDENT IN AN ORGANIZED HEALTH CARE EDUCATION/TRAINING PROGRAM

## 2025-07-19 RX ORDER — INSULIN LISPRO 100 [IU]/ML
0-8 INJECTION, SOLUTION INTRAVENOUS; SUBCUTANEOUS EVERY 4 HOURS
Status: DISCONTINUED | OUTPATIENT
Start: 2025-07-19 | End: 2025-07-20

## 2025-07-19 RX ADMIN — SODIUM CHLORIDE: 0.9 INJECTION, SOLUTION INTRAVENOUS at 05:32

## 2025-07-19 RX ADMIN — ATORVASTATIN CALCIUM 20 MG: 20 TABLET, FILM COATED ORAL at 07:58

## 2025-07-19 RX ADMIN — IPRATROPIUM BROMIDE AND ALBUTEROL SULFATE 1 DOSE: .5; 3 SOLUTION RESPIRATORY (INHALATION) at 08:02

## 2025-07-19 RX ADMIN — CIPROFLOXACIN HYDROCHLORIDE 500 MG: 500 TABLET, FILM COATED ORAL at 07:58

## 2025-07-19 RX ADMIN — Medication 1 CAPSULE: at 16:34

## 2025-07-19 RX ADMIN — ASPIRIN 81 MG: 81 TABLET, CHEWABLE ORAL at 07:59

## 2025-07-19 RX ADMIN — METOCLOPRAMIDE HYDROCHLORIDE 10 MG: 5 INJECTION INTRAMUSCULAR; INTRAVENOUS at 21:42

## 2025-07-19 RX ADMIN — PANTOPRAZOLE SODIUM 40 MG: 40 INJECTION, POWDER, FOR SOLUTION INTRAVENOUS at 07:59

## 2025-07-19 RX ADMIN — Medication 10 ML: at 20:16

## 2025-07-19 RX ADMIN — METOCLOPRAMIDE HYDROCHLORIDE 10 MG: 5 INJECTION INTRAMUSCULAR; INTRAVENOUS at 05:46

## 2025-07-19 RX ADMIN — IPRATROPIUM BROMIDE AND ALBUTEROL SULFATE 1 DOSE: .5; 3 SOLUTION RESPIRATORY (INHALATION) at 20:41

## 2025-07-19 RX ADMIN — INSULIN LISPRO 4 UNITS: 100 INJECTION, SOLUTION INTRAVENOUS; SUBCUTANEOUS at 16:35

## 2025-07-19 RX ADMIN — Medication 10 ML: at 21:43

## 2025-07-19 RX ADMIN — Medication 1 CAPSULE: at 07:59

## 2025-07-19 RX ADMIN — Medication 2 PUFF: at 20:41

## 2025-07-19 RX ADMIN — CIPROFLOXACIN HYDROCHLORIDE 500 MG: 500 TABLET, FILM COATED ORAL at 20:15

## 2025-07-19 RX ADMIN — INSULIN LISPRO 2 UNITS: 100 INJECTION, SOLUTION INTRAVENOUS; SUBCUTANEOUS at 20:15

## 2025-07-19 RX ADMIN — Medication 2 PUFF: at 08:02

## 2025-07-19 RX ADMIN — Medication 10 ML: at 08:00

## 2025-07-19 RX ADMIN — IPRATROPIUM BROMIDE AND ALBUTEROL SULFATE 1 DOSE: .5; 3 SOLUTION RESPIRATORY (INHALATION) at 15:53

## 2025-07-19 RX ADMIN — INSULIN LISPRO 4 UNITS: 100 INJECTION, SOLUTION INTRAVENOUS; SUBCUTANEOUS at 05:46

## 2025-07-19 RX ADMIN — INSULIN LISPRO 6 UNITS: 100 INJECTION, SOLUTION INTRAVENOUS; SUBCUTANEOUS at 11:17

## 2025-07-19 RX ADMIN — APIXABAN 2.5 MG: 2.5 TABLET, FILM COATED ORAL at 20:15

## 2025-07-19 RX ADMIN — CARVEDILOL 6.25 MG: 6.25 TABLET, FILM COATED ORAL at 16:34

## 2025-07-19 RX ADMIN — APIXABAN 2.5 MG: 2.5 TABLET, FILM COATED ORAL at 07:58

## 2025-07-19 RX ADMIN — METOCLOPRAMIDE HYDROCHLORIDE 10 MG: 5 INJECTION INTRAMUSCULAR; INTRAVENOUS at 14:51

## 2025-07-19 RX ADMIN — CARVEDILOL 6.25 MG: 6.25 TABLET, FILM COATED ORAL at 07:59

## 2025-07-19 RX ADMIN — MAGNESIUM SULFATE HEPTAHYDRATE 2000 MG: 40 INJECTION, SOLUTION INTRAVENOUS at 07:58

## 2025-07-19 ASSESSMENT — PULMONARY FUNCTION TESTS
PIF_VALUE: 18
PIF_VALUE: 16
PIF_VALUE: 15
PIF_VALUE: 12
PIF_VALUE: 10
PIF_VALUE: 15

## 2025-07-19 ASSESSMENT — PAIN SCALES - GENERAL
PAINLEVEL_OUTOF10: 0

## 2025-07-19 NOTE — PROGRESS NOTES
Hospitalist Progress Note      PCP: Rosario Stevens MD    Date of Admission: 7/11/2025    LOS: 7    Chief Complaint:   Chief Complaint   Patient presents with    Vomiting     Pt. From [facility] and arrived by [facility]. Pt. Has been vomiting bile and large amount of bile draining from J tube.        Case Summary:   60-year-old lady with history of CAD, hyperlipidemia, COPD, chronic respiratory failure with trach in place, history of anoxic brain injury none interactive who was admitted from F with repeated episodes of vomiting at the nursing home found to have sepsis due to left lower lobe aspiration pneumonia, infected sacral pressure ulcer with contiguous osteomyelitis complicated by acute kidney injury and probable UTI and Candida aureus.  She had hyperkalemia which has since resolved, acute kidney injury now improved.  No other That when patient is laid flat, she gets regurgitation of biliary fluid into the mouth.  She would need reclined at all times to avoid aspiration      Principal Problem:    Pneumonia of left lower lobe due to infectious organism: Likely aspiration  Slowly improving.  Remains afebrile with no leukocytosis no further vomiting being propped up while feeding  - Continue antibiotic as per ID    Sacral decubitus ulcer, stage IV with continuous osteomyelitis: Continue antibiotics as per ID with probiotic      Nausea and vomiting: Improved.  Gets regurgitation with laying flat.  Will benefit from being reclined at all times.  Tube feeds at goal.  Noted abdominal distention today.  Will get a KUB to assess for ileus.  If remains stable to consider discharge to SNF tomorrow      Nutrition: On J-tube feeds with good tolerance.  Monitor residuals and for evidence of vomiting.  If tolerating at goal without vomiting, will consider discharge tomorrow      Chronic respiratory failure with chronic trach: Stable.  Will monitor.     Active Problems:    Mixed hyperlipidemia: On statin    to assess, manipulate, and support his critical organ systems to prevent a likely inevitable decline which could occur if left untreated.  32 minutes of critical care time spent.      ____________________________________________________________________________    Subjective:   Overnight Events:   Uneventful overnight  No new complaints this morning      Physical Exam:  BP (!) 127/59   Pulse 70   Temp 97 °F (36.1 °C) (Temporal)   Resp 16   Ht 1.524 m (5')   Wt 71.5 kg (157 lb 10.1 oz)   LMP 11/30/2005   SpO2 100%   BMI 30.78 kg/m²   General appearance: not verbal  Respiratory: Clear to auscultation, no wheezing.  Trach in place  Cardiovascular: Regular rhythm and rate   Abdomen: Soft nontender nondistended bowel sounds present.  GJ-tube in place  Legs: No peripheral edema clubbing or cyanosis  Neurologic: Not following commands, nonverbal      Intake/Output Summary (Last 24 hours) at 7/19/2025 1411  Last data filed at 7/19/2025 1230  Gross per 24 hour   Intake 2952.93 ml   Output 575 ml   Net 2377.93 ml       Labs:   Recent Labs     07/17/25  0416 07/18/25  0415 07/19/25  0437   WBC 5.0 5.2 4.5   HGB 8.6* 8.9* 8.5*   HCT 24.7* 25.8* 25.9*    156 147      Recent Labs     07/17/25  0416 07/18/25  0415 07/19/25  0437    143 142   K 3.6 3.6 3.6    107 110   CO2 24 25 22   BUN 19 23* 22*   CREATININE 0.8 0.9 0.7   CALCIUM 9.1 9.2 8.7   PHOS 3.6 3.7 3.3   AST 13* 11* 11*   ALT 12 11 6*   BILITOT 0.4 0.3 0.3   ALKPHOS 82 82 75     No results for input(s): \"CKTOTAL\", \"TROPONINI\" in the last 72 hours.    Urinalysis:    Lab Results   Component Value Date/Time    NITRU Negative 07/11/2025 11:20 PM    WBCUA 3-5 07/11/2025 11:20 PM    BACTERIA 3+ 07/11/2025 11:20 PM    RBCUA 5-10 07/11/2025 11:20 PM    BLOODU SMALL 07/11/2025 11:20 PM    GLUCOSEU Negative 07/11/2025 11:20 PM       Radiology:  XR ABDOMEN (KUB) (SINGLE AP VIEW)   Final Result   No evidence of bowel obstruction.         CT HEAD WO CONTRAST

## 2025-07-19 NOTE — PROGRESS NOTES
Trach Care  Patient has size 6.0 Shiley XLT .Trach Kit of same size on standby  Yes, Obturator at head of the bed  Yes. Inner cannula cleaned/replaced Yes, drain sponge changed  Yes, Flange cleaned  Yes. Trach Care performed without complications.

## 2025-07-20 LAB
ALBUMIN SERPL-MCNC: 2.9 G/DL (ref 3.4–5)
ALBUMIN/GLOB SERPL: 0.7 {RATIO} (ref 1.1–2.2)
ALP SERPL-CCNC: 75 U/L (ref 40–129)
ALT SERPL-CCNC: 8 U/L (ref 10–40)
ANION GAP SERPL CALCULATED.3IONS-SCNC: 8 MMOL/L (ref 3–16)
AST SERPL-CCNC: 9 U/L (ref 15–37)
BASOPHILS # BLD: 0 K/UL (ref 0–0.2)
BASOPHILS NFR BLD: 0.4 %
BILIRUB SERPL-MCNC: 0.3 MG/DL (ref 0–1)
BUN SERPL-MCNC: 24 MG/DL (ref 7–20)
CALCIUM SERPL-MCNC: 9 MG/DL (ref 8.3–10.6)
CHLORIDE SERPL-SCNC: 109 MMOL/L (ref 99–110)
CO2 SERPL-SCNC: 25 MMOL/L (ref 21–32)
CREAT SERPL-MCNC: 0.7 MG/DL (ref 0.6–1.2)
DEPRECATED RDW RBC AUTO: 16.1 % (ref 12.4–15.4)
EOSINOPHIL # BLD: 0.1 K/UL (ref 0–0.6)
EOSINOPHIL NFR BLD: 1.7 %
GFR SERPLBLD CREATININE-BSD FMLA CKD-EPI: >90 ML/MIN/{1.73_M2}
GLUCOSE BLD-MCNC: 243 MG/DL (ref 70–99)
GLUCOSE BLD-MCNC: 262 MG/DL (ref 70–99)
GLUCOSE BLD-MCNC: 263 MG/DL (ref 70–99)
GLUCOSE BLD-MCNC: 263 MG/DL (ref 70–99)
GLUCOSE BLD-MCNC: 269 MG/DL (ref 70–99)
GLUCOSE BLD-MCNC: 340 MG/DL (ref 70–99)
GLUCOSE SERPL-MCNC: 263 MG/DL (ref 70–99)
HCT VFR BLD AUTO: 23.8 % (ref 36–48)
HGB BLD-MCNC: 8.1 G/DL (ref 12–16)
LYMPHOCYTES # BLD: 0.8 K/UL (ref 1–5.1)
LYMPHOCYTES NFR BLD: 14.3 %
MAGNESIUM SERPL-MCNC: 1.99 MG/DL (ref 1.8–2.4)
MCH RBC QN AUTO: 32.1 PG (ref 26–34)
MCHC RBC AUTO-ENTMCNC: 34.1 G/DL (ref 31–36)
MCV RBC AUTO: 94 FL (ref 80–100)
MONOCYTES # BLD: 0.4 K/UL (ref 0–1.3)
MONOCYTES NFR BLD: 8 %
NEUTROPHILS # BLD: 4 K/UL (ref 1.7–7.7)
NEUTROPHILS NFR BLD: 75.6 %
PERFORMED ON: ABNORMAL
PHOSPHATE SERPL-MCNC: 3.2 MG/DL (ref 2.5–4.9)
PLATELET # BLD AUTO: 149 K/UL (ref 135–450)
PMV BLD AUTO: 7.9 FL (ref 5–10.5)
POTASSIUM SERPL-SCNC: 3.9 MMOL/L (ref 3.5–5.1)
PROT SERPL-MCNC: 7.1 G/DL (ref 6.4–8.2)
RBC # BLD AUTO: 2.53 M/UL (ref 4–5.2)
SODIUM SERPL-SCNC: 142 MMOL/L (ref 136–145)
WBC # BLD AUTO: 5.3 K/UL (ref 4–11)

## 2025-07-20 PROCEDURE — 80053 COMPREHEN METABOLIC PANEL: CPT

## 2025-07-20 PROCEDURE — 2500000003 HC RX 250 WO HCPCS

## 2025-07-20 PROCEDURE — 94761 N-INVAS EAR/PLS OXIMETRY MLT: CPT

## 2025-07-20 PROCEDURE — 94640 AIRWAY INHALATION TREATMENT: CPT

## 2025-07-20 PROCEDURE — 84100 ASSAY OF PHOSPHORUS: CPT

## 2025-07-20 PROCEDURE — 6370000000 HC RX 637 (ALT 250 FOR IP): Performed by: INTERNAL MEDICINE

## 2025-07-20 PROCEDURE — 2700000000 HC OXYGEN THERAPY PER DAY

## 2025-07-20 PROCEDURE — 2000000000 HC ICU R&B

## 2025-07-20 PROCEDURE — 83735 ASSAY OF MAGNESIUM: CPT

## 2025-07-20 PROCEDURE — 94003 VENT MGMT INPAT SUBQ DAY: CPT

## 2025-07-20 PROCEDURE — 6360000002 HC RX W HCPCS: Performed by: INTERNAL MEDICINE

## 2025-07-20 PROCEDURE — 6370000000 HC RX 637 (ALT 250 FOR IP): Performed by: STUDENT IN AN ORGANIZED HEALTH CARE EDUCATION/TRAINING PROGRAM

## 2025-07-20 PROCEDURE — 36415 COLL VENOUS BLD VENIPUNCTURE: CPT

## 2025-07-20 PROCEDURE — 6360000002 HC RX W HCPCS: Performed by: STUDENT IN AN ORGANIZED HEALTH CARE EDUCATION/TRAINING PROGRAM

## 2025-07-20 PROCEDURE — 6370000000 HC RX 637 (ALT 250 FOR IP)

## 2025-07-20 PROCEDURE — 6370000000 HC RX 637 (ALT 250 FOR IP): Performed by: HOSPITALIST

## 2025-07-20 PROCEDURE — 6360000002 HC RX W HCPCS

## 2025-07-20 PROCEDURE — 85025 COMPLETE CBC W/AUTO DIFF WBC: CPT

## 2025-07-20 RX ORDER — INSULIN GLARGINE 100 [IU]/ML
10 INJECTION, SOLUTION SUBCUTANEOUS DAILY
Status: DISCONTINUED | OUTPATIENT
Start: 2025-07-20 | End: 2025-07-21 | Stop reason: HOSPADM

## 2025-07-20 RX ORDER — HYDRALAZINE HYDROCHLORIDE 20 MG/ML
10 INJECTION INTRAMUSCULAR; INTRAVENOUS EVERY 4 HOURS PRN
Status: DISCONTINUED | OUTPATIENT
Start: 2025-07-20 | End: 2025-07-21 | Stop reason: HOSPADM

## 2025-07-20 RX ORDER — INSULIN LISPRO 100 [IU]/ML
0-16 INJECTION, SOLUTION INTRAVENOUS; SUBCUTANEOUS
Qty: 20 EACH | Refills: 0 | Status: SHIPPED | OUTPATIENT
Start: 2025-07-20 | End: 2025-08-19

## 2025-07-20 RX ORDER — INSULIN LISPRO 100 [IU]/ML
0-16 INJECTION, SOLUTION INTRAVENOUS; SUBCUTANEOUS
Status: DISCONTINUED | OUTPATIENT
Start: 2025-07-20 | End: 2025-07-21 | Stop reason: HOSPADM

## 2025-07-20 RX ORDER — INSULIN LISPRO 100 [IU]/ML
0-16 INJECTION, SOLUTION INTRAVENOUS; SUBCUTANEOUS ONCE
Status: COMPLETED | OUTPATIENT
Start: 2025-07-20 | End: 2025-07-20

## 2025-07-20 RX ORDER — BUDESONIDE AND FORMOTEROL FUMARATE DIHYDRATE 160; 4.5 UG/1; UG/1
2 AEROSOL RESPIRATORY (INHALATION)
Qty: 10.2 G | Refills: 3 | Status: SHIPPED | OUTPATIENT
Start: 2025-07-20

## 2025-07-20 RX ORDER — INSULIN GLARGINE 100 [IU]/ML
10 INJECTION, SOLUTION SUBCUTANEOUS DAILY
Qty: 10 ML | Refills: 3 | Status: ON HOLD | OUTPATIENT
Start: 2025-07-21 | End: 2025-07-27

## 2025-07-20 RX ORDER — CIPROFLOXACIN 500 MG/1
500 TABLET, FILM COATED ORAL EVERY 12 HOURS SCHEDULED
Qty: 60 TABLET | Refills: 0 | Status: ON HOLD | OUTPATIENT
Start: 2025-07-20 | End: 2025-07-27

## 2025-07-20 RX ORDER — CARVEDILOL 6.25 MG/1
6.25 TABLET ORAL 2 TIMES DAILY WITH MEALS
Qty: 60 TABLET | Refills: 3 | Status: SHIPPED | OUTPATIENT
Start: 2025-07-20

## 2025-07-20 RX ADMIN — PANTOPRAZOLE SODIUM 40 MG: 40 INJECTION, POWDER, FOR SOLUTION INTRAVENOUS at 07:48

## 2025-07-20 RX ADMIN — INSULIN GLARGINE 10 UNITS: 100 INJECTION, SOLUTION SUBCUTANEOUS at 07:58

## 2025-07-20 RX ADMIN — Medication 1 CAPSULE: at 07:58

## 2025-07-20 RX ADMIN — ATORVASTATIN CALCIUM 20 MG: 20 TABLET, FILM COATED ORAL at 07:47

## 2025-07-20 RX ADMIN — INSULIN LISPRO 12 UNITS: 100 INJECTION, SOLUTION INTRAVENOUS; SUBCUTANEOUS at 07:59

## 2025-07-20 RX ADMIN — IPRATROPIUM BROMIDE AND ALBUTEROL SULFATE 1 DOSE: .5; 3 SOLUTION RESPIRATORY (INHALATION) at 08:47

## 2025-07-20 RX ADMIN — ASPIRIN 81 MG: 81 TABLET, CHEWABLE ORAL at 07:47

## 2025-07-20 RX ADMIN — Medication 10 ML: at 20:15

## 2025-07-20 RX ADMIN — HYDRALAZINE HYDROCHLORIDE 10 MG: 20 INJECTION INTRAMUSCULAR; INTRAVENOUS at 23:09

## 2025-07-20 RX ADMIN — Medication 2 PUFF: at 19:52

## 2025-07-20 RX ADMIN — IPRATROPIUM BROMIDE AND ALBUTEROL SULFATE 1 DOSE: .5; 3 SOLUTION RESPIRATORY (INHALATION) at 16:04

## 2025-07-20 RX ADMIN — METOCLOPRAMIDE HYDROCHLORIDE 10 MG: 5 INJECTION INTRAMUSCULAR; INTRAVENOUS at 05:48

## 2025-07-20 RX ADMIN — CARVEDILOL 6.25 MG: 6.25 TABLET, FILM COATED ORAL at 07:47

## 2025-07-20 RX ADMIN — INSULIN LISPRO 8 UNITS: 100 INJECTION, SOLUTION INTRAVENOUS; SUBCUTANEOUS at 11:55

## 2025-07-20 RX ADMIN — INSULIN LISPRO 4 UNITS: 100 INJECTION, SOLUTION INTRAVENOUS; SUBCUTANEOUS at 00:31

## 2025-07-20 RX ADMIN — CIPROFLOXACIN HYDROCHLORIDE 500 MG: 500 TABLET, FILM COATED ORAL at 07:47

## 2025-07-20 RX ADMIN — INSULIN LISPRO 4 UNITS: 100 INJECTION, SOLUTION INTRAVENOUS; SUBCUTANEOUS at 04:30

## 2025-07-20 RX ADMIN — IPRATROPIUM BROMIDE AND ALBUTEROL SULFATE 1 DOSE: .5; 3 SOLUTION RESPIRATORY (INHALATION) at 19:52

## 2025-07-20 RX ADMIN — METOCLOPRAMIDE HYDROCHLORIDE 10 MG: 5 INJECTION INTRAMUSCULAR; INTRAVENOUS at 22:02

## 2025-07-20 RX ADMIN — CARVEDILOL 6.25 MG: 6.25 TABLET, FILM COATED ORAL at 17:00

## 2025-07-20 RX ADMIN — INSULIN LISPRO 8 UNITS: 100 INJECTION, SOLUTION INTRAVENOUS; SUBCUTANEOUS at 17:00

## 2025-07-20 RX ADMIN — METOCLOPRAMIDE HYDROCHLORIDE 10 MG: 5 INJECTION INTRAMUSCULAR; INTRAVENOUS at 14:43

## 2025-07-20 RX ADMIN — Medication 2 PUFF: at 08:47

## 2025-07-20 RX ADMIN — Medication 1 CAPSULE: at 17:00

## 2025-07-20 RX ADMIN — HYDRALAZINE HYDROCHLORIDE 10 MG: 20 INJECTION INTRAMUSCULAR; INTRAVENOUS at 15:33

## 2025-07-20 RX ADMIN — CIPROFLOXACIN HYDROCHLORIDE 500 MG: 500 TABLET, FILM COATED ORAL at 20:13

## 2025-07-20 RX ADMIN — INSULIN LISPRO 4 UNITS: 100 INJECTION, SOLUTION INTRAVENOUS; SUBCUTANEOUS at 20:14

## 2025-07-20 RX ADMIN — ACETAMINOPHEN 650 MG: 325 TABLET ORAL at 20:13

## 2025-07-20 RX ADMIN — APIXABAN 2.5 MG: 2.5 TABLET, FILM COATED ORAL at 07:47

## 2025-07-20 RX ADMIN — APIXABAN 2.5 MG: 2.5 TABLET, FILM COATED ORAL at 20:13

## 2025-07-20 ASSESSMENT — PAIN SCALES - GENERAL
PAINLEVEL_OUTOF10: 0

## 2025-07-20 ASSESSMENT — PULMONARY FUNCTION TESTS
PIF_VALUE: 9
PIF_VALUE: 16
PIF_VALUE: 17
PIF_VALUE: 9.6
PIF_VALUE: 16
PIF_VALUE: 20

## 2025-07-20 NOTE — PROGRESS NOTES
Hospitalist Progress Note      PCP: Rosario Stevens MD    Date of Admission: 7/11/2025    LOS: 8    Chief Complaint:   Chief Complaint   Patient presents with    Vomiting     Pt. From [facility] and arrived by [facility]. Pt. Has been vomiting bile and large amount of bile draining from J tube.        Case Summary:   60-year-old lady with history of CAD, hyperlipidemia, COPD, chronic respiratory failure with trach in place, history of anoxic brain injury none interactive who was admitted from F with repeated episodes of vomiting at the nursing home found to have sepsis due to left lower lobe aspiration pneumonia, infected sacral pressure ulcer with contiguous osteomyelitis complicated by acute kidney injury and probable UTI and Candida aureus.  She had hyperkalemia which has since resolved, acute kidney injury now improved      Principal Problem:    Pneumonia of left lower lobe due to infectious organism    Sacral decubitus ulcer, stage IV with continuous osteomyelitis  Slowly improving.  Remains afebrile with no leukocytosis no further vomiting being propped up while feeding, however noted to regurgitate small amounts of bilious material when laying flat to be changed.  Gastric drainage under gravity with jejunal feeding  - Continue antibiotics as per ID and probiotics  - Continue pressure relieving techniques  - ID following with recommendations      Nausea and vomiting: Increase tube feeds with good tolerance.  Noted regurgitation of bilious material when lying flat but controlled when in a reclined position.  Continue gastric emptying antigravity in the nursing home with jejunal feeding.    Nutrition: On J-tube feeds with good tolerance.  At goal with tube feeds rate.      Chronic respiratory failure with chronic trach: Stable.      Active Problems:    Mixed hyperlipidemia: On statin    Uncontrolled type 2 diabetes mellitus with hyperglycemia (HCC): On glargine with sliding scale insulin

## 2025-07-20 NOTE — PROGRESS NOTES
07/20/25 0847   Vent Patient Data (Readings)   Mean Airway Pressure (cmH2O) 10 cmH20   Plateau Pressure (cm H2O) 17 cm H2O   Driving Pressure 12   Inspiratory Time 1.1 sec   I:E Ratio 1:1.7   Flow Sensitivity 3 L/min   Static Compliance (L/cm H2O) 41   Dynamic Compliance (L/cm H2O) 41.25 L/cm H2O

## 2025-07-20 NOTE — CARE COORDINATION
Case Management -  Discharge Note      Patient Name: Lindsay Lucio                   YOB: 1965  Room: [facility] [place] [address]            Readmission Risk (Low < 19, Mod (19-27), High > 27): Readmission Risk Score: 16.2    Current PCP: Rosario Stevens MD        PT AM-PAC:   /24  OT AM-PAC:   /24    Patient noted to have a discharge order.  Pt has been medically cleared to return to LTC (Long Term Care)    Patient discharged to   [facility]  [address] [place] [address]  Phone: [phone]  Fax:  [phone]                   Pre-cert Required/obtained: N/A  Transportation scheduled for 1030  Transportation provided by [employer]  AVS faxed and agency notified: Yes  Family Notified: CM LVM with pt's legal guardian and daughter, Mary Ellen.   Nurse to call report to facility         [facility] agency notified of discharge:  [] Yes [] No  [x] NA    Family notified of discharge:  [] Yes  [] No  [] NA    Facility notified of discharge:  [x] Yes  [] No  [] NA    Pt is being discharged with Outpt IV Antibiotics  [] Yes [] No  [x] NA  If yes, make sure JONAS is faxed to [facility] agency, and meds are called in to pharmacy by RN from JONAS orders only.      Financial    [facility] / Plan: [facility] MEDICAID / Product Type: *No Product type* /     Pharmacy:  Potential assistance Purchasing Medications: No  Meds-to-Beds request: No      [facility] [id number] - [place], [place] - [address] [address] - P [phone] - F [phone]  [address] [place] [address]  [facility] [address]  Phone: [phone] Fax: [phone]    [id number] - [place] - [address] - P [phone] - F [phone]  [address] [place] [facility] [address]  Phone: [phone] Fax: [phone]      Notes:    Additional Case Management Notes:       All CM needs have been met.         Electronically signed by EMMA Mcgovern on 7/20/2025 at 12:19 PM

## 2025-07-20 NOTE — CARE COORDINATION
Discharge Planning:     (HERMELINDA) called Delmy in admissions at [place] [phone] and [facility] to see if Patient can return today and asked for a phone call back. CM provided call back information.       Electronically signed by EMMA Jacobs on 7/20/2025 at 10:47 AM

## 2025-07-20 NOTE — DISCHARGE INSTRUCTIONS
Patient to be rested at all times to avoid gastric regurgitation or vomiting while on tube feed    Continue gastric emptying under gravity via G-tube

## 2025-07-21 VITALS
DIASTOLIC BLOOD PRESSURE: 75 MMHG | RESPIRATION RATE: 18 BRPM | SYSTOLIC BLOOD PRESSURE: 135 MMHG | HEIGHT: 60 IN | TEMPERATURE: 97 F | HEART RATE: 73 BPM | WEIGHT: 169.09 LBS | BODY MASS INDEX: 33.2 KG/M2 | OXYGEN SATURATION: 97 %

## 2025-07-21 LAB
ALBUMIN SERPL-MCNC: 3.1 G/DL (ref 3.4–5)
ALP SERPL-CCNC: 85 U/L (ref 40–129)
ALT SERPL-CCNC: 9 U/L (ref 10–40)
ANION GAP SERPL CALCULATED.3IONS-SCNC: 9 MMOL/L (ref 3–16)
AST SERPL-CCNC: 12 U/L (ref 15–37)
BASE EXCESS BLDA CALC-SCNC: 6 MMOL/L (ref -3–3)
BASOPHILS # BLD: 0 K/UL (ref 0–0.2)
BASOPHILS NFR BLD: 0.4 %
BILIRUB DIRECT SERPL-MCNC: 0.2 MG/DL (ref 0–0.3)
BILIRUB INDIRECT SERPL-MCNC: 0.2 MG/DL (ref 0–1)
BILIRUB SERPL-MCNC: 0.4 MG/DL (ref 0–1)
BUN SERPL-MCNC: 24 MG/DL (ref 7–20)
CALCIUM SERPL-MCNC: 9.1 MG/DL (ref 8.3–10.6)
CHLORIDE SERPL-SCNC: 106 MMOL/L (ref 99–110)
CO2 BLDA-SCNC: 32 MMOL/L
CO2 SERPL-SCNC: 25 MMOL/L (ref 21–32)
CREAT SERPL-MCNC: 0.6 MG/DL (ref 0.6–1.2)
DEPRECATED RDW RBC AUTO: 16.3 % (ref 12.4–15.4)
EOSINOPHIL # BLD: 0.1 K/UL (ref 0–0.6)
EOSINOPHIL NFR BLD: 2.2 %
GFR SERPLBLD CREATININE-BSD FMLA CKD-EPI: >90 ML/MIN/{1.73_M2}
GLUCOSE BLD-MCNC: 328 MG/DL (ref 70–99)
GLUCOSE SERPL-MCNC: 320 MG/DL (ref 70–99)
HCO3 BLDA-SCNC: 30.3 MMOL/L (ref 21–29)
HCT VFR BLD AUTO: 26.6 % (ref 36–48)
HGB BLD-MCNC: 9.1 G/DL (ref 12–16)
LYMPHOCYTES # BLD: 0.9 K/UL (ref 1–5.1)
LYMPHOCYTES NFR BLD: 16.3 %
MAGNESIUM SERPL-MCNC: 1.74 MG/DL (ref 1.8–2.4)
MCH RBC QN AUTO: 32.1 PG (ref 26–34)
MCHC RBC AUTO-ENTMCNC: 34.1 G/DL (ref 31–36)
MCV RBC AUTO: 93.9 FL (ref 80–100)
MONOCYTES # BLD: 0.4 K/UL (ref 0–1.3)
MONOCYTES NFR BLD: 7.5 %
NEUTROPHILS # BLD: 4.2 K/UL (ref 1.7–7.7)
NEUTROPHILS NFR BLD: 73.6 %
PCO2 BLDA: 46.4 MM HG (ref 35–45)
PERFORMED ON: ABNORMAL
PERFORMED ON: ABNORMAL
PH BLDA: 7.42 [PH] (ref 7.35–7.45)
PHOSPHATE SERPL-MCNC: 3.4 MG/DL (ref 2.5–4.9)
PLATELET # BLD AUTO: 158 K/UL (ref 135–450)
PMV BLD AUTO: 7.9 FL (ref 5–10.5)
PO2 BLDA: 77.8 MM HG (ref 75–108)
POC SAMPLE TYPE: ABNORMAL
POTASSIUM SERPL-SCNC: 4 MMOL/L (ref 3.5–5.1)
PROT SERPL-MCNC: 7.7 G/DL (ref 6.4–8.2)
RBC # BLD AUTO: 2.83 M/UL (ref 4–5.2)
SAO2 % BLDA: 96 % (ref 93–100)
SODIUM SERPL-SCNC: 140 MMOL/L (ref 136–145)
WBC # BLD AUTO: 5.7 K/UL (ref 4–11)

## 2025-07-21 PROCEDURE — 94761 N-INVAS EAR/PLS OXIMETRY MLT: CPT

## 2025-07-21 PROCEDURE — 94003 VENT MGMT INPAT SUBQ DAY: CPT

## 2025-07-21 PROCEDURE — 80048 BASIC METABOLIC PNL TOTAL CA: CPT

## 2025-07-21 PROCEDURE — 6360000002 HC RX W HCPCS

## 2025-07-21 PROCEDURE — 84100 ASSAY OF PHOSPHORUS: CPT

## 2025-07-21 PROCEDURE — 83735 ASSAY OF MAGNESIUM: CPT

## 2025-07-21 PROCEDURE — 6370000000 HC RX 637 (ALT 250 FOR IP): Performed by: INTERNAL MEDICINE

## 2025-07-21 PROCEDURE — 2500000003 HC RX 250 WO HCPCS

## 2025-07-21 PROCEDURE — 6360000002 HC RX W HCPCS: Performed by: STUDENT IN AN ORGANIZED HEALTH CARE EDUCATION/TRAINING PROGRAM

## 2025-07-21 PROCEDURE — 6370000000 HC RX 637 (ALT 250 FOR IP)

## 2025-07-21 PROCEDURE — 36415 COLL VENOUS BLD VENIPUNCTURE: CPT

## 2025-07-21 PROCEDURE — 80076 HEPATIC FUNCTION PANEL: CPT

## 2025-07-21 PROCEDURE — 2700000000 HC OXYGEN THERAPY PER DAY

## 2025-07-21 PROCEDURE — 82803 BLOOD GASES ANY COMBINATION: CPT

## 2025-07-21 PROCEDURE — 85025 COMPLETE CBC W/AUTO DIFF WBC: CPT

## 2025-07-21 PROCEDURE — 6370000000 HC RX 637 (ALT 250 FOR IP): Performed by: STUDENT IN AN ORGANIZED HEALTH CARE EDUCATION/TRAINING PROGRAM

## 2025-07-21 PROCEDURE — 94640 AIRWAY INHALATION TREATMENT: CPT

## 2025-07-21 PROCEDURE — 6370000000 HC RX 637 (ALT 250 FOR IP): Performed by: HOSPITALIST

## 2025-07-21 RX ORDER — ALPRAZOLAM 0.25 MG
0.25 TABLET ORAL NIGHTLY PRN
Status: DISCONTINUED | OUTPATIENT
Start: 2025-07-21 | End: 2025-07-21 | Stop reason: HOSPADM

## 2025-07-21 RX ADMIN — IPRATROPIUM BROMIDE AND ALBUTEROL SULFATE 1 DOSE: .5; 3 SOLUTION RESPIRATORY (INHALATION) at 08:28

## 2025-07-21 RX ADMIN — APIXABAN 2.5 MG: 2.5 TABLET, FILM COATED ORAL at 08:04

## 2025-07-21 RX ADMIN — CIPROFLOXACIN HYDROCHLORIDE 500 MG: 500 TABLET, FILM COATED ORAL at 08:04

## 2025-07-21 RX ADMIN — Medication 1 CAPSULE: at 08:04

## 2025-07-21 RX ADMIN — PANTOPRAZOLE SODIUM 40 MG: 40 INJECTION, POWDER, FOR SOLUTION INTRAVENOUS at 08:04

## 2025-07-21 RX ADMIN — INSULIN LISPRO 12 UNITS: 100 INJECTION, SOLUTION INTRAVENOUS; SUBCUTANEOUS at 05:10

## 2025-07-21 RX ADMIN — INSULIN GLARGINE 10 UNITS: 100 INJECTION, SOLUTION SUBCUTANEOUS at 08:04

## 2025-07-21 RX ADMIN — ATORVASTATIN CALCIUM 20 MG: 20 TABLET, FILM COATED ORAL at 08:04

## 2025-07-21 RX ADMIN — IPRATROPIUM BROMIDE 0.5 MG: 0.5 SOLUTION RESPIRATORY (INHALATION) at 04:50

## 2025-07-21 RX ADMIN — ASPIRIN 81 MG: 81 TABLET, CHEWABLE ORAL at 08:04

## 2025-07-21 RX ADMIN — Medication 10 ML: at 08:05

## 2025-07-21 RX ADMIN — CARVEDILOL 6.25 MG: 6.25 TABLET, FILM COATED ORAL at 08:04

## 2025-07-21 RX ADMIN — Medication 2 PUFF: at 08:29

## 2025-07-21 RX ADMIN — ALPRAZOLAM 0.25 MG: 0.25 TABLET ORAL at 01:26

## 2025-07-21 RX ADMIN — METOCLOPRAMIDE HYDROCHLORIDE 10 MG: 5 INJECTION INTRAMUSCULAR; INTRAVENOUS at 05:10

## 2025-07-21 ASSESSMENT — PULMONARY FUNCTION TESTS
PIF_VALUE: 9.5
PIF_VALUE: 10
PIF_VALUE: 14

## 2025-07-21 ASSESSMENT — PAIN SCALES - GENERAL: PAINLEVEL_OUTOF10: 0

## 2025-07-21 NOTE — PLAN OF CARE
Problem: Chronic Conditions and Co-morbidities  Goal: Patient's chronic conditions and co-morbidity symptoms are monitored and maintained or improved  Outcome: Adequate for Discharge     Problem: Discharge Planning  Goal: Discharge to home or other facility with appropriate resources  Outcome: Adequate for Discharge     Problem: Pain  Goal: Verbalizes/displays adequate comfort level or baseline comfort level  Outcome: Adequate for Discharge     Problem: Safety - Adult  Goal: Free from fall injury  Outcome: Adequate for Discharge     Problem: Skin/Tissue Integrity  Goal: Skin integrity remains intact  Description: 1.  Monitor for areas of redness and/or skin breakdown  2.  Assess vascular access sites hourly  3.  Every 4-6 hours minimum:  Change oxygen saturation probe site  4.  Every 4-6 hours:  If on nasal continuous positive airway pressure, respiratory therapy assess nares and determine need for appliance change or resting period  Outcome: Adequate for Discharge     Problem: Nutrition Deficit:  Goal: Optimize nutritional status  Outcome: Adequate for Discharge

## 2025-07-21 NOTE — CARE COORDINATION
Case Management -  Discharge Note      Patient Name: Lindsay Lucio                   YOB: 1965  Room: [place] [id number]            Readmission Risk (Low < 19, Mod (19-27), High > 27): Readmission Risk Score: 15.9    Current PCP: Rosario Stevens MD    PT AM-PAC:   /24  OT AM-PAC:   /24    Patient noted to have a discharge order.  Pt has been medically cleared to return to LTC (Long Term Care)    Patient discharged to     [facility]  [address] [place] [address]  Phone: [phone]  Fax:  [phone]             Pre-cert Required/obtained: N/A  Transportation scheduled for 1030  Transportation provided by [facility]  [facility] faxed and agency notified: Yes  Family Notified: CM LVM with pt's legal guardian and daughter, Mary Ellen.   Nurse to call report to facility: [phone]  Facility notified of discharge:  [x] Yes  [] No  [] NA    Financial    [facility] / Plan: [facility] MEDICAID / Product Type: *No Product type* /     Pharmacy:  Potential assistance Purchasing Medications: No  Meds-to-Beds request: No      [facility] [id number] - [place], [place] - [address]  P [phone] - F [phone]  [address] [facility] [address]  Phone: [phone] Fax: [phone]    [id number] - [place], [place] - [address] - P [phone] - F [phone]  [address] [address]  Phone: [phone] Fax: [phone]      Notes:    Additional Case Management Notes: Dc order noted. Plan-Return to [facility].        Electronically signed by Nabila Sanches RN on 7/21/2025 at 8:07 AM

## 2025-07-21 NOTE — DISCHARGE SUMMARY
Hospital Medicine Discharge Summary    Patient ID: Lindsay Lucio      Patient's PCP: Rosario Stevens MD    Admit Date: 7/11/2025     Discharge Date: 7/21/2025      Admitting Provider: Gurdeep Aguirre DO     Discharge Provider: Samm Corbin MD     Discharge Diagnoses:       Active Hospital Problems    Diagnosis     Severe malnutrition [E43]     Ventilator dependent (HCC) [Z99.11]     Hyperkalemia [E87.5]     NSTEMI (non-ST elevated myocardial infarction) (HCC) [I21.4]     History of infection with vancomycin resistant Enterococcus (VRE) [Z86.19]     History of methicillin resistant staphylococcus aureus (MRSA) [Z86.14]     Infection requiring contact isolation precautions [B99.9]     Infiltrate noted on imaging study [R93.89]     Pressure injury of skin of sacral region [L89.159]     GABY (acute kidney injury) [N17.9]     Chronic osteomyelitis of pelvic region ([facility]) [M86.659]     Chronic respiratory failure (HCC) [J96.10]     Brain injury with loss of consciousness ([facility]) [S06.9X9A]     Septicemia ([facility]) [A41.9]     Chronic anemia [D64.9]     Pneumonia of left lower lobe due to infectious organism [J18.9]     Community acquired pneumonia [J18.9]     Stage 2 moderate COPD by GOLD classification ([facility]) [J44.9]     Mixed hyperlipidemia [E78.2]     Uncontrolled type 2 diabetes mellitus with hyperglycemia ([facility]) [E11.65]     History of acute myocardial infarction [I25.2]        The patient was seen and examined on day of discharge and this discharge summary is in conjunction with any daily progress note from day of discharge.    Hospital Course:   The patient is a 60-year-old lady with history of CAD, hyperlipidemia, COPD, chronic respiratory failure with trach in place, history of anoxic brain injury none interactive who was admitted from [facility] with repeated episodes of vomiting at the nursing home found to have sepsis due to left lower lobe aspiration pneumonia, infected sacral pressure

## 2025-07-21 NOTE — PROGRESS NOTES
Attempted to call report to [facility] facility given the number provided by CM. Facility did not answer. Unable to give report due to this at this time.

## 2025-07-21 NOTE — PROGRESS NOTES
07/21/25 0829   Vent Patient Data (Readings)   Peak Inspiratory Pressure (cmH2O) 14 cmH2O   Rate Measured 19 br/min   Minute Volume (L/min) 8.16 Liters   Mean Airway Pressure (cmH2O) 7.9 cmH20   Plateau Pressure (cm H2O) 13 cm H2O   Driving Pressure 8   Inspiratory Time 1.1 sec   I:E Ratio 1:1.9   Flow Sensitivity 3 L/min   Static Compliance (L/cm H2O) 48   Dynamic Compliance (L/cm H2O) 47.9 L/cm H2O

## 2025-07-26 ENCOUNTER — APPOINTMENT (OUTPATIENT)
Dept: GENERAL RADIOLOGY | Age: 60
End: 2025-07-26
Payer: COMMERCIAL

## 2025-07-26 ENCOUNTER — HOSPITAL ENCOUNTER (OUTPATIENT)
Age: 60
Setting detail: OBSERVATION
Discharge: NURSING, REHAB OR LONG-TERM CARE | End: 2025-07-29
Attending: EMERGENCY MEDICINE | Admitting: INTERNAL MEDICINE
Payer: COMMERCIAL

## 2025-07-26 ENCOUNTER — APPOINTMENT (OUTPATIENT)
Dept: CT IMAGING | Age: 60
End: 2025-07-26
Payer: COMMERCIAL

## 2025-07-26 DIAGNOSIS — R11.2 NAUSEA AND VOMITING, UNSPECIFIED VOMITING TYPE: Primary | ICD-10-CM

## 2025-07-26 DIAGNOSIS — Z91.89 AT HIGH RISK FOR ASPIRATION: ICD-10-CM

## 2025-07-26 DIAGNOSIS — K92.0 HEMATEMESIS, UNSPECIFIED WHETHER NAUSEA PRESENT: ICD-10-CM

## 2025-07-26 PROBLEM — Z99.11 VENTILATOR DEPENDENCE (HCC): Status: ACTIVE | Noted: 2025-07-26

## 2025-07-26 PROBLEM — G93.1 ANOXIC ENCEPHALOPATHY (HCC): Status: ACTIVE | Noted: 2025-07-26

## 2025-07-26 PROBLEM — J69.0 ASPIRATION PNEUMONIA DUE TO GASTRIC SECRETIONS, UNSPECIFIED LATERALITY, UNSPECIFIED PART OF LUNG (HCC): Status: ACTIVE | Noted: 2025-07-26

## 2025-07-26 LAB
ALBUMIN SERPL-MCNC: 3.4 G/DL (ref 3.4–5)
ALBUMIN/GLOB SERPL: 0.7 {RATIO} (ref 1.1–2.2)
ALP SERPL-CCNC: 105 U/L (ref 40–129)
ALT SERPL-CCNC: 14 U/L (ref 10–40)
ANION GAP SERPL CALCULATED.3IONS-SCNC: 11 MMOL/L (ref 3–16)
AST SERPL-CCNC: 21 U/L (ref 15–37)
BACTERIA URNS QL MICRO: ABNORMAL /HPF
BASE EXCESS BLDV CALC-SCNC: 4.3 MMOL/L (ref -3–3)
BASOPHILS # BLD: 0 K/UL (ref 0–0.2)
BASOPHILS NFR BLD: 0.3 %
BILIRUB SERPL-MCNC: 0.7 MG/DL (ref 0–1)
BILIRUB UR QL STRIP.AUTO: NEGATIVE
BUN SERPL-MCNC: 32 MG/DL (ref 7–20)
CALCIUM SERPL-MCNC: 9.8 MG/DL (ref 8.3–10.6)
CHLORIDE SERPL-SCNC: 95 MMOL/L (ref 99–110)
CLARITY UR: CLEAR
CO2 BLDV-SCNC: 69 MMOL/L
CO2 SERPL-SCNC: 26 MMOL/L (ref 21–32)
COHGB MFR BLDV: 2.8 % (ref 0–1.5)
COLOR UR: YELLOW
CREAT SERPL-MCNC: 1.1 MG/DL (ref 0.6–1.2)
DEPRECATED RDW RBC AUTO: 16.1 % (ref 12.4–15.4)
EKG ATRIAL RATE: 81 BPM
EKG DIAGNOSIS: NORMAL
EKG P AXIS: 75 DEGREES
EKG P-R INTERVAL: 148 MS
EKG Q-T INTERVAL: 420 MS
EKG QRS DURATION: 128 MS
EKG QTC CALCULATION (BAZETT): 487 MS
EKG R AXIS: 62 DEGREES
EKG T AXIS: 53 DEGREES
EKG VENTRICULAR RATE: 81 BPM
EOSINOPHIL # BLD: 0.1 K/UL (ref 0–0.6)
EOSINOPHIL NFR BLD: 1.3 %
EPI CELLS #/AREA URNS HPF: ABNORMAL /HPF (ref 0–5)
GFR SERPLBLD CREATININE-BSD FMLA CKD-EPI: 57 ML/MIN/{1.73_M2}
GLUCOSE BLD-MCNC: 202 MG/DL (ref 70–99)
GLUCOSE BLD-MCNC: 316 MG/DL (ref 70–99)
GLUCOSE SERPL-MCNC: 419 MG/DL (ref 70–99)
GLUCOSE UR STRIP.AUTO-MCNC: 100 MG/DL
HCO3 BLDV-SCNC: 29.4 MMOL/L (ref 23–29)
HCT VFR BLD AUTO: 27.3 % (ref 36–48)
HGB BLD-MCNC: 9.5 G/DL (ref 12–16)
HGB UR QL STRIP.AUTO: ABNORMAL
KETONES UR STRIP.AUTO-MCNC: NEGATIVE MG/DL
LACTATE BLDV-SCNC: 2.1 MMOL/L (ref 0.4–2)
LACTATE BLDV-SCNC: 2.3 MMOL/L (ref 0.4–1.9)
LACTATE BLDV-SCNC: 2.6 MMOL/L (ref 0.4–1.9)
LEUKOCYTE ESTERASE UR QL STRIP.AUTO: ABNORMAL
LIPASE SERPL-CCNC: 12 U/L (ref 13–60)
LYMPHOCYTES # BLD: 1.5 K/UL (ref 1–5.1)
LYMPHOCYTES NFR BLD: 13.7 %
MCH RBC QN AUTO: 31.8 PG (ref 26–34)
MCHC RBC AUTO-ENTMCNC: 34.9 G/DL (ref 31–36)
MCV RBC AUTO: 91.1 FL (ref 80–100)
METHGB MFR BLDV: 0.5 %
MONOCYTES # BLD: 0.5 K/UL (ref 0–1.3)
MONOCYTES NFR BLD: 4.9 %
NEUTROPHILS # BLD: 8.6 K/UL (ref 1.7–7.7)
NEUTROPHILS NFR BLD: 79.8 %
NITRITE UR QL STRIP.AUTO: NEGATIVE
O2 CT VFR BLDV CALC: 12 VOL %
O2 THERAPY: ABNORMAL
PCO2 BLDV: 46.1 MMHG (ref 40–50)
PERFORMED ON: ABNORMAL
PERFORMED ON: ABNORMAL
PH BLDV: 7.41 [PH] (ref 7.35–7.45)
PH UR STRIP.AUTO: 5.5 [PH] (ref 5–8)
PLATELET # BLD AUTO: 204 K/UL (ref 135–450)
PMV BLD AUTO: 8.5 FL (ref 5–10.5)
PO2 BLDV: 222 MMHG (ref 25–40)
POTASSIUM SERPL-SCNC: 5.1 MMOL/L (ref 3.5–5.1)
PROCALCITONIN SERPL IA-MCNC: 0.25 NG/ML (ref 0–0.15)
PROT SERPL-MCNC: 8.3 G/DL (ref 6.4–8.2)
PROT UR STRIP.AUTO-MCNC: 100 MG/DL
RBC # BLD AUTO: 2.99 M/UL (ref 4–5.2)
RBC #/AREA URNS HPF: ABNORMAL /HPF (ref 0–4)
SAO2 % BLDV: 100 %
SODIUM SERPL-SCNC: 132 MMOL/L (ref 136–145)
SP GR UR STRIP.AUTO: >=1.03 (ref 1–1.03)
TROPONIN, HIGH SENSITIVITY: 93 NG/L (ref 0–14)
TROPONIN, HIGH SENSITIVITY: 99 NG/L (ref 0–14)
UA COMPLETE W REFLEX CULTURE PNL UR: ABNORMAL
UA DIPSTICK W REFLEX MICRO PNL UR: YES
URN SPEC COLLECT METH UR: ABNORMAL
UROBILINOGEN UR STRIP-ACNC: 0.2 E.U./DL
WBC # BLD AUTO: 10.8 K/UL (ref 4–11)
WBC #/AREA URNS HPF: ABNORMAL /HPF (ref 0–5)

## 2025-07-26 PROCEDURE — 96375 TX/PRO/DX INJ NEW DRUG ADDON: CPT

## 2025-07-26 PROCEDURE — 83605 ASSAY OF LACTIC ACID: CPT

## 2025-07-26 PROCEDURE — 6370000000 HC RX 637 (ALT 250 FOR IP): Performed by: INTERNAL MEDICINE

## 2025-07-26 PROCEDURE — 6360000002 HC RX W HCPCS: Performed by: INTERNAL MEDICINE

## 2025-07-26 PROCEDURE — 96361 HYDRATE IV INFUSION ADD-ON: CPT

## 2025-07-26 PROCEDURE — 81001 URINALYSIS AUTO W/SCOPE: CPT

## 2025-07-26 PROCEDURE — 96372 THER/PROPH/DIAG INJ SC/IM: CPT

## 2025-07-26 PROCEDURE — 99285 EMERGENCY DEPT VISIT HI MDM: CPT

## 2025-07-26 PROCEDURE — 87040 BLOOD CULTURE FOR BACTERIA: CPT

## 2025-07-26 PROCEDURE — G0378 HOSPITAL OBSERVATION PER HR: HCPCS

## 2025-07-26 PROCEDURE — 84145 PROCALCITONIN (PCT): CPT

## 2025-07-26 PROCEDURE — 94640 AIRWAY INHALATION TREATMENT: CPT

## 2025-07-26 PROCEDURE — 99291 CRITICAL CARE FIRST HOUR: CPT | Performed by: INTERNAL MEDICINE

## 2025-07-26 PROCEDURE — 93005 ELECTROCARDIOGRAM TRACING: CPT | Performed by: EMERGENCY MEDICINE

## 2025-07-26 PROCEDURE — 2500000003 HC RX 250 WO HCPCS: Performed by: INTERNAL MEDICINE

## 2025-07-26 PROCEDURE — 80053 COMPREHEN METABOLIC PANEL: CPT

## 2025-07-26 PROCEDURE — 2580000003 HC RX 258: Performed by: EMERGENCY MEDICINE

## 2025-07-26 PROCEDURE — 85025 COMPLETE CBC W/AUTO DIFF WBC: CPT

## 2025-07-26 PROCEDURE — 96366 THER/PROPH/DIAG IV INF ADDON: CPT

## 2025-07-26 PROCEDURE — 94002 VENT MGMT INPAT INIT DAY: CPT

## 2025-07-26 PROCEDURE — 74177 CT ABD & PELVIS W/CONTRAST: CPT

## 2025-07-26 PROCEDURE — 36415 COLL VENOUS BLD VENIPUNCTURE: CPT

## 2025-07-26 PROCEDURE — 96365 THER/PROPH/DIAG IV INF INIT: CPT

## 2025-07-26 PROCEDURE — 2580000003 HC RX 258: Performed by: INTERNAL MEDICINE

## 2025-07-26 PROCEDURE — 93010 ELECTROCARDIOGRAM REPORT: CPT | Performed by: INTERNAL MEDICINE

## 2025-07-26 PROCEDURE — 94003 VENT MGMT INPAT SUBQ DAY: CPT

## 2025-07-26 PROCEDURE — 83690 ASSAY OF LIPASE: CPT

## 2025-07-26 PROCEDURE — 31502 CHANGE OF WINDPIPE AIRWAY: CPT

## 2025-07-26 PROCEDURE — 94761 N-INVAS EAR/PLS OXIMETRY MLT: CPT

## 2025-07-26 PROCEDURE — 71045 X-RAY EXAM CHEST 1 VIEW: CPT

## 2025-07-26 PROCEDURE — 6360000002 HC RX W HCPCS: Performed by: EMERGENCY MEDICINE

## 2025-07-26 PROCEDURE — 84484 ASSAY OF TROPONIN QUANT: CPT

## 2025-07-26 PROCEDURE — 2700000000 HC OXYGEN THERAPY PER DAY

## 2025-07-26 PROCEDURE — 6360000004 HC RX CONTRAST MEDICATION: Performed by: EMERGENCY MEDICINE

## 2025-07-26 PROCEDURE — 96376 TX/PRO/DX INJ SAME DRUG ADON: CPT

## 2025-07-26 PROCEDURE — 82803 BLOOD GASES ANY COMBINATION: CPT

## 2025-07-26 RX ORDER — INSULIN GLARGINE 100 [IU]/ML
10 INJECTION, SOLUTION SUBCUTANEOUS DAILY
Status: DISCONTINUED | OUTPATIENT
Start: 2025-07-26 | End: 2025-07-29 | Stop reason: HOSPADM

## 2025-07-26 RX ORDER — ALBUTEROL SULFATE 0.83 MG/ML
2.5 SOLUTION RESPIRATORY (INHALATION)
Status: DISCONTINUED | OUTPATIENT
Start: 2025-07-26 | End: 2025-07-26

## 2025-07-26 RX ORDER — ACETAMINOPHEN 325 MG/1
650 TABLET ORAL EVERY 6 HOURS PRN
Status: DISCONTINUED | OUTPATIENT
Start: 2025-07-26 | End: 2025-07-29 | Stop reason: HOSPADM

## 2025-07-26 RX ORDER — GLYCOPYRROLATE 1 MG/1
2 TABLET ORAL 3 TIMES DAILY
Status: DISCONTINUED | OUTPATIENT
Start: 2025-07-26 | End: 2025-07-29 | Stop reason: HOSPADM

## 2025-07-26 RX ORDER — GLUCAGON 1 MG/ML
1 KIT INJECTION PRN
Status: DISCONTINUED | OUTPATIENT
Start: 2025-07-26 | End: 2025-07-29 | Stop reason: HOSPADM

## 2025-07-26 RX ORDER — SODIUM CHLORIDE 9 MG/ML
INJECTION, SOLUTION INTRAVENOUS PRN
Status: DISCONTINUED | OUTPATIENT
Start: 2025-07-26 | End: 2025-07-29 | Stop reason: HOSPADM

## 2025-07-26 RX ORDER — METOCLOPRAMIDE HYDROCHLORIDE 5 MG/ML
10 INJECTION INTRAMUSCULAR; INTRAVENOUS EVERY 6 HOURS
Status: DISCONTINUED | OUTPATIENT
Start: 2025-07-26 | End: 2025-07-29 | Stop reason: HOSPADM

## 2025-07-26 RX ORDER — POTASSIUM CHLORIDE 7.45 MG/ML
10 INJECTION INTRAVENOUS PRN
Status: DISCONTINUED | OUTPATIENT
Start: 2025-07-26 | End: 2025-07-29 | Stop reason: HOSPADM

## 2025-07-26 RX ORDER — METOCLOPRAMIDE HYDROCHLORIDE 5 MG/5ML
10 SOLUTION ORAL 3 TIMES DAILY
Status: DISCONTINUED | OUTPATIENT
Start: 2025-07-26 | End: 2025-07-26

## 2025-07-26 RX ORDER — ACETAMINOPHEN 325 MG/1
650 TABLET ORAL EVERY 6 HOURS PRN
Status: DISCONTINUED | OUTPATIENT
Start: 2025-07-26 | End: 2025-07-26 | Stop reason: SDUPTHER

## 2025-07-26 RX ORDER — BISACODYL 10 MG
10 SUPPOSITORY, RECTAL RECTAL DAILY PRN
Status: DISCONTINUED | OUTPATIENT
Start: 2025-07-26 | End: 2025-07-29 | Stop reason: HOSPADM

## 2025-07-26 RX ORDER — SODIUM CHLORIDE 9 MG/ML
INJECTION, SOLUTION INTRAVENOUS CONTINUOUS
Status: DISCONTINUED | OUTPATIENT
Start: 2025-07-26 | End: 2025-07-27

## 2025-07-26 RX ORDER — SODIUM CHLORIDE 0.9 % (FLUSH) 0.9 %
10 SYRINGE (ML) INJECTION EVERY 12 HOURS SCHEDULED
Status: DISCONTINUED | OUTPATIENT
Start: 2025-07-26 | End: 2025-07-29 | Stop reason: HOSPADM

## 2025-07-26 RX ORDER — POLYETHYLENE GLYCOL 3350 17 G/17G
17 POWDER, FOR SOLUTION ORAL DAILY PRN
Status: DISCONTINUED | OUTPATIENT
Start: 2025-07-26 | End: 2025-07-29 | Stop reason: HOSPADM

## 2025-07-26 RX ORDER — ENOXAPARIN SODIUM 100 MG/ML
40 INJECTION SUBCUTANEOUS DAILY
Status: DISCONTINUED | OUTPATIENT
Start: 2025-07-26 | End: 2025-07-27

## 2025-07-26 RX ORDER — ACETAMINOPHEN 650 MG/1
650 SUPPOSITORY RECTAL EVERY 6 HOURS PRN
Status: DISCONTINUED | OUTPATIENT
Start: 2025-07-26 | End: 2025-07-29 | Stop reason: HOSPADM

## 2025-07-26 RX ORDER — ONDANSETRON 2 MG/ML
4 INJECTION INTRAMUSCULAR; INTRAVENOUS EVERY 6 HOURS PRN
Status: DISCONTINUED | OUTPATIENT
Start: 2025-07-26 | End: 2025-07-29 | Stop reason: HOSPADM

## 2025-07-26 RX ORDER — MAGNESIUM SULFATE IN WATER 40 MG/ML
2000 INJECTION, SOLUTION INTRAVENOUS PRN
Status: DISCONTINUED | OUTPATIENT
Start: 2025-07-26 | End: 2025-07-29 | Stop reason: HOSPADM

## 2025-07-26 RX ORDER — ASPIRIN 81 MG/1
81 TABLET, CHEWABLE ORAL DAILY
Status: DISCONTINUED | OUTPATIENT
Start: 2025-07-26 | End: 2025-07-29 | Stop reason: HOSPADM

## 2025-07-26 RX ORDER — MECLIZINE HCL 12.5 MG 12.5 MG/1
12.5 TABLET ORAL EVERY 6 HOURS PRN
Status: DISCONTINUED | OUTPATIENT
Start: 2025-07-26 | End: 2025-07-29 | Stop reason: HOSPADM

## 2025-07-26 RX ORDER — ATORVASTATIN CALCIUM 20 MG/1
20 TABLET, FILM COATED ORAL DAILY
Status: DISCONTINUED | OUTPATIENT
Start: 2025-07-26 | End: 2025-07-29 | Stop reason: HOSPADM

## 2025-07-26 RX ORDER — DEXTROSE MONOHYDRATE 100 MG/ML
INJECTION, SOLUTION INTRAVENOUS CONTINUOUS PRN
Status: DISCONTINUED | OUTPATIENT
Start: 2025-07-26 | End: 2025-07-29 | Stop reason: HOSPADM

## 2025-07-26 RX ORDER — BUDESONIDE AND FORMOTEROL FUMARATE DIHYDRATE 160; 4.5 UG/1; UG/1
2 AEROSOL RESPIRATORY (INHALATION)
Status: DISCONTINUED | OUTPATIENT
Start: 2025-07-26 | End: 2025-07-29 | Stop reason: HOSPADM

## 2025-07-26 RX ORDER — INSULIN LISPRO 100 [IU]/ML
0-8 INJECTION, SOLUTION INTRAVENOUS; SUBCUTANEOUS EVERY 6 HOURS SCHEDULED
Status: DISCONTINUED | OUTPATIENT
Start: 2025-07-26 | End: 2025-07-26

## 2025-07-26 RX ORDER — PANTOPRAZOLE SODIUM 40 MG/10ML
40 INJECTION, POWDER, LYOPHILIZED, FOR SOLUTION INTRAVENOUS 2 TIMES DAILY
Status: DISCONTINUED | OUTPATIENT
Start: 2025-07-27 | End: 2025-07-29 | Stop reason: HOSPADM

## 2025-07-26 RX ORDER — SODIUM CHLORIDE, SODIUM LACTATE, POTASSIUM CHLORIDE, AND CALCIUM CHLORIDE .6; .31; .03; .02 G/100ML; G/100ML; G/100ML; G/100ML
1000 INJECTION, SOLUTION INTRAVENOUS ONCE
Status: COMPLETED | OUTPATIENT
Start: 2025-07-26 | End: 2025-07-26

## 2025-07-26 RX ORDER — ONDANSETRON 4 MG/1
4 TABLET, ORALLY DISINTEGRATING ORAL EVERY 8 HOURS PRN
Status: DISCONTINUED | OUTPATIENT
Start: 2025-07-26 | End: 2025-07-29 | Stop reason: HOSPADM

## 2025-07-26 RX ORDER — INSULIN LISPRO 100 [IU]/ML
0-8 INJECTION, SOLUTION INTRAVENOUS; SUBCUTANEOUS EVERY 6 HOURS
Status: DISCONTINUED | OUTPATIENT
Start: 2025-07-26 | End: 2025-07-28

## 2025-07-26 RX ORDER — PANTOPRAZOLE SODIUM 40 MG/10ML
80 INJECTION, POWDER, LYOPHILIZED, FOR SOLUTION INTRAVENOUS ONCE
Status: COMPLETED | OUTPATIENT
Start: 2025-07-26 | End: 2025-07-26

## 2025-07-26 RX ORDER — ONDANSETRON 2 MG/ML
4 INJECTION INTRAMUSCULAR; INTRAVENOUS ONCE
Status: COMPLETED | OUTPATIENT
Start: 2025-07-26 | End: 2025-07-26

## 2025-07-26 RX ORDER — LACTULOSE 10 G/15ML
20 SOLUTION ORAL 2 TIMES DAILY
Status: DISCONTINUED | OUTPATIENT
Start: 2025-07-26 | End: 2025-07-29 | Stop reason: HOSPADM

## 2025-07-26 RX ORDER — SODIUM CHLORIDE 9 MG/ML
INJECTION, SOLUTION INTRAVENOUS CONTINUOUS
Status: DISCONTINUED | OUTPATIENT
Start: 2025-07-26 | End: 2025-07-26

## 2025-07-26 RX ORDER — IOPAMIDOL 755 MG/ML
75 INJECTION, SOLUTION INTRAVASCULAR
Status: COMPLETED | OUTPATIENT
Start: 2025-07-26 | End: 2025-07-26

## 2025-07-26 RX ORDER — SODIUM CHLORIDE 0.9 % (FLUSH) 0.9 %
10 SYRINGE (ML) INJECTION PRN
Status: DISCONTINUED | OUTPATIENT
Start: 2025-07-26 | End: 2025-07-29 | Stop reason: HOSPADM

## 2025-07-26 RX ORDER — POTASSIUM CHLORIDE 1500 MG/1
40 TABLET, EXTENDED RELEASE ORAL PRN
Status: DISCONTINUED | OUTPATIENT
Start: 2025-07-26 | End: 2025-07-29 | Stop reason: HOSPADM

## 2025-07-26 RX ORDER — CARVEDILOL 6.25 MG/1
6.25 TABLET ORAL 2 TIMES DAILY WITH MEALS
Status: DISCONTINUED | OUTPATIENT
Start: 2025-07-26 | End: 2025-07-29 | Stop reason: HOSPADM

## 2025-07-26 RX ORDER — IPRATROPIUM BROMIDE AND ALBUTEROL SULFATE 2.5; .5 MG/3ML; MG/3ML
1 SOLUTION RESPIRATORY (INHALATION) 3 TIMES DAILY
Status: DISCONTINUED | OUTPATIENT
Start: 2025-07-26 | End: 2025-07-29 | Stop reason: HOSPADM

## 2025-07-26 RX ORDER — PANTOPRAZOLE SODIUM 40 MG/10ML
40 INJECTION, POWDER, LYOPHILIZED, FOR SOLUTION INTRAVENOUS DAILY
Status: DISCONTINUED | OUTPATIENT
Start: 2025-07-27 | End: 2025-07-26

## 2025-07-26 RX ORDER — ALBUTEROL SULFATE 0.83 MG/ML
2.5 SOLUTION RESPIRATORY (INHALATION) EVERY 4 HOURS PRN
Status: DISCONTINUED | OUTPATIENT
Start: 2025-07-26 | End: 2025-07-29 | Stop reason: HOSPADM

## 2025-07-26 RX ADMIN — ATORVASTATIN CALCIUM 20 MG: 20 TABLET, FILM COATED ORAL at 16:11

## 2025-07-26 RX ADMIN — METOCLOPRAMIDE HYDROCHLORIDE 10 MG: 5 INJECTION INTRAMUSCULAR; INTRAVENOUS at 21:35

## 2025-07-26 RX ADMIN — CARVEDILOL 6.25 MG: 6.25 TABLET, FILM COATED ORAL at 16:11

## 2025-07-26 RX ADMIN — SODIUM CHLORIDE 3000 MG: 9 INJECTION, SOLUTION INTRAVENOUS at 16:21

## 2025-07-26 RX ADMIN — SODIUM CHLORIDE 3000 MG: 9 INJECTION, SOLUTION INTRAVENOUS at 21:40

## 2025-07-26 RX ADMIN — Medication 2 PUFF: at 20:01

## 2025-07-26 RX ADMIN — IOPAMIDOL 75 ML: 755 INJECTION, SOLUTION INTRAVENOUS at 07:42

## 2025-07-26 RX ADMIN — GLYCOPYRROLATE 2 MG: 1 TABLET ORAL at 21:41

## 2025-07-26 RX ADMIN — PANTOPRAZOLE SODIUM 80 MG: 40 INJECTION, POWDER, FOR SOLUTION INTRAVENOUS at 07:57

## 2025-07-26 RX ADMIN — IPRATROPIUM BROMIDE AND ALBUTEROL SULFATE 1 DOSE: .5; 3 SOLUTION RESPIRATORY (INHALATION) at 16:22

## 2025-07-26 RX ADMIN — METOCLOPRAMIDE HYDROCHLORIDE 10 MG: 5 INJECTION INTRAMUSCULAR; INTRAVENOUS at 16:09

## 2025-07-26 RX ADMIN — IPRATROPIUM BROMIDE AND ALBUTEROL SULFATE 1 DOSE: .5; 3 SOLUTION RESPIRATORY (INHALATION) at 19:53

## 2025-07-26 RX ADMIN — INSULIN LISPRO 2 UNITS: 100 INJECTION, SOLUTION INTRAVENOUS; SUBCUTANEOUS at 21:10

## 2025-07-26 RX ADMIN — SODIUM CHLORIDE, SODIUM LACTATE, POTASSIUM CHLORIDE, AND CALCIUM CHLORIDE 1000 ML: .6; .31; .03; .02 INJECTION, SOLUTION INTRAVENOUS at 08:34

## 2025-07-26 RX ADMIN — SODIUM CHLORIDE 3000 MG: 9 INJECTION, SOLUTION INTRAVENOUS at 09:56

## 2025-07-26 RX ADMIN — GLYCOPYRROLATE 2 MG: 1 TABLET ORAL at 16:10

## 2025-07-26 RX ADMIN — INSULIN GLARGINE 10 UNITS: 100 INJECTION, SOLUTION SUBCUTANEOUS at 16:10

## 2025-07-26 RX ADMIN — LACTULOSE 20 G: 10 SOLUTION ORAL at 21:41

## 2025-07-26 RX ADMIN — SODIUM CHLORIDE: 0.9 INJECTION, SOLUTION INTRAVENOUS at 13:47

## 2025-07-26 RX ADMIN — ONDANSETRON 4 MG: 2 INJECTION, SOLUTION INTRAMUSCULAR; INTRAVENOUS at 07:50

## 2025-07-26 RX ADMIN — ENOXAPARIN SODIUM 40 MG: 100 INJECTION SUBCUTANEOUS at 16:10

## 2025-07-26 RX ADMIN — SODIUM CHLORIDE, PRESERVATIVE FREE 10 ML: 5 INJECTION INTRAVENOUS at 21:35

## 2025-07-26 RX ADMIN — INSULIN LISPRO 6 UNITS: 100 INJECTION, SOLUTION INTRAVENOUS; SUBCUTANEOUS at 16:10

## 2025-07-26 RX ADMIN — ASPIRIN 81 MG: 81 TABLET, CHEWABLE ORAL at 16:10

## 2025-07-26 ASSESSMENT — PAIN SCALES - GENERAL
PAINLEVEL_OUTOF10: 0

## 2025-07-26 ASSESSMENT — PULMONARY FUNCTION TESTS
PIF_VALUE: 22
PIF_VALUE: 20
PIF_VALUE: 34

## 2025-07-26 ASSESSMENT — PAIN SCALES - WONG BAKER: WONGBAKER_NUMERICALRESPONSE: HURTS WORST

## 2025-07-26 NOTE — PROGRESS NOTES
07/26/25 1701   RT Protocol   History Pulmonary Disease 2   Respiratory pattern 0   Breath sounds 2   Cough 4   Bronchodilator Assessment Score 8

## 2025-07-26 NOTE — PROGRESS NOTES
Pt admitted to ICU, a head to toe was performed. Pt has a known Stage 4 coccyx wound. Wound was covered, when dressing was removed, the entire wound was filled with diarrhea. Wound was washed with saline and vashe, wet to dry dressing performed. Bone is exposed in wound, known osteomylitis, followed by ID. Pt is not alert, non verbal , does not follow any commands.

## 2025-07-26 NOTE — H&P
Hospital Medicine History & Physical      PCP: Rosario Stevens MD    Date of Admission: 7/26/2025    Date of Service: Pt seen/examined on 7/26/2025  7:05 AM and    Placed in Observation.    Chief Complaint: Nausea and vomiting    History Of Present Illness:    60 y.o. female with PMHx significant for anoxic brain injury s/p trach and PEG, type 2 diabetes mellitus, coronary artery disease, COPD on the ventilator admitted to the hospital after episodes of nausea and vomiting and some concern for aspiration  Unable to obtain any history from the patient  History obtained from ED charting and EMS starting patient was at the ventriculostomy cannula change when she suddenly began vomiting.  The risk of the vomitus is coffee-ground no concern for bleeding and she was brought to the hospital because of the concern for some aspiration similar admissions in the past  Patient was actively vomiting when she was in the emergency department        Past Medical History:          Diagnosis Date    Acute respiratory failure with hypoxemia (HCC)     Acute ST elevation myocardial infarction (STEMI) involving right coronary artery (HCC) 10/06/2020    Arthritis     Back pain     CAD (coronary artery disease)     CHF (congestive heart failure) (HCC)     Chronic kidney disease     COPD (chronic obstructive pulmonary disease) (HCC)     Depression     Diabetes mellitus (HCC)     REHMAN (dyspnea on exertion)     Eye abnormalities     bleed in back of eyes with injections Q 8 weeks    Fatty liver     GERD (gastroesophageal reflux disease)     Hx of blood clots     ? PE 3 weeks ago ~11/22    Hyperlipidemia     Hypertension     MRSA (methicillin resistant staph aureus) culture positive 08/15/2018    arm    On home O2     2 l/m    Oxygen deficiency     PVD (peripheral vascular disease)     Stress incontinence     Thyroid disease     CYST ON THYROID--FOUND 20 YEARS AGO    Ventilator dependence (HCC)        Past Surgical History:

## 2025-07-26 NOTE — RT PROTOCOL NOTE
RT Inhaler-Nebulizer Bronchodilator Protocol Note    There is a bronchodilator order in the chart from a provider indicating to follow the RT Bronchodilator Protocol and there is an “Initiate RT Inhaler-Nebulizer Bronchodilator Protocol” order as well (see protocol at bottom of note).    CXR Findings:  XR CHEST PORTABLE  Result Date: 7/26/2025  1. No acute findings.       The findings from the last RT Protocol Assessment were as follows:   History Pulmonary Disease: Chronic pulmonary disease  Respiratory Pattern: Regular pattern and RR 12-20 bpm  Breath Sounds: Slightly diminished and/or crackles  Cough: Unable to generate effective cough  Indication for Bronchodilator Therapy:    Bronchodilator Assessment Score: 8    Aerosolized bronchodilator medication orders have been revised according to the RT Inhaler-Nebulizer Bronchodilator Protocol below.    Respiratory Therapist to perform RT Therapy Protocol Assessment initially then follow the protocol.  Repeat RT Therapy Protocol Assessment PRN for score 0-3 or on second treatment, BID, and PRN for scores above 3.    No Indications - adjust the frequency to every 6 hours PRN wheezing or bronchospasm, if no treatments needed after 48 hours then discontinue using Per Protocol order mode.     If indication present, adjust the RT bronchodilator orders based on the Bronchodilator Assessment Score as indicated below.  Use Inhaler orders unless patient has one or more of the following: on home nebulizer, not able to hold breath for 10 seconds, is not alert and oriented, cannot activate and use MDI correctly, or respiratory rate 25 breaths per minute or more, then use the equivalent nebulizer order(s) with same Frequency and PRN reasons based on the score.  If a patient is on this medication at home then do not decrease Frequency below that used at home.    0-3 - enter or revise RT bronchodilator order(s) to equivalent RT Bronchodilator order with Frequency of every 4 hours PRN

## 2025-07-26 NOTE — ED NOTES
Patient Name: Lindsay Lucio  : 1965 60 y.o.  MRN: [id number]  ED Room #: ED-0001/     Chief complaint:   Chief Complaint   Patient presents with    Illness     Brought in from [place] by [facility] for \"coffee ground emesis.\" Was trying to exchange inter canula when she started to vomit.     Hospital Problem/Diagnosis:   Hospital Problems           Last Modified POA    * (Principal) Aspiration pneumonia due to gastric secretions, unspecified laterality, unspecified part of lung (HCC) 2025 Yes         O2 Flow Rate:O2 Device: Ventilator O2 Flow Rate (L/min): 50 L/min (if applicable)  Cardiac Rhythm:   (if applicable)  Active LDA's:   Peripheral IV 25 Left;Anterior Forearm (Active)   Site Assessment Clean, dry & intact 25 07   Line Status Normal saline locked 25   Dressing Status New dressing applied;Clean, dry & intact 25 0731   Dressing Intervention New 25 0731      Gastrostomy/Enterostomy/Jejunostomy Tube PEG-Jejunostomy LUQ 1 20 fr (Active)          How does patient ambulate? Bed Bound    2. How does patient take pills? Other - Gtube    3. Is patient alert? Alert    4. Is patient oriented? Doesn't follow commands,doesn't speak. History of anoxic brain injury in 2024.    5.   Patient arrived from:  nursing home  Facility Name: [place]    6. If patient is disoriented or from a Skill Nursing Facility has family been notified of admission? No    7. Patient belongings? Belongings: NA    Disposition of belongings? Patient has no belongings.      8. Any specific patient or family belongings/needs/dynamics?   a. Daughter involved.     9. Miscellaneous comments/pending orders?  a. All ED orders complete.       If there are any additional questions please reach out to the Emergency Department.

## 2025-07-26 NOTE — CONSULTS
ICU Resident  Consult History & Physical      Name:  Lindsay Lucio  /Age/Sex: 1965  (60 y.o. female)  MRN & CSN:  [id number] & [id number]     Chief Complaint:  Concern for aspiration    Date of Service: Pt seen/examined in consultation on 2025    History Of Present Illness:      60 y.o. female who we are asked to see/evaluate by Ran Ribeiro MD for management of tracheostomy. PMHx of anoxic brain injury following cardiac arrest during EGD/colonoscopy 2024 (chronic trach/G and J tube), COPD, T2DM, HTN, HLD, CKD, CHF (TTE 25 with EF of 60%), STEMI (s/p PCI to mid RCA x2, mid LAD in ). Patient presented form [place] on  with coffee ground emesis while staff was trying to exchange inner cannula. CXR  with no acute findings. CT abd/pelvis  with no acute abdominal or pelvic findings. Chronic sacral decubitus ulcer with chronic osteomyelitis of the coccyx. Stable pattern of dense airspace opacification in the left lower lobe with scattered ground-glass opacities elsewhere in the lungs. Hepatosplenomegaly noted. Lactic acid elevated at 2.6, hyponatremic at 132, hyperglycemic at 419, troponin elevated at 99 (down-trending to 93). Patient's cannula replaced in ED.    Of note, patient was recently admitted from - with sepsis due to aspiration pneumonia, infected sacral pressure ulcer with osteomyelitis, and probable candidal UTI. Patient was discharged with 1 month of ciprofloxacin per ID.    Patient seen at bedside. Large sacral ulceration noted with fecal content inside. Opens eyes to verbal stimulus, but does not follow commands. Green, bilious output via G tube.      Ventilator Settings  AC/VC 12/420/50%/5.0    Lines  Left PIV     Drips  NS 100mL/hr    Microbiology  Blood cultures  pending      Past Medical History:        Diagnosis Date    Acute respiratory failure with hypoxemia (HCC)     Acute ST elevation myocardial infarction (STEMI) involving right 
Nutrition Note    RECOMMENDATIONS  PO Diet: NPO  ONS: NPO  Nutrition Support:   G port to gravity drainage.   Continue Reglan  Consider adding medication for acid reflux.  Start trophic feeds Vital AF 1.2 (peptide based formula) at 20 mL/hr today. If pt tolerates can begin advancing towards goal rate tomorrow.   Do not recommend to hold tube feeds with emesis of bile following patient care. OK to hold if pt is vomiting tube feed.     ASSESSMENT   Consult received for tube feed ordering and management. Pt well known to this RD from recent admission. Pt with tracheostomy. Pt has PEG/J tube in place to meet 100% of nutrition needs. Typically PEG is to gravity and feeds via J-port. Unfortunately, pt with chronic nausea and vomiting. Pt typically has emesis when being repositioned. Emesis is typically bile, not tube feed. Appears emesis prior to admission occurred while inner canula was being exchanged. At recent admission RD tried pt on Jevity 1.5, Osmolite 1.2, and Vital AF 1.2. Pt did eventually tolerate Vital AF 1.2 via J port at goal rate 55 mL/hr. Suspect emesis prior to admission was d/t canula exchange rather than tube feed intolerance. Will initiate trophic feeds today out of caution and monitor for ability to advance towards goal rate tomorrow. Pt with increased nutrition needs d/t chronic stage IV sacral decubitus ulcer.       Malnutrition Status:  (Unable to complete NFPE today)    NUTRITION DIAGNOSIS   Inadequate oral intake related to cognitive or neurological impairment as evidenced by nutrition support - enteral nutrition  Increased nutrient needs related to increase demand for energy/nutrients as evidenced by wounds    Goals: Tolerate nutrition support at goal rate, by next RD assessment     NUTRITION RELATED FINDINGS  Objective: Lactic acid 2.3. PEG/J tube. NS at 100 mL/hr.  Wounds: Stage IV, Pressure Injury    CURRENT NUTRITION THERAPIES  Diet NPO       PO Intake: NPO   PO Supplement 
.

## 2025-07-26 NOTE — ED PROVIDER NOTES
EMERGENCY DEPARTMENT PROVIDER NOTE    Patient Identification  Pt Name: Linsday Lucio  MRN: [id number]  Birthdate 1965  Date of evaluation: 7/26/2025  Provider: Karsten Acosta DO  PCP: Rosario Stevens MD    Chief Complaint  Illness (Brought in from [place] by [facility] for \"coffee ground emesis.\" Was trying to exchange inter canula when she started to vomit.)      HPI  (History provided by EMS, record review)  This is a 60 y.o. female with pertinent past medical history of anoxic brain injury, nonverbal, trach/PEG dependent who was brought in by EMS transportation from her nursing facility for vomiting.  Patient is nonverbal and unable to contribute meaningfully to history, majority of history is obtained from EMS and record review.  Per EMS patient was undergoing tracheostomy cannula changed when she began to vomit, nursing home staff described the vomit as \"coffee ground\" and were concerned for bleeding.  On record review, patient with recent hospital admission for sepsis and suspected aspiration.  Patient actively vomiting on arrival to the emergency department.      I have reviewed the following nursing documentation:  Allergies: Latex, Tramadol, Codeine, and Penicillins    Past medical history:   Past Medical History:   Diagnosis Date    Acute respiratory failure with hypoxemia (HCC)     Acute ST elevation myocardial infarction (STEMI) involving right coronary artery (HCC) 10/06/2020    Arthritis     Back pain     CAD (coronary artery disease)     CHF (congestive heart failure) (HCC)     Chronic kidney disease     COPD (chronic obstructive pulmonary disease) (HCC)     Depression     Diabetes mellitus (HCC)     REHMAN (dyspnea on exertion)     Eye abnormalities     bleed in back of eyes with injections Q 8 weeks    Fatty liver     GERD (gastroesophageal reflux disease)     Hx of blood clots     ? PE 3 weeks ago ~11/22    Hyperlipidemia     Hypertension     MRSA (methicillin resistant staph

## 2025-07-27 LAB
ALBUMIN SERPL-MCNC: 3 G/DL (ref 3.4–5)
ALBUMIN/GLOB SERPL: 0.8 {RATIO} (ref 1.1–2.2)
ALP SERPL-CCNC: 85 U/L (ref 40–129)
ALT SERPL-CCNC: 17 U/L (ref 10–40)
ANION GAP SERPL CALCULATED.3IONS-SCNC: 10 MMOL/L (ref 3–16)
AST SERPL-CCNC: 15 U/L (ref 15–37)
BASOPHILS # BLD: 0 K/UL (ref 0–0.2)
BASOPHILS # BLD: 0 K/UL (ref 0–0.2)
BASOPHILS NFR BLD: 0.2 %
BASOPHILS NFR BLD: 0.7 %
BILIRUB SERPL-MCNC: 0.4 MG/DL (ref 0–1)
BUN SERPL-MCNC: 28 MG/DL (ref 7–20)
CALCIUM SERPL-MCNC: 8.9 MG/DL (ref 8.3–10.6)
CHLORIDE SERPL-SCNC: 100 MMOL/L (ref 99–110)
CO2 SERPL-SCNC: 26 MMOL/L (ref 21–32)
CREAT SERPL-MCNC: 1.1 MG/DL (ref 0.6–1.2)
DEPRECATED RDW RBC AUTO: 15.6 % (ref 12.4–15.4)
DEPRECATED RDW RBC AUTO: 16 % (ref 12.4–15.4)
EOSINOPHIL # BLD: 0.2 K/UL (ref 0–0.6)
EOSINOPHIL # BLD: 0.2 K/UL (ref 0–0.6)
EOSINOPHIL NFR BLD: 2.2 %
EOSINOPHIL NFR BLD: 2.6 %
GFR SERPLBLD CREATININE-BSD FMLA CKD-EPI: 57 ML/MIN/{1.73_M2}
GLUCOSE BLD-MCNC: 191 MG/DL (ref 70–99)
GLUCOSE BLD-MCNC: 236 MG/DL (ref 70–99)
GLUCOSE BLD-MCNC: 244 MG/DL (ref 70–99)
GLUCOSE BLD-MCNC: 256 MG/DL (ref 70–99)
GLUCOSE SERPL-MCNC: 185 MG/DL (ref 70–99)
HCT VFR BLD AUTO: 21.9 % (ref 36–48)
HCT VFR BLD AUTO: 22.5 % (ref 36–48)
HGB BLD-MCNC: 7.8 G/DL (ref 12–16)
HGB BLD-MCNC: 7.8 G/DL (ref 12–16)
INR PPP: 1.02 (ref 0.86–1.14)
LYMPHOCYTES # BLD: 1.3 K/UL (ref 1–5.1)
LYMPHOCYTES # BLD: 1.6 K/UL (ref 1–5.1)
LYMPHOCYTES NFR BLD: 21.5 %
LYMPHOCYTES NFR BLD: 22.3 %
MCH RBC QN AUTO: 31.8 PG (ref 26–34)
MCH RBC QN AUTO: 32.4 PG (ref 26–34)
MCHC RBC AUTO-ENTMCNC: 34.6 G/DL (ref 31–36)
MCHC RBC AUTO-ENTMCNC: 35.5 G/DL (ref 31–36)
MCV RBC AUTO: 91.2 FL (ref 80–100)
MCV RBC AUTO: 92.1 FL (ref 80–100)
MONOCYTES # BLD: 0.4 K/UL (ref 0–1.3)
MONOCYTES # BLD: 0.7 K/UL (ref 0–1.3)
MONOCYTES NFR BLD: 7.2 %
MONOCYTES NFR BLD: 9.2 %
NEUTROPHILS # BLD: 4.2 K/UL (ref 1.7–7.7)
NEUTROPHILS # BLD: 4.9 K/UL (ref 1.7–7.7)
NEUTROPHILS NFR BLD: 66.1 %
NEUTROPHILS NFR BLD: 68 %
PERFORMED ON: ABNORMAL
PLATELET # BLD AUTO: 134 K/UL (ref 135–450)
PLATELET # BLD AUTO: 134 K/UL (ref 135–450)
PMV BLD AUTO: 8.1 FL (ref 5–10.5)
PMV BLD AUTO: 8.4 FL (ref 5–10.5)
POTASSIUM SERPL-SCNC: 4.3 MMOL/L (ref 3.5–5.1)
PROT SERPL-MCNC: 6.9 G/DL (ref 6.4–8.2)
PROTHROMBIN TIME: 13.7 SEC (ref 12.1–14.9)
RBC # BLD AUTO: 2.4 M/UL (ref 4–5.2)
RBC # BLD AUTO: 2.45 M/UL (ref 4–5.2)
REASON FOR REJECTION: NORMAL
REJECTED TEST: NORMAL
SODIUM SERPL-SCNC: 136 MMOL/L (ref 136–145)
WBC # BLD AUTO: 6.1 K/UL (ref 4–11)
WBC # BLD AUTO: 7.4 K/UL (ref 4–11)

## 2025-07-27 PROCEDURE — 96372 THER/PROPH/DIAG INJ SC/IM: CPT

## 2025-07-27 PROCEDURE — 96361 HYDRATE IV INFUSION ADD-ON: CPT

## 2025-07-27 PROCEDURE — 6370000000 HC RX 637 (ALT 250 FOR IP): Performed by: INTERNAL MEDICINE

## 2025-07-27 PROCEDURE — G0378 HOSPITAL OBSERVATION PER HR: HCPCS

## 2025-07-27 PROCEDURE — 6360000002 HC RX W HCPCS: Performed by: INTERNAL MEDICINE

## 2025-07-27 PROCEDURE — 2580000003 HC RX 258: Performed by: INTERNAL MEDICINE

## 2025-07-27 PROCEDURE — 96376 TX/PRO/DX INJ SAME DRUG ADON: CPT

## 2025-07-27 PROCEDURE — 36415 COLL VENOUS BLD VENIPUNCTURE: CPT

## 2025-07-27 PROCEDURE — 85610 PROTHROMBIN TIME: CPT

## 2025-07-27 PROCEDURE — 94640 AIRWAY INHALATION TREATMENT: CPT

## 2025-07-27 PROCEDURE — 96366 THER/PROPH/DIAG IV INF ADDON: CPT

## 2025-07-27 PROCEDURE — 94002 VENT MGMT INPAT INIT DAY: CPT

## 2025-07-27 PROCEDURE — 83036 HEMOGLOBIN GLYCOSYLATED A1C: CPT

## 2025-07-27 PROCEDURE — 2700000000 HC OXYGEN THERAPY PER DAY

## 2025-07-27 PROCEDURE — 99233 SBSQ HOSP IP/OBS HIGH 50: CPT | Performed by: INTERNAL MEDICINE

## 2025-07-27 PROCEDURE — 85025 COMPLETE CBC W/AUTO DIFF WBC: CPT

## 2025-07-27 PROCEDURE — 2500000003 HC RX 250 WO HCPCS: Performed by: INTERNAL MEDICINE

## 2025-07-27 PROCEDURE — 94003 VENT MGMT INPAT SUBQ DAY: CPT

## 2025-07-27 PROCEDURE — 80053 COMPREHEN METABOLIC PANEL: CPT

## 2025-07-27 PROCEDURE — 94761 N-INVAS EAR/PLS OXIMETRY MLT: CPT

## 2025-07-27 RX ORDER — CIPROFLOXACIN 500 MG/1
500 TABLET, FILM COATED ORAL EVERY 12 HOURS SCHEDULED
Status: DISCONTINUED | OUTPATIENT
Start: 2025-07-27 | End: 2025-07-29 | Stop reason: HOSPADM

## 2025-07-27 RX ORDER — PANTOPRAZOLE SODIUM 40 MG/1
40 TABLET, DELAYED RELEASE ORAL
Qty: 30 TABLET | Refills: 0 | Status: SHIPPED | OUTPATIENT
Start: 2025-07-27

## 2025-07-27 RX ORDER — INSULIN GLARGINE 100 [IU]/ML
15 INJECTION, SOLUTION SUBCUTANEOUS NIGHTLY
Qty: 3 ADJUSTABLE DOSE PRE-FILLED PEN SYRINGE | Refills: 0 | Status: SHIPPED | OUTPATIENT
Start: 2025-07-27

## 2025-07-27 RX ORDER — CIPROFLOXACIN 500 MG/1
500 TABLET, FILM COATED ORAL EVERY 12 HOURS SCHEDULED
Qty: 42 TABLET | Refills: 0 | Status: SHIPPED | OUTPATIENT
Start: 2025-07-27 | End: 2025-08-17

## 2025-07-27 RX ORDER — METOCLOPRAMIDE HYDROCHLORIDE 5 MG/5ML
10 SOLUTION ORAL 4 TIMES DAILY
Qty: 750 ML | Refills: 3 | Status: SHIPPED | OUTPATIENT
Start: 2025-07-27

## 2025-07-27 RX ADMIN — GLYCOPYRROLATE 2 MG: 1 TABLET ORAL at 11:00

## 2025-07-27 RX ADMIN — CARVEDILOL 6.25 MG: 6.25 TABLET, FILM COATED ORAL at 17:24

## 2025-07-27 RX ADMIN — IPRATROPIUM BROMIDE AND ALBUTEROL SULFATE 1 DOSE: .5; 3 SOLUTION RESPIRATORY (INHALATION) at 13:10

## 2025-07-27 RX ADMIN — SODIUM CHLORIDE: 0.9 INJECTION, SOLUTION INTRAVENOUS at 02:18

## 2025-07-27 RX ADMIN — APIXABAN 2.5 MG: 2.5 TABLET, FILM COATED ORAL at 21:15

## 2025-07-27 RX ADMIN — ENOXAPARIN SODIUM 40 MG: 100 INJECTION SUBCUTANEOUS at 11:01

## 2025-07-27 RX ADMIN — METOCLOPRAMIDE HYDROCHLORIDE 10 MG: 5 INJECTION INTRAMUSCULAR; INTRAVENOUS at 10:58

## 2025-07-27 RX ADMIN — Medication 2 PUFF: at 20:41

## 2025-07-27 RX ADMIN — GLYCOPYRROLATE 2 MG: 1 TABLET ORAL at 21:15

## 2025-07-27 RX ADMIN — PANTOPRAZOLE SODIUM 40 MG: 40 INJECTION, POWDER, FOR SOLUTION INTRAVENOUS at 10:58

## 2025-07-27 RX ADMIN — INSULIN LISPRO 4 UNITS: 100 INJECTION, SOLUTION INTRAVENOUS; SUBCUTANEOUS at 21:12

## 2025-07-27 RX ADMIN — SODIUM CHLORIDE 3000 MG: 9 INJECTION, SOLUTION INTRAVENOUS at 02:30

## 2025-07-27 RX ADMIN — SODIUM CHLORIDE: 0.9 INJECTION, SOLUTION INTRAVENOUS at 02:29

## 2025-07-27 RX ADMIN — GLYCOPYRROLATE 2 MG: 1 TABLET ORAL at 17:24

## 2025-07-27 RX ADMIN — SODIUM CHLORIDE, PRESERVATIVE FREE 10 ML: 5 INJECTION INTRAVENOUS at 21:13

## 2025-07-27 RX ADMIN — INSULIN LISPRO 2 UNITS: 100 INJECTION, SOLUTION INTRAVENOUS; SUBCUTANEOUS at 18:31

## 2025-07-27 RX ADMIN — LACTULOSE 20 G: 10 SOLUTION ORAL at 21:15

## 2025-07-27 RX ADMIN — IPRATROPIUM BROMIDE AND ALBUTEROL SULFATE 1 DOSE: .5; 3 SOLUTION RESPIRATORY (INHALATION) at 08:19

## 2025-07-27 RX ADMIN — INSULIN LISPRO 2 UNITS: 100 INJECTION, SOLUTION INTRAVENOUS; SUBCUTANEOUS at 11:15

## 2025-07-27 RX ADMIN — SODIUM CHLORIDE, PRESERVATIVE FREE 10 ML: 5 INJECTION INTRAVENOUS at 11:01

## 2025-07-27 RX ADMIN — METOCLOPRAMIDE HYDROCHLORIDE 10 MG: 5 INJECTION INTRAMUSCULAR; INTRAVENOUS at 02:31

## 2025-07-27 RX ADMIN — CIPROFLOXACIN HYDROCHLORIDE 500 MG: 500 TABLET, FILM COATED ORAL at 11:00

## 2025-07-27 RX ADMIN — Medication 2 PUFF: at 08:20

## 2025-07-27 RX ADMIN — LACTULOSE 20 G: 10 SOLUTION ORAL at 10:58

## 2025-07-27 RX ADMIN — METOCLOPRAMIDE HYDROCHLORIDE 10 MG: 5 INJECTION INTRAMUSCULAR; INTRAVENOUS at 21:20

## 2025-07-27 RX ADMIN — METOCLOPRAMIDE HYDROCHLORIDE 10 MG: 5 INJECTION INTRAMUSCULAR; INTRAVENOUS at 17:25

## 2025-07-27 RX ADMIN — PANTOPRAZOLE SODIUM 40 MG: 40 INJECTION, POWDER, FOR SOLUTION INTRAVENOUS at 21:13

## 2025-07-27 RX ADMIN — CIPROFLOXACIN HYDROCHLORIDE 500 MG: 500 TABLET, FILM COATED ORAL at 21:15

## 2025-07-27 RX ADMIN — ATORVASTATIN CALCIUM 20 MG: 20 TABLET, FILM COATED ORAL at 11:00

## 2025-07-27 RX ADMIN — IPRATROPIUM BROMIDE AND ALBUTEROL SULFATE 1 DOSE: .5; 3 SOLUTION RESPIRATORY (INHALATION) at 20:41

## 2025-07-27 RX ADMIN — INSULIN LISPRO 2 UNITS: 100 INJECTION, SOLUTION INTRAVENOUS; SUBCUTANEOUS at 02:25

## 2025-07-27 RX ADMIN — INSULIN GLARGINE 10 UNITS: 100 INJECTION, SOLUTION SUBCUTANEOUS at 11:00

## 2025-07-27 RX ADMIN — CARVEDILOL 6.25 MG: 6.25 TABLET, FILM COATED ORAL at 11:00

## 2025-07-27 RX ADMIN — ASPIRIN 81 MG: 81 TABLET, CHEWABLE ORAL at 10:59

## 2025-07-27 ASSESSMENT — PAIN SCALES - GENERAL
PAINLEVEL_OUTOF10: 0

## 2025-07-27 ASSESSMENT — PULMONARY FUNCTION TESTS
PIF_VALUE: 20
PIF_VALUE: 18
PIF_VALUE: 25
PIF_VALUE: 22

## 2025-07-27 NOTE — PROGRESS NOTES
ICU Resident  Progress Note    Name:  Lindsay Lucio    /Age/Sex: 1965  (60 y.o. female)  MRN & CSN:  [id number] & [id number]    Date of Admission: 2025    Chief Complaint:   Chief Complaint   Patient presents with    Illness     Brought in from [place] by [facility] for \"coffee ground emesis.\" Was trying to exchange inter canula when she started to vomit.     Hospital Course:     60-year-old female with a PMHx of anoxic brain injury, T2DM, COPD, HTN, HLD, CHF, STEMI, chronic tracheostomy/ventilator presented from [place] by EMS to [facility] on 2025 for vomiting while staff was trying to change her cannula.  Per chart review EMS were called due to \"coffee-ground emesis,\" however none observed in the ED and furthermore in the ICU.  Patient is nonverbal, nonresponsive at baseline.  Only opens eyes to verbal stimuli occasionally.  Patient was admitted in [facility] from - for treatment of sepsis from aspiration PNA, and Candida UTI.  In the ED, bilious output from G-tube.  Patient also has osteomyelitis from a sacral ulcer.  CXR : No pulmonary edema pleural effusion or pneumothorax.  CT abdomen pelvis: Chronic sacral decubitus ulcer with chronic osteomyelitis of coccyx, stable dense airspace opacification of LLL, hepatosplenomegaly.  Patient transferred to ICU for trach/vent care.    Subjective:  Today is:  Hospital Day: 2.  Patient seen and examined in ICU-3902/3902-01.     Overnight  1415 mL urine output in the last 24 hours  Slightly hypothermic around 4 AM 96.6, otherwise stable  Mild to moderate secretions, monitoring and clearing with manual suction    Vitals  BP ranges 120-140s/50s, MAP 70-90s, HR 60-70s    Cultures  Blood cultures pending, day 2    Ventilator Settings:  AC/VC: , RR 12, FiO2 30%, PEEP 5    Lines/catheters/tubes  G/J-tube, PEG placed 2024  Left peripheral IV placed 2025  Mae catheter placed 2025    Prophylaxis  DVT - Lovenox  GI -

## 2025-07-27 NOTE — PROGRESS NOTES
07/27/25 0821   Vent Patient Data (Readings)   Plateau Pressure (cm H2O) 15 cm H2O   Static Compliance (L/cm H2O) 47   Dynamic Compliance (L/cm H2O) 36.15 L/cm H2O

## 2025-07-27 NOTE — PROGRESS NOTES
07/26/25 1947   Vent Patient Data (Readings)   Static Compliance (L/cm H2O) 46   Dynamic Compliance (L/cm H2O) 32 L/cm H2O

## 2025-07-27 NOTE — PROGRESS NOTES
BRIEF NUTRITION NOTE    Pt has tolerated Vital AF 1.2 (peptide based formula) at 20 mL/hr via J port. G port remains to gravity. No emesis noted. Receiving Reglan and Protonix. Spoke with Dr. Ribeiro over the phone, plan is to increase tube feed by 20 mL q 4 hours until goal rate 55 mL/hr is achieved. Continue to recommend not to hold tube feeds if pt has emesis of bile with patient care (turning, suctioning, etc). This is chronic in nature and not related to tube feed tolerance.    Electronically signed by Kiah Mackenzie MS, RD, LD on 7/27/2025 at 4:12 PM     To contact RD during weekend hours, please call tray line at [phone] and leave name and callback number. RD on call will promptly return your call.

## 2025-07-27 NOTE — DISCHARGE INSTR - COC
Continuity of Care Form    Patient Name: Lindsay Lucio   :  1965  MRN:  [id number]    Admit date:  2025  Discharge date:  25    Code Status Order: Full Code   Advance Directives:     Admitting Physician:  Ran Ribeiro MD  PCP: Rosario Stevens MD    Discharging Nurse: Nabila Judd RN  Discharging Hospital Unit/Room#: ICU-3902/3902-01  Discharging Unit Phone Number: [phone]    Emergency Contact:   Extended Emergency Contact Information  Primary Emergency Contact: Mary Ellen Lucio  Home Phone: [phone]  Relation: Child   needed? No  Secondary Emergency Contact: Lisa Lucio  Home Phone: [phone]  Relation: Child   needed? No    Past Surgical History:  Past Surgical History:   Procedure Laterality Date    ANKLE FRACTURE SURGERY Right 2023    RIGHT OPEN REDUCTION AND INTERNAL FIXATION LATERAL MALLEOLUS AND SYNDESMOTIC REPAIR-MICHAEL performed by Wyatt Garsia MD at [facility] OR    CARDIAC CATHETERIZATION      X 2     SECTION      TIMES 3    CHOLECYSTECTOMY      COLONOSCOPY  2017      Colonoscopy with snare and biopsy    COLONOSCOPY N/A 2024    COLONOSCOPY performed by Ja Guadarrama MD at [email or web address] ENDOSCOPY    ESOPHAGEAL DILATATION  2024    ESOPHAGEAL DILATION GARCIA performed by Ja Guadarrama MD at [facility] [[email or web address] ENDOSCOPY    EYE SURGERY Bilateral     muscle correction and eyelid     HERNIA REPAIR      UMBILICAL AREA x 2    HYSTERECTOMY, TOTAL ABDOMINAL (CERVIX REMOVED)      PTCA  10/2020    SHEFALI to RCA x 2; SHEFALI to LAD    SHOULDER ARTHROSCOPY Right 2015    Right shoulder arthroscopy labral debridement open nedra subacromial decompression and chrondroplasty    TRACHEOSTOMY N/A 2024    TRACHEOTOMY performed by Eleuterio Remy MD at [facility] [[email or web address] OR    UPPER GASTROINTESTINAL ENDOSCOPY  2017    Esophagogastroduodenoscopy with biopsy    UPPER GASTROINTESTINAL ENDOSCOPY N/A 2024    ESOPHAGOGASTRODUODENOSCOPY BIOPSY

## 2025-07-27 NOTE — PROGRESS NOTES
Hospitalist Progress Note      PCP: Rosario Stevens MD    Date of Admission: 7/26/2025    Chief Complaint: Emesis    Hospital Course:   No further emesis  No acute events overnight  No hypoxia  Back on ventilator  Tolerating tube feeds at 20 mL an hour        Medications:  Reviewed      Exam:    /62   Pulse 73   Temp 96.8 °F (36 °C) (Temporal)   Resp 21   Ht 1.524 m (5')   Wt 74.1 kg (163 lb 5.8 oz)   LMP 11/30/2005   SpO2 96%   BMI 31.90 kg/m²     General appearance: Opens eyes to verbal stimuli  HEENT: Pupils equal, round, and reactive to light. Conjunctivae/corneas clear.  Neck: Supple, with full range of motion. No jugular venous distention. Trachea midline.  Respiratory: Tracheostomy in place  Cardiovascular: Regular rate and rhythm with normal S1/S2 without MURMURS, rubs or gallops.  Abdomen: Soft, non-tender, non-distended with normal bowel sounds.  Musculoskeletal: No clubbing, cyanosis or EDEMA bilaterally.  Full range of motion without deformity.  Skin: Skin color, texture, turgor normal.  No rashes or lesions.  Neurologic:  Neurovascularly intact without any focal sensory/motor deficits. Cranial nerves: II-XII intact, grossly non-focal.  Psychiatric: Opens eyes to verbal stimuli otherwise nonresponsive  Capillary Refill: Brisk,< 3 seconds   Peripheral Pulses: +2 palpable, equal bilate and rally       Labs:   Recent Labs     07/26/25  0727 07/27/25  0508 07/27/25  0829   WBC 10.8 7.4 6.1   HGB 9.5* 7.8* 7.8*   HCT 27.3* 22.5* 21.9*    134* 134*     Recent Labs     07/26/25  0727 07/27/25  0431   * 136   K 5.1 4.3   CL 95* 100   CO2 26 26   BUN 32* 28*   CREATININE 1.1 1.1   CALCIUM 9.8 8.9     Recent Labs     07/26/25  0727 07/27/25  0431   AST 21 15   ALT 14 17   BILITOT 0.7 0.4   ALKPHOS 105 85     Recent Labs     07/27/25  0431   INR 1.02     No results for input(s): \"CKTOTAL\", \"TROPONINI\" in the last 72 hours.    Urinalysis:      Lab Results   Component Value

## 2025-07-27 NOTE — CARE COORDINATION
Discharge Planning:     (CM) called Delmy in admissions at [place] and confirmed pt is LTC with them. As long as Patient stays in OBSERVATION status, she will NOT need a pre-cert to return.     CM working on scheduling transport back for this evening, if unable to secure for this evening then tomorrow morning.       Electronically signed by EMMA Jacobs on 7/27/2025 at 12:42 PM      UPDATE :    CM has been unable to get transport for this evening or for tomorrow morning for this Patient in [facility].     CM called Delmy in admissions [phone] and advised of above and asked her to see if they can pick Patient up tomorrow, CM asked for a phone call back.       Electronically signed by EMMA Jacobs on 7/27/2025 at 2:10 PM

## 2025-07-28 PROBLEM — K92.0 HEMATEMESIS: Status: RESOLVED | Noted: 2025-07-26 | Resolved: 2025-07-28

## 2025-07-28 LAB
ANION GAP SERPL CALCULATED.3IONS-SCNC: 10 MMOL/L (ref 3–16)
BASOPHILS # BLD: 0 K/UL (ref 0–0.2)
BASOPHILS NFR BLD: 0.2 %
BUN SERPL-MCNC: 24 MG/DL (ref 7–20)
CALCIUM SERPL-MCNC: 9 MG/DL (ref 8.3–10.6)
CHLORIDE SERPL-SCNC: 101 MMOL/L (ref 99–110)
CO2 SERPL-SCNC: 25 MMOL/L (ref 21–32)
CREAT SERPL-MCNC: 1.1 MG/DL (ref 0.6–1.2)
DEPRECATED RDW RBC AUTO: 15.5 % (ref 12.4–15.4)
EOSINOPHIL # BLD: 0.1 K/UL (ref 0–0.6)
EOSINOPHIL NFR BLD: 1.5 %
EST. AVERAGE GLUCOSE BLD GHB EST-MCNC: 122.6 MG/DL
GFR SERPLBLD CREATININE-BSD FMLA CKD-EPI: 57 ML/MIN/{1.73_M2}
GLUCOSE BLD-MCNC: 269 MG/DL (ref 70–99)
GLUCOSE BLD-MCNC: 295 MG/DL (ref 70–99)
GLUCOSE BLD-MCNC: 308 MG/DL (ref 70–99)
GLUCOSE BLD-MCNC: 335 MG/DL (ref 70–99)
GLUCOSE SERPL-MCNC: 287 MG/DL (ref 70–99)
HBA1C MFR BLD: 5.9 %
HCT VFR BLD AUTO: 21 % (ref 36–48)
HGB BLD-MCNC: 7.4 G/DL (ref 12–16)
LYMPHOCYTES # BLD: 1 K/UL (ref 1–5.1)
LYMPHOCYTES NFR BLD: 15.3 %
MCH RBC QN AUTO: 32.1 PG (ref 26–34)
MCHC RBC AUTO-ENTMCNC: 35.1 G/DL (ref 31–36)
MCV RBC AUTO: 91.3 FL (ref 80–100)
MONOCYTES # BLD: 0.3 K/UL (ref 0–1.3)
MONOCYTES NFR BLD: 5.6 %
NEUTROPHILS # BLD: 4.8 K/UL (ref 1.7–7.7)
NEUTROPHILS NFR BLD: 77.4 %
PERFORMED ON: ABNORMAL
PLATELET # BLD AUTO: 137 K/UL (ref 135–450)
PMV BLD AUTO: 8.6 FL (ref 5–10.5)
POTASSIUM SERPL-SCNC: 4.2 MMOL/L (ref 3.5–5.1)
RBC # BLD AUTO: 2.3 M/UL (ref 4–5.2)
SODIUM SERPL-SCNC: 136 MMOL/L (ref 136–145)
WBC # BLD AUTO: 6.3 K/UL (ref 4–11)

## 2025-07-28 PROCEDURE — 6370000000 HC RX 637 (ALT 250 FOR IP): Performed by: INTERNAL MEDICINE

## 2025-07-28 PROCEDURE — 94640 AIRWAY INHALATION TREATMENT: CPT

## 2025-07-28 PROCEDURE — 94761 N-INVAS EAR/PLS OXIMETRY MLT: CPT

## 2025-07-28 PROCEDURE — 6360000002 HC RX W HCPCS: Performed by: INTERNAL MEDICINE

## 2025-07-28 PROCEDURE — 80048 BASIC METABOLIC PNL TOTAL CA: CPT

## 2025-07-28 PROCEDURE — 85025 COMPLETE CBC W/AUTO DIFF WBC: CPT

## 2025-07-28 PROCEDURE — G0378 HOSPITAL OBSERVATION PER HR: HCPCS

## 2025-07-28 PROCEDURE — 36415 COLL VENOUS BLD VENIPUNCTURE: CPT

## 2025-07-28 PROCEDURE — 2500000003 HC RX 250 WO HCPCS: Performed by: INTERNAL MEDICINE

## 2025-07-28 PROCEDURE — 2700000000 HC OXYGEN THERAPY PER DAY

## 2025-07-28 PROCEDURE — 6370000000 HC RX 637 (ALT 250 FOR IP): Performed by: STUDENT IN AN ORGANIZED HEALTH CARE EDUCATION/TRAINING PROGRAM

## 2025-07-28 PROCEDURE — 94003 VENT MGMT INPAT SUBQ DAY: CPT

## 2025-07-28 PROCEDURE — 96376 TX/PRO/DX INJ SAME DRUG ADON: CPT

## 2025-07-28 RX ORDER — INSULIN LISPRO 100 [IU]/ML
0-16 INJECTION, SOLUTION INTRAVENOUS; SUBCUTANEOUS EVERY 6 HOURS SCHEDULED
Status: DISCONTINUED | OUTPATIENT
Start: 2025-07-28 | End: 2025-07-29 | Stop reason: HOSPADM

## 2025-07-28 RX ADMIN — INSULIN LISPRO 6 UNITS: 100 INJECTION, SOLUTION INTRAVENOUS; SUBCUTANEOUS at 17:27

## 2025-07-28 RX ADMIN — ATORVASTATIN CALCIUM 20 MG: 20 TABLET, FILM COATED ORAL at 09:54

## 2025-07-28 RX ADMIN — ASPIRIN 81 MG: 81 TABLET, CHEWABLE ORAL at 09:54

## 2025-07-28 RX ADMIN — SODIUM CHLORIDE, PRESERVATIVE FREE 10 ML: 5 INJECTION INTRAVENOUS at 20:45

## 2025-07-28 RX ADMIN — SODIUM CHLORIDE, PRESERVATIVE FREE 10 ML: 5 INJECTION INTRAVENOUS at 09:53

## 2025-07-28 RX ADMIN — METOCLOPRAMIDE HYDROCHLORIDE 10 MG: 5 INJECTION INTRAMUSCULAR; INTRAVENOUS at 20:44

## 2025-07-28 RX ADMIN — GLYCOPYRROLATE 2 MG: 1 TABLET ORAL at 20:53

## 2025-07-28 RX ADMIN — CARVEDILOL 6.25 MG: 6.25 TABLET, FILM COATED ORAL at 09:54

## 2025-07-28 RX ADMIN — INSULIN GLARGINE 10 UNITS: 100 INJECTION, SOLUTION SUBCUTANEOUS at 09:53

## 2025-07-28 RX ADMIN — IPRATROPIUM BROMIDE AND ALBUTEROL SULFATE 1 DOSE: .5; 3 SOLUTION RESPIRATORY (INHALATION) at 15:54

## 2025-07-28 RX ADMIN — METOCLOPRAMIDE HYDROCHLORIDE 10 MG: 5 INJECTION INTRAMUSCULAR; INTRAVENOUS at 15:13

## 2025-07-28 RX ADMIN — CARVEDILOL 6.25 MG: 6.25 TABLET, FILM COATED ORAL at 17:27

## 2025-07-28 RX ADMIN — INSULIN LISPRO 4 UNITS: 100 INJECTION, SOLUTION INTRAVENOUS; SUBCUTANEOUS at 02:53

## 2025-07-28 RX ADMIN — Medication 2 PUFF: at 21:15

## 2025-07-28 RX ADMIN — METOCLOPRAMIDE HYDROCHLORIDE 10 MG: 5 INJECTION INTRAMUSCULAR; INTRAVENOUS at 02:54

## 2025-07-28 RX ADMIN — METOCLOPRAMIDE HYDROCHLORIDE 10 MG: 5 INJECTION INTRAMUSCULAR; INTRAVENOUS at 09:55

## 2025-07-28 RX ADMIN — LACTULOSE 20 G: 10 SOLUTION ORAL at 20:44

## 2025-07-28 RX ADMIN — IPRATROPIUM BROMIDE AND ALBUTEROL SULFATE 1 DOSE: .5; 3 SOLUTION RESPIRATORY (INHALATION) at 07:40

## 2025-07-28 RX ADMIN — GLYCOPYRROLATE 2 MG: 1 TABLET ORAL at 09:54

## 2025-07-28 RX ADMIN — PANTOPRAZOLE SODIUM 40 MG: 40 INJECTION, POWDER, FOR SOLUTION INTRAVENOUS at 20:44

## 2025-07-28 RX ADMIN — INSULIN LISPRO 6 UNITS: 100 INJECTION, SOLUTION INTRAVENOUS; SUBCUTANEOUS at 10:10

## 2025-07-28 RX ADMIN — APIXABAN 2.5 MG: 2.5 TABLET, FILM COATED ORAL at 20:44

## 2025-07-28 RX ADMIN — PANTOPRAZOLE SODIUM 40 MG: 40 INJECTION, POWDER, FOR SOLUTION INTRAVENOUS at 09:53

## 2025-07-28 RX ADMIN — LACTULOSE 20 G: 10 SOLUTION ORAL at 09:55

## 2025-07-28 RX ADMIN — APIXABAN 2.5 MG: 2.5 TABLET, FILM COATED ORAL at 09:54

## 2025-07-28 RX ADMIN — INSULIN LISPRO 8 UNITS: 100 INJECTION, SOLUTION INTRAVENOUS; SUBCUTANEOUS at 21:10

## 2025-07-28 RX ADMIN — CIPROFLOXACIN HYDROCHLORIDE 500 MG: 500 TABLET, FILM COATED ORAL at 09:54

## 2025-07-28 RX ADMIN — GLYCOPYRROLATE 2 MG: 1 TABLET ORAL at 15:14

## 2025-07-28 RX ADMIN — Medication 2 PUFF: at 07:43

## 2025-07-28 RX ADMIN — CIPROFLOXACIN HYDROCHLORIDE 500 MG: 500 TABLET, FILM COATED ORAL at 20:44

## 2025-07-28 RX ADMIN — IPRATROPIUM BROMIDE AND ALBUTEROL SULFATE 1 DOSE: .5; 3 SOLUTION RESPIRATORY (INHALATION) at 21:15

## 2025-07-28 ASSESSMENT — PULMONARY FUNCTION TESTS
PIF_VALUE: 23
PIF_VALUE: 19
PIF_VALUE: 22
PIF_VALUE: 21
PIF_VALUE: 28
PIF_VALUE: 21

## 2025-07-28 ASSESSMENT — PAIN SCALES - GENERAL
PAINLEVEL_OUTOF10: 0

## 2025-07-28 NOTE — CARE COORDINATION
07/28/25 1154   Readmission Assessment   Number of Days since last admission? 1-7 days   Previous Disposition Other (comment)  ([facility])   Who is being Interviewed Caregiver  (Delmy keli/Amandeep)   What was the patient's/caregiver's perception as to why they think they needed to return back to the hospital? Other (Comment)  (Per staff at [facility]: Pt had coffee ground emesis)   Did you visit your Primary Care Physician after you left the hospital, before you returned this time? No   Why weren't you able to visit your PCP? Did not have an appointment   Did you see a specialist, such as Cardiac, Pulmonary, Orthopedic Physician, etc. after you left the hospital? No   Who advised the patient to return to the hospital? Other (Comment)  (LTC facility)   Does the patient report anything that got in the way of taking their medications? No   In our efforts to provide the best possible care to you and others like you, can you think of anything that we could have done to help you after you left the hospital the first time, so that you might not have needed to return so soon? Other (Comment)  (n/a)

## 2025-07-28 NOTE — CARE COORDINATION
Discharge Planning Note:     Chart reviewed:Patient in observation status    Risk Score: N/A       Primary Care Physician is: Amandeep GERONIMO     Primary insurance is:[facility]      Patient from [facility] needs precert to return Per Delmy dickey/Amandeep. Delmy stated that the insurance stated it does not matter if the patient is in observation status the patient needs a precert to return. Delmy started precert today (7/28) CM acknowledged. CM notified Dr. Ko.     Case management will continue to follow progress and update discharge plan as needed.    Electronically signed by Nabila Sanches RN on 7/28/2025 at 11:39 AM     High Dose Vitamin A Pregnancy And Lactation Text: High dose vitamin A therapy is contraindicated during pregnancy and breast feeding.

## 2025-07-28 NOTE — PROGRESS NOTES
Spiritual Health History and Assessment/Progress Note  [facility]    (P) Spiritual/Emotional Needs,  ,  ,      Name: Lindsay Lucio MRN: [id number]    Age: 60 y.o.     Sex: female   Language: English   Jew: Catholic   Aspiration pneumonia due to gastric secretions, unspecified laterality, unspecified part of lung (HCC)     Date: 7/28/2025            Total Time Calculated: (P) 10 min              Spiritual Assessment began in [facility] ICU        Referral/Consult From: (P) Rounding   Encounter Overview/Reason: (P) Spiritual/Emotional Needs  Service Provided For: (P) Patient    Catrachita, Belief, Meaning:   Patient unable to assess at this time  Family/Friends No family/friends present      Importance and Influence:  Patient unable to assess at this time  Family/Friends No family/friends present    Community:  Patient feels well-supported. Support system includes: Children  Family/Friends No family/friends present    Assessment and Plan of Care:     Patient Interventions include: Other: Prayed with patient  Family/Friends Interventions include: No family/friends present    Patient Plan of Care: Spiritual Care available upon further referral  Family/Friends Plan of Care: No family/friends present    Electronically signed by Chaplain Estuardo on 7/28/2025 at 7:46 PM

## 2025-07-28 NOTE — PROGRESS NOTES
Hospitalist Progress Note    Name:  Lindsay Lucio    /Age/Sex: 1965  (60 y.o. female)  MRN & CSN:  [id number] & [id number]    PCP: Rosario Stevens MD    Date of Admission: 2025    Patient Status:  Observation     Chief Complaint:   Chief Complaint   Patient presents with    Illness     Brought in from [place] by [facility] for \"coffee ground emesis.\" Was trying to exchange inter canula when she started to vomit.       Hospital Course:   60 y.o. female with PMHx significant for anoxic brain injury s/p trach and PEG, type 2 diabetes mellitus, coronary artery disease, COPD on the ventilator admitted to the hospital after episodes of nausea and vomiting and some concern for aspiration.    Patient was actively vomiting when she was in the emergency department.  CCM consulted.      Subjective:  Today is:  Hospital Day: 3.  Patient seen and examined in ICU-3902/3902-01.     In bed.  Nonverbal at baseline.  On trach.  Seems comfortable.      Medications:  Reviewed    Infusion Medications    sodium chloride Stopped (25 1112)    dextrose       Scheduled Medications    insulin lispro  0-16 Units SubCUTAneous 4 times per day    ciprofloxacin  500 mg Oral 2 times per day    apixaban  2.5 mg Oral BID    aspirin  81 mg Oral Daily    atorvastatin  20 mg Per G Tube Daily    budesonide-formoterol  2 puff Inhalation BID RT    carvedilol  6.25 mg Per G Tube BID WC    glycopyrrolate  2 mg Per G Tube TID    insulin glargine  10 Units SubCUTAneous Daily    ipratropium 0.5 mg-albuterol 2.5 mg  1 Dose Inhalation TID    lactulose  20 g Per G Tube BID    sodium chloride flush  10 mL IntraVENous 2 times per day    metoclopramide  10 mg IntraVENous Q6H    pantoprazole  40 mg IntraVENous BID     PRN Meds: bisacodyl, meclizine, sodium chloride flush, sodium chloride, potassium chloride **OR** potassium alternative oral replacement **OR** potassium chloride, magnesium sulfate, ondansetron **OR** ondansetron,

## 2025-07-29 VITALS
BODY MASS INDEX: 34.97 KG/M2 | OXYGEN SATURATION: 98 % | HEART RATE: 71 BPM | TEMPERATURE: 96.8 F | HEIGHT: 60 IN | RESPIRATION RATE: 20 BRPM | WEIGHT: 178.13 LBS | SYSTOLIC BLOOD PRESSURE: 119 MMHG | DIASTOLIC BLOOD PRESSURE: 59 MMHG

## 2025-07-29 LAB
ANION GAP SERPL CALCULATED.3IONS-SCNC: 13 MMOL/L (ref 3–16)
BUN SERPL-MCNC: 27 MG/DL (ref 7–20)
CALCIUM SERPL-MCNC: 9.1 MG/DL (ref 8.3–10.6)
CHLORIDE SERPL-SCNC: 101 MMOL/L (ref 99–110)
CO2 SERPL-SCNC: 21 MMOL/L (ref 21–32)
CREAT SERPL-MCNC: 1.2 MG/DL (ref 0.6–1.2)
GFR SERPLBLD CREATININE-BSD FMLA CKD-EPI: 51 ML/MIN/{1.73_M2}
GLUCOSE BLD-MCNC: 190 MG/DL (ref 70–99)
GLUCOSE BLD-MCNC: 317 MG/DL (ref 70–99)
GLUCOSE SERPL-MCNC: 139 MG/DL (ref 70–99)
PERFORMED ON: ABNORMAL
PERFORMED ON: ABNORMAL
POTASSIUM SERPL-SCNC: 4.2 MMOL/L (ref 3.5–5.1)
SODIUM SERPL-SCNC: 135 MMOL/L (ref 136–145)

## 2025-07-29 PROCEDURE — 6370000000 HC RX 637 (ALT 250 FOR IP): Performed by: INTERNAL MEDICINE

## 2025-07-29 PROCEDURE — 94003 VENT MGMT INPAT SUBQ DAY: CPT

## 2025-07-29 PROCEDURE — 94761 N-INVAS EAR/PLS OXIMETRY MLT: CPT

## 2025-07-29 PROCEDURE — G0378 HOSPITAL OBSERVATION PER HR: HCPCS

## 2025-07-29 PROCEDURE — 96376 TX/PRO/DX INJ SAME DRUG ADON: CPT

## 2025-07-29 PROCEDURE — 2500000003 HC RX 250 WO HCPCS: Performed by: INTERNAL MEDICINE

## 2025-07-29 PROCEDURE — 6370000000 HC RX 637 (ALT 250 FOR IP): Performed by: STUDENT IN AN ORGANIZED HEALTH CARE EDUCATION/TRAINING PROGRAM

## 2025-07-29 PROCEDURE — 2700000000 HC OXYGEN THERAPY PER DAY

## 2025-07-29 PROCEDURE — 80048 BASIC METABOLIC PNL TOTAL CA: CPT

## 2025-07-29 PROCEDURE — 6360000002 HC RX W HCPCS: Performed by: INTERNAL MEDICINE

## 2025-07-29 PROCEDURE — 94640 AIRWAY INHALATION TREATMENT: CPT

## 2025-07-29 PROCEDURE — 36415 COLL VENOUS BLD VENIPUNCTURE: CPT

## 2025-07-29 RX ADMIN — ASPIRIN 81 MG: 81 TABLET, CHEWABLE ORAL at 09:36

## 2025-07-29 RX ADMIN — INSULIN LISPRO 12 UNITS: 100 INJECTION, SOLUTION INTRAVENOUS; SUBCUTANEOUS at 12:28

## 2025-07-29 RX ADMIN — SODIUM CHLORIDE, PRESERVATIVE FREE 10 ML: 5 INJECTION INTRAVENOUS at 09:36

## 2025-07-29 RX ADMIN — GLYCOPYRROLATE 2 MG: 1 TABLET ORAL at 09:35

## 2025-07-29 RX ADMIN — IPRATROPIUM BROMIDE AND ALBUTEROL SULFATE 1 DOSE: .5; 3 SOLUTION RESPIRATORY (INHALATION) at 07:53

## 2025-07-29 RX ADMIN — PANTOPRAZOLE SODIUM 40 MG: 40 INJECTION, POWDER, FOR SOLUTION INTRAVENOUS at 09:35

## 2025-07-29 RX ADMIN — METOCLOPRAMIDE HYDROCHLORIDE 10 MG: 5 INJECTION INTRAMUSCULAR; INTRAVENOUS at 09:35

## 2025-07-29 RX ADMIN — Medication 2 PUFF: at 11:19

## 2025-07-29 RX ADMIN — CIPROFLOXACIN HYDROCHLORIDE 500 MG: 500 TABLET, FILM COATED ORAL at 09:35

## 2025-07-29 RX ADMIN — APIXABAN 2.5 MG: 2.5 TABLET, FILM COATED ORAL at 09:43

## 2025-07-29 RX ADMIN — CARVEDILOL 6.25 MG: 6.25 TABLET, FILM COATED ORAL at 09:36

## 2025-07-29 RX ADMIN — INSULIN GLARGINE 10 UNITS: 100 INJECTION, SOLUTION SUBCUTANEOUS at 09:40

## 2025-07-29 RX ADMIN — GLYCOPYRROLATE 2 MG: 1 TABLET ORAL at 13:53

## 2025-07-29 RX ADMIN — INSULIN LISPRO 4 UNITS: 100 INJECTION, SOLUTION INTRAVENOUS; SUBCUTANEOUS at 00:29

## 2025-07-29 RX ADMIN — METOCLOPRAMIDE HYDROCHLORIDE 10 MG: 5 INJECTION INTRAMUSCULAR; INTRAVENOUS at 03:02

## 2025-07-29 RX ADMIN — ATORVASTATIN CALCIUM 20 MG: 20 TABLET, FILM COATED ORAL at 09:35

## 2025-07-29 RX ADMIN — IPRATROPIUM BROMIDE AND ALBUTEROL SULFATE 1 DOSE: .5; 3 SOLUTION RESPIRATORY (INHALATION) at 14:32

## 2025-07-29 ASSESSMENT — PULMONARY FUNCTION TESTS
PIF_VALUE: 19
PIF_VALUE: 16
PIF_VALUE: 20

## 2025-07-29 NOTE — PLAN OF CARE
Problem: Chronic Conditions and Co-morbidities  Goal: Patient's chronic conditions and co-morbidity symptoms are monitored and maintained or improved  Outcome: Progressing     Problem: Discharge Planning  Goal: Discharge to home or other facility with appropriate resources  Outcome: Progressing     Problem: Pain  Goal: Verbalizes/displays adequate comfort level or baseline comfort level  Outcome: Progressing  Flowsheets (Taken 7/28/2025 2000)  Verbalizes/displays adequate comfort level or baseline comfort level:   Administer analgesics based on type and severity of pain and evaluate response   Assess pain using appropriate pain scale   Implement non-pharmacological measures as appropriate and evaluate response   Consider cultural and social influences on pain and pain management   Notify Licensed Independent Practitioner if interventions unsuccessful or patient reports new pain     Problem: Nutrition Deficit:  Goal: Optimize nutritional status  Outcome: Progressing     Problem: Safety - Adult  Goal: Free from fall injury  Outcome: Progressing     Problem: Skin/Tissue Integrity  Goal: Skin integrity remains intact  Description: 1.  Monitor for areas of redness and/or skin breakdown  2.  Assess vascular access sites hourly  3.  Every 4-6 hours minimum:  Change oxygen saturation probe site  4.  Every 4-6 hours:  If on nasal continuous positive airway pressure, respiratory therapy assess nares and determine need for appliance change or resting period  Outcome: Progressing

## 2025-07-29 NOTE — PROGRESS NOTES
Nutrition Note    RECOMMENDATIONS  PO Diet: NPO  ONS: NPO  Nutrition Support:   Continue Vital AF 1.2 at goal rate 55 mL/hr  Continue water flush 30 mL q 4 hours      ASSESSMENT   Pt tolerating Vital AF 1.2 (peptide based formula) at goal rate 55 mL/hr with water flush 30 mL q 4 hours via J tube. Na+ 135. G port to gravity. One episode green bilious emesis noted on 7/28 but RN reports this was related to turning pt. Stool via rectal tube. Will monitor for continued tolerance to tube feeds at goal.       Malnutrition Status: At risk for malnutrition  Chronic Illness  Findings of the 6 clinical characteristics of malnutrition:  Energy Intake:  75% or less estimated energy requirements for 1 month or longer (Feeds chronically held on and off d/t issues with emesis)  Weight Loss:  Mild weight loss (27 lb (13.2%) loss since anoxic injury September 2024 (10 months))     Body Fat Loss:  No body fat loss     Muscle Mass Loss:  No muscle mass loss    Fluid Accumulation:  Severe Extremities   Strength:  Not Performed      NUTRITION DIAGNOSIS   Inadequate oral intake related to cognitive or neurological impairment as evidenced by nutrition support - enteral nutrition  Increased nutrient needs related to increase demand for energy/nutrients as evidenced by wounds    Goals: Tolerate nutrition support at goal rate, by next RD assessment     NUTRITION RELATED FINDINGS  Objective: +2.5 liters. +3 pitting BUE edema. Trace BLE edema.  Wounds: Stage IV, Pressure Injury    CURRENT NUTRITION THERAPIES  Diet NPO  ADULT TUBE FEEDING; PEG/J; Peptide Based; Continuous; 30; Yes; 20; Q 4 hours; 55; 30; Q 4 hours       PO Intake: NPO   PO Supplement Intake:NPO    Current Tube Feeding (TF) Orders:  Feeding Route: PEG/J (G for decompression, J for feeds)  Formula: Peptide Based  Schedule: Continuous  Feeding Regimen: 55 mL/hr  Additives/Modulars: None  Water Flushes: 30 mL q 4 hours  Current TF Provides: 1320 mL total volume, 1584 calories,

## 2025-07-29 NOTE — PROGRESS NOTES
Hospitalist Progress Note    Name:  Lindsay Lucio    /Age/Sex: 1965  (60 y.o. female)  MRN & CSN:  [id number] & [id number]    PCP: Rosario Stevens MD    Date of Admission: 2025    Patient Status:  Observation     Chief Complaint:   Chief Complaint   Patient presents with    Illness     Brought in from [place] by [facility] for \"coffee ground emesis.\" Was trying to exchange inter canula when she started to vomit.       Hospital Course:   60 y.o. female with PMHx significant for anoxic brain injury s/p trach and PEG, type 2 diabetes mellitus, coronary artery disease, COPD on the ventilator admitted to the hospital after episodes of nausea and vomiting and some concern for aspiration.    Patient was actively vomiting when she was in the emergency department.  CCM consulted.      Subjective:  Today is:  Hospital Day: 4.  Patient seen and examined in ICU-3902/3902-01.     Lying in bed.  Does not seem to be in pain.  Nonverbal at baseline.      Medications:  Reviewed    Infusion Medications    sodium chloride Stopped (25 1112)    dextrose       Scheduled Medications    insulin lispro  0-16 Units SubCUTAneous 4 times per day    ciprofloxacin  500 mg Oral 2 times per day    apixaban  2.5 mg Oral BID    aspirin  81 mg Oral Daily    atorvastatin  20 mg Per G Tube Daily    budesonide-formoterol  2 puff Inhalation BID RT    carvedilol  6.25 mg Per G Tube BID WC    glycopyrrolate  2 mg Per G Tube TID    insulin glargine  10 Units SubCUTAneous Daily    ipratropium 0.5 mg-albuterol 2.5 mg  1 Dose Inhalation TID    lactulose  20 g Per G Tube BID    sodium chloride flush  10 mL IntraVENous 2 times per day    metoclopramide  10 mg IntraVENous Q6H    pantoprazole  40 mg IntraVENous BID     PRN Meds: bisacodyl, meclizine, sodium chloride flush, sodium chloride, potassium chloride **OR** potassium alternative oral replacement **OR** potassium chloride, magnesium sulfate, ondansetron **OR**

## 2025-07-29 NOTE — PROGRESS NOTES
Amandeep here to  patient. Transferred via lift to a recliner from facility. Updated staff taking her back to facility All questions answered. Paperwork sent with patient..

## 2025-07-29 NOTE — CARE COORDINATION
Case Management -  Discharge Note      Patient Name: Lindsay Lucio                   YOB: 1965  Room: [facility]-[address] [place] [address]            Readmission Risk (Low < 19, Mod (19-27), High > 27): Readmission Risk Score: 15.9    Current PCP: Rosario Stevens MD      (IMM) Important Message from Medicare:    Has pt received appropriate compliance notices before being discharged if required: N/A  Compliance doc:  [] 2nd IMM; [] Code 44 [] Workman  Date Given: n/a Given By: n/a        Patient noted to have a discharge order.  Pt has been medically cleared to return to LTC (Long Term Care)    Patient discharged to     [facility]  [address]  [place] [id number]  Phone: [phone]  Fax:  [phone]                   Pre-cert Required/obtained: yes  Transportation scheduled for 2-230pm  Transportation provided by [facility] faxed and agency notified: yes  The following prescriptions sent with pt:  yes if any per nursing    Nurse to call report to facility: [phone]    Facility notified of discharge:  [x] Yes  [] No  [] NA       Financial    [facility] / Product Type: *No Product type* /     Pharmacy:  Potential assistance Purchasing Medications: No  Meds-to-Beds request: No      [id number] - [place], [place] - [address] - P [phone] - F [phone]  [address] [place] [id number]  Phone: [phone] Fax: [phone]    [id number] - [place], [place] - [address] - P [phone] - F [phone]  [address]  [facility] [address]  Phone: [phone] Fax: [phone]    [facility] - [place], [place] - [address] - P [phone] - F [phone]  [address]  [facility] [address]  Phone: [phone] Fax: [phone]      Notes:    Additional Case Management Notes: Dc order noted. Plan-Return to [facility], precert approved.

## 2025-07-29 NOTE — PROGRESS NOTES
Received notice patient will return to [facility] today. Report called to Nurse Cardozo. Update on patient given. All questions answered.

## 2025-07-29 NOTE — PROGRESS NOTES
07/29/25 0753   Vent Patient Data (Readings)   Mean Airway Pressure (cmH2O) 9.6 cmH20   Plateau Pressure (cm H2O) 16 cm H2O   Driving Pressure 11   I:E Ratio 1.1.8   Flow Sensitivity 3 L/min   Static Compliance (L/cm H2O) 44   Dynamic Compliance (L/cm H2O) 31.93 L/cm H2O

## 2025-07-30 LAB
BACTERIA BLD CULT ORG #2: NORMAL
BACTERIA BLD CULT: NORMAL

## 2025-07-30 NOTE — DISCHARGE SUMMARY
Hospital Medicine Discharge Summary    Name:  Lindsay Lucio  Gender: female  : 1965  60 y.o.  MRN: [id number]    PCP: Rosario Stevens MD     Date of Admission:  2025  7:05 AM  Discharge Date: 25    Admitting Physician: Ran Ribeiro MD  Discharge Physician: Varinder Ko DO      Discharge Diagnoses:       Active Hospital Problems    Diagnosis     Aspiration pneumonia due to gastric secretions, unspecified laterality, unspecified part of lung (HCC) [J69.0]     Nausea and vomiting [R11.2]     At high risk for aspiration [Z91.89]     Anoxic encephalopathy (HCC) [G93.1]     Ventilator dependence (HCC) [Z99.11]        The patient was seen and examined on day of discharge and this discharge summary is in conjunction with any daily progress note from day of discharge.    Hospital Course:  Lindsay Lucio is a 60 y.o. year old female who presented to [facility] on 2025  7:05 AM.      60 y.o. female with PMHx significant for anoxic brain injury s/p trach and PEG, type 2 diabetes mellitus, coronary artery disease, COPD on the ventilator admitted to the hospital after episodes of nausea and vomiting and some concern for aspiration.     Patient was actively vomiting when she was in the emergency department.  [facility] consulted.    Patient quickly recovered during her stay. Her stay was prolonged due to precert requirements back to facility.      On the last day of hospital stay, patient was doing well. Did not seem to be in pain. Nonverbal at baseline.  The patient expressed appropriate understanding of and agreement with the discharge recommendations, medications, and plan.      Physical Exam Performed:     BP (!) 119/59   Pulse 71   Temp 96.8 °F (36 °C) (Temporal)   Resp 20   Ht 1.524 m (5')   Wt 80.8 kg (178 lb 2.1 oz)   LMP 2005   SpO2 98%   BMI 34.79 kg/m²       General appearance:  No apparent distress. Non verbal at baseline.  HEENT:  Normal cephalic, atraumatic

## 2025-08-17 ENCOUNTER — APPOINTMENT (OUTPATIENT)
Dept: CT IMAGING | Age: 60
End: 2025-08-17
Payer: COMMERCIAL

## 2025-08-17 ENCOUNTER — HOSPITAL ENCOUNTER (INPATIENT)
Age: 60
LOS: 5 days | Discharge: NURSING, REHAB OR LONG-TERM CARE | End: 2025-08-23
Attending: EMERGENCY MEDICINE | Admitting: INTERNAL MEDICINE
Payer: COMMERCIAL

## 2025-08-17 DIAGNOSIS — L98.429 SKIN ULCER OF SACRUM, UNSPECIFIED ULCER STAGE (HCC): ICD-10-CM

## 2025-08-17 DIAGNOSIS — T85.528A GASTROJEJUNOSTOMY TUBE DISLODGEMENT: Primary | ICD-10-CM

## 2025-08-17 DIAGNOSIS — E87.5 HYPERKALEMIA: ICD-10-CM

## 2025-08-17 LAB
ALBUMIN SERPL-MCNC: 3.6 G/DL (ref 3.4–5)
ALBUMIN/GLOB SERPL: 0.8 {RATIO} (ref 1.1–2.2)
ALP SERPL-CCNC: 85 U/L (ref 40–129)
ALT SERPL-CCNC: 27 U/L (ref 10–40)
ANION GAP SERPL CALCULATED.3IONS-SCNC: 12 MMOL/L (ref 3–16)
AST SERPL-CCNC: 35 U/L (ref 15–37)
BILIRUB SERPL-MCNC: 0.6 MG/DL (ref 0–1)
BUN SERPL-MCNC: 43 MG/DL (ref 7–20)
CALCIUM SERPL-MCNC: 9.9 MG/DL (ref 8.3–10.6)
CHLORIDE SERPL-SCNC: 92 MMOL/L (ref 99–110)
CO2 SERPL-SCNC: 28 MMOL/L (ref 21–32)
CREAT SERPL-MCNC: 1.5 MG/DL (ref 0.6–1.2)
GFR SERPLBLD CREATININE-BSD FMLA CKD-EPI: 39 ML/MIN/{1.73_M2}
GLUCOSE SERPL-MCNC: 202 MG/DL (ref 70–99)
POTASSIUM SERPL-SCNC: 6.1 MMOL/L (ref 3.5–5.1)
PROT SERPL-MCNC: 8.2 G/DL (ref 6.4–8.2)
REASON FOR REJECTION: NORMAL
REJECTED TEST: NORMAL
SODIUM SERPL-SCNC: 132 MMOL/L (ref 136–145)

## 2025-08-17 PROCEDURE — 93005 ELECTROCARDIOGRAM TRACING: CPT | Performed by: PHYSICIAN ASSISTANT

## 2025-08-17 PROCEDURE — 85025 COMPLETE CBC W/AUTO DIFF WBC: CPT

## 2025-08-17 PROCEDURE — 96374 THER/PROPH/DIAG INJ IV PUSH: CPT

## 2025-08-17 PROCEDURE — 6360000002 HC RX W HCPCS: Performed by: EMERGENCY MEDICINE

## 2025-08-17 PROCEDURE — 80053 COMPREHEN METABOLIC PANEL: CPT

## 2025-08-17 PROCEDURE — 99285 EMERGENCY DEPT VISIT HI MDM: CPT

## 2025-08-17 PROCEDURE — 5A1955Z RESPIRATORY VENTILATION, GREATER THAN 96 CONSECUTIVE HOURS: ICD-10-PCS | Performed by: INTERNAL MEDICINE

## 2025-08-17 RX ORDER — ONDANSETRON 2 MG/ML
4 INJECTION INTRAMUSCULAR; INTRAVENOUS ONCE
Status: COMPLETED | OUTPATIENT
Start: 2025-08-17 | End: 2025-08-17

## 2025-08-17 RX ADMIN — ONDANSETRON 4 MG: 2 INJECTION, SOLUTION INTRAMUSCULAR; INTRAVENOUS at 22:55

## 2025-08-18 ENCOUNTER — ANESTHESIA EVENT (OUTPATIENT)
Dept: ENDOSCOPY | Age: 60
End: 2025-08-18
Payer: COMMERCIAL

## 2025-08-18 ENCOUNTER — APPOINTMENT (OUTPATIENT)
Dept: CT IMAGING | Age: 60
End: 2025-08-18
Payer: COMMERCIAL

## 2025-08-18 ENCOUNTER — ANESTHESIA (OUTPATIENT)
Dept: ENDOSCOPY | Age: 60
End: 2025-08-18
Payer: COMMERCIAL

## 2025-08-18 PROBLEM — K94.23 PEG TUBE MALFUNCTION (HCC): Status: ACTIVE | Noted: 2025-08-18

## 2025-08-18 LAB
ANION GAP SERPL CALCULATED.3IONS-SCNC: 13 MMOL/L (ref 3–16)
BASOPHILS # BLD: 0.1 K/UL (ref 0–0.2)
BASOPHILS NFR BLD: 0.7 %
BUN SERPL-MCNC: 45 MG/DL (ref 7–20)
CALCIUM SERPL-MCNC: 10.4 MG/DL (ref 8.3–10.6)
CHLORIDE SERPL-SCNC: 91 MMOL/L (ref 99–110)
CO2 SERPL-SCNC: 28 MMOL/L (ref 21–32)
CREAT SERPL-MCNC: 1.6 MG/DL (ref 0.6–1.2)
DEPRECATED RDW RBC AUTO: 15.1 % (ref 12.4–15.4)
DEPRECATED RDW RBC AUTO: 15.6 % (ref 12.4–15.4)
EKG ATRIAL RATE: 71 BPM
EKG DIAGNOSIS: NORMAL
EKG P AXIS: 67 DEGREES
EKG P-R INTERVAL: 162 MS
EKG Q-T INTERVAL: 428 MS
EKG QRS DURATION: 114 MS
EKG QTC CALCULATION (BAZETT): 465 MS
EKG R AXIS: 33 DEGREES
EKG T AXIS: 33 DEGREES
EKG VENTRICULAR RATE: 71 BPM
EOSINOPHIL # BLD: 0.3 K/UL (ref 0–0.6)
EOSINOPHIL NFR BLD: 2.9 %
GFR SERPLBLD CREATININE-BSD FMLA CKD-EPI: 36 ML/MIN/{1.73_M2}
GLUCOSE BLD-MCNC: 189 MG/DL (ref 70–99)
GLUCOSE BLD-MCNC: 204 MG/DL (ref 70–99)
GLUCOSE BLD-MCNC: 220 MG/DL (ref 70–99)
GLUCOSE BLD-MCNC: 228 MG/DL (ref 70–99)
GLUCOSE SERPL-MCNC: 223 MG/DL (ref 70–99)
HCT VFR BLD AUTO: 24.1 % (ref 36–48)
HCT VFR BLD AUTO: 24.7 % (ref 36–48)
HGB BLD-MCNC: 8.4 G/DL (ref 12–16)
HGB BLD-MCNC: 8.5 G/DL (ref 12–16)
LYMPHOCYTES # BLD: 1.1 K/UL (ref 1–5.1)
LYMPHOCYTES NFR BLD: 12 %
MCH RBC QN AUTO: 30.9 PG (ref 26–34)
MCH RBC QN AUTO: 30.9 PG (ref 26–34)
MCHC RBC AUTO-ENTMCNC: 34.5 G/DL (ref 31–36)
MCHC RBC AUTO-ENTMCNC: 34.8 G/DL (ref 31–36)
MCV RBC AUTO: 88.8 FL (ref 80–100)
MCV RBC AUTO: 89.5 FL (ref 80–100)
MONOCYTES # BLD: 0.5 K/UL (ref 0–1.3)
MONOCYTES NFR BLD: 5 %
NEUTROPHILS # BLD: 7.4 K/UL (ref 1.7–7.7)
NEUTROPHILS NFR BLD: 79.4 %
PERFORMED ON: ABNORMAL
PLATELET # BLD AUTO: 185 K/UL (ref 135–450)
PLATELET # BLD AUTO: 206 K/UL (ref 135–450)
PMV BLD AUTO: 7.7 FL (ref 5–10.5)
PMV BLD AUTO: 7.8 FL (ref 5–10.5)
POTASSIUM SERPL-SCNC: 5.2 MMOL/L (ref 3.5–5.1)
POTASSIUM SERPL-SCNC: 5.7 MMOL/L (ref 3.5–5.1)
RBC # BLD AUTO: 2.71 M/UL (ref 4–5.2)
RBC # BLD AUTO: 2.76 M/UL (ref 4–5.2)
SODIUM SERPL-SCNC: 132 MMOL/L (ref 136–145)
WBC # BLD AUTO: 9.3 K/UL (ref 4–11)
WBC # BLD AUTO: 9.5 K/UL (ref 4–11)

## 2025-08-18 PROCEDURE — 43762 RPLC GTUBE NO REVJ TRC: CPT

## 2025-08-18 PROCEDURE — 7100000011 HC PHASE II RECOVERY - ADDTL 15 MIN: Performed by: INTERNAL MEDICINE

## 2025-08-18 PROCEDURE — 84132 ASSAY OF SERUM POTASSIUM: CPT

## 2025-08-18 PROCEDURE — 6360000002 HC RX W HCPCS

## 2025-08-18 PROCEDURE — 3609017100 HC EGD: Performed by: INTERNAL MEDICINE

## 2025-08-18 PROCEDURE — 2500000003 HC RX 250 WO HCPCS: Performed by: INTERNAL MEDICINE

## 2025-08-18 PROCEDURE — 85027 COMPLETE CBC AUTOMATED: CPT

## 2025-08-18 PROCEDURE — 6360000002 HC RX W HCPCS: Performed by: PHYSICIAN ASSISTANT

## 2025-08-18 PROCEDURE — 6370000000 HC RX 637 (ALT 250 FOR IP): Performed by: INTERNAL MEDICINE

## 2025-08-18 PROCEDURE — 6370000000 HC RX 637 (ALT 250 FOR IP): Performed by: PHYSICIAN ASSISTANT

## 2025-08-18 PROCEDURE — 6360000004 HC RX CONTRAST MEDICATION: Performed by: EMERGENCY MEDICINE

## 2025-08-18 PROCEDURE — 94003 VENT MGMT INPAT SUBQ DAY: CPT

## 2025-08-18 PROCEDURE — 2720000010 HC SURG SUPPLY STERILE: Performed by: INTERNAL MEDICINE

## 2025-08-18 PROCEDURE — 74177 CT ABD & PELVIS W/CONTRAST: CPT

## 2025-08-18 PROCEDURE — 2000000000 HC ICU R&B

## 2025-08-18 PROCEDURE — 36415 COLL VENOUS BLD VENIPUNCTURE: CPT

## 2025-08-18 PROCEDURE — 0D20XUZ CHANGE FEEDING DEVICE IN UPPER INTESTINAL TRACT, EXTERNAL APPROACH: ICD-10-PCS | Performed by: INTERNAL MEDICINE

## 2025-08-18 PROCEDURE — 93010 ELECTROCARDIOGRAM REPORT: CPT | Performed by: INTERNAL MEDICINE

## 2025-08-18 PROCEDURE — 96375 TX/PRO/DX INJ NEW DRUG ADDON: CPT

## 2025-08-18 PROCEDURE — 6360000002 HC RX W HCPCS: Performed by: INTERNAL MEDICINE

## 2025-08-18 PROCEDURE — 94640 AIRWAY INHALATION TREATMENT: CPT

## 2025-08-18 PROCEDURE — 94761 N-INVAS EAR/PLS OXIMETRY MLT: CPT

## 2025-08-18 PROCEDURE — 2709999900 HC NON-CHARGEABLE SUPPLY: Performed by: INTERNAL MEDICINE

## 2025-08-18 PROCEDURE — 94002 VENT MGMT INPAT INIT DAY: CPT

## 2025-08-18 PROCEDURE — 2700000000 HC OXYGEN THERAPY PER DAY

## 2025-08-18 PROCEDURE — 7100000010 HC PHASE II RECOVERY - FIRST 15 MIN: Performed by: INTERNAL MEDICINE

## 2025-08-18 PROCEDURE — 99152 MOD SED SAME PHYS/QHP 5/>YRS: CPT | Performed by: INTERNAL MEDICINE

## 2025-08-18 PROCEDURE — 2580000003 HC RX 258: Performed by: INTERNAL MEDICINE

## 2025-08-18 PROCEDURE — 80048 BASIC METABOLIC PNL TOTAL CA: CPT

## 2025-08-18 PROCEDURE — 2580000003 HC RX 258: Performed by: PHYSICIAN ASSISTANT

## 2025-08-18 PROCEDURE — 99153 MOD SED SAME PHYS/QHP EA: CPT | Performed by: INTERNAL MEDICINE

## 2025-08-18 DEVICE — IMPLANTABLE DEVICE: Type: IMPLANTABLE DEVICE | Site: ABDOMEN | Status: FUNCTIONAL

## 2025-08-18 RX ORDER — TOBRAMYCIN INHALATION SOLUTION 300 MG/5ML
300 INHALANT RESPIRATORY (INHALATION)
COMMUNITY
Start: 2025-08-23 | End: 2025-09-02

## 2025-08-18 RX ORDER — MIDAZOLAM HYDROCHLORIDE 2 MG/2ML
4 INJECTION, SOLUTION INTRAMUSCULAR; INTRAVENOUS ONCE
Status: COMPLETED | OUTPATIENT
Start: 2025-08-18 | End: 2025-08-18

## 2025-08-18 RX ORDER — POLYETHYLENE GLYCOL 3350 17 G/17G
17 POWDER, FOR SOLUTION ORAL DAILY PRN
Status: DISCONTINUED | OUTPATIENT
Start: 2025-08-18 | End: 2025-08-23 | Stop reason: HOSPADM

## 2025-08-18 RX ORDER — IOPAMIDOL 755 MG/ML
75 INJECTION, SOLUTION INTRAVASCULAR
Status: COMPLETED | OUTPATIENT
Start: 2025-08-18 | End: 2025-08-18

## 2025-08-18 RX ORDER — L. ACIDOPHILUS/L.BULGARICUS 100MM CELL
1 GRANULES IN PACKET (EA) ORAL 2 TIMES DAILY
Status: DISCONTINUED | OUTPATIENT
Start: 2025-08-18 | End: 2025-08-23 | Stop reason: HOSPADM

## 2025-08-18 RX ORDER — CALCIUM GLUCONATE 20 MG/ML
1000 INJECTION, SOLUTION INTRAVENOUS ONCE
Status: DISCONTINUED | OUTPATIENT
Start: 2025-08-18 | End: 2025-08-18

## 2025-08-18 RX ORDER — ATORVASTATIN CALCIUM 40 MG/1
20 TABLET, FILM COATED ORAL DAILY
Status: DISCONTINUED | OUTPATIENT
Start: 2025-08-18 | End: 2025-08-23 | Stop reason: HOSPADM

## 2025-08-18 RX ORDER — CARVEDILOL 3.12 MG/1
6.25 TABLET ORAL 2 TIMES DAILY WITH MEALS
Status: DISCONTINUED | OUTPATIENT
Start: 2025-08-18 | End: 2025-08-23 | Stop reason: HOSPADM

## 2025-08-18 RX ORDER — ALBUTEROL SULFATE 0.83 MG/ML
2.5 SOLUTION RESPIRATORY (INHALATION) EVERY 6 HOURS PRN
Status: DISCONTINUED | OUTPATIENT
Start: 2025-08-18 | End: 2025-08-23 | Stop reason: HOSPADM

## 2025-08-18 RX ORDER — GLUCAGON 1 MG/ML
1 KIT INJECTION PRN
Status: DISCONTINUED | OUTPATIENT
Start: 2025-08-18 | End: 2025-08-23 | Stop reason: HOSPADM

## 2025-08-18 RX ORDER — LACTULOSE 10 G/15ML
20 SOLUTION ORAL 2 TIMES DAILY
Status: DISCONTINUED | OUTPATIENT
Start: 2025-08-18 | End: 2025-08-23 | Stop reason: HOSPADM

## 2025-08-18 RX ORDER — BUDESONIDE AND FORMOTEROL FUMARATE DIHYDRATE 160; 4.5 UG/1; UG/1
2 AEROSOL RESPIRATORY (INHALATION)
Status: DISCONTINUED | OUTPATIENT
Start: 2025-08-18 | End: 2025-08-23 | Stop reason: HOSPADM

## 2025-08-18 RX ORDER — ONDANSETRON 4 MG/1
4 TABLET, ORALLY DISINTEGRATING ORAL EVERY 8 HOURS PRN
COMMUNITY

## 2025-08-18 RX ORDER — PANTOPRAZOLE SODIUM 40 MG/10ML
40 INJECTION, POWDER, LYOPHILIZED, FOR SOLUTION INTRAVENOUS
Status: DISCONTINUED | OUTPATIENT
Start: 2025-08-18 | End: 2025-08-23 | Stop reason: HOSPADM

## 2025-08-18 RX ORDER — ACETAMINOPHEN 650 MG/1
650 SUPPOSITORY RECTAL EVERY 6 HOURS PRN
Status: DISCONTINUED | OUTPATIENT
Start: 2025-08-18 | End: 2025-08-23 | Stop reason: HOSPADM

## 2025-08-18 RX ORDER — GLUCAGON 1 MG/ML
1 KIT INJECTION PRN
Status: DISCONTINUED | OUTPATIENT
Start: 2025-08-18 | End: 2025-08-18 | Stop reason: ALTCHOICE

## 2025-08-18 RX ORDER — SODIUM CHLORIDE 0.9 % (FLUSH) 0.9 %
5-40 SYRINGE (ML) INJECTION PRN
Status: DISCONTINUED | OUTPATIENT
Start: 2025-08-18 | End: 2025-08-23 | Stop reason: HOSPADM

## 2025-08-18 RX ORDER — POTASSIUM CHLORIDE 1500 MG/1
40 TABLET, EXTENDED RELEASE ORAL PRN
Status: DISCONTINUED | OUTPATIENT
Start: 2025-08-18 | End: 2025-08-23 | Stop reason: HOSPADM

## 2025-08-18 RX ORDER — SODIUM CHLORIDE 9 MG/ML
INJECTION, SOLUTION INTRAVENOUS CONTINUOUS
Status: DISCONTINUED | OUTPATIENT
Start: 2025-08-18 | End: 2025-08-22

## 2025-08-18 RX ORDER — INSULIN LISPRO 100 [IU]/ML
0-8 INJECTION, SOLUTION INTRAVENOUS; SUBCUTANEOUS
Status: DISCONTINUED | OUTPATIENT
Start: 2025-08-18 | End: 2025-08-23 | Stop reason: HOSPADM

## 2025-08-18 RX ORDER — ACETAMINOPHEN 325 MG/1
650 TABLET ORAL EVERY 6 HOURS PRN
Status: DISCONTINUED | OUTPATIENT
Start: 2025-08-18 | End: 2025-08-18 | Stop reason: ALTCHOICE

## 2025-08-18 RX ORDER — FUROSEMIDE 10 MG/ML
40 INJECTION INTRAMUSCULAR; INTRAVENOUS ONCE
Status: COMPLETED | OUTPATIENT
Start: 2025-08-18 | End: 2025-08-18

## 2025-08-18 RX ORDER — BISACODYL 10 MG
10 SUPPOSITORY, RECTAL RECTAL DAILY PRN
Status: DISCONTINUED | OUTPATIENT
Start: 2025-08-18 | End: 2025-08-23 | Stop reason: HOSPADM

## 2025-08-18 RX ORDER — ONDANSETRON 2 MG/ML
4 INJECTION INTRAMUSCULAR; INTRAVENOUS ONCE
Status: DISCONTINUED | OUTPATIENT
Start: 2025-08-18 | End: 2025-08-18

## 2025-08-18 RX ORDER — INSULIN GLARGINE 100 [IU]/ML
15 INJECTION, SOLUTION SUBCUTANEOUS NIGHTLY
Status: DISCONTINUED | OUTPATIENT
Start: 2025-08-18 | End: 2025-08-23 | Stop reason: HOSPADM

## 2025-08-18 RX ORDER — ACETAMINOPHEN 325 MG/1
650 TABLET ORAL EVERY 6 HOURS PRN
Status: DISCONTINUED | OUTPATIENT
Start: 2025-08-18 | End: 2025-08-23 | Stop reason: HOSPADM

## 2025-08-18 RX ORDER — MIDAZOLAM HYDROCHLORIDE 1 MG/ML
INJECTION, SOLUTION INTRAMUSCULAR; INTRAVENOUS
Status: COMPLETED
Start: 2025-08-18 | End: 2025-08-18

## 2025-08-18 RX ORDER — CALCIUM GLUCONATE 20 MG/ML
1000 INJECTION, SOLUTION INTRAVENOUS ONCE
Status: COMPLETED | OUTPATIENT
Start: 2025-08-18 | End: 2025-08-18

## 2025-08-18 RX ORDER — ONDANSETRON 4 MG/1
4 TABLET, ORALLY DISINTEGRATING ORAL EVERY 8 HOURS PRN
Status: DISCONTINUED | OUTPATIENT
Start: 2025-08-18 | End: 2025-08-23 | Stop reason: HOSPADM

## 2025-08-18 RX ORDER — ENOXAPARIN SODIUM 100 MG/ML
30 INJECTION SUBCUTANEOUS NIGHTLY
Status: DISCONTINUED | OUTPATIENT
Start: 2025-08-18 | End: 2025-08-18

## 2025-08-18 RX ORDER — ONDANSETRON 2 MG/ML
4 INJECTION INTRAMUSCULAR; INTRAVENOUS EVERY 6 HOURS PRN
Status: DISCONTINUED | OUTPATIENT
Start: 2025-08-18 | End: 2025-08-23 | Stop reason: HOSPADM

## 2025-08-18 RX ORDER — SODIUM CHLORIDE 0.9 % (FLUSH) 0.9 %
5-40 SYRINGE (ML) INJECTION EVERY 12 HOURS SCHEDULED
Status: DISCONTINUED | OUTPATIENT
Start: 2025-08-18 | End: 2025-08-23 | Stop reason: HOSPADM

## 2025-08-18 RX ORDER — MECLIZINE HCL 12.5 MG 12.5 MG/1
12.5 TABLET ORAL EVERY 6 HOURS PRN
Status: DISCONTINUED | OUTPATIENT
Start: 2025-08-18 | End: 2025-08-23 | Stop reason: HOSPADM

## 2025-08-18 RX ORDER — 0.9 % SODIUM CHLORIDE 0.9 %
500 INTRAVENOUS SOLUTION INTRAVENOUS ONCE
Status: DISCONTINUED | OUTPATIENT
Start: 2025-08-18 | End: 2025-08-23 | Stop reason: HOSPADM

## 2025-08-18 RX ORDER — FENTANYL CITRATE 50 UG/ML
INJECTION, SOLUTION INTRAMUSCULAR; INTRAVENOUS
Status: COMPLETED
Start: 2025-08-18 | End: 2025-08-18

## 2025-08-18 RX ORDER — FUROSEMIDE 10 MG/ML
40 INJECTION INTRAMUSCULAR; INTRAVENOUS ONCE
Status: DISCONTINUED | OUTPATIENT
Start: 2025-08-18 | End: 2025-08-18

## 2025-08-18 RX ORDER — DEXTROSE MONOHYDRATE 100 MG/ML
INJECTION, SOLUTION INTRAVENOUS CONTINUOUS PRN
Status: DISCONTINUED | OUTPATIENT
Start: 2025-08-18 | End: 2025-08-23 | Stop reason: HOSPADM

## 2025-08-18 RX ORDER — METOCLOPRAMIDE HYDROCHLORIDE 5 MG/5ML
10 SOLUTION ORAL
Status: DISCONTINUED | OUTPATIENT
Start: 2025-08-18 | End: 2025-08-23 | Stop reason: HOSPADM

## 2025-08-18 RX ORDER — DEXTROSE MONOHYDRATE 100 MG/ML
INJECTION, SOLUTION INTRAVENOUS CONTINUOUS PRN
Status: DISCONTINUED | OUTPATIENT
Start: 2025-08-18 | End: 2025-08-18 | Stop reason: ALTCHOICE

## 2025-08-18 RX ORDER — SODIUM CHLORIDE 9 MG/ML
INJECTION, SOLUTION INTRAVENOUS PRN
Status: DISCONTINUED | OUTPATIENT
Start: 2025-08-18 | End: 2025-08-23 | Stop reason: HOSPADM

## 2025-08-18 RX ORDER — POTASSIUM CHLORIDE 7.45 MG/ML
10 INJECTION INTRAVENOUS PRN
Status: DISCONTINUED | OUTPATIENT
Start: 2025-08-18 | End: 2025-08-23 | Stop reason: HOSPADM

## 2025-08-18 RX ORDER — MAGNESIUM SULFATE IN WATER 40 MG/ML
2000 INJECTION, SOLUTION INTRAVENOUS PRN
Status: DISCONTINUED | OUTPATIENT
Start: 2025-08-18 | End: 2025-08-23 | Stop reason: HOSPADM

## 2025-08-18 RX ORDER — CASTOR OIL AND BALSAM, PERU 788; 87 MG/G; MG/G
OINTMENT TOPICAL 2 TIMES DAILY
Status: DISCONTINUED | OUTPATIENT
Start: 2025-08-18 | End: 2025-08-23 | Stop reason: HOSPADM

## 2025-08-18 RX ORDER — IPRATROPIUM BROMIDE AND ALBUTEROL SULFATE 2.5; .5 MG/3ML; MG/3ML
1 SOLUTION RESPIRATORY (INHALATION) 3 TIMES DAILY
Status: DISCONTINUED | OUTPATIENT
Start: 2025-08-18 | End: 2025-08-19

## 2025-08-18 RX ORDER — ASPIRIN 81 MG/1
81 TABLET, CHEWABLE ORAL DAILY
Status: DISCONTINUED | OUTPATIENT
Start: 2025-08-18 | End: 2025-08-23 | Stop reason: HOSPADM

## 2025-08-18 RX ORDER — FENTANYL CITRATE 50 UG/ML
100 INJECTION, SOLUTION INTRAMUSCULAR; INTRAVENOUS ONCE
Status: COMPLETED | OUTPATIENT
Start: 2025-08-18 | End: 2025-08-18

## 2025-08-18 RX ADMIN — IPRATROPIUM BROMIDE AND ALBUTEROL SULFATE 1 DOSE: .5; 3 SOLUTION RESPIRATORY (INHALATION) at 08:38

## 2025-08-18 RX ADMIN — INSULIN LISPRO 2 UNITS: 100 INJECTION, SOLUTION INTRAVENOUS; SUBCUTANEOUS at 20:12

## 2025-08-18 RX ADMIN — SODIUM CHLORIDE: 0.9 INJECTION, SOLUTION INTRAVENOUS at 19:49

## 2025-08-18 RX ADMIN — Medication 2 PUFF: at 21:20

## 2025-08-18 RX ADMIN — FUROSEMIDE 40 MG: 10 INJECTION, SOLUTION INTRAMUSCULAR; INTRAVENOUS at 02:05

## 2025-08-18 RX ADMIN — INSULIN HUMAN 5 UNITS: 100 INJECTION, SOLUTION PARENTERAL at 01:59

## 2025-08-18 RX ADMIN — SODIUM CHLORIDE: 0.9 INJECTION, SOLUTION INTRAVENOUS at 09:46

## 2025-08-18 RX ADMIN — INSULIN GLARGINE 15 UNITS: 100 INJECTION, SOLUTION SUBCUTANEOUS at 20:29

## 2025-08-18 RX ADMIN — INSULIN LISPRO 2 UNITS: 100 INJECTION, SOLUTION INTRAVENOUS; SUBCUTANEOUS at 17:57

## 2025-08-18 RX ADMIN — INSULIN LISPRO 2 UNITS: 100 INJECTION, SOLUTION INTRAVENOUS; SUBCUTANEOUS at 11:50

## 2025-08-18 RX ADMIN — DEXTROSE 250 ML: 10 SOLUTION INTRAVENOUS at 02:05

## 2025-08-18 RX ADMIN — METOCLOPRAMIDE 10 MG: 5 SOLUTION ORAL at 17:43

## 2025-08-18 RX ADMIN — IPRATROPIUM BROMIDE AND ALBUTEROL SULFATE 1 DOSE: .5; 3 SOLUTION RESPIRATORY (INHALATION) at 15:07

## 2025-08-18 RX ADMIN — MIDAZOLAM HYDROCHLORIDE 4 MG: 2 INJECTION, SOLUTION INTRAMUSCULAR; INTRAVENOUS at 16:10

## 2025-08-18 RX ADMIN — IOPAMIDOL 75 ML: 755 INJECTION, SOLUTION INTRAVENOUS at 00:36

## 2025-08-18 RX ADMIN — IOHEXOL 25 ML: 350 INJECTION, SOLUTION INTRAVENOUS at 00:36

## 2025-08-18 RX ADMIN — Medication 1 PACKET: at 20:13

## 2025-08-18 RX ADMIN — PANTOPRAZOLE SODIUM 40 MG: 40 INJECTION, POWDER, FOR SOLUTION INTRAVENOUS at 10:05

## 2025-08-18 RX ADMIN — MIDAZOLAM 4 MG: 1 INJECTION INTRAMUSCULAR; INTRAVENOUS at 16:10

## 2025-08-18 RX ADMIN — Medication 2 PUFF: at 08:49

## 2025-08-18 RX ADMIN — CARVEDILOL 6.25 MG: 3.12 TABLET, FILM COATED ORAL at 17:43

## 2025-08-18 RX ADMIN — IPRATROPIUM BROMIDE AND ALBUTEROL SULFATE 1 DOSE: .5; 3 SOLUTION RESPIRATORY (INHALATION) at 21:21

## 2025-08-18 RX ADMIN — Medication: at 20:13

## 2025-08-18 RX ADMIN — SODIUM CHLORIDE, PRESERVATIVE FREE 10 ML: 5 INJECTION INTRAVENOUS at 19:46

## 2025-08-18 RX ADMIN — FENTANYL CITRATE 100 MCG: 50 INJECTION, SOLUTION INTRAMUSCULAR; INTRAVENOUS at 16:10

## 2025-08-18 RX ADMIN — INSULIN LISPRO 2 UNITS: 100 INJECTION, SOLUTION INTRAVENOUS; SUBCUTANEOUS at 09:47

## 2025-08-18 RX ADMIN — Medication: at 11:15

## 2025-08-18 RX ADMIN — CALCIUM GLUCONATE 1000 MG: 20 INJECTION, SOLUTION INTRAVENOUS at 01:46

## 2025-08-18 ASSESSMENT — PULMONARY FUNCTION TESTS
PIF_VALUE: 22
PIF_VALUE: 22
PIF_VALUE: 24
PIF_VALUE: 23
PIF_VALUE: 18
PIF_VALUE: 20
PIF_VALUE: 19
PIF_VALUE: 22
PIF_VALUE: 21
PIF_VALUE: 20

## 2025-08-18 ASSESSMENT — PAIN SCALES - GENERAL
PAINLEVEL_OUTOF10: 0

## 2025-08-18 ASSESSMENT — ENCOUNTER SYMPTOMS: SHORTNESS OF BREATH: 1

## 2025-08-19 LAB
ANION GAP SERPL CALCULATED.3IONS-SCNC: 10 MMOL/L (ref 3–16)
BUN SERPL-MCNC: 40 MG/DL (ref 7–20)
CALCIUM SERPL-MCNC: 9.1 MG/DL (ref 8.3–10.6)
CHLORIDE SERPL-SCNC: 97 MMOL/L (ref 99–110)
CO2 SERPL-SCNC: 29 MMOL/L (ref 21–32)
CREAT SERPL-MCNC: 1.8 MG/DL (ref 0.6–1.2)
DEPRECATED RDW RBC AUTO: 15.4 % (ref 12.4–15.4)
EST. AVERAGE GLUCOSE BLD GHB EST-MCNC: 128.4 MG/DL
GFR SERPLBLD CREATININE-BSD FMLA CKD-EPI: 32 ML/MIN/{1.73_M2}
GLUCOSE BLD-MCNC: 151 MG/DL (ref 70–99)
GLUCOSE BLD-MCNC: 184 MG/DL (ref 70–99)
GLUCOSE BLD-MCNC: 226 MG/DL (ref 70–99)
GLUCOSE SERPL-MCNC: 107 MG/DL (ref 70–99)
HBA1C MFR BLD: 6.1 %
HCT VFR BLD AUTO: 21.5 % (ref 36–48)
HGB BLD-MCNC: 7.4 G/DL (ref 12–16)
MCH RBC QN AUTO: 30.7 PG (ref 26–34)
MCHC RBC AUTO-ENTMCNC: 34.5 G/DL (ref 31–36)
MCV RBC AUTO: 89 FL (ref 80–100)
PERFORMED ON: ABNORMAL
PLATELET # BLD AUTO: 175 K/UL (ref 135–450)
PMV BLD AUTO: 7.5 FL (ref 5–10.5)
POTASSIUM SERPL-SCNC: 4.7 MMOL/L (ref 3.5–5.1)
RBC # BLD AUTO: 2.42 M/UL (ref 4–5.2)
SODIUM SERPL-SCNC: 136 MMOL/L (ref 136–145)
WBC # BLD AUTO: 6.6 K/UL (ref 4–11)

## 2025-08-19 PROCEDURE — 80048 BASIC METABOLIC PNL TOTAL CA: CPT

## 2025-08-19 PROCEDURE — 83036 HEMOGLOBIN GLYCOSYLATED A1C: CPT

## 2025-08-19 PROCEDURE — 2580000003 HC RX 258: Performed by: INTERNAL MEDICINE

## 2025-08-19 PROCEDURE — 6370000000 HC RX 637 (ALT 250 FOR IP): Performed by: HOSPITALIST

## 2025-08-19 PROCEDURE — 51702 INSERT TEMP BLADDER CATH: CPT

## 2025-08-19 PROCEDURE — 85027 COMPLETE CBC AUTOMATED: CPT

## 2025-08-19 PROCEDURE — 94003 VENT MGMT INPAT SUBQ DAY: CPT

## 2025-08-19 PROCEDURE — 6360000002 HC RX W HCPCS: Performed by: INTERNAL MEDICINE

## 2025-08-19 PROCEDURE — 2000000000 HC ICU R&B

## 2025-08-19 PROCEDURE — 94640 AIRWAY INHALATION TREATMENT: CPT

## 2025-08-19 PROCEDURE — 36415 COLL VENOUS BLD VENIPUNCTURE: CPT

## 2025-08-19 PROCEDURE — 2700000000 HC OXYGEN THERAPY PER DAY

## 2025-08-19 PROCEDURE — 6370000000 HC RX 637 (ALT 250 FOR IP): Performed by: INTERNAL MEDICINE

## 2025-08-19 PROCEDURE — 94761 N-INVAS EAR/PLS OXIMETRY MLT: CPT

## 2025-08-19 RX ORDER — IPRATROPIUM BROMIDE AND ALBUTEROL SULFATE 2.5; .5 MG/3ML; MG/3ML
1 SOLUTION RESPIRATORY (INHALATION)
Status: DISCONTINUED | OUTPATIENT
Start: 2025-08-19 | End: 2025-08-23 | Stop reason: HOSPADM

## 2025-08-19 RX ORDER — 0.9 % SODIUM CHLORIDE 0.9 %
1000 INTRAVENOUS SOLUTION INTRAVENOUS ONCE
Status: COMPLETED | OUTPATIENT
Start: 2025-08-19 | End: 2025-08-19

## 2025-08-19 RX ADMIN — Medication 2 PUFF: at 08:22

## 2025-08-19 RX ADMIN — Medication: at 21:14

## 2025-08-19 RX ADMIN — INSULIN LISPRO 2 UNITS: 100 INJECTION, SOLUTION INTRAVENOUS; SUBCUTANEOUS at 21:18

## 2025-08-19 RX ADMIN — IPRATROPIUM BROMIDE AND ALBUTEROL SULFATE 1 DOSE: .5; 3 SOLUTION RESPIRATORY (INHALATION) at 08:21

## 2025-08-19 RX ADMIN — METOCLOPRAMIDE 10 MG: 5 SOLUTION ORAL at 17:20

## 2025-08-19 RX ADMIN — IPRATROPIUM BROMIDE AND ALBUTEROL SULFATE 1 DOSE: .5; 3 SOLUTION RESPIRATORY (INHALATION) at 20:50

## 2025-08-19 RX ADMIN — METOCLOPRAMIDE 10 MG: 5 SOLUTION ORAL at 06:54

## 2025-08-19 RX ADMIN — IPRATROPIUM BROMIDE AND ALBUTEROL SULFATE 1 DOSE: .5; 3 SOLUTION RESPIRATORY (INHALATION) at 15:00

## 2025-08-19 RX ADMIN — ASPIRIN 81 MG: 81 TABLET, CHEWABLE ORAL at 10:52

## 2025-08-19 RX ADMIN — CARVEDILOL 6.25 MG: 3.12 TABLET, FILM COATED ORAL at 17:20

## 2025-08-19 RX ADMIN — PANTOPRAZOLE SODIUM 40 MG: 40 INJECTION, POWDER, FOR SOLUTION INTRAVENOUS at 06:54

## 2025-08-19 RX ADMIN — Medication: at 11:02

## 2025-08-19 RX ADMIN — ATORVASTATIN CALCIUM 20 MG: 40 TABLET, FILM COATED ORAL at 10:52

## 2025-08-19 RX ADMIN — Medication 2 PUFF: at 20:50

## 2025-08-19 RX ADMIN — Medication 1 PACKET: at 10:53

## 2025-08-19 RX ADMIN — INSULIN GLARGINE 15 UNITS: 100 INJECTION, SOLUTION SUBCUTANEOUS at 21:12

## 2025-08-19 RX ADMIN — SODIUM CHLORIDE: 0.9 INJECTION, SOLUTION INTRAVENOUS at 17:29

## 2025-08-19 RX ADMIN — METOCLOPRAMIDE 10 MG: 5 SOLUTION ORAL at 10:52

## 2025-08-19 RX ADMIN — SODIUM CHLORIDE: 0.9 INJECTION, SOLUTION INTRAVENOUS at 06:13

## 2025-08-19 RX ADMIN — INSULIN LISPRO 2 UNITS: 100 INJECTION, SOLUTION INTRAVENOUS; SUBCUTANEOUS at 17:37

## 2025-08-19 RX ADMIN — SODIUM CHLORIDE 1000 ML: 0.9 INJECTION, SOLUTION INTRAVENOUS at 09:24

## 2025-08-19 RX ADMIN — Medication 1 PACKET: at 21:18

## 2025-08-19 ASSESSMENT — PULMONARY FUNCTION TESTS
PIF_VALUE: 23
PIF_VALUE: 20
PIF_VALUE: 24
PIF_VALUE: 17
PIF_VALUE: 19
PIF_VALUE: 20
PIF_VALUE: 18
PIF_VALUE: 21
PIF_VALUE: 24

## 2025-08-19 ASSESSMENT — PAIN SCALES - GENERAL
PAINLEVEL_OUTOF10: 0

## 2025-08-20 ENCOUNTER — APPOINTMENT (OUTPATIENT)
Dept: GENERAL RADIOLOGY | Age: 60
End: 2025-08-20
Payer: COMMERCIAL

## 2025-08-20 LAB
ANION GAP SERPL CALCULATED.3IONS-SCNC: 11 MMOL/L (ref 3–16)
BUN SERPL-MCNC: 30 MG/DL (ref 7–20)
CALCIUM SERPL-MCNC: 8.9 MG/DL (ref 8.3–10.6)
CHLORIDE SERPL-SCNC: 104 MMOL/L (ref 99–110)
CO2 SERPL-SCNC: 24 MMOL/L (ref 21–32)
CREAT SERPL-MCNC: 1.4 MG/DL (ref 0.6–1.2)
DEPRECATED RDW RBC AUTO: 15.6 % (ref 12.4–15.4)
GFR SERPLBLD CREATININE-BSD FMLA CKD-EPI: 43 ML/MIN/{1.73_M2}
GLUCOSE BLD-MCNC: 169 MG/DL (ref 70–99)
GLUCOSE BLD-MCNC: 187 MG/DL (ref 70–99)
GLUCOSE BLD-MCNC: 187 MG/DL (ref 70–99)
GLUCOSE BLD-MCNC: 191 MG/DL (ref 70–99)
GLUCOSE SERPL-MCNC: 178 MG/DL (ref 70–99)
HCT VFR BLD AUTO: 21.5 % (ref 36–48)
HGB BLD-MCNC: 7.4 G/DL (ref 12–16)
MCH RBC QN AUTO: 30.8 PG (ref 26–34)
MCHC RBC AUTO-ENTMCNC: 34.3 G/DL (ref 31–36)
MCV RBC AUTO: 89.8 FL (ref 80–100)
PERFORMED ON: ABNORMAL
PLATELET # BLD AUTO: 174 K/UL (ref 135–450)
PMV BLD AUTO: 7.5 FL (ref 5–10.5)
POTASSIUM SERPL-SCNC: 4.6 MMOL/L (ref 3.5–5.1)
RBC # BLD AUTO: 2.4 M/UL (ref 4–5.2)
SODIUM SERPL-SCNC: 139 MMOL/L (ref 136–145)
WBC # BLD AUTO: 5.9 K/UL (ref 4–11)

## 2025-08-20 PROCEDURE — 36415 COLL VENOUS BLD VENIPUNCTURE: CPT

## 2025-08-20 PROCEDURE — 6370000000 HC RX 637 (ALT 250 FOR IP): Performed by: HOSPITALIST

## 2025-08-20 PROCEDURE — 80048 BASIC METABOLIC PNL TOTAL CA: CPT

## 2025-08-20 PROCEDURE — 85027 COMPLETE CBC AUTOMATED: CPT

## 2025-08-20 PROCEDURE — 2000000000 HC ICU R&B

## 2025-08-20 PROCEDURE — 2580000003 HC RX 258: Performed by: INTERNAL MEDICINE

## 2025-08-20 PROCEDURE — 2700000000 HC OXYGEN THERAPY PER DAY

## 2025-08-20 PROCEDURE — 74019 RADEX ABDOMEN 2 VIEWS: CPT

## 2025-08-20 PROCEDURE — 6360000002 HC RX W HCPCS: Performed by: INTERNAL MEDICINE

## 2025-08-20 PROCEDURE — 6370000000 HC RX 637 (ALT 250 FOR IP): Performed by: INTERNAL MEDICINE

## 2025-08-20 PROCEDURE — 94761 N-INVAS EAR/PLS OXIMETRY MLT: CPT

## 2025-08-20 PROCEDURE — 94003 VENT MGMT INPAT SUBQ DAY: CPT

## 2025-08-20 PROCEDURE — 94640 AIRWAY INHALATION TREATMENT: CPT

## 2025-08-20 RX ADMIN — Medication 2 PUFF: at 08:29

## 2025-08-20 RX ADMIN — IPRATROPIUM BROMIDE AND ALBUTEROL SULFATE 1 DOSE: .5; 3 SOLUTION RESPIRATORY (INHALATION) at 19:54

## 2025-08-20 RX ADMIN — CARVEDILOL 6.25 MG: 3.12 TABLET, FILM COATED ORAL at 18:11

## 2025-08-20 RX ADMIN — IPRATROPIUM BROMIDE AND ALBUTEROL SULFATE 1 DOSE: .5; 3 SOLUTION RESPIRATORY (INHALATION) at 15:00

## 2025-08-20 RX ADMIN — Medication: at 10:25

## 2025-08-20 RX ADMIN — Medication: at 22:00

## 2025-08-20 RX ADMIN — CARVEDILOL 6.25 MG: 3.12 TABLET, FILM COATED ORAL at 10:31

## 2025-08-20 RX ADMIN — INSULIN LISPRO 2 UNITS: 100 INJECTION, SOLUTION INTRAVENOUS; SUBCUTANEOUS at 10:31

## 2025-08-20 RX ADMIN — SODIUM CHLORIDE: 0.9 INJECTION, SOLUTION INTRAVENOUS at 18:49

## 2025-08-20 RX ADMIN — ATORVASTATIN CALCIUM 20 MG: 40 TABLET, FILM COATED ORAL at 10:24

## 2025-08-20 RX ADMIN — INSULIN LISPRO 2 UNITS: 100 INJECTION, SOLUTION INTRAVENOUS; SUBCUTANEOUS at 18:11

## 2025-08-20 RX ADMIN — INSULIN GLARGINE 15 UNITS: 100 INJECTION, SOLUTION SUBCUTANEOUS at 21:30

## 2025-08-20 RX ADMIN — Medication 2 PUFF: at 19:55

## 2025-08-20 RX ADMIN — ASPIRIN 81 MG: 81 TABLET, CHEWABLE ORAL at 10:25

## 2025-08-20 RX ADMIN — Medication 1 PACKET: at 21:40

## 2025-08-20 RX ADMIN — SODIUM CHLORIDE: 0.9 INJECTION, SOLUTION INTRAVENOUS at 01:31

## 2025-08-20 RX ADMIN — METOCLOPRAMIDE 10 MG: 5 SOLUTION ORAL at 10:25

## 2025-08-20 RX ADMIN — METOCLOPRAMIDE 10 MG: 5 SOLUTION ORAL at 18:11

## 2025-08-20 RX ADMIN — ACETAMINOPHEN 650 MG: 325 TABLET ORAL at 21:39

## 2025-08-20 RX ADMIN — IPRATROPIUM BROMIDE AND ALBUTEROL SULFATE 1 DOSE: .5; 3 SOLUTION RESPIRATORY (INHALATION) at 08:28

## 2025-08-20 RX ADMIN — Medication 1 PACKET: at 10:54

## 2025-08-20 RX ADMIN — SODIUM CHLORIDE: 0.9 INJECTION, SOLUTION INTRAVENOUS at 10:09

## 2025-08-20 RX ADMIN — PANTOPRAZOLE SODIUM 40 MG: 40 INJECTION, POWDER, FOR SOLUTION INTRAVENOUS at 10:31

## 2025-08-20 ASSESSMENT — PULMONARY FUNCTION TESTS
PIF_VALUE: 17
PIF_VALUE: 12
PIF_VALUE: 18
PIF_VALUE: 18
PIF_VALUE: 21
PIF_VALUE: 18
PIF_VALUE: 18
PIF_VALUE: 19
PIF_VALUE: 15
PIF_VALUE: 18
PIF_VALUE: 14

## 2025-08-20 ASSESSMENT — PAIN SCALES - GENERAL
PAINLEVEL_OUTOF10: 0

## 2025-08-21 LAB
GLUCOSE BLD-MCNC: 175 MG/DL (ref 70–99)
GLUCOSE BLD-MCNC: 199 MG/DL (ref 70–99)
GLUCOSE BLD-MCNC: 222 MG/DL (ref 70–99)
GLUCOSE BLD-MCNC: 228 MG/DL (ref 70–99)
PERFORMED ON: ABNORMAL

## 2025-08-21 PROCEDURE — 6360000002 HC RX W HCPCS: Performed by: INTERNAL MEDICINE

## 2025-08-21 PROCEDURE — 2580000003 HC RX 258: Performed by: INTERNAL MEDICINE

## 2025-08-21 PROCEDURE — 2000000000 HC ICU R&B

## 2025-08-21 PROCEDURE — 94003 VENT MGMT INPAT SUBQ DAY: CPT

## 2025-08-21 PROCEDURE — 94640 AIRWAY INHALATION TREATMENT: CPT

## 2025-08-21 PROCEDURE — 6370000000 HC RX 637 (ALT 250 FOR IP): Performed by: INTERNAL MEDICINE

## 2025-08-21 PROCEDURE — 2700000000 HC OXYGEN THERAPY PER DAY

## 2025-08-21 PROCEDURE — 6370000000 HC RX 637 (ALT 250 FOR IP): Performed by: HOSPITALIST

## 2025-08-21 PROCEDURE — 94761 N-INVAS EAR/PLS OXIMETRY MLT: CPT

## 2025-08-21 RX ADMIN — PANTOPRAZOLE SODIUM 40 MG: 40 INJECTION, POWDER, FOR SOLUTION INTRAVENOUS at 06:59

## 2025-08-21 RX ADMIN — INSULIN GLARGINE 15 UNITS: 100 INJECTION, SOLUTION SUBCUTANEOUS at 21:30

## 2025-08-21 RX ADMIN — METOCLOPRAMIDE 10 MG: 5 SOLUTION ORAL at 12:43

## 2025-08-21 RX ADMIN — ATORVASTATIN CALCIUM 20 MG: 40 TABLET, FILM COATED ORAL at 08:32

## 2025-08-21 RX ADMIN — CARVEDILOL 6.25 MG: 3.12 TABLET, FILM COATED ORAL at 17:25

## 2025-08-21 RX ADMIN — METOCLOPRAMIDE 10 MG: 5 SOLUTION ORAL at 07:00

## 2025-08-21 RX ADMIN — SODIUM CHLORIDE: 0.9 INJECTION, SOLUTION INTRAVENOUS at 22:49

## 2025-08-21 RX ADMIN — IPRATROPIUM BROMIDE AND ALBUTEROL SULFATE 1 DOSE: .5; 3 SOLUTION RESPIRATORY (INHALATION) at 08:31

## 2025-08-21 RX ADMIN — INSULIN LISPRO 2 UNITS: 100 INJECTION, SOLUTION INTRAVENOUS; SUBCUTANEOUS at 21:47

## 2025-08-21 RX ADMIN — ASPIRIN 81 MG: 81 TABLET, CHEWABLE ORAL at 08:32

## 2025-08-21 RX ADMIN — IPRATROPIUM BROMIDE AND ALBUTEROL SULFATE 1 DOSE: .5; 3 SOLUTION RESPIRATORY (INHALATION) at 21:05

## 2025-08-21 RX ADMIN — ACETAMINOPHEN 650 MG: 325 TABLET ORAL at 21:29

## 2025-08-21 RX ADMIN — Medication: at 08:34

## 2025-08-21 RX ADMIN — Medication 2 PUFF: at 08:31

## 2025-08-21 RX ADMIN — INSULIN LISPRO 2 UNITS: 100 INJECTION, SOLUTION INTRAVENOUS; SUBCUTANEOUS at 12:43

## 2025-08-21 RX ADMIN — Medication 2 PUFF: at 21:11

## 2025-08-21 RX ADMIN — SODIUM CHLORIDE: 0.9 INJECTION, SOLUTION INTRAVENOUS at 03:48

## 2025-08-21 RX ADMIN — Medication: at 21:29

## 2025-08-21 RX ADMIN — Medication 1 PACKET: at 08:52

## 2025-08-21 RX ADMIN — Medication 1 PACKET: at 21:28

## 2025-08-21 RX ADMIN — IPRATROPIUM BROMIDE AND ALBUTEROL SULFATE 1 DOSE: .5; 3 SOLUTION RESPIRATORY (INHALATION) at 15:36

## 2025-08-21 RX ADMIN — INSULIN LISPRO 2 UNITS: 100 INJECTION, SOLUTION INTRAVENOUS; SUBCUTANEOUS at 07:04

## 2025-08-21 RX ADMIN — SODIUM CHLORIDE: 0.9 INJECTION, SOLUTION INTRAVENOUS at 14:04

## 2025-08-21 RX ADMIN — CARVEDILOL 6.25 MG: 3.12 TABLET, FILM COATED ORAL at 08:32

## 2025-08-21 ASSESSMENT — PAIN SCALES - GENERAL
PAINLEVEL_OUTOF10: 0

## 2025-08-21 ASSESSMENT — PULMONARY FUNCTION TESTS
PIF_VALUE: 19
PIF_VALUE: 20
PIF_VALUE: 18
PIF_VALUE: 16
PIF_VALUE: 15

## 2025-08-21 ASSESSMENT — PAIN - FUNCTIONAL ASSESSMENT: PAIN_FUNCTIONAL_ASSESSMENT: ADULT NONVERBAL PAIN SCALE (NPVS)

## 2025-08-22 LAB
ANION GAP SERPL CALCULATED.3IONS-SCNC: 10 MMOL/L (ref 3–16)
BUN SERPL-MCNC: 14 MG/DL (ref 7–20)
CALCIUM SERPL-MCNC: 8.6 MG/DL (ref 8.3–10.6)
CHLORIDE SERPL-SCNC: 109 MMOL/L (ref 99–110)
CO2 SERPL-SCNC: 20 MMOL/L (ref 21–32)
CREAT SERPL-MCNC: 1 MG/DL (ref 0.6–1.2)
DEPRECATED RDW RBC AUTO: 15.2 % (ref 12.4–15.4)
GFR SERPLBLD CREATININE-BSD FMLA CKD-EPI: 64 ML/MIN/{1.73_M2}
GLUCOSE BLD-MCNC: 189 MG/DL (ref 70–99)
GLUCOSE BLD-MCNC: 192 MG/DL (ref 70–99)
GLUCOSE BLD-MCNC: 202 MG/DL (ref 70–99)
GLUCOSE SERPL-MCNC: 214 MG/DL (ref 70–99)
HCT VFR BLD AUTO: 20.1 % (ref 36–48)
HCT VFR BLD AUTO: 21.7 % (ref 36–48)
HGB BLD-MCNC: 6.9 G/DL (ref 12–16)
HGB BLD-MCNC: 7.3 G/DL (ref 12–16)
MCH RBC QN AUTO: 31.1 PG (ref 26–34)
MCHC RBC AUTO-ENTMCNC: 34.3 G/DL (ref 31–36)
MCV RBC AUTO: 90.8 FL (ref 80–100)
PERFORMED ON: ABNORMAL
PLATELET # BLD AUTO: 155 K/UL (ref 135–450)
PMV BLD AUTO: 7.4 FL (ref 5–10.5)
POTASSIUM SERPL-SCNC: 4.2 MMOL/L (ref 3.5–5.1)
RBC # BLD AUTO: 2.21 M/UL (ref 4–5.2)
SODIUM SERPL-SCNC: 139 MMOL/L (ref 136–145)
WBC # BLD AUTO: 5.4 K/UL (ref 4–11)

## 2025-08-22 PROCEDURE — 2000000000 HC ICU R&B

## 2025-08-22 PROCEDURE — 85018 HEMOGLOBIN: CPT

## 2025-08-22 PROCEDURE — 85027 COMPLETE CBC AUTOMATED: CPT

## 2025-08-22 PROCEDURE — 2500000003 HC RX 250 WO HCPCS: Performed by: INTERNAL MEDICINE

## 2025-08-22 PROCEDURE — 6370000000 HC RX 637 (ALT 250 FOR IP): Performed by: INTERNAL MEDICINE

## 2025-08-22 PROCEDURE — 6360000002 HC RX W HCPCS: Performed by: INTERNAL MEDICINE

## 2025-08-22 PROCEDURE — 80048 BASIC METABOLIC PNL TOTAL CA: CPT

## 2025-08-22 PROCEDURE — 85014 HEMATOCRIT: CPT

## 2025-08-22 PROCEDURE — 2700000000 HC OXYGEN THERAPY PER DAY

## 2025-08-22 PROCEDURE — 6370000000 HC RX 637 (ALT 250 FOR IP): Performed by: HOSPITALIST

## 2025-08-22 PROCEDURE — 94761 N-INVAS EAR/PLS OXIMETRY MLT: CPT

## 2025-08-22 PROCEDURE — 94003 VENT MGMT INPAT SUBQ DAY: CPT

## 2025-08-22 PROCEDURE — 94640 AIRWAY INHALATION TREATMENT: CPT

## 2025-08-22 PROCEDURE — 36415 COLL VENOUS BLD VENIPUNCTURE: CPT

## 2025-08-22 RX ADMIN — Medication 1 PACKET: at 21:04

## 2025-08-22 RX ADMIN — Medication 2 PUFF: at 19:49

## 2025-08-22 RX ADMIN — METOCLOPRAMIDE 10 MG: 5 SOLUTION ORAL at 16:41

## 2025-08-22 RX ADMIN — INSULIN GLARGINE 15 UNITS: 100 INJECTION, SOLUTION SUBCUTANEOUS at 21:04

## 2025-08-22 RX ADMIN — Medication 1 PACKET: at 08:07

## 2025-08-22 RX ADMIN — INSULIN LISPRO 2 UNITS: 100 INJECTION, SOLUTION INTRAVENOUS; SUBCUTANEOUS at 07:00

## 2025-08-22 RX ADMIN — METOCLOPRAMIDE 10 MG: 5 SOLUTION ORAL at 08:07

## 2025-08-22 RX ADMIN — IPRATROPIUM BROMIDE AND ALBUTEROL SULFATE 1 DOSE: .5; 3 SOLUTION RESPIRATORY (INHALATION) at 19:48

## 2025-08-22 RX ADMIN — CARVEDILOL 6.25 MG: 3.12 TABLET, FILM COATED ORAL at 16:42

## 2025-08-22 RX ADMIN — SODIUM CHLORIDE, PRESERVATIVE FREE 10 ML: 5 INJECTION INTRAVENOUS at 21:05

## 2025-08-22 RX ADMIN — INSULIN LISPRO 2 UNITS: 100 INJECTION, SOLUTION INTRAVENOUS; SUBCUTANEOUS at 16:42

## 2025-08-22 RX ADMIN — Medication: at 21:21

## 2025-08-22 RX ADMIN — SODIUM CHLORIDE, PRESERVATIVE FREE 10 ML: 5 INJECTION INTRAVENOUS at 08:07

## 2025-08-22 RX ADMIN — INSULIN LISPRO 2 UNITS: 100 INJECTION, SOLUTION INTRAVENOUS; SUBCUTANEOUS at 16:41

## 2025-08-22 RX ADMIN — IPRATROPIUM BROMIDE AND ALBUTEROL SULFATE 1 DOSE: .5; 3 SOLUTION RESPIRATORY (INHALATION) at 13:19

## 2025-08-22 RX ADMIN — Medication 2 PUFF: at 08:23

## 2025-08-22 RX ADMIN — PANTOPRAZOLE SODIUM 40 MG: 40 INJECTION, POWDER, FOR SOLUTION INTRAVENOUS at 07:00

## 2025-08-22 RX ADMIN — ATORVASTATIN CALCIUM 20 MG: 40 TABLET, FILM COATED ORAL at 08:07

## 2025-08-22 RX ADMIN — Medication: at 08:08

## 2025-08-22 RX ADMIN — IPRATROPIUM BROMIDE AND ALBUTEROL SULFATE 1 DOSE: .5; 3 SOLUTION RESPIRATORY (INHALATION) at 08:22

## 2025-08-22 RX ADMIN — ASPIRIN 81 MG: 81 TABLET, CHEWABLE ORAL at 08:07

## 2025-08-22 RX ADMIN — CARVEDILOL 6.25 MG: 3.12 TABLET, FILM COATED ORAL at 08:07

## 2025-08-22 RX ADMIN — METOCLOPRAMIDE 10 MG: 5 SOLUTION ORAL at 11:25

## 2025-08-22 RX ADMIN — INSULIN LISPRO 2 UNITS: 100 INJECTION, SOLUTION INTRAVENOUS; SUBCUTANEOUS at 11:25

## 2025-08-22 RX ADMIN — INSULIN LISPRO 2 UNITS: 100 INJECTION, SOLUTION INTRAVENOUS; SUBCUTANEOUS at 21:04

## 2025-08-22 ASSESSMENT — PULMONARY FUNCTION TESTS
PIF_VALUE: 15
PIF_VALUE: 13
PIF_VALUE: 12
PIF_VALUE: 11
PIF_VALUE: 13
PIF_VALUE: 14
PIF_VALUE: 436

## 2025-08-22 ASSESSMENT — PAIN SCALES - GENERAL: PAINLEVEL_OUTOF10: 0

## 2025-08-23 VITALS
OXYGEN SATURATION: 99 % | BODY MASS INDEX: 32.29 KG/M2 | DIASTOLIC BLOOD PRESSURE: 56 MMHG | RESPIRATION RATE: 18 BRPM | WEIGHT: 164.46 LBS | HEART RATE: 73 BPM | TEMPERATURE: 98.2 F | SYSTOLIC BLOOD PRESSURE: 116 MMHG | HEIGHT: 60 IN

## 2025-08-23 LAB
ANION GAP SERPL CALCULATED.3IONS-SCNC: 11 MMOL/L (ref 3–16)
BASOPHILS # BLD: 0 K/UL (ref 0–0.2)
BASOPHILS NFR BLD: 0.5 %
BUN SERPL-MCNC: 14 MG/DL (ref 7–20)
CALCIUM SERPL-MCNC: 8.8 MG/DL (ref 8.3–10.6)
CHLORIDE SERPL-SCNC: 105 MMOL/L (ref 99–110)
CO2 SERPL-SCNC: 22 MMOL/L (ref 21–32)
CREAT SERPL-MCNC: 1 MG/DL (ref 0.6–1.2)
DEPRECATED RDW RBC AUTO: 15.4 % (ref 12.4–15.4)
EOSINOPHIL # BLD: 0.2 K/UL (ref 0–0.6)
EOSINOPHIL NFR BLD: 3 %
GFR SERPLBLD CREATININE-BSD FMLA CKD-EPI: 64 ML/MIN/{1.73_M2}
GLUCOSE BLD-MCNC: 238 MG/DL (ref 70–99)
GLUCOSE SERPL-MCNC: 182 MG/DL (ref 70–99)
HCT VFR BLD AUTO: 20.7 % (ref 36–48)
HGB BLD-MCNC: 7.2 G/DL (ref 12–16)
LYMPHOCYTES # BLD: 0.8 K/UL (ref 1–5.1)
LYMPHOCYTES NFR BLD: 14 %
MCH RBC QN AUTO: 31.1 PG (ref 26–34)
MCHC RBC AUTO-ENTMCNC: 34.5 G/DL (ref 31–36)
MCV RBC AUTO: 90.3 FL (ref 80–100)
MONOCYTES # BLD: 0.4 K/UL (ref 0–1.3)
MONOCYTES NFR BLD: 7.1 %
NEUTROPHILS # BLD: 4.2 K/UL (ref 1.7–7.7)
NEUTROPHILS NFR BLD: 75.4 %
PERFORMED ON: ABNORMAL
PLATELET # BLD AUTO: 176 K/UL (ref 135–450)
PMV BLD AUTO: 7.6 FL (ref 5–10.5)
POTASSIUM SERPL-SCNC: 3.9 MMOL/L (ref 3.5–5.1)
RBC # BLD AUTO: 2.3 M/UL (ref 4–5.2)
SODIUM SERPL-SCNC: 138 MMOL/L (ref 136–145)
WBC # BLD AUTO: 5.5 K/UL (ref 4–11)

## 2025-08-23 PROCEDURE — 94640 AIRWAY INHALATION TREATMENT: CPT

## 2025-08-23 PROCEDURE — 2700000000 HC OXYGEN THERAPY PER DAY

## 2025-08-23 PROCEDURE — 6370000000 HC RX 637 (ALT 250 FOR IP): Performed by: HOSPITALIST

## 2025-08-23 PROCEDURE — 6360000002 HC RX W HCPCS: Performed by: INTERNAL MEDICINE

## 2025-08-23 PROCEDURE — 36415 COLL VENOUS BLD VENIPUNCTURE: CPT

## 2025-08-23 PROCEDURE — 80048 BASIC METABOLIC PNL TOTAL CA: CPT

## 2025-08-23 PROCEDURE — 6370000000 HC RX 637 (ALT 250 FOR IP): Performed by: INTERNAL MEDICINE

## 2025-08-23 PROCEDURE — 94003 VENT MGMT INPAT SUBQ DAY: CPT

## 2025-08-23 PROCEDURE — 85025 COMPLETE CBC W/AUTO DIFF WBC: CPT

## 2025-08-23 PROCEDURE — 94761 N-INVAS EAR/PLS OXIMETRY MLT: CPT

## 2025-08-23 RX ADMIN — Medication 1 PACKET: at 08:36

## 2025-08-23 RX ADMIN — Medication: at 08:37

## 2025-08-23 RX ADMIN — CARVEDILOL 6.25 MG: 3.12 TABLET, FILM COATED ORAL at 08:36

## 2025-08-23 RX ADMIN — INSULIN LISPRO 2 UNITS: 100 INJECTION, SOLUTION INTRAVENOUS; SUBCUTANEOUS at 07:08

## 2025-08-23 RX ADMIN — Medication 2 PUFF: at 07:35

## 2025-08-23 RX ADMIN — PANTOPRAZOLE SODIUM 40 MG: 40 INJECTION, POWDER, FOR SOLUTION INTRAVENOUS at 07:08

## 2025-08-23 RX ADMIN — IPRATROPIUM BROMIDE AND ALBUTEROL SULFATE 1 DOSE: .5; 3 SOLUTION RESPIRATORY (INHALATION) at 07:35

## 2025-08-23 RX ADMIN — ATORVASTATIN CALCIUM 20 MG: 40 TABLET, FILM COATED ORAL at 08:36

## 2025-08-23 RX ADMIN — ASPIRIN 81 MG: 81 TABLET, CHEWABLE ORAL at 08:36

## 2025-08-23 ASSESSMENT — PULMONARY FUNCTION TESTS
PIF_VALUE: 20
PIF_VALUE: 16

## 2025-08-23 ASSESSMENT — PAIN SCALES - GENERAL: PAINLEVEL_OUTOF10: 0

## 2025-08-31 ENCOUNTER — HOSPITAL ENCOUNTER (EMERGENCY)
Age: 60
Discharge: HOME | End: 2025-09-01
Attending: EMERGENCY MEDICINE
Payer: COMMERCIAL

## 2025-08-31 ENCOUNTER — APPOINTMENT (OUTPATIENT)
Dept: GENERAL RADIOLOGY | Age: 60
End: 2025-08-31
Payer: COMMERCIAL

## 2025-08-31 DIAGNOSIS — R11.2 NAUSEA AND VOMITING, UNSPECIFIED VOMITING TYPE: Primary | ICD-10-CM

## 2025-08-31 LAB
ALBUMIN SERPL-MCNC: 3.7 G/DL (ref 3.4–5)
ALBUMIN/GLOB SERPL: 0.7 {RATIO} (ref 1.1–2.2)
ALP SERPL-CCNC: 98 U/L (ref 40–129)
ALT SERPL-CCNC: 21 U/L (ref 10–40)
ANION GAP SERPL CALCULATED.3IONS-SCNC: 15 MMOL/L (ref 3–16)
AST SERPL-CCNC: 18 U/L (ref 15–37)
BASOPHILS # BLD: 0.2 K/UL (ref 0–0.2)
BASOPHILS NFR BLD: 3.1 %
BILIRUB SERPL-MCNC: 1 MG/DL (ref 0–1)
BUN SERPL-MCNC: 34 MG/DL (ref 7–20)
CALCIUM SERPL-MCNC: 10.4 MG/DL (ref 8.3–10.6)
CHLORIDE SERPL-SCNC: 99 MMOL/L (ref 99–110)
CO2 SERPL-SCNC: 31 MMOL/L (ref 21–32)
CREAT SERPL-MCNC: 1.6 MG/DL (ref 0.6–1.2)
DEPRECATED RDW RBC AUTO: 15.2 % (ref 12.4–15.4)
EOSINOPHIL # BLD: 0.1 K/UL (ref 0–0.6)
EOSINOPHIL NFR BLD: 1.7 %
GFR SERPLBLD CREATININE-BSD FMLA CKD-EPI: 36 ML/MIN/{1.73_M2}
GLUCOSE SERPL-MCNC: 229 MG/DL (ref 70–99)
HCT VFR BLD AUTO: 26 % (ref 36–48)
HGB BLD-MCNC: 8.8 G/DL (ref 12–16)
LIPASE SERPL-CCNC: 11 U/L (ref 13–60)
LYMPHOCYTES # BLD: 0.8 K/UL (ref 1–5.1)
LYMPHOCYTES NFR BLD: 12.5 %
MCH RBC QN AUTO: 30.5 PG (ref 26–34)
MCHC RBC AUTO-ENTMCNC: 34 G/DL (ref 31–36)
MCV RBC AUTO: 89.8 FL (ref 80–100)
MONOCYTES # BLD: 0.3 K/UL (ref 0–1.3)
MONOCYTES NFR BLD: 5 %
NEUTROPHILS # BLD: 5.3 K/UL (ref 1.7–7.7)
NEUTROPHILS NFR BLD: 77.7 %
PLATELET # BLD AUTO: 214 K/UL (ref 135–450)
PMV BLD AUTO: 7.3 FL (ref 5–10.5)
POTASSIUM SERPL-SCNC: 4.5 MMOL/L (ref 3.5–5.1)
PROT SERPL-MCNC: 9 G/DL (ref 6.4–8.2)
RBC # BLD AUTO: 2.89 M/UL (ref 4–5.2)
SODIUM SERPL-SCNC: 145 MMOL/L (ref 136–145)
WBC # BLD AUTO: 6.8 K/UL (ref 4–11)

## 2025-08-31 PROCEDURE — 2580000003 HC RX 258: Performed by: PHYSICIAN ASSISTANT

## 2025-08-31 PROCEDURE — 6360000004 HC RX CONTRAST MEDICATION

## 2025-08-31 PROCEDURE — 85025 COMPLETE CBC W/AUTO DIFF WBC: CPT

## 2025-08-31 PROCEDURE — 94002 VENT MGMT INPAT INIT DAY: CPT

## 2025-08-31 PROCEDURE — 2700000000 HC OXYGEN THERAPY PER DAY

## 2025-08-31 PROCEDURE — 80053 COMPREHEN METABOLIC PANEL: CPT

## 2025-08-31 PROCEDURE — 83690 ASSAY OF LIPASE: CPT

## 2025-08-31 PROCEDURE — 94761 N-INVAS EAR/PLS OXIMETRY MLT: CPT

## 2025-08-31 PROCEDURE — 49465 FLUORO EXAM OF G/COLON TUBE: CPT

## 2025-08-31 PROCEDURE — 2500000003 HC RX 250 WO HCPCS: Performed by: PHYSICIAN ASSISTANT

## 2025-08-31 PROCEDURE — 6360000002 HC RX W HCPCS: Performed by: PHYSICIAN ASSISTANT

## 2025-08-31 RX ORDER — ONDANSETRON 2 MG/ML
4 INJECTION INTRAMUSCULAR; INTRAVENOUS ONCE
Status: COMPLETED | OUTPATIENT
Start: 2025-08-31 | End: 2025-08-31

## 2025-08-31 RX ORDER — ONDANSETRON 4 MG/1
4-8 TABLET, ORALLY DISINTEGRATING ORAL EVERY 12 HOURS PRN
Qty: 20 TABLET | Refills: 0 | Status: ON HOLD | OUTPATIENT
Start: 2025-08-31

## 2025-08-31 RX ORDER — 0.9 % SODIUM CHLORIDE 0.9 %
1000 INTRAVENOUS SOLUTION INTRAVENOUS ONCE
Status: COMPLETED | OUTPATIENT
Start: 2025-08-31 | End: 2025-08-31

## 2025-08-31 RX ORDER — ACETAMINOPHEN 325 MG/1
650 TABLET ORAL EVERY 6 HOURS PRN
Status: ON HOLD | COMMUNITY

## 2025-08-31 RX ADMIN — IOHEXOL 40 ML: 350 INJECTION, SOLUTION INTRAVENOUS at 15:17

## 2025-08-31 RX ADMIN — ONDANSETRON 4 MG: 2 INJECTION, SOLUTION INTRAMUSCULAR; INTRAVENOUS at 15:14

## 2025-08-31 RX ADMIN — SODIUM CHLORIDE 1000 ML: 0.9 INJECTION, SOLUTION INTRAVENOUS at 16:09

## 2025-08-31 RX ADMIN — LORAZEPAM 1 MG: 2 INJECTION INTRAMUSCULAR; INTRAVENOUS at 16:22

## 2025-08-31 ASSESSMENT — PULMONARY FUNCTION TESTS
PIF_VALUE: 21
PIF_VALUE: 25

## 2025-08-31 ASSESSMENT — LIFESTYLE VARIABLES
HOW MANY STANDARD DRINKS CONTAINING ALCOHOL DO YOU HAVE ON A TYPICAL DAY: PATIENT UNABLE TO ANSWER
HOW OFTEN DO YOU HAVE A DRINK CONTAINING ALCOHOL: PATIENT UNABLE TO ANSWER

## 2025-09-01 VITALS
BODY MASS INDEX: 32.39 KG/M2 | HEART RATE: 68 BPM | OXYGEN SATURATION: 100 % | DIASTOLIC BLOOD PRESSURE: 66 MMHG | WEIGHT: 165 LBS | TEMPERATURE: 99.1 F | HEIGHT: 60 IN | RESPIRATION RATE: 14 BRPM | SYSTOLIC BLOOD PRESSURE: 137 MMHG

## 2025-09-02 PROBLEM — K94.13 MALFUNCTION OF JEJUNOSTOMY TUBE (HCC): Status: ACTIVE | Noted: 2025-09-02

## 2025-09-02 PROBLEM — Z99.11 CHRONIC RESPIRATORY FAILURE REQUIRING CONTINUOUS MECHANICAL VENTILATION THROUGH TRACHEOSTOMY (HCC): Status: ACTIVE | Noted: 2025-07-13

## 2025-09-02 PROBLEM — Z93.0 CHRONIC RESPIRATORY FAILURE REQUIRING CONTINUOUS MECHANICAL VENTILATION THROUGH TRACHEOSTOMY (HCC): Status: ACTIVE | Noted: 2025-07-13

## (undated) DEVICE — INTENDED FOR TISSUE SEPARATION, AND OTHER PROCEDURES THAT REQUIRE A SHARP SURGICAL BLADE TO PUNCTURE OR CUT.: Brand: BARD-PARKER ® STAINLESS STEEL BLADES

## (undated) DEVICE — GLOVE ORTHO 8   MSG9480

## (undated) DEVICE — MOUTHPIECE ENDOSCP L CTRL OPN AND SIDE PORTS DISP

## (undated) DEVICE — SUTURE MCRYL + SZ 4-0 L27IN ABSRB UD L19MM PS-2 3/8 CIR MCP426H

## (undated) DEVICE — SOLUTION IRRIG 500ML STRL H2O NONPYROGENIC

## (undated) DEVICE — Z DISCONTINUED GLOVE SURG SZ 7 L12IN FNGR THK13MIL WHT ISOLEX POLYISOPRENE

## (undated) DEVICE — ENDOSCOPY KIT: Brand: MEDLINE INDUSTRIES, INC.

## (undated) DEVICE — PACK PROCEDURE SURG EXTREMITY MFFOP CUST

## (undated) DEVICE — TUBE TRACH CUF 7.5X10.8X74 MM 6.5 MM FLX SHILEY REUSE

## (undated) DEVICE — STRIP,CLOSURE,WOUND,MEDI-STRIP,1/2X4: Brand: MEDLINE

## (undated) DEVICE — ZIMMER® STERILE DISPOSABLE TOURNIQUET CUFF WITH PLC, DUAL PORT, SINGLE BLADDER, 34 IN. (86 CM)

## (undated) DEVICE — HYPODERMIC SAFETY NEEDLE: Brand: MAGELLAN

## (undated) DEVICE — SHEARS ENDOSCP L9CM CRV HARM FOCS +

## (undated) DEVICE — BIT DRL L100MM DIA2.5MM FOR SM FRAG UNIV LOK SYS

## (undated) DEVICE — TOWEL,OR,DSP,ST,BLUE,STD,4/PK,20PK/CS: Brand: MEDLINE

## (undated) DEVICE — ZIMMER® STERILE DISPOSABLE TOURNIQUET CUFF WITH PLC, DUAL PORT, SINGLE BLADDER, 18 IN. (46 CM)

## (undated) DEVICE — GLOVE SURG SZ 65 THK91MIL LTX FREE SYN POLYISOPRENE

## (undated) DEVICE — SYRINGE MED 10ML TRNSLUC BRL PLUNG BLK MRK POLYPR CTRL

## (undated) DEVICE — BRUSH CLN WRK L220CM CHN DIA2-4.2MM PLAS OPN STIFFER

## (undated) DEVICE — ENT: Brand: MEDLINE INDUSTRIES, INC.

## (undated) DEVICE — T-DRAPE,EXTREMITY,STERILE: Brand: MEDLINE

## (undated) DEVICE — GLOVE ORANGE PI 8   MSG9080

## (undated) DEVICE — SOLUTION IRRIG 500ML 0.9% SOD CHLO USP POUR PLAS BTL

## (undated) DEVICE — FORMALIN CLEAR VIAL 20 ML 10%

## (undated) DEVICE — DEVICE ENDO L200CM SHTH OD1.9MM GRSP HYBRID JAW

## (undated) DEVICE — GLOVE SURG SZ 65 L12IN THK75MIL DK GRN LTX FREE

## (undated) DEVICE — PREMIUM DRY TRAY LF: Brand: MEDLINE INDUSTRIES, INC.

## (undated) DEVICE — ELECTRODE PT RET AD L9FT HI MOIST COND ADH HYDRGEL CORDED

## (undated) DEVICE — MEDICINE CUP, GRADUATED, STER: Brand: MEDLINE

## (undated) DEVICE — SYRINGE MED 10ML LUERLOCK TIP W/O SFTY DISP

## (undated) DEVICE — SPONGE LAP W18XL18IN WHT COT 4 PLY FLD STRUNG RADPQ DISP ST 2 PER PACK

## (undated) DEVICE — BANDAGE COMPR W6INXL10YD ST M E WHITE/BEIGE

## (undated) DEVICE — SPONGE,DISSECTOR,K,XRAY,9/16"X1/4",STRL: Brand: MEDLINE

## (undated) DEVICE — GUIDEWIRE ORTH L6IN DIA1.6MM SMOOTH TRCR TIP FOR CANN SCR

## (undated) DEVICE — SOLUTION PREP PAINT POV IOD FOR SKIN MUCOUS MEM

## (undated) DEVICE — Device

## (undated) DEVICE — SUTURE VCRL + SZ 3-0 L18IN ABSRB UD SH 1/2 CIR TAPERCUT NDL VCP864D

## (undated) DEVICE — MASC TURNOVER KIT: Brand: MEDLINE INDUSTRIES, INC.

## (undated) DEVICE — GLOVE ORTHO 7 1/2   MSG9475

## (undated) DEVICE — HOLDER TRACH TB W1IN FIT UPTO 19.5IN NK 2 PC ADJ BLU

## (undated) DEVICE — GOWN SURG L BLU NONREINFORCED LEV 3 HK LOOP CLSR AURORA

## (undated) DEVICE — SYRINGE IRRIG 60ML SFT PLIABLE BLB EZ TO GRP 1 HND USE W/

## (undated) DEVICE — DRAPE,U/ SHT,SPLIT,PLAS,STERIL: Brand: MEDLINE

## (undated) DEVICE — BLADE ES ELASTOMERIC COAT INSUL DURABLE BEND UPTO 90DEG

## (undated) DEVICE — BITE BLOCK ENDOSCP AD 60 FR W/ ADJ STRP PLAS GRN BLOX

## (undated) DEVICE — ENDOSCOPIC KIT 6X3/16 FT COLON W/ 1.1 OZ 2 GWN W/O BRSH

## (undated) DEVICE — BIT DRL DIA2.7MM CANN QUIK CPL

## (undated) DEVICE — TRANSFER SET 3": Brand: MEDLINE INDUSTRIES, INC.

## (undated) DEVICE — PADDING CAST W4INXL4YD ST COT RAYON MICROPLEATED HIGHLY

## (undated) DEVICE — GAUZE,SPONGE,4"X4",8PLY,STRL,LF,10/TRAY: Brand: MEDLINE

## (undated) DEVICE — ENDOVIVE SFT PEG KIT PULL WENFIT 20F BX2

## (undated) DEVICE — SOLUTION SCRB 4OZ 7.5% POVIDONE IOD ANTIMIC BTL

## (undated) DEVICE — BANDAGE COMPR W4INXL12FT E DISP ESMARCH EVEN

## (undated) DEVICE — DRESSING,GAUZE,XEROFORM,CURAD,1"X8",ST: Brand: CURAD

## (undated) DEVICE — MASTISOL ADHESIVE LIQ 2/3ML

## (undated) DEVICE — MERCY HEALTH WEST TURNOVER: Brand: MEDLINE INDUSTRIES, INC.

## (undated) DEVICE — FORCEPS BX 240CM 2.4MM L NDL RAD JAW 4 M00513334

## (undated) DEVICE — SUTURE VCRL + SZ 2-0 L18IN ABSRB UD CT1 L36MM 1/2 CIR VCP839D

## (undated) DEVICE — CAP WATER BTL AIR TBNG L 16 IN CO2 TBNG L 48 IN ENDOSCP

## (undated) DEVICE — VALVE SUCTION AIR H2O SET ORCA POD + DISP

## (undated) DEVICE — SUTURE PROL SZ 3-0 L18IN NONABSORBABLE BLU L30MM FS-1 3/8 8663G

## (undated) DEVICE — RETRIEVER ENDOSCP L230CM SHTH DIA2.5MM NET 3X6CM STD FOR

## (undated) DEVICE — APPLICATOR MEDICATED 26 CC SOLUTION HI LT ORNG CHLORAPREP

## (undated) DEVICE — TUBING IRRIG COMPATIBLE W ERBE MEDIVATOR PMP HYDR

## (undated) DEVICE — NEPTUNE E-SEP SMOKE EVACUATION PENCIL, COATED, 70MM BLADE, PUSH BUTTON SWITCH: Brand: NEPTUNE E-SEP

## (undated) DEVICE — BIT DRL DIA2MM STD QUIK CONN FOR PERIARTC LOK PLT SYS

## (undated) DEVICE — C-ARM: Brand: UNBRANDED

## (undated) DEVICE — DEVICE GRSP L230CM SHTH DIA2.4MM HYBRID JAW FLX DST WIRE

## (undated) DEVICE — ADAPTER AIR/WATER CHANNEL CLEANING OLYMPUS COMPATIBLE NS

## (undated) DEVICE — SUTURE VICRYL + SZ 3-0 L18IN ABSRB UD SH 1/2 CIR TAPERCUT NDL VCP864D

## (undated) DEVICE — BANDAGE,ELASTIC,ESMARK,STERILE,6"X9',LF: Brand: MEDLINE